# Patient Record
Sex: FEMALE | Race: WHITE | NOT HISPANIC OR LATINO | Employment: OTHER | URBAN - METROPOLITAN AREA
[De-identification: names, ages, dates, MRNs, and addresses within clinical notes are randomized per-mention and may not be internally consistent; named-entity substitution may affect disease eponyms.]

---

## 2017-01-19 RX ORDER — SIMVASTATIN 10 MG/1
TABLET, FILM COATED ORAL
Qty: 90 TABLET | Refills: 1 | Status: SHIPPED | OUTPATIENT
Start: 2017-01-19 | End: 2017-07-10

## 2017-01-19 RX ORDER — LISINOPRIL 10 MG/1
TABLET ORAL
Qty: 90 TABLET | Refills: 1 | Status: SHIPPED | OUTPATIENT
Start: 2017-01-19 | End: 2017-08-02 | Stop reason: SDUPTHER

## 2017-01-27 ENCOUNTER — OFFICE VISIT (OUTPATIENT)
Dept: OPTOMETRY | Facility: CLINIC | Age: 82
End: 2017-01-27
Payer: MEDICARE

## 2017-01-27 DIAGNOSIS — H25.13 NUCLEAR SCLEROSIS, BILATERAL: Primary | ICD-10-CM

## 2017-01-27 DIAGNOSIS — H52.13 MYOPIA WITH ASTIGMATISM AND PRESBYOPIA, BILATERAL: ICD-10-CM

## 2017-01-27 DIAGNOSIS — Z46.0 CONTACT LENS/GLASSES FITTING: ICD-10-CM

## 2017-01-27 DIAGNOSIS — H35.3120 NONEXUDATIVE AGE-RELATED MACULAR DEGENERATION OF LEFT EYE: ICD-10-CM

## 2017-01-27 DIAGNOSIS — H43.813 POSTERIOR VITREOUS DETACHMENT, BILATERAL: ICD-10-CM

## 2017-01-27 DIAGNOSIS — Z46.0 CONTACT LENS/GLASSES FITTING: Primary | ICD-10-CM

## 2017-01-27 DIAGNOSIS — H52.4 MYOPIA WITH ASTIGMATISM AND PRESBYOPIA, BILATERAL: ICD-10-CM

## 2017-01-27 DIAGNOSIS — H52.203 MYOPIA WITH ASTIGMATISM AND PRESBYOPIA, BILATERAL: ICD-10-CM

## 2017-01-27 DIAGNOSIS — Z13.5 GLAUCOMA SCREENING: ICD-10-CM

## 2017-01-27 PROCEDURE — 92015 DETERMINE REFRACTIVE STATE: CPT | Mod: S$GLB,,, | Performed by: OPTOMETRIST

## 2017-01-27 PROCEDURE — 99499 UNLISTED E&M SERVICE: CPT | Mod: S$PBB,,, | Performed by: OPTOMETRIST

## 2017-01-27 PROCEDURE — 99999 PR PBB SHADOW E&M-EST. PATIENT-LVL II: CPT | Mod: PBBFAC,,, | Performed by: OPTOMETRIST

## 2017-01-27 PROCEDURE — 92014 COMPRE OPH EXAM EST PT 1/>: CPT | Mod: S$GLB,,, | Performed by: OPTOMETRIST

## 2017-01-27 PROCEDURE — 92310 CONTACT LENS FITTING OU: CPT | Mod: ,,, | Performed by: OPTOMETRIST

## 2017-01-27 PROCEDURE — 99499 UNLISTED E&M SERVICE: CPT | Mod: S$GLB,,, | Performed by: OPTOMETRIST

## 2017-01-27 NOTE — PROGRESS NOTES
HPI     Annual Exam    Additional comments: DLE 7-16 (timoteo)    pt needs updated specs & clrx            Blurred Vision    Additional comments: at distance only -- near [facility]       Last edited by Urmila Barger on 1/27/2017  1:50 PM. (History)            Assessment /Plan     For exam results, see Encounter Report.    Nuclear sclerosis, bilateral    Nonexudative age-related macular degeneration of left eye    Posterior vitreous detachment, bilateral    Glaucoma screening    Myopia with astigmatism and presbyopia, bilateral    Contact lens/glasses fitting      1. Vis sig OU ready for consult  Pt defers consult at this time  2. Dry changes OS>>OD, appears stable from previous  Continue areds, amsler, sun protection  3. RD precautions given  4. Not suspect  5. Updated specs rx  6. Updated / gave copy clrx    Long discussion AGAIN with patient that current vision not safe for driving.  Changing glasses and contact lens Rx will not improve vision to adequate levels for safe driving  Strongly advised to d/c driving until consult for CE  Pt refuses to schedule stating she has other health concerns that are more pressing.    Advised to call and we will schedule consult per her schedule, otherwise RTC prn

## 2017-01-27 NOTE — MR AVS SNAPSHOT
[facility]  [address] [facility] [address]  [facility] [address]  Phone: [phone]  Fax: [phone]                  Laly Baker   2017 1:45 PM   Office Visit    Description:  Female : 1935   Provider:  SHIRA Murillo, OD   Department:  [facility]           Reason for Visit     Annual Exam     Blurred Vision           Diagnoses this Visit        Comments    Nuclear sclerosis, bilateral    -  Primary     Nonexudative age-related macular degeneration of left eye         Posterior vitreous detachment, bilateral         Glaucoma screening         Myopia with astigmatism and presbyopia, bilateral         Contact lens/glasses fitting                To Do List           Future Appointments        Provider Department Dept Phone    2017 4:00 PM SHIRA Murillo, [facility] [phone]    2017 1:00 PM HAYDE Meléndez MD [facility] [phone]      Goals (5 Years of Data)     None      [facility] On [facility]     [facility] - /7 Assistance  Registered nurses in the [facility] provide clinical advisement, health education, appointment booking, and other advisory services.  Call for this free service at [phone].             Medications           Message regarding Medications     Verify the changes and/or additions to your medication regime listed below are the same as discussed with your clinician today.  If any of these changes or additions are incorrect, please notify your healthcare provider.             Verify that the below list of medications is an accurate representation of the medications you are currently taking.  If none reported, the list may be blank. If incorrect, please contact your healthcare provider. Carry this list with you in case of emergency.           Current Medications     aspirin 81 mg Tab Every day    fish oil-dha-epa (FISH OIL) 1,200-144-216 mg Cap Every day    lisinopril 10 MG tablet  TAKE ONE TABLET BY MOUTH EVERY DAY    multivitamin (THERAGRAN) per tablet Every day    simvastatin (ZOCOR) 10 MG tablet TAKE ONE TABLET BY MOUTH EVERY EVENING    VIT A/VIT C/VIT E/ZINC/COPPER (PRESERVISION AREDS ORAL) Take by mouth.           Clinical Reference Information           Allergies as of 1/27/2017     No Known Allergies      Immunizations Administered on Date of Encounter - 1/27/2017     None      Instructions        Cataract Surgery FAQs  Frequently Asked Questions    My doctor told me I have a cataract     What do I do next?   Relax. Cataracts are a normal part of aging, and cataract surgery is among the safest and most common procedures performed today.  Most patients who have had cataract surgery report significant improvement in their quality of life because of their improved vision, with a speedy recovery and minimal discomfort or inconvenience.    Your optometrist will recommend a cataract surgeon who will examine your eyes, evaluate the need for surgery, and discuss the details with you and your family.     What exactly is a cataract?   A cataract is simply a darkening or clouding of the eyes internal lens, which blurs your vision.  It is not a film which grows over the eye, but rather a degenerative process which causes the eyes normally clear lens to become cloudy or hazy.     Because this degenerative process occurs slowly over many years, we generally wait until the cataract begins to significantly impact your vision, before recommend surgery.                     How is cataract surgery performed?  Cataract surgery involves removal of the dark or cloudy lens from the eye, and implantation of a new, clear lens implant or IOL.  Cataract surgery is a lens replacement surgery.          Modern cataract surgery is performed as a same-day surgery and typically takes about 15 minutes to complete.  It is performed while you are awake, but you will be medicated so that you are relaxed and  comfortable. Of course, the eye is anesthetized so that you dont feel any discomfort, and many people only vaguely remember the actual procedure, recalling only lights and the sensation of moisture on the eye.     Although is takes about a week to fully heal, most people are able to see better and resume their normal activities the very next day!  You will need to use eye drops for about one month after your surgery.     Will I need glasses after cataract surgery?   The purpose of cataract surgery is to improve the overall quality of your vision, but it does not necessarily eliminate the need for glasses. Although some people dont need to wear glasses after standard cataract surgery, most people will still need to wear glasses for certain tasks.  If you are interested in minimizing or even eliminating the need for glasses, you should ask your surgeon about Refractive Cataract Surgery.    What is Refractive Cataract Surgery?   Refractive Cataract Surgery refers to the use of additional techniques performed either at the time of your cataract surgery or soon afterwards, designed to minimize and often eliminate your need for glasses.  Technologies such as LASIK, Astigmatism Correcting Incisions, Multifocal, Accommodating, or Toric lens implants can all be used, depending on the particular needs of each patient. Only your surgeon can determine if you are a candidate and which techniques are appropriate for your goals and your eye.                         LASIK                Multifocal IOL         Will my insurance cover these additional procedures?   Although your insurance will fully cover your cataract surgery, any other additional procedures -designed solely to eliminate the need for glasses- are considered elective (not medically necessary), and therefore not covered by your insurance.  Rest assured that your vision will be better after cataract surgery, in either case.  You simply need to decide whether  "minimizing your dependence on glasses is worth the additional investment in your eyes.  (Costs typically range between $1,000 to $2,000 per eye, depending on your needs).    View a 1hr video discussing cataract surgery with live patient questions and answers on the [facility] Web Site (Keywords: [facility] Cataract):    [email or web address]_[email or web address]/  DRY EYES:  Use Over The Counter artificial tears as needed for dry eye symptoms.  Some common brands include:  Systane, Optive, and Refresh.  These drops can be used as frequently as desired, but may be most helpful use during long periods of concentrated work.  For example, reading / working at the computer.  Avoid drops that "get redness out", as these contain medication that may further irritate the eyes.    ALLERGY EYES / SYMPTOMS:    Over the counter medications include--Zaditor and Alaway  Use as directed 1-2 drops daily for symptoms of itching / watering eyes.  These drops will not help for dry eye or exposure symptoms.    FLASHES / FLOATERS / POSTERIOR VITREOUS DETACHMENT    Call the clinic if you have any further changes in symptoms.  Including:  Increased numbers of floaters or flashing lights, dimness or darkness that moves through or stays constant in your vision, or any pain in the eye (s).                   "

## 2017-01-27 NOTE — PATIENT INSTRUCTIONS
Cataract Surgery FAQs  Frequently Asked Questions    My doctor told me I have a cataract     What do I do next?   Relax. Cataracts are a normal part of aging, and cataract surgery is among the safest and most common procedures performed today.  Most patients who have had cataract surgery report significant improvement in their quality of life because of their improved vision, with a speedy recovery and minimal discomfort or inconvenience.    Your optometrist will recommend a cataract surgeon who will examine your eyes, evaluate the need for surgery, and discuss the details with you and your family.     What exactly is a cataract?   A cataract is simply a darkening or clouding of the eyes internal lens, which blurs your vision.  It is not a film which grows over the eye, but rather a degenerative process which causes the eyes normally clear lens to become cloudy or hazy.     Because this degenerative process occurs slowly over many years, we generally wait until the cataract begins to significantly impact your vision, before recommend surgery.                     How is cataract surgery performed?  Cataract surgery involves removal of the dark or cloudy lens from the eye, and implantation of a new, clear lens implant or IOL.  Cataract surgery is a lens replacement surgery.          Modern cataract surgery is performed as a same-day surgery and typically takes about 15 minutes to complete.  It is performed while you are awake, but you will be medicated so that you are relaxed and comfortable. Of course, the eye is anesthetized so that you dont feel any discomfort, and many people only vaguely remember the actual procedure, recalling only lights and the sensation of moisture on the eye.     Although is takes about a week to fully heal, most people are able to see better and resume their normal activities the very next day!  You will need to use eye drops for about one month after your surgery.     Will I  need glasses after cataract surgery?   The purpose of cataract surgery is to improve the overall quality of your vision, but it does not necessarily eliminate the need for glasses. Although some people dont need to wear glasses after standard cataract surgery, most people will still need to wear glasses for certain tasks.  If you are interested in minimizing or even eliminating the need for glasses, you should ask your surgeon about Refractive Cataract Surgery.    What is Refractive Cataract Surgery?   Refractive Cataract Surgery refers to the use of additional techniques performed either at the time of your cataract surgery or soon afterwards, designed to minimize and often eliminate your need for glasses.  Technologies such as LASIK, Astigmatism Correcting Incisions, Multifocal, Accommodating, or Toric lens implants can all be used, depending on the particular needs of each patient. Only your surgeon can determine if you are a candidate and which techniques are appropriate for your goals and your eye.                         LASIK                Multifocal IOL         Will my insurance cover these additional procedures?   Although your insurance will fully cover your cataract surgery, any other additional procedures -designed solely to eliminate the need for glasses- are considered elective (not medically necessary), and therefore not covered by your insurance.  Rest assured that your vision will be better after cataract surgery, in either case.  You simply need to decide whether minimizing your dependence on glasses is worth the additional investment in your eyes.  (Costs typically range between $1,000 to $2,000 per eye, depending on your needs).    View a 1hr video discussing cataract surgery with live patient questions and answers on the [employer] Web Site (Keywords: [facility] Cataract):    [email or web address]/[email or web address] license of [facility]_cataracts/  DRY EYES:  Use Over The Counter artificial tears as needed  "for dry eye symptoms.  Some common brands include:  Systane, Optive, and Refresh.  These drops can be used as frequently as desired, but may be most helpful use during long periods of concentrated work.  For example, reading / working at the computer.  Avoid drops that "get redness out", as these contain medication that may further irritate the eyes.    ALLERGY EYES / SYMPTOMS:    Over the counter medications include--Zaditor and Alaway  Use as directed 1-2 drops daily for symptoms of itching / watering eyes.  These drops will not help for dry eye or exposure symptoms.    FLASHES / FLOATERS / POSTERIOR VITREOUS DETACHMENT    Call the clinic if you have any further changes in symptoms.  Including:  Increased numbers of floaters or flashing lights, dimness or darkness that moves through or stays constant in your vision, or any pain in the eye (s).              "

## 2017-02-06 ENCOUNTER — TELEPHONE (OUTPATIENT)
Dept: FAMILY MEDICINE | Facility: CLINIC | Age: 82
End: 2017-02-06

## 2017-02-06 DIAGNOSIS — Z12.39 SCREENING FOR BREAST CANCER: Primary | ICD-10-CM

## 2017-02-06 DIAGNOSIS — Z78.0 POSTMENOPAUSAL: ICD-10-CM

## 2017-02-06 NOTE — TELEPHONE ENCOUNTER
----- Message from Aurea Lara sent at 2/6/2017  1:58 PM CST -----  Bone davonte and mammo order patient would like doctor to put in.

## 2017-02-06 NOTE — TELEPHONE ENCOUNTER
See message below. Please advise. Thank you.  Both pended. Was not sure if that was the correct bone density.

## 2017-03-01 ENCOUNTER — HOSPITAL ENCOUNTER (OUTPATIENT)
Dept: RADIOLOGY | Facility: HOSPITAL | Age: 82
Discharge: HOME | End: 2017-03-01
Attending: FAMILY MEDICINE
Payer: MEDICARE

## 2017-03-01 DIAGNOSIS — Z12.39 SCREENING FOR BREAST CANCER: ICD-10-CM

## 2017-03-01 DIAGNOSIS — Z78.0 POSTMENOPAUSAL: ICD-10-CM

## 2017-03-01 DIAGNOSIS — Z12.31 VISIT FOR SCREENING MAMMOGRAM: ICD-10-CM

## 2017-03-01 PROCEDURE — 77080 DXA BONE DENSITY AXIAL: CPT | Mod: 26,,, | Performed by: RADIOLOGY

## 2017-03-01 PROCEDURE — 77067 SCR MAMMO BI INCL CAD: CPT | Mod: 26,,, | Performed by: RADIOLOGY

## 2017-03-01 PROCEDURE — 77080 DXA BONE DENSITY AXIAL: CPT | Mod: TC,PO

## 2017-03-01 PROCEDURE — 77063 BREAST TOMOSYNTHESIS BI: CPT | Mod: 26,,, | Performed by: RADIOLOGY

## 2017-03-01 PROCEDURE — 77067 SCR MAMMO BI INCL CAD: CPT | Mod: TC

## 2017-03-03 ENCOUNTER — TELEPHONE (OUTPATIENT)
Dept: FAMILY MEDICINE | Facility: CLINIC | Age: 82
End: 2017-03-03

## 2017-03-03 NOTE — TELEPHONE ENCOUNTER
Spoke to patient and stated to her that the bone density was normal for her age. Pt verbalized understanding.

## 2017-03-03 NOTE — TELEPHONE ENCOUNTER
----- Message from Noemi Garcia sent at 3/2/2017  4:06 PM CST -----  Contact: ctuu- [phone]  Patient returning a phone call. Thanks!

## 2017-03-06 ENCOUNTER — TELEPHONE (OUTPATIENT)
Dept: FAMILY MEDICINE | Facility: CLINIC | Age: 82
End: 2017-03-06

## 2017-03-06 NOTE — TELEPHONE ENCOUNTER
----- Message from Jaquelin Conroy sent at 3/6/2017  9:18 AM CST -----  Contact: self                 attn:  Carol  Patient [phone] is returning call Nurse Carol from this past Friday 03 03 17/please call

## 2017-05-08 ENCOUNTER — TELEPHONE (OUTPATIENT)
Dept: FAMILY MEDICINE | Facility: CLINIC | Age: 82
End: 2017-05-08

## 2017-05-08 DIAGNOSIS — I10 ESSENTIAL HYPERTENSION: Primary | Chronic | ICD-10-CM

## 2017-05-08 DIAGNOSIS — E78.5 HYPERLIPIDEMIA, UNSPECIFIED HYPERLIPIDEMIA TYPE: Chronic | ICD-10-CM

## 2017-05-08 NOTE — TELEPHONE ENCOUNTER
----- Message from Ami Soliman sent at 5/8/2017  9:41 AM CDT -----  Patient is schedule on 05/16/17 for her f/u appointment contact patient to advise if labs are required prior to appointment at [phone].    Thank you

## 2017-05-09 NOTE — TELEPHONE ENCOUNTER
----- Message from Ami Soliman sent at 5/9/2017 12:43 PM CDT -----  Patient is returning nurses call, contact patient at [phone].    Thank you

## 2017-05-09 NOTE — TELEPHONE ENCOUNTER
Spoke to pt and rescheduled her appt. Pt is wanting to know if she needs to get labs done? Please advise. Pt would like to go on Thursday with her .

## 2017-05-09 NOTE — TELEPHONE ENCOUNTER
----- Message from Nasrin Raman sent at 5/9/2017 10:12 AM CDT -----  Contact: Self  Pt was scheduled for the 18th with her . Her husbands appt was rescheduled, but when they went to reschedule her appt, they scheduled it for the same day. She is suppose to come in on the 23rd with her . Also, her  is doing lab work on the 17th, and the wife is as well,but there are not any orders in the computer. Please call pt to discuss appts.

## 2017-05-18 NOTE — TELEPHONE ENCOUNTER
Spoke to pt and notified her labs entered. Pt states she will come in on Saturday to have them done.

## 2017-05-19 ENCOUNTER — LAB VISIT (OUTPATIENT)
Dept: LAB | Facility: HOSPITAL | Age: 82
End: 2017-05-19
Attending: FAMILY MEDICINE
Payer: MEDICARE

## 2017-05-19 DIAGNOSIS — I10 ESSENTIAL HYPERTENSION: Chronic | ICD-10-CM

## 2017-05-19 DIAGNOSIS — E78.5 HYPERLIPIDEMIA, UNSPECIFIED HYPERLIPIDEMIA TYPE: Chronic | ICD-10-CM

## 2017-05-19 LAB
ALBUMIN SERPL BCP-MCNC: 3.8 G/DL
ALP SERPL-CCNC: 91 U/L
ALT SERPL W/O P-5'-P-CCNC: 28 U/L
ANION GAP SERPL CALC-SCNC: 7 MMOL/L
AST SERPL-CCNC: 34 U/L
BASOPHILS # BLD AUTO: 0.03 K/UL
BASOPHILS NFR BLD: 0.5 %
BILIRUB SERPL-MCNC: 0.6 MG/DL
BUN SERPL-MCNC: 22 MG/DL
CALCIUM SERPL-MCNC: 9.8 MG/DL
CHLORIDE SERPL-SCNC: 107 MMOL/L
CHOLEST/HDLC SERPL: 2.2 {RATIO}
CO2 SERPL-SCNC: 29 MMOL/L
CREAT SERPL-MCNC: 0.9 MG/DL
DIFFERENTIAL METHOD: NORMAL
EOSINOPHIL # BLD AUTO: 0.2 K/UL
EOSINOPHIL NFR BLD: 3.2 %
ERYTHROCYTE [DISTWIDTH] IN BLOOD BY AUTOMATED COUNT: 13.8 %
EST. GFR  (AFRICAN AMERICAN): >60 ML/MIN/1.73 M^2
EST. GFR  (NON AFRICAN AMERICAN): >60 ML/MIN/1.73 M^2
GLUCOSE SERPL-MCNC: 102 MG/DL
HCT VFR BLD AUTO: 40.7 %
HDL/CHOLESTEROL RATIO: 44.5 %
HDLC SERPL-MCNC: 200 MG/DL
HDLC SERPL-MCNC: 89 MG/DL
HGB BLD-MCNC: 13.1 G/DL
LDLC SERPL CALC-MCNC: 98.4 MG/DL
LYMPHOCYTES # BLD AUTO: 1.1 K/UL
LYMPHOCYTES NFR BLD: 18.8 %
MCH RBC QN AUTO: 30.3 PG
MCHC RBC AUTO-ENTMCNC: 32.2 %
MCV RBC AUTO: 94 FL
MONOCYTES # BLD AUTO: 0.8 K/UL
MONOCYTES NFR BLD: 13.5 %
NEUTROPHILS # BLD AUTO: 3.9 K/UL
NEUTROPHILS NFR BLD: 64 %
NONHDLC SERPL-MCNC: 111 MG/DL
PLATELET # BLD AUTO: 199 K/UL
PMV BLD AUTO: 10.1 FL
POTASSIUM SERPL-SCNC: 4.6 MMOL/L
PROT SERPL-MCNC: 7 G/DL
RBC # BLD AUTO: 4.33 M/UL
SODIUM SERPL-SCNC: 143 MMOL/L
T4 FREE SERPL-MCNC: 0.8 NG/DL
TRIGL SERPL-MCNC: 63 MG/DL
TSH SERPL DL<=0.005 MIU/L-ACNC: 4.2 UIU/ML
WBC # BLD AUTO: 6.01 K/UL

## 2017-05-19 PROCEDURE — 85025 COMPLETE CBC W/AUTO DIFF WBC: CPT

## 2017-05-19 PROCEDURE — 80061 LIPID PANEL: CPT

## 2017-05-19 PROCEDURE — 36415 COLL VENOUS BLD VENIPUNCTURE: CPT | Mod: PO

## 2017-05-19 PROCEDURE — 84443 ASSAY THYROID STIM HORMONE: CPT

## 2017-05-19 PROCEDURE — 84439 ASSAY OF FREE THYROXINE: CPT

## 2017-05-19 PROCEDURE — 80053 COMPREHEN METABOLIC PANEL: CPT

## 2017-05-23 ENCOUNTER — OFFICE VISIT (OUTPATIENT)
Dept: FAMILY MEDICINE | Facility: CLINIC | Age: 82
End: 2017-05-23
Payer: MEDICARE

## 2017-05-23 VITALS
HEIGHT: 64 IN | SYSTOLIC BLOOD PRESSURE: 138 MMHG | DIASTOLIC BLOOD PRESSURE: 72 MMHG | HEART RATE: 76 BPM | WEIGHT: 103.19 LBS | RESPIRATION RATE: 20 BRPM | BODY MASS INDEX: 17.62 KG/M2

## 2017-05-23 DIAGNOSIS — I10 ESSENTIAL HYPERTENSION: Chronic | ICD-10-CM

## 2017-05-23 DIAGNOSIS — E78.5 HYPERLIPIDEMIA, UNSPECIFIED HYPERLIPIDEMIA TYPE: Primary | Chronic | ICD-10-CM

## 2017-05-23 DIAGNOSIS — E46 PROTEIN CALORIE MALNUTRITION: ICD-10-CM

## 2017-05-23 PROCEDURE — 1125F AMNT PAIN NOTED PAIN PRSNT: CPT | Mod: S$GLB,,, | Performed by: FAMILY MEDICINE

## 2017-05-23 PROCEDURE — 1160F RVW MEDS BY RX/DR IN RCRD: CPT | Mod: S$GLB,,, | Performed by: FAMILY MEDICINE

## 2017-05-23 PROCEDURE — 1159F MED LIST DOCD IN RCRD: CPT | Mod: S$GLB,,, | Performed by: FAMILY MEDICINE

## 2017-05-23 PROCEDURE — 3078F DIAST BP <80 MM HG: CPT | Mod: S$GLB,,, | Performed by: FAMILY MEDICINE

## 2017-05-23 PROCEDURE — 3075F SYST BP GE 130 - 139MM HG: CPT | Mod: S$GLB,,, | Performed by: FAMILY MEDICINE

## 2017-05-23 PROCEDURE — 1157F ADVNC CARE PLAN IN RCRD: CPT | Mod: S$GLB,,, | Performed by: FAMILY MEDICINE

## 2017-05-23 PROCEDURE — 99499 UNLISTED E&M SERVICE: CPT | Mod: S$PBB,,, | Performed by: FAMILY MEDICINE

## 2017-05-23 PROCEDURE — 99214 OFFICE O/P EST MOD 30 MIN: CPT | Mod: S$GLB,,, | Performed by: FAMILY MEDICINE

## 2017-05-23 PROCEDURE — 99999 PR PBB SHADOW E&M-EST. PATIENT-LVL III: CPT | Mod: PBBFAC,,, | Performed by: FAMILY MEDICINE

## 2017-05-23 NOTE — PROGRESS NOTES
Subjective:       Patient ID: Laly Baker is a 82 y.o. female.    Chief Complaint: Hypertension (Patient here for 6 month follow up. )    Here for f/u chronic medical issues.        Hypertension   This is a chronic problem. The current episode started more than 1 year ago. The problem is controlled. Pertinent negatives include no chest pain, headaches, palpitations or shortness of breath. Past treatments include ACE inhibitors.     Review of Systems   Constitutional: Negative for activity change, chills, fever and unexpected weight change.   HENT: Negative for hearing loss, rhinorrhea and trouble swallowing.    Eyes: Positive for visual disturbance. Negative for discharge.   Respiratory: Negative for cough, chest tightness, shortness of breath and wheezing.    Cardiovascular: Negative for chest pain, palpitations and leg swelling.   Gastrointestinal: Negative for blood in stool, constipation, diarrhea and vomiting.   Endocrine: Negative for cold intolerance, heat intolerance, polydipsia and polyuria.   Genitourinary: Negative for difficulty urinating, dysuria, hematuria and menstrual problem.   Musculoskeletal: Positive for arthralgias. Negative for joint swelling.   Skin: Negative for rash.   Neurological: Negative for weakness and headaches.   Psychiatric/Behavioral: Negative for confusion and dysphoric mood.       Objective:      Physical Exam   Constitutional: She appears well-developed and well-nourished.   HENT:   Head: Normocephalic and atraumatic.   Cardiovascular: Normal rate, regular rhythm and normal heart sounds.    Pulmonary/Chest: Effort normal and breath sounds normal.   Abdominal: Soft. Bowel sounds are normal.   Psychiatric: She has a normal mood and affect.   Nursing note and vitals reviewed.      Assessment:       1. Hyperlipidemia, unspecified hyperlipidemia type    2. Essential hypertension    3. Protein calorie malnutrition        Plan:       Hyperlipidemia, unspecified hyperlipidemia  type    Essential hypertension    Protein calorie malnutrition      Thinks had tetanus booster a few years ago and to call if so.   Will monitor chronic medical issues and continue current plan of care.  Add calcium and vit d.    Return in about 6 months (around 11/23/2017), or if symptoms worsen or fail to improve.

## 2017-05-30 PROBLEM — A41.9 SEVERE SEPSIS: Status: ACTIVE | Noted: 2017-05-30

## 2017-05-30 PROBLEM — R10.11 ABDOMINAL PAIN, BILATERAL UPPER QUADRANT: Status: ACTIVE | Noted: 2017-05-30

## 2017-05-30 PROBLEM — R10.12 ABDOMINAL PAIN, BILATERAL UPPER QUADRANT: Status: ACTIVE | Noted: 2017-05-30

## 2017-05-30 PROBLEM — K85.10 ACUTE BILIARY PANCREATITIS: Status: ACTIVE | Noted: 2017-05-30

## 2017-05-30 PROBLEM — E86.0 DEHYDRATION, MODERATE: Status: ACTIVE | Noted: 2017-05-30

## 2017-05-30 PROBLEM — K85.90 ACUTE PANCREATITIS: Status: ACTIVE | Noted: 2017-05-30

## 2017-05-30 PROBLEM — R65.20 SEVERE SEPSIS: Status: ACTIVE | Noted: 2017-05-30

## 2017-05-31 PROBLEM — N17.0 ACUTE RENAL FAILURE WITH TUBULAR NECROSIS: Status: ACTIVE | Noted: 2017-05-31

## 2017-06-09 ENCOUNTER — TELEPHONE (OUTPATIENT)
Dept: FAMILY MEDICINE | Facility: CLINIC | Age: 82
End: 2017-06-09

## 2017-06-09 ENCOUNTER — PATIENT MESSAGE (OUTPATIENT)
Dept: FAMILY MEDICINE | Facility: CLINIC | Age: 82
End: 2017-06-09

## 2017-06-09 NOTE — TELEPHONE ENCOUNTER
"LOV 5/23/2017    [facility] contacted us VIA Fax that " Patient was in [facility] from 5/30 through 6/7. She was admitted to [facility] on 6/8, who received a call from the patient's  stating that the patient continues to have diarrhea and is requesting a prescription to help stop the diarrhea"    Please advise.  "

## 2017-06-12 ENCOUNTER — TELEPHONE (OUTPATIENT)
Dept: FAMILY MEDICINE | Facility: CLINIC | Age: 82
End: 2017-06-12

## 2017-06-12 NOTE — TELEPHONE ENCOUNTER
----- Message from Nerissa Garibay sent at 6/9/2017  3:35 PM CDT -----  Contact: Aaron with [facility]   Aaron with [facility] would like to speak to a nurse   The patient just got out of the hospital and is having diarrhea     Please call her at  to advise what the patient can take      Thanks

## 2017-06-13 ENCOUNTER — PATIENT MESSAGE (OUTPATIENT)
Dept: FAMILY MEDICINE | Facility: CLINIC | Age: 82
End: 2017-06-13

## 2017-06-13 NOTE — TELEPHONE ENCOUNTER
Note from pharmacy regarding refill for KCL and Furosemide. Spoke with  via telephone. I informed him that Dr. Meléndez would like for her to have labs drawn prior to refilling either medication. He states she no longer has diarrhea, Have gave her an anti-diarrheal (over the counter). He states home health will be drawing blood work on the patient tomorrow morning but he does not know who the ordering physician is. He states they have enough medication for today and tomorrow. She has an appt to see Dr. Meléndez on 06/15/2017. I have instructed him to please keep the appt. on 06/15/2017. Please try to contact nurse to see who is ordering physician, what labs are being ordered, and to be sure that Dr. Meléndez will be able to get results. Need to be sure she is having a BMP ordered. Thanks

## 2017-06-13 NOTE — TELEPHONE ENCOUNTER
"As previously addressed she needs evaluation as this could be c.diff and "stopping" the diarrhea can be dangerous as toxins could build up  Unfortunately no easy script can be sent  Needs visit  "

## 2017-06-14 ENCOUNTER — TELEPHONE (OUTPATIENT)
Dept: FAMILY MEDICINE | Facility: CLINIC | Age: 82
End: 2017-06-14

## 2017-06-14 NOTE — TELEPHONE ENCOUNTER
----- Message from Lester Dumont sent at 6/14/2017 11:04 AM CDT -----  Contact: [place] - Aaron  States that she is returning the call and to please call her back at [phone].  Thank you

## 2017-06-14 NOTE — TELEPHONE ENCOUNTER
----- Message from Ann Marie Frank sent at 6/14/2017 10:02 AM CDT -----  Contact:  rey   Stating the hospital visit for 06 15 2017 was cancelled wrongly by automated system   Wants it rescheduled   Call back

## 2017-06-14 NOTE — TELEPHONE ENCOUNTER
Attempted to contact pt nurse. No answer. LM  Previous message has been sent to Dr. Meléndez to see what to do as appt has already been taken.

## 2017-06-14 NOTE — TELEPHONE ENCOUNTER
Spoke to pt and notified her that unfortunately there was nothing that could be done in regards to the canceled appt. Advised pt that Dr. Meléndez suggest she needs to see a NP if she needed to be seen sooner. Pt was still upset but agreed to appt with NP. Scheduled pt first appt available with NP Andrew. Friday at 8am.

## 2017-06-14 NOTE — TELEPHONE ENCOUNTER
See message below. Please advise. Thank you.  They cancelled the appt accidentally and the appt has been taken. You have no more openings this week. Please advise as the pt  was very upset stating that the pt really needs to see you.

## 2017-06-14 NOTE — TELEPHONE ENCOUNTER
Spoke to pt earlier in previous message regarding this appointment and pt  sent this. appt was canceled by an automated response. Pt has appt on Friday with NP. Please advise as how to handle this message. Thanks.

## 2017-06-14 NOTE — TELEPHONE ENCOUNTER
----- Message from Cookie Walker sent at 6/14/2017 10:21 AM CDT -----  Contact: Aaron london/ [facility]  Aaron london/ [facility] calling in regards to the patient's appt. The patient accidentally cancelled appt for 6/15/17 during the reminder call. She desperately needs this appt. Aaron would like to speak with a Nurse to schedule the appt for tomorrow. Please advise.  Call back Aaron .  Thanks!

## 2017-06-14 NOTE — TELEPHONE ENCOUNTER
Not going to argue via mychart; last time I will address this; if she has abd pain and diarrhea go to ER;  I can't be any more clear  No treatment for either from me

## 2017-06-14 NOTE — TELEPHONE ENCOUNTER
----- Message from Zara Hernandez sent at 6/14/2017  8:38 AM CDT -----  Contact: Sarah Boogie  Home health nurse called regarding rescheduling her hospital f/u for tomorrow. Cancelled the appt by accident when received a phone call about the reminder. Wanted to reschedule for tomorrow same time. Please contact [phone] (home)

## 2017-06-14 NOTE — TELEPHONE ENCOUNTER
This patient is hospital FU - she needs to see her PCP for FU or TCC clinic - this is complex case and should be seeing TCC clinic   Please rescheudle her appt or make it with her PCP =- as she had booked on 6-15-17 already

## 2017-06-14 NOTE — TELEPHONE ENCOUNTER
Spoke to falguni and notified her that the appt has been taken and we are waiting on a response from dr bojorquez.. Notified her that we will notify pt when he responds.

## 2017-06-15 ENCOUNTER — PATIENT MESSAGE (OUTPATIENT)
Dept: FAMILY MEDICINE | Facility: CLINIC | Age: 82
End: 2017-06-15

## 2017-06-15 RX ORDER — POTASSIUM CHLORIDE 750 MG/1
10 TABLET, EXTENDED RELEASE ORAL DAILY
Qty: 7 TABLET | Refills: 0 | Status: SHIPPED | OUTPATIENT
Start: 2017-06-15 | End: 2017-07-10

## 2017-06-15 RX ORDER — FUROSEMIDE 20 MG/1
20 TABLET ORAL DAILY
Qty: 7 TABLET | Refills: 0 | Status: SHIPPED | OUTPATIENT
Start: 2017-06-15 | End: 2017-07-10

## 2017-06-15 NOTE — TELEPHONE ENCOUNTER
Spoke to pt and  and advised your recommendations. States pt is just going to wait until appt tomorrow.

## 2017-06-15 NOTE — TELEPHONE ENCOUNTER
Spoke to Mary with transitional care team at [facility], patient is set for 11:00am appointment 6/16/2017. Spoke to patient who confirmed that they will be attending that appointment.

## 2017-06-15 NOTE — TELEPHONE ENCOUNTER
Supposedly has an appointment tomorrow at [facility]; it is odd they are putting all this effort into these messages instead of going to the ER as advised to begin with as she could have life threatening issue going on    DO NOT COPY/PASTE this message.  Call the patient and advise what I have said please

## 2017-06-15 NOTE — TELEPHONE ENCOUNTER
----- Message from Shavonne Gudino sent at 6/14/2017  2:55 PM CDT -----  Regarding: Re: Medication refill  Patient's  in the office requesting a refill on his wife's medication be called in to our pharmacy here at [facility] so that he can pick it up tomorrow as she is completely out. I have scheduled an appointment for the patient on Friday, 6/16, with Elina Andrew. The appointment she had with Dr Meléndez for tomorrow, 6/15, was somehow cancelled and the patient's  was rather upset.

## 2017-06-15 NOTE — TELEPHONE ENCOUNTER
See message below. Please advise. Thank you.  Did advise pt of your last message and this was sent back. How would you like us to address? Again pt has appt with NP on Friday.

## 2017-06-15 NOTE — TELEPHONE ENCOUNTER
"Spoke with patient today about using [facility] for follow up given that patient is "sick, and not feeling well", but needs to be seen for hospital follow up.   Patient asked for refills of K+ and Lasix as she is out of those medications and needs refills ASAP.  "

## 2017-06-16 RX ORDER — FUROSEMIDE 20 MG/1
TABLET ORAL
Qty: 90 TABLET | Refills: 0 | OUTPATIENT
Start: 2017-06-16

## 2017-06-16 RX ORDER — POTASSIUM CHLORIDE 750 MG/1
TABLET, EXTENDED RELEASE ORAL
Qty: 90 TABLET | Refills: 0 | OUTPATIENT
Start: 2017-06-16

## 2017-06-20 ENCOUNTER — TELEPHONE (OUTPATIENT)
Dept: FAMILY MEDICINE | Facility: CLINIC | Age: 82
End: 2017-06-20

## 2017-06-20 NOTE — TELEPHONE ENCOUNTER
Received Fax from [facility]. Patient declines assistance with [facility]. Patient states that she is able to perform ADL's.

## 2017-06-26 ENCOUNTER — HOSPITAL ENCOUNTER (OUTPATIENT)
Dept: RADIOLOGY | Facility: HOSPITAL | Age: 82
Discharge: HOME | End: 2017-06-26
Attending: INTERNAL MEDICINE
Payer: MEDICARE

## 2017-06-26 DIAGNOSIS — K85.91 ACUTE NECROSIS OF PANCREAS: ICD-10-CM

## 2017-06-26 PROCEDURE — 74178 CT ABD&PLV WO CNTR FLWD CNTR: CPT | Mod: 26,,, | Performed by: RADIOLOGY

## 2017-06-26 PROCEDURE — 25500020 PHARM REV CODE 255: Mod: PO | Performed by: INTERNAL MEDICINE

## 2017-06-26 PROCEDURE — 74178 CT ABD&PLV WO CNTR FLWD CNTR: CPT | Mod: TC,PO

## 2017-06-26 RX ADMIN — IOHEXOL 50 ML: 350 INJECTION, SOLUTION INTRAVENOUS at 01:06

## 2017-06-26 RX ADMIN — IOHEXOL 30 ML: 350 INJECTION, SOLUTION INTRAVENOUS at 01:06

## 2017-06-28 ENCOUNTER — INITIAL CONSULT (OUTPATIENT)
Dept: OPHTHALMOLOGY | Facility: CLINIC | Age: 82
End: 2017-06-28
Payer: MEDICARE

## 2017-06-28 DIAGNOSIS — H25.12 NUCLEAR SCLEROTIC CATARACT OF LEFT EYE: ICD-10-CM

## 2017-06-28 DIAGNOSIS — H25.11 NUCLEAR SCLEROTIC CATARACT OF RIGHT EYE: Primary | ICD-10-CM

## 2017-06-28 PROCEDURE — 99999 PR PBB SHADOW E&M-EST. PATIENT-LVL II: CPT | Mod: PBBFAC,,, | Performed by: OPHTHALMOLOGY

## 2017-06-28 PROCEDURE — 99499 UNLISTED E&M SERVICE: CPT | Mod: S$PBB,,, | Performed by: OPHTHALMOLOGY

## 2017-06-28 PROCEDURE — 92014 COMPRE OPH EXAM EST PT 1/>: CPT | Mod: S$GLB,,, | Performed by: OPHTHALMOLOGY

## 2017-06-28 PROCEDURE — 92136 OPHTHALMIC BIOMETRY: CPT | Mod: RT,S$GLB,, | Performed by: OPHTHALMOLOGY

## 2017-06-28 RX ORDER — PREDNISOLONE ACETATE 10 MG/ML
1 SUSPENSION/ DROPS OPHTHALMIC 3 TIMES DAILY
Qty: 5 ML | Refills: 1 | Status: SHIPPED | OUTPATIENT
Start: 2017-06-28 | End: 2017-07-10

## 2017-06-28 RX ORDER — OFLOXACIN 3 MG/ML
1 SOLUTION/ DROPS OPHTHALMIC 3 TIMES DAILY
Qty: 5 ML | Refills: 0 | Status: SHIPPED | OUTPATIENT
Start: 2017-06-28 | End: 2017-09-26 | Stop reason: SDUPTHER

## 2017-06-28 NOTE — LETTER
June 28, 2017      SHIRA Murillo, OD  [address] [facility] [address] [place] [address]           [facility]  [address] [facility] [address] [place] [address]  Phone: [phone]  Fax: [phone]          Patient: Laly Baker   MR Number: [id number]   YOB: 1935   Date of Visit: 6/28/2017       Dear Dr. SHIRA Murillo:    Thank you for referring Laly Baker to me for evaluation. Attached you will find relevant portions of my assessment and plan of care.    If you have questions, please do not hesitate to call me. I look forward to following Laly Baker along with you.    Sincerely,    Isabela Rosales MD    Enclosure  CC:  No Recipients    If you would like to receive this communication electronically, please contact [email or web address] or [phone] to request more information on [employer] Link access.    For providers and/or their staff who would like to refer a patient to [facility], please contact us through our one-stop-shop provider referral line, [facility], at [phone].    If you feel you have received this communication in error or would no longer like to receive these types of communications, please e-mail [email or web address]

## 2017-06-28 NOTE — PROGRESS NOTES
HPI     Cihdi ref    1. NS OU  2.PVD OU    Cat erema--Pt complains of blurred-no longer wearing contact lens OD. Pt no   longer driving due to vision. Pt was in hospital for 8 days for   pancreatitis.      Last edited by Deidre Roberts on 6/28/2017  9:43 AM. (History)            Assessment /Plan     For exam results, see Encounter Report.    Nuclear sclerotic cataract of right eye  -     IOL Master - OD - Right Eye    Nuclear sclerotic cataract of left eye    Other orders  -     prednisoLONE acetate (PRED FORTE) 1 % DrpS; Place 1 drop into the right eye 3 (three) times daily.  Dispense: 5 mL; Refill: 1  -     ofloxacin (OCUFLOX) 0.3 % ophthalmic solution; Place 1 drop into the right eye 3 (three) times daily.  Dispense: 5 mL; Refill: 0      Visually significant nuclear sclerotic cataract   - Interfering with activities of daily living.  Pt desires cataract surgery for [facility] rehabilitation.   - R/B/A discussed and pt agrees to proceed with surgery.   - IOL options discussed according to patient's goals and concomitant ocular pathology; and pt content with monofocal lens.    - Target: plano.    pcboo 16.0 OD    (pcboo 18.0 OS)    * pt has natural monoVA but wants distance OU*

## 2017-07-10 ENCOUNTER — OFFICE VISIT (OUTPATIENT)
Dept: FAMILY MEDICINE | Facility: CLINIC | Age: 82
End: 2017-07-10
Payer: MEDICARE

## 2017-07-10 ENCOUNTER — TELEPHONE (OUTPATIENT)
Dept: OPHTHALMOLOGY | Facility: CLINIC | Age: 82
End: 2017-07-10

## 2017-07-10 ENCOUNTER — LAB VISIT (OUTPATIENT)
Dept: LAB | Facility: HOSPITAL | Age: 82
End: 2017-07-10
Attending: NURSE PRACTITIONER
Payer: MEDICARE

## 2017-07-10 VITALS
RESPIRATION RATE: 18 BRPM | SYSTOLIC BLOOD PRESSURE: 106 MMHG | HEIGHT: 64 IN | WEIGHT: 93.06 LBS | BODY MASS INDEX: 15.89 KG/M2 | OXYGEN SATURATION: 98 % | HEART RATE: 67 BPM | TEMPERATURE: 98 F | DIASTOLIC BLOOD PRESSURE: 60 MMHG

## 2017-07-10 DIAGNOSIS — Z01.818 PRE-OPERATIVE EXAMINATION: Primary | ICD-10-CM

## 2017-07-10 DIAGNOSIS — Z01.818 PRE-OPERATIVE EXAMINATION: ICD-10-CM

## 2017-07-10 DIAGNOSIS — H25.11 CATARACT, NUCLEAR SCLEROTIC, RIGHT EYE: ICD-10-CM

## 2017-07-10 LAB
ALBUMIN SERPL BCP-MCNC: 3.9 G/DL
ALP SERPL-CCNC: 87 U/L
ALT SERPL W/O P-5'-P-CCNC: 14 U/L
ANION GAP SERPL CALC-SCNC: 8 MMOL/L
AST SERPL-CCNC: 24 U/L
BASOPHILS # BLD AUTO: 0.05 K/UL
BASOPHILS NFR BLD: 0.5 %
BILIRUB SERPL-MCNC: 0.4 MG/DL
BUN SERPL-MCNC: 21 MG/DL
CALCIUM SERPL-MCNC: 10.2 MG/DL
CHLORIDE SERPL-SCNC: 102 MMOL/L
CO2 SERPL-SCNC: 28 MMOL/L
CREAT SERPL-MCNC: 1.2 MG/DL
DIFFERENTIAL METHOD: ABNORMAL
EOSINOPHIL # BLD AUTO: 0.2 K/UL
EOSINOPHIL NFR BLD: 2.2 %
ERYTHROCYTE [DISTWIDTH] IN BLOOD BY AUTOMATED COUNT: 14.3 %
EST. GFR  (AFRICAN AMERICAN): 49.3 ML/MIN/1.73 M^2
EST. GFR  (NON AFRICAN AMERICAN): 42.8 ML/MIN/1.73 M^2
GLUCOSE SERPL-MCNC: 100 MG/DL
HCT VFR BLD AUTO: 36.5 %
HGB BLD-MCNC: 11.5 G/DL
LYMPHOCYTES # BLD AUTO: 2 K/UL
LYMPHOCYTES NFR BLD: 22.2 %
MCH RBC QN AUTO: 28.9 PG
MCHC RBC AUTO-ENTMCNC: 31.5 %
MCV RBC AUTO: 92 FL
MONOCYTES # BLD AUTO: 0.8 K/UL
MONOCYTES NFR BLD: 9 %
NEUTROPHILS # BLD AUTO: 6 K/UL
NEUTROPHILS NFR BLD: 65.9 %
PLATELET # BLD AUTO: 386 K/UL
PMV BLD AUTO: 10.2 FL
POTASSIUM SERPL-SCNC: 4.6 MMOL/L
PROT SERPL-MCNC: 7.9 G/DL
RBC # BLD AUTO: 3.98 M/UL
SODIUM SERPL-SCNC: 138 MMOL/L
WBC # BLD AUTO: 9.15 K/UL

## 2017-07-10 PROCEDURE — 1126F AMNT PAIN NOTED NONE PRSNT: CPT | Mod: S$GLB,,, | Performed by: NURSE PRACTITIONER

## 2017-07-10 PROCEDURE — 85025 COMPLETE CBC W/AUTO DIFF WBC: CPT

## 2017-07-10 PROCEDURE — 36415 COLL VENOUS BLD VENIPUNCTURE: CPT | Mod: PO

## 2017-07-10 PROCEDURE — 99214 OFFICE O/P EST MOD 30 MIN: CPT | Mod: S$GLB,,, | Performed by: NURSE PRACTITIONER

## 2017-07-10 PROCEDURE — 80053 COMPREHEN METABOLIC PANEL: CPT

## 2017-07-10 PROCEDURE — 1157F ADVNC CARE PLAN IN RCRD: CPT | Mod: S$GLB,,, | Performed by: NURSE PRACTITIONER

## 2017-07-10 PROCEDURE — 99999 PR PBB SHADOW E&M-EST. PATIENT-LVL IV: CPT | Mod: PBBFAC,,, | Performed by: NURSE PRACTITIONER

## 2017-07-10 PROCEDURE — 1159F MED LIST DOCD IN RCRD: CPT | Mod: S$GLB,,, | Performed by: NURSE PRACTITIONER

## 2017-07-10 RX ORDER — PREDNISOLONE ACETATE 10 MG/ML
1 SUSPENSION/ DROPS OPHTHALMIC 3 TIMES DAILY
COMMUNITY
End: 2017-09-27 | Stop reason: SDUPTHER

## 2017-07-10 RX ORDER — OFLOXACIN 3 MG/ML
1 SOLUTION/ DROPS OPHTHALMIC 3 TIMES DAILY
COMMUNITY
End: 2018-05-17

## 2017-07-10 NOTE — PROGRESS NOTES
Subjective:       Patient ID: Laly Baker is a 82 y.o. female.    Chief Complaint: Other (Patient here for clearance for cataract surgery. )  She was last seen in primary care by Dr. Meléndez on 05/23/2017. This is her first time seeing me in the clinic.  HPI   She is here today for right eye cataract surgery clearance. Her surgery is scheduled for early August with Dr. Rosales  Vitals:    07/10/17 1017   BP: 106/60   Pulse: 67   Resp: 18   Temp: 98.1 °F (36.7 °C)     Past Medical History:   Diagnosis Date    Cataract     OU    Hyperlipidemia     Hypertension      Lab Results   Component Value Date    WBC 9.15 07/10/2017    HGB 11.5 (L) 07/10/2017    HCT 36.5 (L) 07/10/2017    MCV 92 07/10/2017     (H) 07/10/2017     CMP  Sodium   Date Value Ref Range Status   07/10/2017 138 136 - 145 mmol/L Final     Potassium   Date Value Ref Range Status   07/10/2017 4.6 3.5 - 5.1 mmol/L Final     Chloride   Date Value Ref Range Status   07/10/2017 102 95 - 110 mmol/L Final     CO2   Date Value Ref Range Status   07/10/2017 28 23 - 29 mmol/L Final     Glucose   Date Value Ref Range Status   07/10/2017 100 70 - 110 mg/dL Final     BUN, Bld   Date Value Ref Range Status   07/10/2017 21 8 - 23 mg/dL Final     Creatinine   Date Value Ref Range Status   07/10/2017 1.2 0.5 - 1.4 mg/dL Final     Calcium   Date Value Ref Range Status   07/10/2017 10.2 8.7 - 10.5 mg/dL Final     Total Protein   Date Value Ref Range Status   07/10/2017 7.9 6.0 - 8.4 g/dL Final     Albumin   Date Value Ref Range Status   07/10/2017 3.9 3.5 - 5.2 g/dL Final     Total Bilirubin   Date Value Ref Range Status   07/10/2017 0.4 0.1 - 1.0 mg/dL Final     Comment:     For infants and newborns, interpretation of results should be based  on gestational age, weight and in agreement with clinical  observations.  Premature Infant recommended reference ranges:  Up to 24 hours.............<8.0 mg/dL  Up to 48 hours............<12.0 mg/dL  3-5  days..................<15.0 mg/dL  6-29 days.................<15.0 mg/dL       Alkaline Phosphatase   Date Value Ref Range Status   07/10/2017 87 55 - 135 U/L Final     AST   Date Value Ref Range Status   07/10/2017 24 10 - 40 U/L Final     ALT   Date Value Ref Range Status   07/10/2017 14 10 - 44 U/L Final     Anion Gap   Date Value Ref Range Status   07/10/2017 8 8 - 16 mmol/L Final     eGFR if    Date Value Ref Range Status   07/10/2017 49.3 (A) >60 mL/min/1.73 m^2 Final     eGFR if non    Date Value Ref Range Status   07/10/2017 42.8 (A) >60 mL/min/1.73 m^2 Final     Comment:     Calculation used to obtain the estimated glomerular filtration  rate (eGFR) is the CKD-EPI equation. Since race is unknown   in our information system, the eGFR values for   -American and Non--American patients are given   for each creatinine result.       No results found for: LABA1C, HGBA1C  EK2017  Cardiac Clearance Needed: NO   Anticoagulants: YES fish oil    Review of Systems   Constitutional: Positive for unexpected weight change.     She states she has had more than 10 pound weight loss since with being in the hospital recently.  Hospitalized on 2017 for diagnosis of pancreatitis. I have reviewed her last lab and will repeat her CBC and BMP related to elevated WBC and abnormal potassium and glucose levels which need follow up.  Objective:      Physical Exam   Constitutional: She is oriented to person, place, and time. Vital signs are normal. She appears well-developed and well-nourished.   HENT:   Head: Normocephalic and atraumatic.   Right Ear: Hearing, tympanic membrane, external ear and ear canal normal.   Left Ear: Hearing, tympanic membrane, external ear and ear canal normal.   Nose: Nose normal.   Mouth/Throat: Uvula is midline, oropharynx is clear and moist and mucous membranes are normal.   Eyes: Conjunctivae, EOM and lids are normal. Pupils are equal, round, and  reactive to light. Right eye exhibits no chemosis, no discharge, no exudate and no hordeolum. No foreign body present in the right eye. Left eye exhibits no chemosis, no discharge, no exudate and no hordeolum. No foreign body present in the left eye. No scleral icterus.   Neck: Normal range of motion. Neck supple.   Cardiovascular: Normal rate, regular rhythm and normal heart sounds.    Pulmonary/Chest: Effort normal and breath sounds normal.   Abdominal: Soft. She exhibits no ascites. There is no splenomegaly or hepatomegaly. There is no tenderness.   Lymphadenopathy:        Head (right side): No submental, no submandibular, no tonsillar, no preauricular, no posterior auricular and no occipital adenopathy present.        Head (left side): No submental, no submandibular, no tonsillar, no preauricular, no posterior auricular and no occipital adenopathy present.     She has no cervical adenopathy.   Neurological: She is alert and oriented to person, place, and time.   Skin: Skin is warm, dry and intact.   Psychiatric: She has a normal mood and affect. Her speech is normal and behavior is normal. Judgment and thought content normal. Cognition and memory are normal.   Nursing note and vitals reviewed.      Assessment & Plan:       Pre-operative examination  -     CBC auto differential; Future; Expected date: 07/10/2017  -     Comprehensive metabolic panel; Future; Expected date: 07/10/2017    Cataract, nuclear sclerotic, right eye    I have updated her medical and social history   She is cleared for cataract surgery.      Return if symptoms worsen or fail to improve.

## 2017-07-11 ENCOUNTER — TELEPHONE (OUTPATIENT)
Dept: FAMILY MEDICINE | Facility: CLINIC | Age: 82
End: 2017-07-11

## 2017-07-11 NOTE — TELEPHONE ENCOUNTER
----- Message from Ami Mac sent at 7/11/2017  7:37 AM CDT -----  Patient requesting to speak with nurse concerning lab test results/please call back at [phone] to advise.

## 2017-07-11 NOTE — TELEPHONE ENCOUNTER
I called earlier this morning and was only able to get her . Called again at the number in message and unable to reach via telephone. Please notify her again and tell all labs were acceptable and I have sent her clearance for eye surgery.

## 2017-07-12 NOTE — TELEPHONE ENCOUNTER
----- Message from Noemi Garcia sent at 7/12/2017  2:39 PM CDT -----  Contact: Self-  [phone] will be available at 4:30pm   Patient is returning a phone call. Thanks!

## 2017-07-19 ENCOUNTER — PATIENT MESSAGE (OUTPATIENT)
Dept: OPHTHALMOLOGY | Facility: CLINIC | Age: 82
End: 2017-07-19

## 2017-07-21 ENCOUNTER — TELEPHONE (OUTPATIENT)
Dept: OPHTHALMOLOGY | Facility: CLINIC | Age: 82
End: 2017-07-21

## 2017-07-21 DIAGNOSIS — H25.11 NUCLEAR SCLEROTIC CATARACT OF RIGHT EYE: Primary | ICD-10-CM

## 2017-07-28 ENCOUNTER — LAB VISIT (OUTPATIENT)
Dept: LAB | Facility: HOSPITAL | Age: 82
End: 2017-07-28
Attending: INTERNAL MEDICINE
Payer: MEDICARE

## 2017-07-28 DIAGNOSIS — K86.3 CYST AND PSEUDOCYST OF PANCREAS: Primary | ICD-10-CM

## 2017-07-28 DIAGNOSIS — K86.2 CYST AND PSEUDOCYST OF PANCREAS: Primary | ICD-10-CM

## 2017-07-28 LAB — CANCER AG19-9 SERPL-ACNC: 32 U/ML

## 2017-07-28 PROCEDURE — 36415 COLL VENOUS BLD VENIPUNCTURE: CPT | Mod: PO

## 2017-07-28 PROCEDURE — 86301 IMMUNOASSAY TUMOR CA 19-9: CPT

## 2017-08-02 ENCOUNTER — HOSPITAL ENCOUNTER (OUTPATIENT)
Dept: RADIOLOGY | Facility: HOSPITAL | Age: 82
Discharge: HOME | End: 2017-08-02
Attending: INTERNAL MEDICINE
Payer: MEDICARE

## 2017-08-02 DIAGNOSIS — K86.3 CYST AND PSEUDOCYST OF PANCREAS: ICD-10-CM

## 2017-08-02 DIAGNOSIS — K86.2 CYST AND PSEUDOCYST OF PANCREAS: ICD-10-CM

## 2017-08-02 PROCEDURE — 74178 CT ABD&PLV WO CNTR FLWD CNTR: CPT | Mod: TC,PO

## 2017-08-02 PROCEDURE — 74178 CT ABD&PLV WO CNTR FLWD CNTR: CPT | Mod: 26,,, | Performed by: RADIOLOGY

## 2017-08-02 PROCEDURE — 25500020 PHARM REV CODE 255: Mod: PO | Performed by: INTERNAL MEDICINE

## 2017-08-02 RX ADMIN — IOHEXOL 75 ML: 350 INJECTION, SOLUTION INTRAVENOUS at 12:08

## 2017-08-02 RX ADMIN — IOHEXOL 30 ML: 350 INJECTION, SOLUTION INTRAVENOUS at 12:08

## 2017-08-04 RX ORDER — LISINOPRIL 10 MG/1
TABLET ORAL
Qty: 90 TABLET | Refills: 1 | Status: SHIPPED | OUTPATIENT
Start: 2017-08-04 | End: 2017-11-01 | Stop reason: SDUPTHER

## 2017-08-07 ENCOUNTER — ANESTHESIA EVENT (OUTPATIENT)
Dept: SURGERY | Facility: HOSPITAL | Age: 82
End: 2017-08-07
Payer: MEDICARE

## 2017-08-08 ENCOUNTER — ANESTHESIA (OUTPATIENT)
Dept: SURGERY | Facility: HOSPITAL | Age: 82
End: 2017-08-08
Payer: MEDICARE

## 2017-08-08 ENCOUNTER — HOSPITAL ENCOUNTER (OUTPATIENT)
Facility: HOSPITAL | Age: 82
Discharge: HOME | End: 2017-08-08
Attending: OPHTHALMOLOGY | Admitting: OPHTHALMOLOGY
Payer: MEDICARE

## 2017-08-08 ENCOUNTER — SURGERY (OUTPATIENT)
Age: 82
End: 2017-08-08

## 2017-08-08 VITALS
RESPIRATION RATE: 16 BRPM | HEIGHT: 64 IN | SYSTOLIC BLOOD PRESSURE: 102 MMHG | TEMPERATURE: 98 F | BODY MASS INDEX: 16.39 KG/M2 | OXYGEN SATURATION: 100 % | WEIGHT: 96 LBS | DIASTOLIC BLOOD PRESSURE: 52 MMHG | HEART RATE: 55 BPM

## 2017-08-08 DIAGNOSIS — H25.11 CATARACT, NUCLEAR SCLEROTIC, RIGHT EYE: Primary | ICD-10-CM

## 2017-08-08 DIAGNOSIS — H25.10 SENILE NUCLEAR SCLEROSIS: ICD-10-CM

## 2017-08-08 PROCEDURE — 71000033 HC RECOVERY, INTIAL HOUR: Mod: PO | Performed by: OPHTHALMOLOGY

## 2017-08-08 PROCEDURE — 37000009 HC ANESTHESIA EA ADD 15 MINS: Mod: PO | Performed by: OPHTHALMOLOGY

## 2017-08-08 PROCEDURE — 25000003 PHARM REV CODE 250: Mod: PO | Performed by: ANESTHESIOLOGY

## 2017-08-08 PROCEDURE — 36000706: Mod: PO | Performed by: OPHTHALMOLOGY

## 2017-08-08 PROCEDURE — 37000008 HC ANESTHESIA 1ST 15 MINUTES: Mod: PO | Performed by: OPHTHALMOLOGY

## 2017-08-08 PROCEDURE — 63600175 PHARM REV CODE 636 W HCPCS: Mod: PO | Performed by: OPHTHALMOLOGY

## 2017-08-08 PROCEDURE — 25000003 PHARM REV CODE 250: Mod: PO | Performed by: OPHTHALMOLOGY

## 2017-08-08 PROCEDURE — 66984 XCAPSL CTRC RMVL W/O ECP: CPT | Mod: RT,,, | Performed by: OPHTHALMOLOGY

## 2017-08-08 PROCEDURE — D9220A PRA ANESTHESIA: Mod: CRNA,,, | Performed by: NURSE ANESTHETIST, CERTIFIED REGISTERED

## 2017-08-08 PROCEDURE — 63600175 PHARM REV CODE 636 W HCPCS: Mod: PO | Performed by: NURSE ANESTHETIST, CERTIFIED REGISTERED

## 2017-08-08 PROCEDURE — C9447 INJ, PHENYLEPHRINE KETOROLAC: HCPCS | Mod: PO | Performed by: OPHTHALMOLOGY

## 2017-08-08 PROCEDURE — D9220A PRA ANESTHESIA: Mod: ANES,,, | Performed by: ANESTHESIOLOGY

## 2017-08-08 PROCEDURE — V2632 POST CHMBR INTRAOCULAR LENS: HCPCS | Mod: PO | Performed by: OPHTHALMOLOGY

## 2017-08-08 PROCEDURE — 36000707: Mod: PO | Performed by: OPHTHALMOLOGY

## 2017-08-08 DEVICE — LENS IOL ITEC PRELOAD 16.0D: Type: IMPLANTABLE DEVICE | Site: EYE | Status: FUNCTIONAL

## 2017-08-08 RX ORDER — PREDNISOLONE ACETATE 10 MG/ML
SUSPENSION/ DROPS OPHTHALMIC
Status: DISCONTINUED | OUTPATIENT
Start: 2017-08-08 | End: 2017-08-08 | Stop reason: HOSPADM

## 2017-08-08 RX ORDER — ACETAMINOPHEN 325 MG/1
650 TABLET ORAL EVERY 4 HOURS PRN
Status: CANCELLED | OUTPATIENT
Start: 2017-08-08

## 2017-08-08 RX ORDER — OFLOXACIN 3 MG/ML
1 SOLUTION/ DROPS OPHTHALMIC
Status: COMPLETED | OUTPATIENT
Start: 2017-08-08 | End: 2017-08-08

## 2017-08-08 RX ORDER — MIDAZOLAM HYDROCHLORIDE 1 MG/ML
INJECTION, SOLUTION INTRAMUSCULAR; INTRAVENOUS
Status: DISCONTINUED | OUTPATIENT
Start: 2017-08-08 | End: 2017-08-08

## 2017-08-08 RX ORDER — ONDANSETRON 2 MG/ML
INJECTION INTRAMUSCULAR; INTRAVENOUS
Status: DISCONTINUED | OUTPATIENT
Start: 2017-08-08 | End: 2017-08-08

## 2017-08-08 RX ORDER — PHENYLEPHRINE HYDROCHLORIDE 25 MG/ML
1 SOLUTION/ DROPS OPHTHALMIC
Status: COMPLETED | OUTPATIENT
Start: 2017-08-08 | End: 2017-08-08

## 2017-08-08 RX ORDER — LIDOCAINE HYDROCHLORIDE 10 MG/ML
1 INJECTION, SOLUTION EPIDURAL; INFILTRATION; INTRACAUDAL; PERINEURAL ONCE
Status: DISCONTINUED | OUTPATIENT
Start: 2017-08-08 | End: 2017-08-08 | Stop reason: HOSPADM

## 2017-08-08 RX ORDER — LIDOCAINE HYDROCHLORIDE 10 MG/ML
INJECTION, SOLUTION EPIDURAL; INFILTRATION; INTRACAUDAL; PERINEURAL
Status: DISCONTINUED | OUTPATIENT
Start: 2017-08-08 | End: 2017-08-08 | Stop reason: HOSPADM

## 2017-08-08 RX ORDER — KETOROLAC TROMETHAMINE 5 MG/ML
1 SOLUTION OPHTHALMIC ONCE
Status: COMPLETED | OUTPATIENT
Start: 2017-08-08 | End: 2017-08-08

## 2017-08-08 RX ORDER — PROPARACAINE HYDROCHLORIDE 5 MG/ML
1 SOLUTION/ DROPS OPHTHALMIC
Status: DISCONTINUED | OUTPATIENT
Start: 2017-08-08 | End: 2017-08-08 | Stop reason: HOSPADM

## 2017-08-08 RX ORDER — LIDOCAINE HYDROCHLORIDE 40 MG/ML
INJECTION, SOLUTION RETROBULBAR
Status: DISCONTINUED | OUTPATIENT
Start: 2017-08-08 | End: 2017-08-08 | Stop reason: HOSPADM

## 2017-08-08 RX ORDER — TROPICAMIDE 10 MG/ML
1 SOLUTION/ DROPS OPHTHALMIC
Status: COMPLETED | OUTPATIENT
Start: 2017-08-08 | End: 2017-08-08

## 2017-08-08 RX ORDER — LIDOCAINE HYDROCHLORIDE 40 MG/ML
1 INJECTION, SOLUTION RETROBULBAR
Status: COMPLETED | OUTPATIENT
Start: 2017-08-08 | End: 2017-08-08

## 2017-08-08 RX ORDER — SODIUM CHLORIDE, SODIUM LACTATE, POTASSIUM CHLORIDE, CALCIUM CHLORIDE 600; 310; 30; 20 MG/100ML; MG/100ML; MG/100ML; MG/100ML
INJECTION, SOLUTION INTRAVENOUS CONTINUOUS
Status: DISCONTINUED | OUTPATIENT
Start: 2017-08-08 | End: 2017-08-08 | Stop reason: HOSPADM

## 2017-08-08 RX ORDER — MOXIFLOXACIN 5 MG/ML
SOLUTION/ DROPS OPHTHALMIC
Status: DISCONTINUED | OUTPATIENT
Start: 2017-08-08 | End: 2017-08-08 | Stop reason: HOSPADM

## 2017-08-08 RX ORDER — MOXIFLOXACIN 5 MG/ML
1 SOLUTION/ DROPS OPHTHALMIC
Status: DISCONTINUED | OUTPATIENT
Start: 2017-08-08 | End: 2017-08-08

## 2017-08-08 RX ADMIN — SODIUM CHLORIDE, SODIUM LACTATE, POTASSIUM CHLORIDE, AND CALCIUM CHLORIDE: .6; .31; .03; .02 INJECTION, SOLUTION INTRAVENOUS at 07:08

## 2017-08-08 RX ADMIN — MIDAZOLAM HYDROCHLORIDE 1 MG: 1 INJECTION, SOLUTION INTRAMUSCULAR; INTRAVENOUS at 08:08

## 2017-08-08 RX ADMIN — LIDOCAINE HYDROCHLORIDE 4 MG: 40 SOLUTION RETROBULBAR; TOPICAL at 07:08

## 2017-08-08 RX ADMIN — MOXIFLOXACIN HYDROCHLORIDE 1 DROP: 5 SOLUTION/ DROPS OPHTHALMIC at 09:08

## 2017-08-08 RX ADMIN — LIDOCAINE HYDROCHLORIDE 1 ML: 10 INJECTION, SOLUTION EPIDURAL; INFILTRATION; INTRACAUDAL; PERINEURAL at 09:08

## 2017-08-08 RX ADMIN — Medication 0.5 ML: at 09:08

## 2017-08-08 RX ADMIN — OFLOXACIN 1 DROP: 3 SOLUTION OPHTHALMIC at 07:08

## 2017-08-08 RX ADMIN — TROPICAMIDE 1 DROP: 10 SOLUTION/ DROPS OPHTHALMIC at 07:08

## 2017-08-08 RX ADMIN — PROPARACAINE HYDROCHLORIDE 1 DROP: 5 SOLUTION/ DROPS OPHTHALMIC at 07:08

## 2017-08-08 RX ADMIN — PHENYLEPHRINE AND KETOROLAC 1 EACH: 10.16; 2.88 INJECTION, SOLUTION, CONCENTRATE INTRAOCULAR at 09:08

## 2017-08-08 RX ADMIN — LIDOCAINE HYDROCHLORIDE 3 DROP: 40 SOLUTION RETROBULBAR; TOPICAL at 09:08

## 2017-08-08 RX ADMIN — PREDNISOLONE ACETATE 1 DROP: 10 SUSPENSION OPHTHALMIC at 09:08

## 2017-08-08 RX ADMIN — KETOROLAC TROMETHAMINE 1 DROP: 5 SOLUTION OPHTHALMIC at 07:08

## 2017-08-08 RX ADMIN — SODIUM HYALURONATE 0.8 MG: 10 INJECTION INTRAOCULAR at 09:08

## 2017-08-08 RX ADMIN — PHENYLEPHRINE HYDROCHLORIDE 1 DROP: 25 SOLUTION/ DROPS OPHTHALMIC at 07:08

## 2017-08-08 RX ADMIN — BALANCED SALT SOLUTION 500 ML: 6.4; .75; .48; .3; 3.9; 1.7 SOLUTION OPHTHALMIC at 09:08

## 2017-08-08 RX ADMIN — ONDANSETRON 4 MG: 2 INJECTION, SOLUTION INTRAMUSCULAR; INTRAVENOUS at 08:08

## 2017-08-08 NOTE — OP NOTE
DATE OF PROCEDURE: 08/08/2017    SURGEON: BRETT HOOPER MD    PREOPERATIVE DIAGNOSIS:  Senile nuclear sclerotic cataract right eye.     POSTOPERATIVE DIAGNOSIS: Senile nuclear sclerotic cataract right eye.     PROCEDURE PERFORMED:  Phacoemulsification with placement of intraocular lens, right eye.    IMPLANT:  PCBOO 16.0    ANESTHESIA:  Topical and MAC    COMPLICATIONS: none    ESTIMATED BLOOD LOSS: <1cc    SPECIMENS: none    INDICATIONS FOR PROCEDURE:  This patient presented to the clinic with decreased vision in the right eye and was found to have a cataract.  The risks, benefits, and alternatives were discussed and the patient agreed to proceed with phacoemulsification and implantation of a lens in the right eye.     PROCEDURE IN DETAIL:  The patient was met in the preop holding area.  Consent was confirmed to be signed.  The operative site was marked.  The patient was brought into the operating room by the anesthesia team and placed under monitored anesthesia care.  The right eye was prepped and draped in a sterile ophthalmic fashion.  A Brianda speculum was placed into the right eye.   A paracentesis site was made and 1% preservative-free lidocaine was injected into the anterior chamber.  Viscoelastic  material was injected into the anterior chamber.  A keratome blade was used to make a clear corneal incision.  A cystotome was used to initiate the continuous curvilinear capsulorrhexis which was completed with Utrata forceps.  BSS on a lancaster cannula was used to perform hydrodissection.  The phacoemulsification tip was introduced into the eye and the nucleus was removed in a standard divide-and-conquer fashion.  Remaining cortical material was removed from the eye using irrigation-aspiration.  The capsular bag was filled with viscoelastic material and the intraocular lens was injected and positioned into place. Remaining viscoelastic material was removed from the eye using irrigation and aspiration.  The  corneal wounds were hydrated.  The eye was filled to physiologic pressure. The wounds were found to be watertight. Drops of Vigamox and prednisilone were placed into the eye.  The eye was washed, dried, and shielded.  The patient tolerated the procedure well and knows to follow up with me tomorrow morning, sooner if needed.

## 2017-08-08 NOTE — PLAN OF CARE
VSS, all questions answered. Denies recent fever or illness. Right eye dilating well. Pt states ready for procedure.

## 2017-08-08 NOTE — DISCHARGE INSTRUCTIONS
Discharge Instructions: After Your Surgery  Youve just had surgery. During surgery you were given medicine called anesthesia to keep you relaxed and free of pain. After surgery you may have some pain or nausea. This is common. Here are some tips for feeling better and getting well after surgery.     Stay on schedule with your medication.   Going home  Your doctor or nurse will show you how to take care of yourself when you go home. He or she will also answer your questions. Have an adult family member or friend drive you home. For the first 24 hours after your surgery:  · Do not drive or use heavy equipment.  · Do not make important decisions or sign legal papers.  · Do not drink alcohol.  · Have someone stay with you, if needed. He or she can watch for problems and help keep you safe.  Be sure to go to all follow-up visits with your doctor. And rest after your surgery for as long as your doctor tells you to.  Coping with pain  If you have pain after surgery, pain medicine will help you feel better. Take it as told, before pain becomes severe. Also, ask your doctor or pharmacist about other ways to control pain. This might be with heat, ice, or relaxation. And follow any other instructions your surgeon or nurse gives you.  Tips for taking pain medicine  To get the best relief possible, remember these points:  · Pain medicines can upset your stomach. Taking them with a little food may help.  · Most pain relievers taken by mouth need at least 20 to 30 minutes to start to work.  · Taking medicine on a schedule can help you remember to take it. Try to time your medicine so that you can take it before starting an activity. This might be before you get dressed, go for a walk, or sit down for dinner.  · Constipation is a common side effect of pain medicines. Call your doctor before taking any medicines such as laxatives or stool softeners to help ease constipation. Also ask if you should skip any foods. Drinking lots  of fluids and eating foods such as fruits and vegetables that are high in fiber can also help. Remember, do not take laxatives unless your surgeon has prescribed them.  · Drinking alcohol and taking pain medicine can cause dizziness and slow your breathing. It can even be deadly. Do not drink alcohol while taking pain medicine.  · Pain medicine can make you react more slowly to things. Do not drive or run machinery while taking pain medicine.  Your health care provider may tell you to take acetaminophen to help ease your pain. Ask him or her how much you are supposed to take each day. Acetaminophen or other pain relievers may interact with your prescription medicines or other over-the-counter (OTC) drugs. Some prescription medicines have acetaminophen and other ingredients. Using both prescription and OTC acetaminophen for pain can cause you to overdose. Read the labels on your OTC medicines with care. This will help you to clearly know the list of ingredients, how much to take, and any warnings. It may also help you not take too much acetaminophen. If you have questions or do not understand the information, ask your pharmacist or health care provider to explain it to you before you take the OTC medicine.  Managing nausea  Some people have an upset stomach after surgery. This is often because of anesthesia, pain, or pain medicine, or the stress of surgery. These tips will help you handle nausea and eat healthy foods as you get better. If you were on a special food plan before surgery, ask your doctor if you should follow it while you get better. These tips may help:  · Do not push yourself to eat. Your body will tell you when to eat and how much.  · Start off with clear liquids and soup. They are easier to digest.  · Next try semi-solid foods, such as mashed potatoes, applesauce, and gelatin, as you feel ready.  · Slowly move to solid foods. Dont eat fatty, rich, or spicy foods at first.  · Do not force yourself to  have 3 large meals a day. Instead eat smaller amounts more often.  · Take pain medicines with a small amount of solid food, such as crackers or toast, to avoid nausea.     Call your surgeon if  · You still have pain an hour after taking medicine. The medicine may not be strong enough.  · You feel too sleepy, dizzy, or groggy. The medicine may be too strong.  · You have side effects like nausea, vomiting, or skin changes, such as rash, itching, or hives.       If you have obstructive sleep apnea  You were given anesthesia medicine during surgery to keep you comfortable and free of pain. After surgery, you may have more apnea spells because of this medicine and other medicines you were given. The spells may last longer than usual.   At home:  · Keep using the continuous positive airway pressure (CPAP) device when you sleep. Unless your health care provider tells you not to, use it when you sleep, day or night. CPAP is a common device used to treat obstructive sleep apnea.  · Talk with your provider before taking any pain medicine, muscle relaxants, or sedatives. Your provider will tell you about the possible dangers of taking these medicines.  Date Last Reviewed: 10/16/2014  © 1318-0394 [employer]. [address] [place] [address], [place] [address]. All rights reserved. This information is not intended as a substitute for professional medical care. Always follow your healthcare professional's instructions.

## 2017-08-08 NOTE — PLAN OF CARE
PT TO PACU S/P CATARACT REMOVAL RT EYE.  DISCHARGE INSTRUCTIONS INCLUDING EYE SHEET AND ANES POST OP

## 2017-08-08 NOTE — ANESTHESIA PREPROCEDURE EVALUATION
08/08/2017  Laly Baker is a 82 y.o., female.    Anesthesia Evaluation    I have reviewed the Patient Summary Reports.        Review of Systems  Anesthesia Hx:  No problems with previous Anesthesia    Cardiovascular:   Hypertension, well controlled        Physical Exam  General:  Well nourished                 Anesthesia Plan  Type of Anesthesia, risks & benefits discussed:  Anesthesia Type:  MAC  Patient's Preference:   Intra-op Monitoring Plan:   Intra-op Monitoring Plan Comments:   Post Op Pain Control Plan:   Post Op Pain Control Plan Comments:   Induction:   IV  Beta Blocker:  Patient is not currently on a Beta-Blocker (No further documentation required).       Informed Consent: Patient understands risks and agrees with Anesthesia plan.  Questions answered. Anesthesia consent signed with patient.  ASA Score: 2     Day of Surgery Review of History & Physical:    H&P update referred to the surgeon.         Ready For Surgery From Anesthesia Perspective.

## 2017-08-08 NOTE — ANESTHESIA POSTPROCEDURE EVALUATION
"Anesthesia Post Evaluation    Patient: Laly Baker    Procedure(s) Performed: Procedure(s) (LRB):  PHACOEMULSIFICATION-ASPIRATION-CATARACT (Right)  INSERTION-INTRAOCULAR LENS (IOL) (Right)    Final Anesthesia Type: general  Patient location during evaluation: PACU  Patient participation: Yes- Able to Participate  Level of consciousness: awake and alert  Post-procedure vital signs: reviewed and stable  Pain management: adequate  Airway patency: patent  PONV status at discharge: No PONV  Anesthetic complications: no      Cardiovascular status: blood pressure returned to baseline and hemodynamically stable  Respiratory status: unassisted  Hydration status: euvolemic  Follow-up not needed.        Visit Vitals  /67   Pulse 70   Temp 36.7 °C (98.1 °F) (Skin)   Resp 16   Ht 5' 4" (1.626 m)   Wt 43.5 kg (96 lb)   SpO2 100%   Breastfeeding? No   BMI 16.48 kg/m²       Pain/Puma Score: Pain Assessment Performed: Yes (8/8/2017  9:11 AM)  Presence of Pain: denies (8/8/2017  9:11 AM)  Puma Score: 9 (8/8/2017  9:11 AM)      "

## 2017-08-09 ENCOUNTER — OFFICE VISIT (OUTPATIENT)
Dept: OPHTHALMOLOGY | Facility: CLINIC | Age: 82
End: 2017-08-09
Payer: MEDICARE

## 2017-08-09 DIAGNOSIS — Z96.1 STATUS POST CATARACT EXTRACTION AND INSERTION OF INTRAOCULAR LENS, RIGHT: Primary | ICD-10-CM

## 2017-08-09 DIAGNOSIS — Z98.41 STATUS POST CATARACT EXTRACTION AND INSERTION OF INTRAOCULAR LENS, RIGHT: Primary | ICD-10-CM

## 2017-08-09 DIAGNOSIS — H25.12 NUCLEAR SCLEROTIC CATARACT OF LEFT EYE: ICD-10-CM

## 2017-08-09 PROCEDURE — 99999 PR PBB SHADOW E&M-EST. PATIENT-LVL II: CPT | Mod: PBBFAC,,, | Performed by: OPHTHALMOLOGY

## 2017-08-09 PROCEDURE — 99024 POSTOP FOLLOW-UP VISIT: CPT | Mod: S$GLB,,, | Performed by: OPHTHALMOLOGY

## 2017-08-09 RX ORDER — KETOROLAC TROMETHAMINE 5 MG/ML
SOLUTION OPHTHALMIC
COMMUNITY
Start: 2017-07-10 | End: 2018-05-17

## 2017-08-09 NOTE — PROGRESS NOTES
HPI     Post-op Evaluation    Additional comments: 1 d po phaco iol OD d 8/8/2017//  taking drops as   directed//           Comments   [facility] referral    phaco iol OD  8/8/2017       Last edited by Isabela Rosales MD on 8/9/2017  1:52 PM. (History)            Assessment /Plan     For exam results, see Encounter Report.    Status post cataract extraction and insertion of intraocular lens, right    Nuclear sclerotic cataract of left eye      Slit Lamp Exam  L/L - normal  C/s - quiet  Cornea - clear  A/C - 1+ cell  Lens - PCIOL    POD #1 s/p phaco/IOL  - doing well  - continue the following drops:    vigamox or ocuflox TID x 1 wk then stop  Pred forte or durezol or dexamethasone TID x  4 wks  Ketorolac TID until runs out    Appropriate precautions and post op medications reviewed.  Patient instructed to call or come in if symptoms of redness, decreased vision, or pain are experienced.    -f/up 1-2wks, sooner PRN.       Cat OS - sign consent next, date in OCT,  recovering from broken hip

## 2017-08-23 ENCOUNTER — OFFICE VISIT (OUTPATIENT)
Dept: OPHTHALMOLOGY | Facility: CLINIC | Age: 82
End: 2017-08-23
Payer: MEDICARE

## 2017-08-23 DIAGNOSIS — Z98.41 STATUS POST CATARACT EXTRACTION AND INSERTION OF INTRAOCULAR LENS, RIGHT: Primary | ICD-10-CM

## 2017-08-23 DIAGNOSIS — H25.12 NUCLEAR SCLEROTIC CATARACT OF LEFT EYE: ICD-10-CM

## 2017-08-23 DIAGNOSIS — Z96.1 STATUS POST CATARACT EXTRACTION AND INSERTION OF INTRAOCULAR LENS, RIGHT: Primary | ICD-10-CM

## 2017-08-23 PROCEDURE — 99499 UNLISTED E&M SERVICE: CPT | Mod: S$PBB,,, | Performed by: OPHTHALMOLOGY

## 2017-08-23 PROCEDURE — 99024 POSTOP FOLLOW-UP VISIT: CPT | Mod: S$GLB,,, | Performed by: OPHTHALMOLOGY

## 2017-08-23 PROCEDURE — 92136 OPHTHALMIC BIOMETRY: CPT | Mod: 26,LT,S$GLB, | Performed by: OPHTHALMOLOGY

## 2017-08-23 PROCEDURE — 99999 PR PBB SHADOW E&M-EST. PATIENT-LVL II: CPT | Mod: PBBFAC,,, | Performed by: OPHTHALMOLOGY

## 2017-08-28 NOTE — PROGRESS NOTES
HPI     [facility] referral     1. phaco iol OD  8/8/2017     Pt denies pain, using gtts as directed   gtts-ofloxacin TID, PF TID    Last edited by Deidre Roberts on 8/23/2017  2:49 PM. (History)            Assessment /Plan     For exam results, see Encounter Report.    Status post cataract extraction and insertion of intraocular lens, right    Nuclear sclerotic cataract of left eye  -     IOL Master - OU - Both Eyes      PO week #1 s/p phaco/IOL -    - doing well, no issues  - okay to d/c abx gtt  - continue PF/ketoroloc TID for total of 1 month    - f/up 3-4 wks for MRx, DFE    Visually significant nuclear sclerotic cataract   - Interfering with activities of daily living.  Pt desires cataract surgery for [facility] rehabilitation.   - R/B/A discussed and pt agrees to proceed with surgery.   - IOL options discussed according to patient's goals and concomitant ocular pathology; and pt content with monofocal lens.    - Target: plano.      (pcboo 18.0 OS)    * pt has natural monoVA but wants distance OU*

## 2017-09-14 ENCOUNTER — TELEPHONE (OUTPATIENT)
Dept: OPHTHALMOLOGY | Facility: CLINIC | Age: 82
End: 2017-09-14

## 2017-09-14 DIAGNOSIS — H25.12 NUCLEAR SCLEROTIC CATARACT OF LEFT EYE: Primary | ICD-10-CM

## 2017-09-26 RX ORDER — OFLOXACIN 3 MG/ML
SOLUTION/ DROPS OPHTHALMIC
Qty: 5 ML | Refills: 0 | Status: SHIPPED | OUTPATIENT
Start: 2017-09-26 | End: 2018-05-17

## 2017-09-27 RX ORDER — PREDNISOLONE ACETATE 10 MG/ML
1 SUSPENSION/ DROPS OPHTHALMIC 3 TIMES DAILY
Qty: 5 ML | Refills: 1 | Status: SHIPPED | OUTPATIENT
Start: 2017-09-27 | End: 2018-05-17

## 2017-10-03 ENCOUNTER — OFFICE VISIT (OUTPATIENT)
Dept: FAMILY MEDICINE | Facility: CLINIC | Age: 82
End: 2017-10-03
Payer: MEDICARE

## 2017-10-03 VITALS
DIASTOLIC BLOOD PRESSURE: 50 MMHG | RESPIRATION RATE: 16 BRPM | SYSTOLIC BLOOD PRESSURE: 108 MMHG | HEART RATE: 68 BPM | OXYGEN SATURATION: 99 % | WEIGHT: 92.81 LBS | HEIGHT: 64 IN | BODY MASS INDEX: 15.85 KG/M2

## 2017-10-03 DIAGNOSIS — R63.4 WEIGHT LOSS: ICD-10-CM

## 2017-10-03 DIAGNOSIS — I10 ESSENTIAL HYPERTENSION: Chronic | ICD-10-CM

## 2017-10-03 DIAGNOSIS — N18.30 CKD (CHRONIC KIDNEY DISEASE), STAGE III: ICD-10-CM

## 2017-10-03 DIAGNOSIS — Z01.818 PRE-OP EXAM: Primary | ICD-10-CM

## 2017-10-03 PROBLEM — R10.12 ABDOMINAL PAIN, BILATERAL UPPER QUADRANT: Status: RESOLVED | Noted: 2017-05-30 | Resolved: 2017-10-03

## 2017-10-03 PROBLEM — R10.11 ABDOMINAL PAIN, BILATERAL UPPER QUADRANT: Status: RESOLVED | Noted: 2017-05-30 | Resolved: 2017-10-03

## 2017-10-03 PROBLEM — K85.90 ACUTE PANCREATITIS WITHOUT INFECTION OR NECROSIS: Status: RESOLVED | Noted: 2017-05-30 | Resolved: 2017-10-03

## 2017-10-03 PROBLEM — A41.9 SEVERE SEPSIS: Status: RESOLVED | Noted: 2017-05-30 | Resolved: 2017-10-03

## 2017-10-03 PROBLEM — H25.11 CATARACT, NUCLEAR SCLEROTIC, RIGHT EYE: Status: RESOLVED | Noted: 2017-07-10 | Resolved: 2017-10-03

## 2017-10-03 PROBLEM — R65.20 SEVERE SEPSIS: Status: RESOLVED | Noted: 2017-05-30 | Resolved: 2017-10-03

## 2017-10-03 PROBLEM — N17.0 ACUTE RENAL FAILURE WITH TUBULAR NECROSIS: Status: RESOLVED | Noted: 2017-05-31 | Resolved: 2017-10-03

## 2017-10-03 PROBLEM — E86.0 DEHYDRATION, MODERATE: Status: RESOLVED | Noted: 2017-05-30 | Resolved: 2017-10-03

## 2017-10-03 PROCEDURE — 99213 OFFICE O/P EST LOW 20 MIN: CPT | Mod: S$GLB,,, | Performed by: EMERGENCY MEDICINE

## 2017-10-03 PROCEDURE — 90662 IIV NO PRSV INCREASED AG IM: CPT | Mod: S$GLB,,, | Performed by: EMERGENCY MEDICINE

## 2017-10-03 PROCEDURE — 99499 UNLISTED E&M SERVICE: CPT | Mod: S$PBB,,, | Performed by: EMERGENCY MEDICINE

## 2017-10-03 PROCEDURE — G0008 ADMIN INFLUENZA VIRUS VAC: HCPCS | Mod: S$GLB,,, | Performed by: EMERGENCY MEDICINE

## 2017-10-03 PROCEDURE — 99999 PR PBB SHADOW E&M-EST. PATIENT-LVL III: CPT | Mod: PBBFAC,,, | Performed by: EMERGENCY MEDICINE

## 2017-10-03 RX ORDER — SIMVASTATIN 10 MG/1
10 TABLET, FILM COATED ORAL NIGHTLY
COMMUNITY
End: 2017-10-06

## 2017-10-03 NOTE — Clinical Note
I have done a short preop note for her impending cataract surgery.  She is low risk. Yayo Lindsey MD

## 2017-10-03 NOTE — PROGRESS NOTES
Subjective:   THIS NOTE IS DONE WITH VOICE RECOGNITION        Patient ID: Laly Elder is a 82 y.o. female.    Chief Complaint: Establish Care      HPI     Pancreatitis in May 2017.    Hospital Course:   Hospital medicine asked to evaluate patient for acute pancreatitis with severe sepsis/SIRS. Patient started on supportive care and conservative management with nothing by mouth status and fluids, IV pain medication as needed. Lipase has trended down significantly from 69,000-6000->100s. Patient complaining of left sided rib pain, although improved. Patient's ambulation has improved and she is tolerating her diet better.  +loose stools but only one BM per day (better). C diff negative. Off antibiotics and fluids. Potassium repleted. Patient stable for discharge home. She did qualify for home oxygen, suspect oxygen requirement from atelectasis plus some fluid overload. Discharged home with home health, PT/OT, RN visit, home O2. Labs to be drawn and sent to PCP and Dr. Campbell. Started on Lasix orally plus potassium repletion and nebs.     CKD III    Results for LALY ELDER (MRN [id number]) as of 10/3/2017 11:19   Ref. Range 6/5/2017 17:58 6/6/2017 04:32 6/6/2017 14:48 6/7/2017 07:58 7/10/2017 11:50   Creatinine Latest Ref Range: 0.5 - 1.4 mg/dL  1.34   1.2   eGFR if non African American Latest Ref Range: >60 mL/min/1.73 m^2  37 (A)   42.8 (A)     Results for LALY ELDER (MRN [id number]) as of 10/3/2017 11:19   Ref. Range 6/5/2017 17:58 6/6/2017 04:32 6/6/2017 14:48 6/7/2017 07:58 7/10/2017 11:50   Creatinine Latest Ref Range: 0.5 - 1.4 mg/dL  1.34   1.2   eGFR if non African American Latest Ref Range: >60 mL/min/1.73 m^2  37 (A)   42.8 (A)     Has stopped simvastatin.  Will recheck lipids with next blood work.    She is scheduled for cataract surgery on October 10 for left cataract emulsification.  We will use this checkup for her preop clearance.      She is experiencing some neuropathy symptoms manifested  primarily by tingling and prickly sensation in her feet.  This is not progressing.  She chooses to ignore it, and does not want to have any intervention.    Immunization History   Administered Date(s) Administered    Influenza 12/01/2006, 10/11/2010    Influenza - High Dose 11/22/2011, 10/25/2013, 10/17/2014, 11/19/2015, 10/26/2016, 10/03/2017    Pneumococcal Conjugate - 13 Valent 11/15/2016    Pneumococcal Polysaccharide - 23 Valent 11/07/2014    Zoster 04/10/2008         Current Outpatient Prescriptions   Medication Sig Dispense Refill    aspirin 81 mg Tab Take 1 tablet by mouth once daily at 6am. Every day      fish oil-dha-epa (FISH OIL) 1,200-144-216 mg Cap Take 1,000 mg by mouth once daily at 6am. Every day      ketorolac 0.5% (ACULAR) 0.5 % Drop       lisinopril 10 MG tablet TAKE ONE TABLET BY MOUTH EVERY DAY 90 tablet 1    MULTIVIT-IRON-MIN-FOLIC ACID 3,500-18-0.4 UNIT-MG-MG ORAL CHEW Take 1 tablet by mouth once daily at 6am.      ofloxacin (OCUFLOX) 0.3 % ophthalmic solution INSTILL ONE DROP INTO THE RIGHT EYE THREE TIMES A DAY 5 mL 0    prednisoLONE acetate (PRED FORTE) 1 % DrpS Place 1 drop into the right eye 3 (three) times daily. 5 mL 1    simvastatin (ZOCOR) 10 MG tablet Take 10 mg by mouth every evening.      ofloxacin (OCUFLOX) 0.3 % ophthalmic solution Place 1 drop into the right eye 3 (three) times daily.       No current facility-administered medications for this visit.          Review of Systems   Constitutional: Positive for unexpected weight change. Negative for activity change, appetite change, chills, diaphoresis, fatigue and fever.   HENT: Negative for congestion, ear pain, hearing loss, rhinorrhea, trouble swallowing and voice change.    Eyes: Positive for visual disturbance. Negative for pain.   Respiratory: Negative for cough, chest tightness and shortness of breath.    Cardiovascular: Negative for chest pain, palpitations and leg swelling.   Gastrointestinal: Negative for  abdominal distention, abdominal pain and blood in stool.   Endocrine: Negative for polyphagia and polyuria.   Genitourinary: Negative for difficulty urinating, flank pain, frequency and urgency.   Musculoskeletal: Negative for arthralgias, back pain, joint swelling, myalgias, neck pain and neck stiffness.   Skin: Negative for pallor and rash.   Neurological: Negative for dizziness, tremors, syncope, weakness and headaches.   Hematological: Negative for adenopathy.   Psychiatric/Behavioral: Negative for dysphoric mood and sleep disturbance. The patient is not nervous/anxious.        Objective:      Physical Exam   Constitutional: She is oriented to person, place, and time. She appears well-developed and well-nourished. No distress.   HENT:   Head: Normocephalic and atraumatic.   Nose: Nose normal.   Mouth/Throat: Oropharynx is clear and moist.   Eyes: Conjunctivae and EOM are normal. Pupils are equal, round, and reactive to light. No scleral icterus.   Cataract left eye.  Detailed exam per ophthalmology.   Neck: Normal range of motion. Neck supple. No JVD present. No thyromegaly present.   Cardiovascular: Normal rate, regular rhythm, normal heart sounds and intact distal pulses.    No murmur heard.  Pulmonary/Chest: Effort normal and breath sounds normal. She has no wheezes. She has no rales.   Abdominal: Soft. Bowel sounds are normal. She exhibits no distension. There is no tenderness.   No residual abdominal tenderness based on her history of pancreatitis.  There are no masses.     Musculoskeletal: Normal range of motion. She exhibits no edema or tenderness.   Lymphadenopathy:     She has no cervical adenopathy.   Neurological: She is alert and oriented to person, place, and time. She has normal reflexes. No cranial nerve deficit. Coordination normal.   Skin: Skin is warm and dry. No rash noted. No erythema.   Psychiatric: She has a normal mood and affect. Her behavior is normal.   Vitals reviewed.      Assessment:        1. Pre-op exam    2. CKD (chronic kidney disease), stage III    3. Essential hypertension    4. Weight loss        Plan:       1. Pre-op exam  Cleared for cataract surgery  A copy of this note to Dr. Rosales    2. CKD (chronic kidney disease), stage III  Stable  Update lab work  Avoid nonsteroidal anti-inflammatory medications  No new medicines added today.    - CBC auto differential; Future  - Renal function panel; Future  - Lipid panel; Future    3. Essential hypertension  Controlled.  Continue current medications  Continue to avoid excessive sodium  Continue home monitoring  Target blood pressure is 139/89 or less    4. Weight loss  13 pound weight loss since June  All labs from the hospital reviewed  Additional calories recommended in the form of formal supplementation  She did have a mildly elevated TSH, we'll monitor this with her next blood work.    - TSH; Future

## 2017-10-04 ENCOUNTER — LAB VISIT (OUTPATIENT)
Dept: LAB | Facility: HOSPITAL | Age: 82
End: 2017-10-04
Attending: EMERGENCY MEDICINE
Payer: MEDICARE

## 2017-10-04 DIAGNOSIS — R63.4 WEIGHT LOSS: ICD-10-CM

## 2017-10-04 DIAGNOSIS — N18.30 CKD (CHRONIC KIDNEY DISEASE), STAGE III: ICD-10-CM

## 2017-10-04 LAB
ALBUMIN SERPL BCP-MCNC: 3.7 G/DL
ANION GAP SERPL CALC-SCNC: 9 MMOL/L
BASOPHILS # BLD AUTO: 0.02 K/UL
BASOPHILS NFR BLD: 0.3 %
BUN SERPL-MCNC: 36 MG/DL
CALCIUM SERPL-MCNC: 9.9 MG/DL
CHLORIDE SERPL-SCNC: 108 MMOL/L
CHOLEST SERPL-MCNC: 205 MG/DL
CHOLEST/HDLC SERPL: 3.3 {RATIO}
CO2 SERPL-SCNC: 27 MMOL/L
CREAT SERPL-MCNC: 1.1 MG/DL
DIFFERENTIAL METHOD: ABNORMAL
EOSINOPHIL # BLD AUTO: 0.2 K/UL
EOSINOPHIL NFR BLD: 3.9 %
ERYTHROCYTE [DISTWIDTH] IN BLOOD BY AUTOMATED COUNT: 14.9 %
EST. GFR  (AFRICAN AMERICAN): 54.8 ML/MIN/1.73 M^2
EST. GFR  (NON AFRICAN AMERICAN): 47.5 ML/MIN/1.73 M^2
GLUCOSE SERPL-MCNC: 98 MG/DL
HCT VFR BLD AUTO: 38 %
HDLC SERPL-MCNC: 62 MG/DL
HDLC SERPL: 30.2 %
HGB BLD-MCNC: 12.2 G/DL
LDLC SERPL CALC-MCNC: 123.8 MG/DL
LYMPHOCYTES # BLD AUTO: 1.4 K/UL
LYMPHOCYTES NFR BLD: 22.9 %
MCH RBC QN AUTO: 28.7 PG
MCHC RBC AUTO-ENTMCNC: 32.1 G/DL
MCV RBC AUTO: 89 FL
MONOCYTES # BLD AUTO: 0.7 K/UL
MONOCYTES NFR BLD: 11.9 %
NEUTROPHILS # BLD AUTO: 3.6 K/UL
NEUTROPHILS NFR BLD: 60.8 %
NONHDLC SERPL-MCNC: 143 MG/DL
PHOSPHATE SERPL-MCNC: 3.7 MG/DL
PLATELET # BLD AUTO: 231 K/UL
PMV BLD AUTO: 9.7 FL
POTASSIUM SERPL-SCNC: 4.9 MMOL/L
RBC # BLD AUTO: 4.25 M/UL
SODIUM SERPL-SCNC: 144 MMOL/L
TRIGL SERPL-MCNC: 96 MG/DL
TSH SERPL DL<=0.005 MIU/L-ACNC: 3.79 UIU/ML
WBC # BLD AUTO: 5.9 K/UL

## 2017-10-04 PROCEDURE — 80061 LIPID PANEL: CPT

## 2017-10-04 PROCEDURE — 36415 COLL VENOUS BLD VENIPUNCTURE: CPT | Mod: PO

## 2017-10-04 PROCEDURE — 85025 COMPLETE CBC W/AUTO DIFF WBC: CPT

## 2017-10-04 PROCEDURE — 80069 RENAL FUNCTION PANEL: CPT

## 2017-10-04 PROCEDURE — 84443 ASSAY THYROID STIM HORMONE: CPT

## 2017-10-04 NOTE — PATIENT INSTRUCTIONS
Laly,    Thank you for coming in today.    Your weight is problematic.  I would encourage you to supplement your regular diet with supplement like Boost or Ensure.  I am sure there is a product that would work at the location where you shop.    I have cleared you for your cataract surgery and sent a note to Dr. Rosales.    Your immunizations are perfect.    I would like to see how you weight is doing in six weeks.  Can you please weigh yourself weekly, record the values and send them to me in six weeks.  Please weigh yourself in a consistent fashion (same scale, dressed the same, and with empty bladder/bowel).    ODALYS

## 2017-10-05 ENCOUNTER — PATIENT MESSAGE (OUTPATIENT)
Dept: FAMILY MEDICINE | Facility: CLINIC | Age: 82
End: 2017-10-05

## 2017-10-09 ENCOUNTER — ANESTHESIA EVENT (OUTPATIENT)
Dept: SURGERY | Facility: HOSPITAL | Age: 82
End: 2017-10-09
Payer: MEDICARE

## 2017-10-09 RX ORDER — MOXIFLOXACIN 5 MG/ML
1 SOLUTION/ DROPS OPHTHALMIC
Status: CANCELLED | OUTPATIENT
Start: 2017-10-09 | End: 2017-10-09

## 2017-10-09 NOTE — H&P
History    Chief complaint:  Painless progressive vision loss    Present Ilness/Diagnosis: Nuclear sclerotic Cataract    Past Medical History:  has a past medical history of Anticoagulant long-term use; Arthritis; Cataract; Hyperlipidemia; Hypertension; and Pancreatitis (05/2017).    Family History/Social History: refer to chart    Allergies: Review of patient's allergies indicates:  No Known Allergies    Current Medications: No current facility-administered medications for this encounter.     Current Outpatient Prescriptions:     aspirin 81 mg Tab, Take 1 tablet by mouth once daily at 6am. Every day, Disp: , Rfl:     fish oil-dha-epa (FISH OIL) 1,200-144-216 mg Cap, Take 1,000 mg by mouth once daily at 6am. Every day, Disp: , Rfl:     lisinopril 10 MG tablet, TAKE ONE TABLET BY MOUTH EVERY DAY, Disp: 90 tablet, Rfl: 1    MULTIVIT-IRON-MIN-FOLIC ACID 3,500-18-0.4 UNIT-MG-MG ORAL CHEW, Take 1 tablet by mouth once daily at 6am., Disp: , Rfl:     ketorolac 0.5% (ACULAR) 0.5 % Drop, , Disp: , Rfl:     ofloxacin (OCUFLOX) 0.3 % ophthalmic solution, Place 1 drop into the right eye 3 (three) times daily., Disp: , Rfl:     ofloxacin (OCUFLOX) 0.3 % ophthalmic solution, INSTILL ONE DROP INTO THE RIGHT EYE THREE TIMES A DAY, Disp: 5 mL, Rfl: 0    prednisoLONE acetate (PRED FORTE) 1 % DrpS, Place 1 drop into the right eye 3 (three) times daily., Disp: 5 mL, Rfl: 1    Physical Exam    BP: Vital signs stable  General: No apparent distress  HEENT: nuclear sclerotic cataract  Lungs: adequate respirations  Heart: + pulses  Abdomen: soft  Rectal/pelvic: deferred    Impression: Visually significant Cataract    Plan: Phacoemulsification with implantation of Intraocular lens

## 2017-10-10 ENCOUNTER — ANESTHESIA (OUTPATIENT)
Dept: SURGERY | Facility: HOSPITAL | Age: 82
End: 2017-10-10
Payer: MEDICARE

## 2017-10-10 ENCOUNTER — SURGERY (OUTPATIENT)
Age: 82
End: 2017-10-10

## 2017-10-10 ENCOUNTER — HOSPITAL ENCOUNTER (OUTPATIENT)
Facility: HOSPITAL | Age: 82
Discharge: HOME | End: 2017-10-10
Attending: OPHTHALMOLOGY | Admitting: OPHTHALMOLOGY
Payer: MEDICARE

## 2017-10-10 VITALS
OXYGEN SATURATION: 100 % | HEART RATE: 53 BPM | SYSTOLIC BLOOD PRESSURE: 134 MMHG | HEIGHT: 65 IN | RESPIRATION RATE: 16 BRPM | WEIGHT: 95 LBS | TEMPERATURE: 97 F | BODY MASS INDEX: 15.83 KG/M2 | DIASTOLIC BLOOD PRESSURE: 60 MMHG

## 2017-10-10 DIAGNOSIS — H25.10 SENILE NUCLEAR SCLEROSIS: ICD-10-CM

## 2017-10-10 DIAGNOSIS — H25.12 NUCLEAR SENILE CATARACT OF LEFT EYE: Primary | ICD-10-CM

## 2017-10-10 PROCEDURE — 36000707: Mod: PO | Performed by: OPHTHALMOLOGY

## 2017-10-10 PROCEDURE — D9220A PRA ANESTHESIA: Mod: ANES,,, | Performed by: ANESTHESIOLOGY

## 2017-10-10 PROCEDURE — 25000003 PHARM REV CODE 250: Mod: PO | Performed by: ANESTHESIOLOGY

## 2017-10-10 PROCEDURE — 36000706: Mod: PO | Performed by: OPHTHALMOLOGY

## 2017-10-10 PROCEDURE — V2632 POST CHMBR INTRAOCULAR LENS: HCPCS | Mod: PO | Performed by: OPHTHALMOLOGY

## 2017-10-10 PROCEDURE — C9447 INJ, PHENYLEPHRINE KETOROLAC: HCPCS | Mod: PO | Performed by: OPHTHALMOLOGY

## 2017-10-10 PROCEDURE — 63600175 PHARM REV CODE 636 W HCPCS: Mod: PO | Performed by: OPHTHALMOLOGY

## 2017-10-10 PROCEDURE — 25000003 PHARM REV CODE 250: Mod: PO | Performed by: OPHTHALMOLOGY

## 2017-10-10 PROCEDURE — 63600175 PHARM REV CODE 636 W HCPCS: Mod: PO | Performed by: NURSE ANESTHETIST, CERTIFIED REGISTERED

## 2017-10-10 PROCEDURE — 71000033 HC RECOVERY, INTIAL HOUR: Mod: PO | Performed by: OPHTHALMOLOGY

## 2017-10-10 PROCEDURE — 66984 XCAPSL CTRC RMVL W/O ECP: CPT | Mod: 79,LT,, | Performed by: OPHTHALMOLOGY

## 2017-10-10 PROCEDURE — 37000008 HC ANESTHESIA 1ST 15 MINUTES: Mod: PO | Performed by: OPHTHALMOLOGY

## 2017-10-10 PROCEDURE — 37000009 HC ANESTHESIA EA ADD 15 MINS: Mod: PO | Performed by: OPHTHALMOLOGY

## 2017-10-10 PROCEDURE — D9220A PRA ANESTHESIA: Mod: CRNA,,, | Performed by: NURSE ANESTHETIST, CERTIFIED REGISTERED

## 2017-10-10 DEVICE — LENS IOL ITEC PRELOAD 18.0D: Type: IMPLANTABLE DEVICE | Site: EYE | Status: FUNCTIONAL

## 2017-10-10 RX ORDER — SODIUM CHLORIDE 0.9 % (FLUSH) 0.9 %
3 SYRINGE (ML) INJECTION
Status: DISCONTINUED | OUTPATIENT
Start: 2017-10-10 | End: 2017-10-10 | Stop reason: HOSPADM

## 2017-10-10 RX ORDER — LIDOCAINE HYDROCHLORIDE 40 MG/ML
1 INJECTION, SOLUTION RETROBULBAR
Status: COMPLETED | OUTPATIENT
Start: 2017-10-10 | End: 2017-10-10

## 2017-10-10 RX ORDER — PROPARACAINE HYDROCHLORIDE 5 MG/ML
1 SOLUTION/ DROPS OPHTHALMIC
Status: DISCONTINUED | OUTPATIENT
Start: 2017-10-10 | End: 2017-10-10 | Stop reason: HOSPADM

## 2017-10-10 RX ORDER — LIDOCAINE HYDROCHLORIDE 10 MG/ML
INJECTION, SOLUTION EPIDURAL; INFILTRATION; INTRACAUDAL; PERINEURAL
Status: DISCONTINUED | OUTPATIENT
Start: 2017-10-10 | End: 2017-10-10 | Stop reason: HOSPADM

## 2017-10-10 RX ORDER — OFLOXACIN 3 MG/ML
1 SOLUTION/ DROPS OPHTHALMIC
Status: COMPLETED | OUTPATIENT
Start: 2017-10-10 | End: 2017-10-10

## 2017-10-10 RX ORDER — MIDAZOLAM HYDROCHLORIDE 1 MG/ML
INJECTION, SOLUTION INTRAMUSCULAR; INTRAVENOUS
Status: DISCONTINUED | OUTPATIENT
Start: 2017-10-10 | End: 2017-10-10

## 2017-10-10 RX ORDER — KETOROLAC TROMETHAMINE 5 MG/ML
1 SOLUTION OPHTHALMIC ONCE
Status: COMPLETED | OUTPATIENT
Start: 2017-10-10 | End: 2017-10-10

## 2017-10-10 RX ORDER — LIDOCAINE HYDROCHLORIDE 10 MG/ML
1 INJECTION, SOLUTION EPIDURAL; INFILTRATION; INTRACAUDAL; PERINEURAL ONCE
Status: DISCONTINUED | OUTPATIENT
Start: 2017-10-10 | End: 2017-10-10 | Stop reason: HOSPADM

## 2017-10-10 RX ORDER — PREDNISOLONE ACETATE 10 MG/ML
SUSPENSION/ DROPS OPHTHALMIC
Status: DISCONTINUED | OUTPATIENT
Start: 2017-10-10 | End: 2017-10-10 | Stop reason: HOSPADM

## 2017-10-10 RX ORDER — MOXIFLOXACIN 5 MG/ML
SOLUTION/ DROPS OPHTHALMIC
Status: DISCONTINUED | OUTPATIENT
Start: 2017-10-10 | End: 2017-10-10 | Stop reason: HOSPADM

## 2017-10-10 RX ORDER — PHENYLEPHRINE HYDROCHLORIDE 25 MG/ML
1 SOLUTION/ DROPS OPHTHALMIC
Status: DISCONTINUED | OUTPATIENT
Start: 2017-10-10 | End: 2017-10-10 | Stop reason: HOSPADM

## 2017-10-10 RX ORDER — TROPICAMIDE 10 MG/ML
1 SOLUTION/ DROPS OPHTHALMIC
Status: DISCONTINUED | OUTPATIENT
Start: 2017-10-10 | End: 2017-10-10 | Stop reason: HOSPADM

## 2017-10-10 RX ORDER — SODIUM CHLORIDE, SODIUM LACTATE, POTASSIUM CHLORIDE, CALCIUM CHLORIDE 600; 310; 30; 20 MG/100ML; MG/100ML; MG/100ML; MG/100ML
INJECTION, SOLUTION INTRAVENOUS CONTINUOUS
Status: DISCONTINUED | OUTPATIENT
Start: 2017-10-10 | End: 2017-10-10 | Stop reason: HOSPADM

## 2017-10-10 RX ORDER — MEPERIDINE HYDROCHLORIDE 50 MG/ML
12.5 INJECTION INTRAMUSCULAR; INTRAVENOUS; SUBCUTANEOUS ONCE AS NEEDED
Status: DISCONTINUED | OUTPATIENT
Start: 2017-10-10 | End: 2017-10-10 | Stop reason: HOSPADM

## 2017-10-10 RX ADMIN — MIDAZOLAM HYDROCHLORIDE 1 MG: 1 INJECTION, SOLUTION INTRAMUSCULAR; INTRAVENOUS at 10:10

## 2017-10-10 RX ADMIN — TROPICAMIDE 1 DROP: 10 SOLUTION/ DROPS OPHTHALMIC at 09:10

## 2017-10-10 RX ADMIN — PHENYLEPHRINE AND KETOROLAC 1 EACH: 10.16; 2.88 INJECTION, SOLUTION, CONCENTRATE INTRAOCULAR at 10:10

## 2017-10-10 RX ADMIN — PHENYLEPHRINE HYDROCHLORIDE 1 DROP: 25 SOLUTION/ DROPS OPHTHALMIC at 09:10

## 2017-10-10 RX ADMIN — MOXIFLOXACIN HYDROCHLORIDE 1 DROP: 5 SOLUTION/ DROPS OPHTHALMIC at 10:10

## 2017-10-10 RX ADMIN — SODIUM HYALURONATE 0.8 MG: 10 INJECTION INTRAOCULAR at 10:10

## 2017-10-10 RX ADMIN — SODIUM CHLORIDE, SODIUM LACTATE, POTASSIUM CHLORIDE, AND CALCIUM CHLORIDE: .6; .31; .03; .02 INJECTION, SOLUTION INTRAVENOUS at 09:10

## 2017-10-10 RX ADMIN — BALANCED SALT SOLUTION 15 ML: 6.4; .75; .48; .3; 3.9; 1.7 SOLUTION OPHTHALMIC at 10:10

## 2017-10-10 RX ADMIN — LIDOCAINE HYDROCHLORIDE: 40 INJECTION, SOLUTION RETROBULBAR; TOPICAL at 09:10

## 2017-10-10 RX ADMIN — OFLOXACIN 1 DROP: 3 SOLUTION OPHTHALMIC at 09:10

## 2017-10-10 RX ADMIN — LIDOCAINE HYDROCHLORIDE 1 ML: 10 INJECTION, SOLUTION EPIDURAL; INFILTRATION; INTRACAUDAL; PERINEURAL at 10:10

## 2017-10-10 RX ADMIN — PROPARACAINE HYDROCHLORIDE 1 DROP: 5 SOLUTION/ DROPS OPHTHALMIC at 09:10

## 2017-10-10 RX ADMIN — PROPARACAINE HYDROCHLORIDE 1 DROP: 5 SOLUTION/ DROPS OPHTHALMIC at 10:10

## 2017-10-10 RX ADMIN — KETOROLAC TROMETHAMINE 1 DROP: 5 SOLUTION OPHTHALMIC at 09:10

## 2017-10-10 RX ADMIN — Medication 0.5 ML: at 10:10

## 2017-10-10 RX ADMIN — LIDOCAINE HYDROCHLORIDE: 40 INJECTION, SOLUTION RETROBULBAR; TOPICAL at 10:10

## 2017-10-10 RX ADMIN — PREDNISOLONE ACETATE 1 DROP: 10 SUSPENSION OPHTHALMIC at 10:10

## 2017-10-10 NOTE — TRANSFER OF CARE
"Anesthesia Transfer of Care Note    Patient: Laly Baker    Procedure(s) Performed: Procedure(s) (LRB):  PHACOEMULSIFICATION-ASPIRATION-CATARACT (Left)  INSERTION-INTRAOCULAR LENS (IOL) (Left)    Patient location: PACU    Anesthesia Type: MAC    Transport from OR: Transported from OR on room air with adequate spontaneous ventilation    Post pain: adequate analgesia    Post assessment: no apparent anesthetic complications    Level of consciousness: awake, alert and oriented    Nausea/Vomiting: no nausea/vomiting    Complications: none    Transfer of care protocol was followed      Last vitals:   Visit Vitals  /61 (BP Location: Right arm, Patient Position: Sitting)   Pulse (!) 48   Temp 36.6 °C (97.9 °F) (Skin)   Resp 18   Ht 5' 5" (1.651 m)   Wt 43.1 kg (95 lb)   SpO2 100%   Breastfeeding? No   BMI 15.81 kg/m²     "

## 2017-10-10 NOTE — DISCHARGE INSTRUCTIONS
Pre printed instructions given post op     Recovery After Procedural Sedation (Adult)  You have been given medicine by vein to make you sleep during your surgery. This may have included both a pain medicine and sleeping medicine. Most of the effects have worn off. But you may still have some drowsiness for the next 6 to 8 hours.  Home care  Follow these guidelines when you get home:  · For the next 8 hours, you should be watched by a responsible adult. This person should make sure your condition is not getting worse.  · Don't drink any alcohol for the next 24 hours.  · Don't drive, operate dangerous machinery, or make important business or personal decisions during the next 24 hours.  Note: Your healthcare provider may tell you not to take any medicine by mouth for pain or sleep in the next 4 hours. These medicines may react with the medicines you were given in the hospital. This could cause a much stronger response than usual.  Follow-up care  Follow up with your healthcare provider if you are not alert and back to your usual level of activity within 12 hours.  When to seek medical advice  Call your healthcare provider right away if any of these occur:  · Drowsiness gets worse  · Weakness or dizziness gets worse  · Repeated vomiting  · You can't be awakened   Date Last Reviewed: 10/18/2016  © 1728-9547 [employer]. [address] [place] [address], [place] [address]. All rights reserved. This information is not intended as a substitute for professional medical care. Always follow your healthcare professional's instructions.

## 2017-10-10 NOTE — PLAN OF CARE
Vss, phoebe po fluids, denies pain, ambulates easily. IV removed, catheter intact. Discharge instructions provided and states understanding. States ready to go home.  Discharged from facility with family.

## 2017-10-10 NOTE — OP NOTE
DATE OF PROCEDURE: 10/10/2017    SURGEON: BRETT HOOPER MD    PREOPERATIVE DIAGNOSIS:  Senile nuclear sclerotic cataract left eye.     POSTOPERATIVE DIAGNOSIS: Senile nuclear sclerotic cataract left eye.     PROCEDURE PERFORMED:  Phacoemulsification with placement of intraocular lens, left eye.    IMPLANT:  PCBOO 18.0    ANESTHESIA:  Topical and MAC    COMPLICATIONS: none    ESTIMATED BLOOD LOSS: <1cc    SPECIMENS: none    INDICATIONS FOR PROCEDURE:  This patient presented to the clinic with decreased vision in the left eye and was found to have a cataract.  The risks, benefits, and alternatives were discussed and the patient agreed to proceed with phacoemulsification and implantation of a lens in the left eye.     PROCEDURE IN DETAIL:  The patient was met in the preop holding area.  Consent was confirmed to be signed.  The operative site was marked.  The patient was brought into the operating room by the anesthesia team and placed under monitored anesthesia care.  The left eye was prepped and draped in a sterile ophthalmic fashion.  A Brianda speculum was placed into the left eye.   A paracentesis site was made and 1% preservative-free lidocaine was injected into the anterior chamber.  Viscoelastic  material was injected into the anterior chamber.  A keratome blade was used to make a clear corneal incision.  A cystotome was used to initiate the continuous curvilinear capsulorrhexis which was completed with Utrata forceps.  BSS on a lancaster cannula was used to perform hydrodissection.  The phacoemulsification tip was introduced into the eye and the nucleus was removed in a standard divide-and-conquer fashion.  Remaining cortical material was removed from the eye using irrigation-aspiration.  The capsular bag was filled with viscoelastic material and the intraocular lens was injected and positioned into place. Remaining viscoelastic material was removed from the eye using irrigation and aspiration.  The corneal  wounds were hydrated.  The eye was filled to physiologic pressure. The wounds were found to be watertight. Drops of Vigamox and prednisilone were placed into the eye.  The eye was washed, dried, and shielded.  The patient tolerated the procedure well and knows to follow up with me tomorrow morning, sooner if needed.

## 2017-10-11 ENCOUNTER — OFFICE VISIT (OUTPATIENT)
Dept: OPHTHALMOLOGY | Facility: CLINIC | Age: 82
End: 2017-10-11
Payer: MEDICARE

## 2017-10-11 DIAGNOSIS — Z96.1 STATUS POST CATARACT EXTRACTION AND INSERTION OF INTRAOCULAR LENS, LEFT: ICD-10-CM

## 2017-10-11 DIAGNOSIS — Z96.1 STATUS POST CATARACT EXTRACTION AND INSERTION OF INTRAOCULAR LENS, RIGHT: Primary | ICD-10-CM

## 2017-10-11 DIAGNOSIS — Z98.41 STATUS POST CATARACT EXTRACTION AND INSERTION OF INTRAOCULAR LENS, RIGHT: Primary | ICD-10-CM

## 2017-10-11 DIAGNOSIS — Z98.42 STATUS POST CATARACT EXTRACTION AND INSERTION OF INTRAOCULAR LENS, LEFT: ICD-10-CM

## 2017-10-11 PROCEDURE — 99999 PR PBB SHADOW E&M-EST. PATIENT-LVL II: CPT | Mod: PBBFAC,,, | Performed by: OPHTHALMOLOGY

## 2017-10-11 PROCEDURE — 99024 POSTOP FOLLOW-UP VISIT: CPT | Mod: S$GLB,,, | Performed by: OPHTHALMOLOGY

## 2017-10-11 NOTE — ANESTHESIA PREPROCEDURE EVALUATION
10/10/2017  Laly Baker is a 82 y.o., female.    Anesthesia Evaluation    I have reviewed the Patient Summary Reports.    I have reviewed the Nursing Notes.   I have reviewed the Medications.     Review of Systems      Physical Exam  General:  Well nourished    Airway/Jaw/Neck:  Airway Findings: Mouth Opening: Normal Tongue: Normal  General Airway Assessment: Adult, Good  Mallampati: II  Improves to II with phonation.  TM Distance: 4-6 cm      Dental:  Dental Findings: In tact   Chest/Lungs:  Chest/Lungs Findings: Clear to auscultation, Normal Respiratory Rate     Heart/Vascular:  Heart Findings: Rate: Normal  Rhythm: Regular Rhythm  Sounds: Normal  Heart murmur: negative       Mental Status:  Mental Status Findings:  Cooperative, Alert and Oriented         Anesthesia Plan  Type of Anesthesia, risks & benefits discussed:  Anesthesia Type:  MAC  Patient's Preference:   Intra-op Monitoring Plan: standard ASA monitors  Intra-op Monitoring Plan Comments:   Post Op Pain Control Plan:   Post Op Pain Control Plan Comments:   Induction:   IV  Beta Blocker:  Patient is not currently on a Beta-Blocker (No further documentation required).       Informed Consent: Patient understands risks and agrees with Anesthesia plan.  Questions answered. Anesthesia consent signed with patient.  ASA Score: 3     Day of Surgery Review of History & Physical: I have interviewed and examined the patient. I have reviewed the patient's H&P dated:  There are no significant changes.  H&P update referred to the surgeon.         Ready For Surgery From Anesthesia Perspective.

## 2017-10-11 NOTE — ANESTHESIA POSTPROCEDURE EVALUATION
"Anesthesia Post Evaluation    Patient: Laly Baker    Procedure(s) Performed: Procedure(s) (LRB):  PHACOEMULSIFICATION-ASPIRATION-CATARACT (Left)  INSERTION-INTRAOCULAR LENS (IOL) (Left)    Final Anesthesia Type: general  Patient location during evaluation: PACU  Patient participation: Yes- Able to Participate  Level of consciousness: awake and alert and oriented  Post-procedure vital signs: reviewed and stable  Pain management: adequate  Airway patency: patent  PONV status at discharge: No PONV  Anesthetic complications: no      Cardiovascular status: blood pressure returned to baseline  Respiratory status: unassisted, spontaneous ventilation and room air  Hydration status: euvolemic  Follow-up not needed.        Visit Vitals  /60   Pulse (!) 53   Temp 36.3 °C (97.3 °F) (Skin)   Resp 16   Ht 5' 5" (1.651 m)   Wt 43.1 kg (95 lb)   SpO2 100%   Breastfeeding? No   BMI 15.81 kg/m²       Pain/Puma Score: Pain Assessment Performed: Yes (10/10/2017 10:49 AM)  Presence of Pain: denies (10/10/2017 10:49 AM)  Puma Score: 10 (10/10/2017 10:49 AM)      "

## 2017-10-13 NOTE — PROGRESS NOTES
HPI     Post-op Evaluation    Additional comments: 1 day s/p phaco iol OS 10/10/17           Comments   timoteo referral     1 day s/p phaco iol OS 10/10/17  Pseudophakic OD  8/8/2017     Pt states no pain or irritation. Seeing better.    Gtts: Prednisolone, Ketorolac, Ofloxacin TID OS       Last edited by Isabela Rosales MD on 10/13/2017 10:24 AM. (History)            Assessment /Plan     For exam results, see Encounter Report.    Status post cataract extraction and insertion of intraocular lens, right    Status post cataract extraction and insertion of intraocular lens, left      Slit Lamp Exam  L/L - normal  C/s - quiet  Cornea - clear  A/C - 1+ cell  Lens - PCIOL    POD #1 s/p phaco/IOL  - doing well  - continue the following drops:    vigamox or ocuflox TID x 1 wk then stop  Pred forte or durezol or dexamethasone TID x  4 wks  Ketorolac TID until runs out    Appropriate precautions and post op medications reviewed.  Patient instructed to call or come in if symptoms of redness, decreased vision, or pain are experienced.    -f/up  4wks, sooner PRN.  mrx if needed, dfe with dr. mahmood.

## 2017-11-02 RX ORDER — LISINOPRIL 10 MG/1
TABLET ORAL
Qty: 90 TABLET | Refills: 3 | Status: SHIPPED | OUTPATIENT
Start: 2017-11-02 | End: 2018-05-17 | Stop reason: SDUPTHER

## 2017-11-02 NOTE — PROGRESS NOTES
Refill Authorization Note     is requesting a refill authorization.    Brief assessment and rational for refill: APPROVE: prr  Name of medication: LISINOPRIL 10MG TABS  How patient will take medication: t1t po daily   Amount/Quantity of medication ordered: 90d   Medication reconciliation completed: No        Refills Authorized: Yes  If authorized number of refills: 3        Medication Therapy Plan: Recently seen; kidney labs and BP controlled.  Talib 12 mo   Comments:   Lab Results   Component Value Date    CREATININE 1.1 10/04/2017    BUN 36 (H) 10/04/2017     10/04/2017    K 4.9 10/04/2017     10/04/2017    CO2 27 10/04/2017      BP Readings from Last 3 Encounters:   10/10/17 134/60   10/03/17 (!) 108/50   08/08/17 (!) 102/52

## 2017-11-28 ENCOUNTER — OFFICE VISIT (OUTPATIENT)
Dept: OPTOMETRY | Facility: CLINIC | Age: 82
End: 2017-11-28
Payer: MEDICARE

## 2017-11-28 DIAGNOSIS — Z96.1 S/P CATARACT EXTRACTION AND INSERTION OF INTRAOCULAR LENS, LEFT: Primary | ICD-10-CM

## 2017-11-28 DIAGNOSIS — Z13.5 GLAUCOMA SCREENING: ICD-10-CM

## 2017-11-28 DIAGNOSIS — Z98.42 S/P CATARACT EXTRACTION AND INSERTION OF INTRAOCULAR LENS, LEFT: Primary | ICD-10-CM

## 2017-11-28 DIAGNOSIS — H43.813 POSTERIOR VITREOUS DETACHMENT, BILATERAL: ICD-10-CM

## 2017-11-28 DIAGNOSIS — H35.3120 NONEXUDATIVE AGE-RELATED MACULAR DEGENERATION OF LEFT EYE: ICD-10-CM

## 2017-11-28 DIAGNOSIS — Z96.1 BILATERAL PSEUDOPHAKIA: ICD-10-CM

## 2017-11-28 PROCEDURE — 99999 PR PBB SHADOW E&M-EST. PATIENT-LVL II: CPT | Mod: PBBFAC,,, | Performed by: OPTOMETRIST

## 2017-11-28 PROCEDURE — 99024 POSTOP FOLLOW-UP VISIT: CPT | Mod: S$GLB,,, | Performed by: OPTOMETRIST

## 2017-11-28 NOTE — PROGRESS NOTES
HPI     Post-op Evaluation    Additional comments: 1 month s/p phaco IOL OS -- finished all gtts // OU   done now -- pt states happy w/ results           Blurred Vision    Additional comments: at near only -- distance VA good           Comments   Agree above  Notes VA stable / improved following CE  OD 8/8/17  OS 10/10/17  Finished w/ gtts no new complaint         Last edited by SHIRA Murillo, OD on 11/28/2017  1:37 PM. (History)        ROS     Positive for: Eyes    Negative for: Constitutional, Gastrointestinal, Neurological, Skin,   Genitourinary, Musculoskeletal, HENT, Endocrine, Cardiovascular,   Respiratory, Psychiatric, Allergic/Imm, Heme/Lymph    Last edited by SHIRA Murillo, OD on 11/28/2017  1:37 PM. (History)        Assessment /Plan     For exam results, see Encounter Report.    S/P cataract extraction and insertion of intraocular lens, left    Nonexudative age-related macular degeneration of left eye    Posterior vitreous detachment, bilateral    Glaucoma screening    Bilateral pseudophakia      1. Finished PO period OS, sx 10/10/17  Good result, no issues   2. Stable OS>>OD, continue areds / amsler  3. RD precautions   4. Not suspect  5. Stable---ok continue with otc readers for near    Discussed and educated patient on current findings /plan.  RTC 1 year, prn if any changes / issues

## 2017-11-28 NOTE — PATIENT INSTRUCTIONS
Using the Amsler Grid  If you are at risk for vision loss, you may be told to check your eyesight regularly using the Amsler grid. Below is the grid and instructions for using it.         The Amsler grid helps you track changes in your vision.    How to Use the Amsler Grid  1. Use the grid in a well-lighted area.  2. Wear glasses or contact lenses if you usually wear them.  3. Hold the grid at your normal reading distance (about 16 inches).  4. Cover your left eye.  5. With your right eye, look at the dot in the center of the grid.  6. While looking at the dot, notice if any of the lines look wavy, if any lines disappear, or if the boxes change shape.  7. Write down on a piece of paper any vision changes from the last time you used the grid.  8. Now repeat the exercise, this time covering your right eye.  9. Call your doctor right away if you notice any vision changes.  How Often Should I Check My Vision?  Use the Amsler grid as often as your eye doctor suggests. Keep the grid where youll remember to use it. Call your eye doctor right away if you notice any changes with your eyesight. This includes if your vision improves.  © 6900-0553 [facility], [address] [place] [address], [place] [address]. All rights reserved. This information is not intended as a substitute for professional medical care. Always follow your healthcare professional's instructions.FLASHES / FLOATERS / POSTERIOR VITREOUS DETACHMENT    Call the clinic if you have any further changes in symptoms.  Including:  Increased numbers of floaters or flashing lights, dimness or darkness that moves through or stays constant in your vision, or any pain in the eye (s).            DRY EYES:  Use Over The Counter artificial tears as needed for dry eye symptoms.  Some common brands include:  Systane, Optive, and Refresh.  These drops can be used as frequently as desired, but may be most helpful use during long periods of concentrated work.  For example, reading /  "working at the computer.  Avoid drops that "get redness out", as these contain medication that may further irritate the eyes.    ALLERGY EYES / SYMPTOMS:    Over the counter medications include--Zaditor and Alaway  Use as directed 1-2 drops daily for symptoms of itching / watering eyes.  These drops will not help for dry eye or exposure symptoms.      "

## 2017-12-08 ENCOUNTER — HOSPITAL ENCOUNTER (OUTPATIENT)
Dept: RADIOLOGY | Facility: HOSPITAL | Age: 82
Discharge: HOME | End: 2017-12-08
Attending: INTERNAL MEDICINE
Payer: MEDICARE

## 2017-12-08 DIAGNOSIS — K85.90 PANCREATITIS: Primary | ICD-10-CM

## 2017-12-08 DIAGNOSIS — K86.3 PSEUDOCYST OF PANCREAS: ICD-10-CM

## 2017-12-08 PROCEDURE — 74178 CT ABD&PLV WO CNTR FLWD CNTR: CPT | Mod: 26,,, | Performed by: RADIOLOGY

## 2017-12-08 PROCEDURE — 74178 CT ABD&PLV WO CNTR FLWD CNTR: CPT | Mod: TC,PO

## 2017-12-08 PROCEDURE — 25500020 PHARM REV CODE 255: Mod: PO | Performed by: INTERNAL MEDICINE

## 2017-12-08 RX ADMIN — IOHEXOL 75 ML: 350 INJECTION, SOLUTION INTRAVENOUS at 12:12

## 2018-02-15 ENCOUNTER — LAB VISIT (OUTPATIENT)
Dept: LAB | Facility: HOSPITAL | Age: 83
End: 2018-02-15
Attending: INTERNAL MEDICINE
Payer: MEDICARE

## 2018-02-15 DIAGNOSIS — K86.2 CYST AND PSEUDOCYST OF PANCREAS: Primary | ICD-10-CM

## 2018-02-15 DIAGNOSIS — K86.3 CYST AND PSEUDOCYST OF PANCREAS: Primary | ICD-10-CM

## 2018-02-15 PROCEDURE — 82656 EL-1 FECAL QUAL/SEMIQ: CPT

## 2018-02-22 LAB
ELASTASE 1, FECAL: 483.2 UG E/G STOOL
ELASTASE INTERPRETATION: NORMAL

## 2018-03-06 ENCOUNTER — HOSPITAL ENCOUNTER (OUTPATIENT)
Dept: RADIOLOGY | Facility: HOSPITAL | Age: 83
Discharge: HOME | End: 2018-03-06
Attending: EMERGENCY MEDICINE
Payer: MEDICARE

## 2018-03-06 DIAGNOSIS — Z12.39 ENCOUNTER FOR SPECIAL SCREENING EXAMINATION FOR NEOPLASM OF BREAST: ICD-10-CM

## 2018-03-06 PROCEDURE — 77067 SCR MAMMO BI INCL CAD: CPT | Mod: 26,,, | Performed by: RADIOLOGY

## 2018-03-06 PROCEDURE — 77067 SCR MAMMO BI INCL CAD: CPT | Mod: TC,PO

## 2018-03-06 PROCEDURE — 77063 BREAST TOMOSYNTHESIS BI: CPT | Mod: 26,,, | Performed by: RADIOLOGY

## 2018-03-19 ENCOUNTER — TELEPHONE (OUTPATIENT)
Dept: FAMILY MEDICINE | Facility: CLINIC | Age: 83
End: 2018-03-19

## 2018-03-19 NOTE — TELEPHONE ENCOUNTER
Would you like this patient booked for April or May or wait till first available in July? Please advise.

## 2018-03-19 NOTE — TELEPHONE ENCOUNTER
----- Message from Geni Gold sent at 3/19/2018 11:46 AM CDT -----  Contact: self  Pt requesting 6 mon f/u appt for April or May, I was not able to find anything until July.  Please call [phone].

## 2018-05-17 ENCOUNTER — OFFICE VISIT (OUTPATIENT)
Dept: FAMILY MEDICINE | Facility: CLINIC | Age: 83
End: 2018-05-17
Payer: MEDICARE

## 2018-05-17 VITALS
WEIGHT: 101 LBS | SYSTOLIC BLOOD PRESSURE: 102 MMHG | HEIGHT: 65 IN | BODY MASS INDEX: 16.83 KG/M2 | DIASTOLIC BLOOD PRESSURE: 52 MMHG | HEART RATE: 75 BPM | OXYGEN SATURATION: 95 % | RESPIRATION RATE: 17 BRPM

## 2018-05-17 DIAGNOSIS — E78.00 PURE HYPERCHOLESTEROLEMIA: Chronic | ICD-10-CM

## 2018-05-17 DIAGNOSIS — I10 ESSENTIAL HYPERTENSION: Primary | Chronic | ICD-10-CM

## 2018-05-17 DIAGNOSIS — K86.3 PANCREATIC PSEUDOCYST: ICD-10-CM

## 2018-05-17 PROCEDURE — 3078F DIAST BP <80 MM HG: CPT | Mod: CPTII,S$GLB,, | Performed by: EMERGENCY MEDICINE

## 2018-05-17 PROCEDURE — 3074F SYST BP LT 130 MM HG: CPT | Mod: CPTII,S$GLB,, | Performed by: EMERGENCY MEDICINE

## 2018-05-17 PROCEDURE — 99214 OFFICE O/P EST MOD 30 MIN: CPT | Mod: S$GLB,,, | Performed by: EMERGENCY MEDICINE

## 2018-05-17 PROCEDURE — 99999 PR PBB SHADOW E&M-EST. PATIENT-LVL IV: CPT | Mod: PBBFAC,,, | Performed by: EMERGENCY MEDICINE

## 2018-05-17 RX ORDER — LISINOPRIL 5 MG/1
5 TABLET ORAL DAILY
Qty: 90 TABLET | Refills: 1 | Status: SHIPPED | OUTPATIENT
Start: 2018-05-17 | End: 2018-11-12 | Stop reason: SDUPTHER

## 2018-05-17 RX ORDER — MULTIVIT WITH MINERALS/HERBS
1 TABLET ORAL DAILY
COMMUNITY
End: 2018-11-29

## 2018-05-17 NOTE — PROGRESS NOTES
Subjective:   THIS NOTE IS DONE WITH VOICE RECOGNITION        Patient ID: Laly Baker is a 83 y.o. female.    Chief Complaint: Hyperlipidemia      HPI   She is in today to follow-up on her medical issues including hyperlipidemia, pseudocyst, and a bout of pancreatitis.    Her pancreatitis was many months ago.  Has resolved.  Her lipase levels have returned to normal.  Follow-up of her pancreatic pseudocyst has revealed resolution.  She is having no symptoms.    She did lose a significant amount of weight during her pancreatic episode.  She has gained 6 lb.  She is using a combination of boost in trying the eat more.  She would like to be about 6 lb heavier.    She does have some spider veins and tiny varicosities.  She is not experiencing pain. She does have the way they look.  Options discussed.  She will continue to watch these.  No intervention anticipated.    BP Readings from Last 3 Encounters:   05/17/18 (!) 102/52   10/10/17 134/60   10/03/17 (!) 108/50         Immunization History   Administered Date(s) Administered    Influenza 12/01/2006, 10/11/2010    Influenza - High Dose 11/22/2011, 10/25/2013, 10/17/2014, 11/19/2015, 10/26/2016, 10/03/2017    Pneumococcal Conjugate - 13 Valent 11/15/2016    Pneumococcal Polysaccharide - 23 Valent 11/07/2014    Zoster 04/10/2008     No change in social history, surgical history, medical history, or family history.    Current Outpatient Prescriptions   Medication Sig Dispense Refill    aspirin 81 mg Tab Take 1 tablet by mouth once daily at 6am. Every day      b complex vitamins tablet Take 1 tablet by mouth once daily.      fish oil-dha-epa (FISH OIL) 1,200-144-216 mg Cap Take 1,000 mg by mouth once daily at 6am. Every day      lisinopril 10 MG tablet TAKE ONE TABLET BY MOUTH EVERY DAY 90 tablet 3    MULTIVIT-IRON-MIN-FOLIC ACID 3,500-18-0.4 UNIT-MG-MG ORAL CHEW Take 1 tablet by mouth once daily at 6am.       No current facility-administered medications for  this visit.          Review of Systems   Constitutional: Negative for activity change, appetite change, chills, diaphoresis, fatigue, fever and unexpected weight change.   HENT: Negative for congestion, ear pain, hearing loss, rhinorrhea, trouble swallowing and voice change.    Eyes: Negative for pain and visual disturbance.   Respiratory: Negative for cough, chest tightness and shortness of breath.    Cardiovascular: Negative for chest pain, palpitations and leg swelling.        Other then the veins in her legs, no issues.   Gastrointestinal: Negative for abdominal distention, abdominal pain and blood in stool.   Genitourinary: Negative for difficulty urinating, flank pain, frequency and urgency.   Musculoskeletal: Negative for arthralgias, back pain, joint swelling, myalgias, neck pain and neck stiffness.   Skin: Negative for pallor and rash.   Neurological: Negative for dizziness, tremors, syncope, weakness and headaches.   Hematological: Negative for adenopathy.   Psychiatric/Behavioral: Negative for dysphoric mood and sleep disturbance. The patient is not nervous/anxious.        Objective:      Physical Exam    Assessment:       1. Essential hypertension    2. Pure hypercholesterolemia    3. Pancreatic pseudocyst        Plan:       1. Essential hypertension  Controlled.  Secondary to low blood pressure will decrease her lisinopril to 5 mg a day.  Continue to monitor blood pressure  Continue current medications  Continue to avoid excessive sodium  Continue home monitoring  Target blood pressure is 139/89 or less    - lisinopril (PRINIVIL,ZESTRIL) 5 MG tablet; Take 1 tablet (5 mg total) by mouth once daily.  Dispense: 90 tablet; Refill: 1  - Comprehensive metabolic panel; Future    2. Pure hypercholesterolemia  Stable  Routine follow-up recommended with lipid profile    - Lipid panel; Future    3. Pancreatic pseudocyst  Resolved  One final check a lipase to insure return to stability.  - Lipase; Future

## 2018-05-20 NOTE — PATIENT INSTRUCTIONS
Laly,    I anticipate that your pancreatic enzymes will remain normal.  I am pleased she has been able to gain some weight.  Hopefully regain the last 6 lb.    Routine blood work to check your cholesterol levels.    If reducing your lisinopril to 5 mg causes significant increase in your blood pressure, please let me know.    ODALYS

## 2018-05-21 ENCOUNTER — LAB VISIT (OUTPATIENT)
Dept: LAB | Facility: HOSPITAL | Age: 83
End: 2018-05-21
Attending: EMERGENCY MEDICINE
Payer: MEDICARE

## 2018-05-21 DIAGNOSIS — K86.3 PANCREATIC PSEUDOCYST: ICD-10-CM

## 2018-05-21 DIAGNOSIS — E78.00 PURE HYPERCHOLESTEROLEMIA: Chronic | ICD-10-CM

## 2018-05-21 DIAGNOSIS — I10 ESSENTIAL HYPERTENSION: Chronic | ICD-10-CM

## 2018-05-21 LAB
ALBUMIN SERPL BCP-MCNC: 4 G/DL
ALP SERPL-CCNC: 71 U/L
ALT SERPL W/O P-5'-P-CCNC: 18 U/L
ANION GAP SERPL CALC-SCNC: 8 MMOL/L
AST SERPL-CCNC: 25 U/L
BILIRUB SERPL-MCNC: 0.5 MG/DL
BUN SERPL-MCNC: 41 MG/DL
CALCIUM SERPL-MCNC: 10.1 MG/DL
CHLORIDE SERPL-SCNC: 105 MMOL/L
CHOLEST SERPL-MCNC: 245 MG/DL
CHOLEST/HDLC SERPL: 3.5 {RATIO}
CO2 SERPL-SCNC: 27 MMOL/L
CREAT SERPL-MCNC: 1.3 MG/DL
EST. GFR  (AFRICAN AMERICAN): 44.5 ML/MIN/1.73 M^2
EST. GFR  (NON AFRICAN AMERICAN): 38.6 ML/MIN/1.73 M^2
GLUCOSE SERPL-MCNC: 88 MG/DL
HDLC SERPL-MCNC: 71 MG/DL
HDLC SERPL: 29 %
LDLC SERPL CALC-MCNC: 153.2 MG/DL
LIPASE SERPL-CCNC: 40 U/L
NONHDLC SERPL-MCNC: 174 MG/DL
POTASSIUM SERPL-SCNC: 4.9 MMOL/L
PROT SERPL-MCNC: 7.1 G/DL
SODIUM SERPL-SCNC: 140 MMOL/L
TRIGL SERPL-MCNC: 104 MG/DL

## 2018-05-21 PROCEDURE — 83690 ASSAY OF LIPASE: CPT

## 2018-05-21 PROCEDURE — 36415 COLL VENOUS BLD VENIPUNCTURE: CPT | Mod: PO

## 2018-05-21 PROCEDURE — 80061 LIPID PANEL: CPT

## 2018-05-21 PROCEDURE — 80053 COMPREHEN METABOLIC PANEL: CPT

## 2018-05-23 ENCOUNTER — PATIENT MESSAGE (OUTPATIENT)
Dept: FAMILY MEDICINE | Facility: CLINIC | Age: 83
End: 2018-05-23

## 2018-05-24 ENCOUNTER — PATIENT MESSAGE (OUTPATIENT)
Dept: OPTOMETRY | Facility: CLINIC | Age: 83
End: 2018-05-24

## 2018-05-24 ENCOUNTER — PATIENT MESSAGE (OUTPATIENT)
Dept: FAMILY MEDICINE | Facility: CLINIC | Age: 83
End: 2018-05-24

## 2018-05-24 DIAGNOSIS — E78.2 MIXED HYPERLIPIDEMIA: Primary | ICD-10-CM

## 2018-05-26 ENCOUNTER — PATIENT MESSAGE (OUTPATIENT)
Dept: FAMILY MEDICINE | Facility: CLINIC | Age: 83
End: 2018-05-26

## 2018-05-29 ENCOUNTER — PATIENT MESSAGE (OUTPATIENT)
Dept: FAMILY MEDICINE | Facility: CLINIC | Age: 83
End: 2018-05-29

## 2018-11-12 DIAGNOSIS — I10 ESSENTIAL HYPERTENSION: Chronic | ICD-10-CM

## 2018-11-13 RX ORDER — LISINOPRIL 5 MG/1
5 TABLET ORAL DAILY
Qty: 90 TABLET | Refills: 1 | Status: SHIPPED | OUTPATIENT
Start: 2018-11-13 | End: 2019-05-13

## 2018-11-19 ENCOUNTER — LAB VISIT (OUTPATIENT)
Dept: LAB | Facility: HOSPITAL | Age: 83
End: 2018-11-19
Attending: EMERGENCY MEDICINE
Payer: MEDICARE

## 2018-11-19 DIAGNOSIS — E78.2 MIXED HYPERLIPIDEMIA: ICD-10-CM

## 2018-11-19 LAB
CHOLEST SERPL-MCNC: 238 MG/DL
CHOLEST/HDLC SERPL: 3.4 {RATIO}
HDLC SERPL-MCNC: 69 MG/DL
HDLC SERPL: 29 %
LDLC SERPL CALC-MCNC: 148.8 MG/DL
NONHDLC SERPL-MCNC: 169 MG/DL
TRIGL SERPL-MCNC: 101 MG/DL

## 2018-11-19 PROCEDURE — 80061 LIPID PANEL: CPT

## 2018-11-19 PROCEDURE — 36415 COLL VENOUS BLD VENIPUNCTURE: CPT | Mod: PO

## 2018-11-26 ENCOUNTER — OFFICE VISIT (OUTPATIENT)
Dept: FAMILY MEDICINE | Facility: CLINIC | Age: 83
End: 2018-11-26
Payer: MEDICARE

## 2018-11-26 VITALS
RESPIRATION RATE: 16 BRPM | OXYGEN SATURATION: 97 % | HEART RATE: 71 BPM | SYSTOLIC BLOOD PRESSURE: 110 MMHG | DIASTOLIC BLOOD PRESSURE: 62 MMHG | BODY MASS INDEX: 17.12 KG/M2 | WEIGHT: 102.75 LBS | HEIGHT: 65 IN

## 2018-11-26 DIAGNOSIS — E78.00 PURE HYPERCHOLESTEROLEMIA: Chronic | ICD-10-CM

## 2018-11-26 DIAGNOSIS — I10 ESSENTIAL HYPERTENSION: Primary | Chronic | ICD-10-CM

## 2018-11-26 DIAGNOSIS — M48.00 SPINAL STENOSIS, UNSPECIFIED SPINAL REGION: Chronic | ICD-10-CM

## 2018-11-26 DIAGNOSIS — Z86.010 HX OF COLONIC POLYPS: Chronic | ICD-10-CM

## 2018-11-26 PROCEDURE — 1101F PT FALLS ASSESS-DOCD LE1/YR: CPT | Mod: CPTII,S$GLB,, | Performed by: EMERGENCY MEDICINE

## 2018-11-26 PROCEDURE — 3074F SYST BP LT 130 MM HG: CPT | Mod: CPTII,S$GLB,, | Performed by: EMERGENCY MEDICINE

## 2018-11-26 PROCEDURE — 99214 OFFICE O/P EST MOD 30 MIN: CPT | Mod: S$GLB,,, | Performed by: EMERGENCY MEDICINE

## 2018-11-26 PROCEDURE — 3078F DIAST BP <80 MM HG: CPT | Mod: CPTII,S$GLB,, | Performed by: EMERGENCY MEDICINE

## 2018-11-26 PROCEDURE — 99499 UNLISTED E&M SERVICE: CPT | Mod: S$GLB,,, | Performed by: EMERGENCY MEDICINE

## 2018-11-26 PROCEDURE — 99999 PR PBB SHADOW E&M-EST. PATIENT-LVL IV: CPT | Mod: PBBFAC,,, | Performed by: EMERGENCY MEDICINE

## 2018-11-26 RX ORDER — SIMVASTATIN 20 MG/1
20 TABLET, FILM COATED ORAL NIGHTLY
Qty: 90 TABLET | Refills: 3 | Status: SHIPPED | OUTPATIENT
Start: 2018-11-26 | End: 2019-11-05

## 2018-11-26 NOTE — PROGRESS NOTES
Subjective:   THIS NOTE IS DONE WITH VOICE RECOGNITION        Patient ID: Laly Baker is a 83 y.o. female.    Chief Complaint: Hyperlipidemia      HPI     She is in today to review her health issues, and update her ongoing medical conditions.    She remains remarkably active, engaged, and with significant amount of activity a daily basis.  A very active 83-year-old woman.    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  She has elected not to let this bother her.  She is participating in all activities.  She does not feel she needs additional intervention, but she does admit to regular awareness of the difficulty with some minor radicular pain and back pain.    Here to follow up on blood pressure.  Taking current medications.    BP Readings from Last 3 Encounters:   11/26/18 110/62   05/17/18 (!) 102/52   10/10/17 134/60     Hyperlipidemia monitoring.  She is currently using 20 milligrams of simvastatin daily.    Lab Results   Component Value Date    CHOL 238 (H) 11/19/2018    CHOL 245 (H) 05/21/2018    CHOL 205 (H) 10/04/2017     Lab Results   Component Value Date    HDL 69 11/19/2018    HDL 71 05/21/2018    HDL 62 10/04/2017     Lab Results   Component Value Date    LDLCALC 148.8 11/19/2018    LDLCALC 153.2 05/21/2018    LDLCALC 123.8 10/04/2017     Lab Results   Component Value Date    TRIG 101 11/19/2018    TRIG 104 05/21/2018    TRIG 96 10/04/2017     Lab Results   Component Value Date    CHOLHDL 29.0 11/19/2018    CHOLHDL 29.0 05/21/2018    CHOLHDL 30.2 10/04/2017     Question about Vit B and lung cancer.  Not an issue for her.  The study that she is looking at was valid for only males with extraordinarily high vitamin B consumption.    Immunization History   Administered Date(s) Administered    Influenza 12/01/2006, 10/11/2010    Influenza - High Dose 11/22/2011, 10/25/2013, 10/17/2014, 11/19/2015, 10/26/2016, 10/03/2017, 10/30/2018    Pneumococcal Conjugate - 13 Valent 11/15/2016    Pneumococcal  Polysaccharide - 23 Valent 11/07/2014    Zoster 04/10/2008         Current Outpatient Medications   Medication Sig Dispense Refill    aspirin 81 mg Tab Take 1 tablet by mouth once daily at 6am. Every day      b complex vitamins tablet Take 1 tablet by mouth once daily.      fish oil-dha-epa (FISH OIL) 1,200-144-216 mg Cap Take 1,000 mg by mouth once daily at 6am. Every day      lisinopril (PRINIVIL,ZESTRIL) 5 MG tablet Take 1 tablet (5 mg total) by mouth once daily. 90 tablet 1    MULTIVIT-IRON-MIN-FOLIC ACID 3,500-18-0.4 UNIT-MG-MG ORAL CHEW Take 1 tablet by mouth once daily at 6am.       No current facility-administered medications for this visit.          Review of Systems   Constitutional: Negative for activity change and unexpected weight change.   HENT: Negative for hearing loss, rhinorrhea and trouble swallowing.    Eyes: Negative for discharge and visual disturbance.   Respiratory: Negative for chest tightness and wheezing.    Cardiovascular: Negative for chest pain and palpitations.   Gastrointestinal: Negative for blood in stool, constipation, diarrhea and vomiting.   Endocrine: Negative for polydipsia and polyuria.   Genitourinary: Negative for difficulty urinating, dysuria, hematuria and menstrual problem.   Musculoskeletal: Negative for arthralgias, joint swelling and neck pain.   Skin: Negative.    Neurological: Negative for weakness and headaches.   Psychiatric/Behavioral: Negative for confusion and dysphoric mood.       Objective:      Physical Exam   Constitutional: She is oriented to person, place, and time. She appears well-developed and well-nourished. No distress.   HENT:   Head: Normocephalic and atraumatic.   Nose: Nose normal.   Mouth/Throat: Oropharynx is clear and moist.   Eyes: Conjunctivae and EOM are normal. Pupils are equal, round, and reactive to light. No scleral icterus.   Neck: Normal range of motion. Neck supple. No JVD present. No thyromegaly present.   Cardiovascular:  Normal rate, regular rhythm, normal heart sounds and intact distal pulses.   No murmur heard.  Pulmonary/Chest: Effort normal and breath sounds normal. She has no wheezes. She has no rales.   Abdominal: Soft. Bowel sounds are normal. She exhibits no distension. There is no tenderness.   Musculoskeletal: Normal range of motion. She exhibits no edema or tenderness.   Even with her spinal stenosis, she has reasonable range of motion of the back.  She does not have specific findings on direct straight leg raising.  No significant deformity of the spine.    Hips and knees with reasonable range of motion.   Lymphadenopathy:     She has no cervical adenopathy.   Neurological: She is alert and oriented to person, place, and time. She has normal reflexes. She displays normal reflexes. No cranial nerve deficit. Coordination normal.   Skin: Skin is warm and dry. Capillary refill takes less than 2 seconds. No rash noted. No erythema.   Psychiatric: She has a normal mood and affect. Her behavior is normal. Judgment and thought content normal.   Vitals reviewed.      Assessment:       1. Essential hypertension    2. Pure hypercholesterolemia    3. Hx of colonic polyps    4. Spinal stenosis, unspecified spinal region        Plan:       1. Essential hypertension  Controlled.  Continue current medications  Continue to avoid excessive sodium  Continue home monitoring  Target blood pressure is 139/89 or less      2. Pure hypercholesterolemia  Lipids controlled  Continue current medications  Annual lipid profile and comprehensive metabolic panel  If complications developed such as myalgia or arthralgia, contact me.    - simvastatin (ZOCOR) 20 MG tablet; Take 1 tablet (20 mg total) by mouth every evening.  Dispense: 90 tablet; Refill: 3    3. Hx of colonic polyps  Colonoscopy in 2015 was unremarkable for polyps  We did discuss fecal immune testing, which would be referral for her.  Given her previous findings and her age, I do believe  this is a reasonable screening next year to year after.    4. Spinal stenosis, unspecified spinal region  Stable  Satisfied with current management  No limitations on activity.

## 2018-11-29 ENCOUNTER — OFFICE VISIT (OUTPATIENT)
Dept: OPTOMETRY | Facility: CLINIC | Age: 83
End: 2018-11-29
Payer: MEDICARE

## 2018-11-29 DIAGNOSIS — Z96.1 BILATERAL PSEUDOPHAKIA: ICD-10-CM

## 2018-11-29 DIAGNOSIS — H52.4 MYOPIA WITH ASTIGMATISM AND PRESBYOPIA, BILATERAL: ICD-10-CM

## 2018-11-29 DIAGNOSIS — Z13.5 GLAUCOMA SCREENING: ICD-10-CM

## 2018-11-29 DIAGNOSIS — H43.813 POSTERIOR VITREOUS DETACHMENT, BILATERAL: Primary | ICD-10-CM

## 2018-11-29 DIAGNOSIS — H52.13 MYOPIA WITH ASTIGMATISM AND PRESBYOPIA, BILATERAL: ICD-10-CM

## 2018-11-29 DIAGNOSIS — H35.3131 EARLY DRY STAGE NONEXUDATIVE AGE-RELATED MACULAR DEGENERATION OF BOTH EYES: ICD-10-CM

## 2018-11-29 DIAGNOSIS — H52.203 MYOPIA WITH ASTIGMATISM AND PRESBYOPIA, BILATERAL: ICD-10-CM

## 2018-11-29 PROCEDURE — 92014 COMPRE OPH EXAM EST PT 1/>: CPT | Mod: S$GLB,,, | Performed by: OPTOMETRIST

## 2018-11-29 PROCEDURE — 99999 PR PBB SHADOW E&M-EST. PATIENT-LVL III: CPT | Mod: PBBFAC,,, | Performed by: OPTOMETRIST

## 2018-11-29 PROCEDURE — 92015 DETERMINE REFRACTIVE STATE: CPT | Mod: S$GLB,,, | Performed by: OPTOMETRIST

## 2018-11-29 NOTE — PROGRESS NOTES
HPI     Annual Exam      Additional comments: DLE 11-17 (timoteo)   ocular health exam               Blurred Vision      Additional comments: at near only -- distance VA good              Comments     Agree above  Notes VA reduced at near OU (without specs) and distance OS  No other new issues            Last edited by SHIRA Murillo, OD on 11/29/2018 11:58 AM. (History)        ROS     Positive for: Eyes    Negative for: Constitutional, Gastrointestinal, Neurological, Skin,   Genitourinary, Musculoskeletal, HENT, Endocrine, Cardiovascular,   Respiratory, Psychiatric, Allergic/Imm, Heme/Lymph    Last edited by SHIRA Murillo, OD on 11/29/2018 10:22 AM. (History)        Assessment /Plan     For exam results, see Encounter Report.    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 RD precautions given  2. Very mild changes, consider oct mac next exam  areds or similar mv  3. Not supsect  4. Stable OU  5. Gave copy of specs  Consider for distance driving, but not needed for full time wear    Discussed and educated patient on current findings /plan.  RTC 1 year, prn if any changes / issues

## 2018-11-29 NOTE — PATIENT INSTRUCTIONS
"DRY EYES:  Use Over The Counter artificial tears as needed for dry eye symptoms.  Some common brands include:  Systane, Optive, and Refresh.  These drops can be used as frequently as desired, but may be most helpful use during long periods of concentrated work.  For example, reading / working at the computer.  Avoid drops that "get redness out", as these contain medication that may further irritate the eyes.    ALLERGY EYES / SYMPTOMS:    Over the counter medications include--Zaditor and Alaway  Use as directed 1-2 drops daily for symptoms of itching / watering eyes.  These drops will not help for dry eye or exposure symptoms.    FLASHES / FLOATERS / POSTERIOR VITREOUS DETACHMENT    Call the clinic if you have any further changes in symptoms.  Including:  Increased numbers of floaters or flashing lights, dimness or darkness that moves through or stays constant in your vision, or any pain in the eye (s).            Using the Amsler Grid  If you are at risk for vision loss, you may be told to check your eyesight regularly using the Amsler grid. Below is the grid and instructions for using it.         The Amsler grid helps you track changes in your vision.    How to Use the Amsler Grid  1. Use the grid in a well-lighted area.  2. Wear glasses or contact lenses if you usually wear them.  3. Hold the grid at your normal reading distance (about 16 inches).  4. Cover your left eye.  5. With your right eye, look at the dot in the center of the grid.  6. While looking at the dot, notice if any of the lines look wavy, if any lines disappear, or if the boxes change shape.  7. Write down on a piece of paper any vision changes from the last time you used the grid.  8. Now repeat the exercise, this time covering your right eye.  9. Call your doctor right away if you notice any vision changes.  How Often Should I Check My Vision?  Use the Amsler grid as often as your eye doctor suggests. Keep the grid where youll remember to use " it. Call your eye doctor right away if you notice any changes with your eyesight. This includes if your vision improves.  © 4035-4758 [employer], [address] [place] [address], [place] [address]. All rights reserved. This information is not intended as a substitute for professional medical care. Always follow your healthcare professional's instructions.

## 2018-12-01 NOTE — PATIENT INSTRUCTIONS
Laly,    You're doing extremely well.  I would not make any new recommendations.  I would encourage you to keep current activities and interventions in place.    [place] comes time to test for colon cancer screening, I would recommend using the newer technology of fecal immune testing given that your last colonoscopy revealed no polyps.    Yayo Lindsey MD

## 2018-12-02 PROBLEM — N18.30 CKD (CHRONIC KIDNEY DISEASE), STAGE III: Chronic | Status: ACTIVE | Noted: 2018-12-02

## 2018-12-24 ENCOUNTER — TELEPHONE (OUTPATIENT)
Dept: FAMILY MEDICINE | Facility: CLINIC | Age: 83
End: 2018-12-24

## 2018-12-24 NOTE — TELEPHONE ENCOUNTER
----- Message from Zenobia Ye sent at 12/24/2018  8:36 AM CST -----    [facility]  Calling to  Verify   Pt  Still  Having  Dr donis  For pcp , because  Pt  Has  Seen  maryellen  Several times // call [phone]

## 2019-03-04 ENCOUNTER — TELEPHONE (OUTPATIENT)
Dept: FAMILY MEDICINE | Facility: CLINIC | Age: 84
End: 2019-03-04

## 2019-03-04 DIAGNOSIS — Z13.820 OSTEOPOROSIS SCREENING: ICD-10-CM

## 2019-03-04 DIAGNOSIS — Z12.31 SCREENING MAMMOGRAM, ENCOUNTER FOR: Primary | ICD-10-CM

## 2019-03-04 NOTE — TELEPHONE ENCOUNTER
----- Message from Tea Martinez sent at 3/4/2019  1:22 PM CST -----  Could you please put an order in for Ms. Baker to get a mammogram and dexascan? She got a letter saying it was time for her to get this done but we have no orders. Please call her when orders are in so she can make appointment. Thank you.

## 2019-03-07 ENCOUNTER — TELEPHONE (OUTPATIENT)
Dept: FAMILY MEDICINE | Facility: CLINIC | Age: 84
End: 2019-03-07

## 2019-03-07 NOTE — TELEPHONE ENCOUNTER
I left a message for pt that BMD per Dr. Lindsey can wait another year since the last two have been normal.   I have the order in for mammogram left message to call back to set up.

## 2019-03-07 NOTE — TELEPHONE ENCOUNTER
----- Message from Constanza Huddleston sent at 3/7/2019 11:59 AM CST -----  Contact: Ildefonso Clemenssumit  Pt's spouse Mr Ildefonso came to desk, states sent message on My [employer] in regards to approval for mammo by Dr Lindsey. He wants someone to respond on the portal.

## 2019-03-15 ENCOUNTER — HOSPITAL ENCOUNTER (OUTPATIENT)
Dept: RADIOLOGY | Facility: HOSPITAL | Age: 84
Discharge: HOME | End: 2019-03-15
Attending: EMERGENCY MEDICINE
Payer: MEDICARE

## 2019-03-15 DIAGNOSIS — Z12.31 SCREENING MAMMOGRAM, ENCOUNTER FOR: ICD-10-CM

## 2019-03-15 PROCEDURE — 77063 BREAST TOMOSYNTHESIS BI: CPT | Mod: 26,,, | Performed by: RADIOLOGY

## 2019-03-15 PROCEDURE — 77067 SCR MAMMO BI INCL CAD: CPT | Mod: 26,,, | Performed by: RADIOLOGY

## 2019-03-15 PROCEDURE — 77067 SCR MAMMO BI INCL CAD: CPT | Mod: TC,PO

## 2019-03-15 PROCEDURE — 77067 MAMMO DIGITAL SCREENING BILAT WITH TOMOSYNTHESIS_CAD: ICD-10-PCS | Mod: 26,,, | Performed by: RADIOLOGY

## 2019-03-15 PROCEDURE — 77063 MAMMO DIGITAL SCREENING BILAT WITH TOMOSYNTHESIS_CAD: ICD-10-PCS | Mod: 26,,, | Performed by: RADIOLOGY

## 2019-05-13 DIAGNOSIS — I10 ESSENTIAL HYPERTENSION: Chronic | ICD-10-CM

## 2019-05-13 RX ORDER — LISINOPRIL 5 MG/1
5 TABLET ORAL DAILY
Qty: 90 TABLET | Refills: 1 | Status: SHIPPED | OUTPATIENT
Start: 2019-05-13 | End: 2019-11-05 | Stop reason: SDUPTHER

## 2019-05-13 RX ORDER — LISINOPRIL 5 MG/1
5 TABLET ORAL DAILY
Qty: 90 TABLET | Refills: 1 | Status: CANCELLED | OUTPATIENT
Start: 2019-05-13

## 2019-07-05 ENCOUNTER — TELEPHONE (OUTPATIENT)
Dept: FAMILY MEDICINE | Facility: CLINIC | Age: 84
End: 2019-07-05

## 2019-07-05 NOTE — TELEPHONE ENCOUNTER
----- Message from Yesi Snyder sent at 7/5/2019  2:14 PM CDT -----  Can you guys call this patient to souleymane her and her , they would like to go in together, they are looking at the middle of august I offered them the end of augustm they want to know from you guys if there would be anything sooner, [phone]

## 2019-08-23 ENCOUNTER — OFFICE VISIT (OUTPATIENT)
Dept: FAMILY MEDICINE | Facility: CLINIC | Age: 84
End: 2019-08-23
Payer: MEDICARE

## 2019-08-23 VITALS
SYSTOLIC BLOOD PRESSURE: 110 MMHG | HEART RATE: 84 BPM | WEIGHT: 101 LBS | OXYGEN SATURATION: 94 % | RESPIRATION RATE: 16 BRPM | BODY MASS INDEX: 16.83 KG/M2 | DIASTOLIC BLOOD PRESSURE: 60 MMHG | HEIGHT: 65 IN

## 2019-08-23 DIAGNOSIS — E78.2 MIXED HYPERLIPIDEMIA: Chronic | ICD-10-CM

## 2019-08-23 DIAGNOSIS — I10 ESSENTIAL HYPERTENSION: Chronic | ICD-10-CM

## 2019-08-23 DIAGNOSIS — M48.00 SPINAL STENOSIS, UNSPECIFIED SPINAL REGION: Chronic | ICD-10-CM

## 2019-08-23 DIAGNOSIS — N18.30 CKD (CHRONIC KIDNEY DISEASE), STAGE III: Primary | Chronic | ICD-10-CM

## 2019-08-23 DIAGNOSIS — Z86.010 HX OF COLONIC POLYPS: Chronic | ICD-10-CM

## 2019-08-23 PROCEDURE — 99999 PR PBB SHADOW E&M-EST. PATIENT-LVL IV: CPT | Mod: PBBFAC,,, | Performed by: EMERGENCY MEDICINE

## 2019-08-23 PROCEDURE — 99499 UNLISTED E&M SERVICE: CPT | Mod: S$GLB,,, | Performed by: EMERGENCY MEDICINE

## 2019-08-23 PROCEDURE — 1101F PT FALLS ASSESS-DOCD LE1/YR: CPT | Mod: CPTII,S$GLB,, | Performed by: EMERGENCY MEDICINE

## 2019-08-23 PROCEDURE — 3078F PR MOST RECENT DIASTOLIC BLOOD PRESSURE < 80 MM HG: ICD-10-PCS | Mod: CPTII,S$GLB,, | Performed by: EMERGENCY MEDICINE

## 2019-08-23 PROCEDURE — 1101F PR PT FALLS ASSESS DOC 0-1 FALLS W/OUT INJ PAST YR: ICD-10-PCS | Mod: CPTII,S$GLB,, | Performed by: EMERGENCY MEDICINE

## 2019-08-23 PROCEDURE — 3074F PR MOST RECENT SYSTOLIC BLOOD PRESSURE < 130 MM HG: ICD-10-PCS | Mod: CPTII,S$GLB,, | Performed by: EMERGENCY MEDICINE

## 2019-08-23 PROCEDURE — 99499 RISK ADDL DX/OHS AUDIT: ICD-10-PCS | Mod: S$GLB,,, | Performed by: EMERGENCY MEDICINE

## 2019-08-23 PROCEDURE — 99214 PR OFFICE/OUTPT VISIT, EST, LEVL IV, 30-39 MIN: ICD-10-PCS | Mod: S$GLB,,, | Performed by: EMERGENCY MEDICINE

## 2019-08-23 PROCEDURE — 99214 OFFICE O/P EST MOD 30 MIN: CPT | Mod: S$GLB,,, | Performed by: EMERGENCY MEDICINE

## 2019-08-23 PROCEDURE — 3074F SYST BP LT 130 MM HG: CPT | Mod: CPTII,S$GLB,, | Performed by: EMERGENCY MEDICINE

## 2019-08-23 PROCEDURE — 3078F DIAST BP <80 MM HG: CPT | Mod: CPTII,S$GLB,, | Performed by: EMERGENCY MEDICINE

## 2019-08-23 PROCEDURE — 99999 PR PBB SHADOW E&M-EST. PATIENT-LVL IV: ICD-10-PCS | Mod: PBBFAC,,, | Performed by: EMERGENCY MEDICINE

## 2019-08-23 NOTE — PATIENT INSTRUCTIONS
Laly,    We do need to monitor your chronic kidney disease every six months.    Keep up the good work.    I do recommend both you and your  to get flu shots and your shingles vaccine this fall.    Yayo Lindsey MD

## 2019-08-23 NOTE — PROGRESS NOTES
Subjective:   THIS NOTE IS DONE WITH VOICE RECOGNITION        Patient ID: Laly Elder is a 84 y.o. female.    Chief Complaint: Hyperlipidemia and Chronic Kidney Disease      HPI     She is in today to follow-up on her blood pressure, her chronic kidney disease, and her hyperlipidemia.    Here to follow up on blood pressure.  Taking current medications.  She is not experiencing cardiovascular symptomatology.  She continues to exercise regularly, without limitation.    BP Readings from Last 3 Encounters:   08/23/19 110/60   11/26/18 110/62   05/17/18 (!) 102/52     Hyperlipidemia monitoring.  Simvastatin 20mg.  She is due for follow-up lipids, which will be arranged for later this fall.    Lab Results   Component Value Date    CHOL 238 (H) 11/19/2018    CHOL 245 (H) 05/21/2018    CHOL 205 (H) 10/04/2017     Lab Results   Component Value Date    HDL 69 11/19/2018    HDL 71 05/21/2018    HDL 62 10/04/2017     Lab Results   Component Value Date    LDLCALC 148.8 11/19/2018    LDLCALC 153.2 05/21/2018    LDLCALC 123.8 10/04/2017     Lab Results   Component Value Date    TRIG 101 11/19/2018    TRIG 104 05/21/2018    TRIG 96 10/04/2017     Lab Results   Component Value Date    CHOLHDL 29.0 11/19/2018    CHOLHDL 29.0 05/21/2018    CHOLHDL 30.2 10/04/2017     She does have chronic kidney disease stage 3.  She is due for routine testing in the next month or 2.  She is not experiencing problems.  She is not using nonsteroidal anti-inflammatory medications.  Her blood pressure is controlled.  No other recognized nephrotoxins.  She is limiting her salt.  Most importantly, she continues to feel quite well in all regards.    Results for LALY ELDER (MRN [id number]) as of 8/23/2019 09:30   Ref. Range 7/28/2017 14:33 10/4/2017 08:02 12/8/2017 09:40 5/21/2018 08:36 11/19/2018 08:17   Creatinine Latest Ref Range: 0.5 - 1.4 mg/dL  1.1 1.0 1.3    eGFR if non African American Latest Ref Range: >60 mL/min/1.73 m^2  47.5 (A) 53 (A)  38.6 (A)      She did have an adenomatous polyp on her previous colonoscopy.  Her last colonoscopy which was 5 years ago was unremarkable.  When asked how long she thought she was going to live, she is hoping she will be like her other family members who live in the mid 90s.  With that in mind.  I have recommended that she move forward with routine diagnostic colonoscopy secondary to history of adenomatous polyps.    Immunization History   Administered Date(s) Administered    Influenza 12/01/2006, 10/11/2010    Influenza - High Dose - PF (65 years and older) 11/22/2011, 10/25/2013, 10/17/2014, 11/19/2015, 10/26/2016, 10/03/2017, 10/30/2018    Pneumococcal Conjugate - 13 Valent 11/15/2016    Pneumococcal Polysaccharide - 23 Valent 11/07/2014    Zoster 04/10/2008     Recombinant shingles vaccine recommended as well as influenza vaccine.    Current Outpatient Medications   Medication Sig Dispense Refill    aspirin 81 mg Tab Take 1 tablet by mouth once daily at 6am. Every day      fish oil-dha-epa (FISH OIL) 1,200-144-216 mg Cap Take 1,000 mg by mouth once daily at 6am. Every day      lisinopril (PRINIVIL,ZESTRIL) 5 MG tablet Take 1 tablet (5 mg total) by mouth once daily. 90 tablet 1    mv-mn/folic acid/calcium/vit K (WOMEN'S 50 PLUS MULTIVITAMIN ORAL) Take by mouth once daily.      protein supplement Liqd Take by mouth.      simvastatin (ZOCOR) 20 MG tablet Take 1 tablet (20 mg total) by mouth every evening. 90 tablet 3     No current facility-administered medications for this visit.          Review of Systems   Constitutional: Negative for activity change, appetite change, chills, diaphoresis, fatigue, fever and unexpected weight change.   HENT: Negative for congestion, ear pain, hearing loss, rhinorrhea, trouble swallowing and voice change.    Eyes: Negative for pain and visual disturbance.   Respiratory: Negative for cough, chest tightness and shortness of breath.    Cardiovascular: Negative for chest  pain, palpitations and leg swelling.   Gastrointestinal: Negative for abdominal distention, abdominal pain and blood in stool.   Genitourinary: Negative for difficulty urinating, flank pain, frequency and urgency.   Musculoskeletal: Positive for back pain. Negative for arthralgias, joint swelling, myalgias, neck pain and neck stiffness.   Skin: Negative for pallor and rash.   Neurological: Negative for dizziness, tremors, syncope, weakness and headaches.   Hematological: Negative for adenopathy.   Psychiatric/Behavioral: Negative for dysphoric mood and sleep disturbance. The patient is not nervous/anxious.        Objective:      Physical Exam   Constitutional: She is oriented to person, place, and time. She appears well-developed and well-nourished. No distress.   HENT:   Head: Normocephalic and atraumatic.   Nose: Nose normal.   Mouth/Throat: Oropharynx is clear and moist.   Eyes: Pupils are equal, round, and reactive to light. Conjunctivae and EOM are normal. No scleral icterus.   Neck: Normal range of motion. Neck supple. No JVD present. No thyromegaly present.   Cardiovascular: Normal rate, regular rhythm, normal heart sounds and intact distal pulses.   No murmur heard.  Pulmonary/Chest: Effort normal and breath sounds normal. She has no wheezes. She has no rales.   Abdominal: Soft. Bowel sounds are normal. She exhibits no distension. There is no tenderness.   Musculoskeletal: Normal range of motion. She exhibits no edema or tenderness.   Lymphadenopathy:     She has no cervical adenopathy.   Neurological: She is alert and oriented to person, place, and time. She has normal reflexes. No cranial nerve deficit. Coordination normal.   Skin: Skin is warm and dry. Capillary refill takes less than 2 seconds. No rash noted. No erythema.   Psychiatric: She has a normal mood and affect. Her behavior is normal.   Vitals reviewed.      Assessment:       1. CKD (chronic kidney disease), stage III    2. Hx of colonic polyps     3. Mixed hyperlipidemia    4. Essential hypertension    5. Spinal stenosis, unspecified spinal region        Plan:       1. CKD (chronic kidney disease), stage III  Thought to be stable  Update labs at her convenience with her annual labs that are due in late October.  Will add PTH and vitamin-D levels.    - Vitamin D; Future  - PTH, intact; Future  - Comprehensive metabolic panel; Future    2. Hx of colonic polyps  History of adenomatous colon polyp, 10 years ago.  Have recommended 1 more colonoscopy, assuming there are no issues.    - Case request GI: COLONOSCOPY    3. Mixed hyperlipidemia  Update labs  No change in medications  Remain physically fit  - Lipid panel; Future  - Comprehensive metabolic panel; Future    4. Essential hypertension  Controlled.  Continue current medications  Continue to avoid excessive sodium  Continue home monitoring  Target blood pressure is 139/89 or less    5. Spinal stenosis, unspecified spinal region  Stable  Remains very active.  No new symptoms  No change in current management

## 2019-10-14 ENCOUNTER — LAB VISIT (OUTPATIENT)
Dept: LAB | Facility: HOSPITAL | Age: 84
End: 2019-10-14
Attending: EMERGENCY MEDICINE
Payer: MEDICARE

## 2019-10-14 DIAGNOSIS — N18.30 CKD (CHRONIC KIDNEY DISEASE), STAGE III: Chronic | ICD-10-CM

## 2019-10-14 DIAGNOSIS — E78.2 MIXED HYPERLIPIDEMIA: Chronic | ICD-10-CM

## 2019-10-14 LAB
25(OH)D3+25(OH)D2 SERPL-MCNC: 42 NG/ML (ref 30–96)
ALBUMIN SERPL BCP-MCNC: 3.7 G/DL (ref 3.5–5.2)
ALP SERPL-CCNC: 72 U/L (ref 55–135)
ALT SERPL W/O P-5'-P-CCNC: 21 U/L (ref 10–44)
ANION GAP SERPL CALC-SCNC: 7 MMOL/L (ref 8–16)
AST SERPL-CCNC: 30 U/L (ref 10–40)
BILIRUB SERPL-MCNC: 0.5 MG/DL (ref 0.1–1)
BUN SERPL-MCNC: 20 MG/DL (ref 8–23)
CALCIUM SERPL-MCNC: 9.3 MG/DL (ref 8.7–10.5)
CHLORIDE SERPL-SCNC: 100 MMOL/L (ref 95–110)
CHOLEST SERPL-MCNC: 133 MG/DL (ref 120–199)
CHOLEST/HDLC SERPL: 2.3 {RATIO} (ref 2–5)
CO2 SERPL-SCNC: 27 MMOL/L (ref 23–29)
CREAT SERPL-MCNC: 0.9 MG/DL (ref 0.5–1.4)
EST. GFR  (AFRICAN AMERICAN): >60 ML/MIN/1.73 M^2
EST. GFR  (NON AFRICAN AMERICAN): 59.7 ML/MIN/1.73 M^2
GLUCOSE SERPL-MCNC: 100 MG/DL (ref 70–110)
HDLC SERPL-MCNC: 59 MG/DL (ref 40–75)
HDLC SERPL: 44.4 % (ref 20–50)
LDLC SERPL CALC-MCNC: 58 MG/DL (ref 63–159)
NONHDLC SERPL-MCNC: 74 MG/DL
POTASSIUM SERPL-SCNC: 4.6 MMOL/L (ref 3.5–5.1)
PROT SERPL-MCNC: 6.3 G/DL (ref 6–8.4)
PTH-INTACT SERPL-MCNC: 42 PG/ML (ref 9–77)
SODIUM SERPL-SCNC: 134 MMOL/L (ref 136–145)
TRIGL SERPL-MCNC: 80 MG/DL (ref 30–150)

## 2019-10-14 PROCEDURE — 80061 LIPID PANEL: CPT

## 2019-10-14 PROCEDURE — 83970 ASSAY OF PARATHORMONE: CPT

## 2019-10-14 PROCEDURE — 80053 COMPREHEN METABOLIC PANEL: CPT

## 2019-10-14 PROCEDURE — 36415 COLL VENOUS BLD VENIPUNCTURE: CPT | Mod: PO

## 2019-10-14 PROCEDURE — 82306 VITAMIN D 25 HYDROXY: CPT

## 2019-11-05 DIAGNOSIS — E78.00 PURE HYPERCHOLESTEROLEMIA: Chronic | ICD-10-CM

## 2019-11-05 DIAGNOSIS — I10 ESSENTIAL HYPERTENSION: Chronic | ICD-10-CM

## 2019-11-05 RX ORDER — LISINOPRIL 5 MG/1
5 TABLET ORAL DAILY
Qty: 90 TABLET | Refills: 1 | Status: CANCELLED | OUTPATIENT
Start: 2019-11-05

## 2019-11-05 RX ORDER — LISINOPRIL 5 MG/1
5 TABLET ORAL DAILY
Qty: 90 TABLET | Refills: 1 | Status: SHIPPED | OUTPATIENT
Start: 2019-11-05 | End: 2020-01-01 | Stop reason: SDUPTHER

## 2019-11-06 RX ORDER — SIMVASTATIN 20 MG/1
20 TABLET, FILM COATED ORAL NIGHTLY
Qty: 90 TABLET | Refills: 3 | Status: ON HOLD | OUTPATIENT
Start: 2019-11-06 | End: 2020-01-01 | Stop reason: HOSPADM

## 2019-12-04 ENCOUNTER — OFFICE VISIT (OUTPATIENT)
Dept: OPTOMETRY | Facility: CLINIC | Age: 84
End: 2019-12-04
Payer: MEDICARE

## 2019-12-04 ENCOUNTER — TELEPHONE (OUTPATIENT)
Dept: OPTOMETRY | Facility: CLINIC | Age: 84
End: 2019-12-04

## 2019-12-04 DIAGNOSIS — Z13.5 GLAUCOMA SCREENING: ICD-10-CM

## 2019-12-04 DIAGNOSIS — Z96.1 BILATERAL PSEUDOPHAKIA: ICD-10-CM

## 2019-12-04 DIAGNOSIS — H35.3111 EARLY DRY STAGE NONEXUDATIVE AGE-RELATED MACULAR DEGENERATION OF RIGHT EYE: ICD-10-CM

## 2019-12-04 DIAGNOSIS — H43.813 POSTERIOR VITREOUS DETACHMENT, BILATERAL: Primary | ICD-10-CM

## 2019-12-04 DIAGNOSIS — H35.3220 EXUDATIVE AGE-RELATED MACULAR DEGENERATION OF LEFT EYE, UNSPECIFIED STAGE: ICD-10-CM

## 2019-12-04 PROCEDURE — 99499 RISK ADDL DX/OHS AUDIT: ICD-10-PCS | Mod: S$GLB,,, | Performed by: OPTOMETRIST

## 2019-12-04 PROCEDURE — 92014 PR EYE EXAM, EST PATIENT,COMPREHESV: ICD-10-PCS | Mod: S$GLB,,, | Performed by: OPTOMETRIST

## 2019-12-04 PROCEDURE — 99499 UNLISTED E&M SERVICE: CPT | Mod: S$GLB,,, | Performed by: OPTOMETRIST

## 2019-12-04 PROCEDURE — 92134 CPTRZ OPH DX IMG PST SGM RTA: CPT | Mod: S$GLB,,, | Performed by: OPTOMETRIST

## 2019-12-04 PROCEDURE — 99999 PR PBB SHADOW E&M-EST. PATIENT-LVL III: ICD-10-PCS | Mod: PBBFAC,,, | Performed by: OPTOMETRIST

## 2019-12-04 PROCEDURE — 92134 POSTERIOR SEGMENT OCT RETINA (OCULAR COHERENCE TOMOGRAPHY)-BOTH EYES: ICD-10-PCS | Mod: S$GLB,,, | Performed by: OPTOMETRIST

## 2019-12-04 PROCEDURE — 99999 PR PBB SHADOW E&M-EST. PATIENT-LVL III: CPT | Mod: PBBFAC,,, | Performed by: OPTOMETRIST

## 2019-12-04 PROCEDURE — 92014 COMPRE OPH EXAM EST PT 1/>: CPT | Mod: S$GLB,,, | Performed by: OPTOMETRIST

## 2019-12-04 NOTE — PATIENT INSTRUCTIONS
"DRY EYES:  Use Over The Counter artificial tears as needed for dry eye symptoms.  Some common brands include:  Systane, Optive, and Refresh.  These drops can be used as frequently as desired, but may be most helpful use during long periods of concentrated work.  For example, reading / working at the computer.  Avoid drops that "get redness out", as these contain medication that may further irritate the eyes.    ALLERGY EYES / SYMPTOMS:    Over the counter medications include--Zaditor and Alaway  Use as directed 1-2 drops daily for symptoms of itching / watering eyes.  These drops will not help for dry eye or exposure symptoms.    FLASHES / FLOATERS / POSTERIOR VITREOUS DETACHMENT    Call the clinic if you have any further changes in symptoms.  Including:  Increased numbers of floaters or flashing lights, dimness or darkness that moves through or stays constant in your vision, or any pain in the eye (s).            Using the Amsler Grid  If you are at risk for vision loss, you may be told to check your eyesight regularly using the Amsler grid. Below is the grid and instructions for using it.         The Amsler grid helps you track changes in your vision.    How to Use the Amsler Grid  1. Use the grid in a well-lighted area.  2. Wear glasses or contact lenses if you usually wear them.  3. Hold the grid at your normal reading distance (about 16 inches).  4. Cover your left eye.  5. With your right eye, look at the dot in the center of the grid.  6. While looking at the dot, notice if any of the lines look wavy, if any lines disappear, or if the boxes change shape.  7. Write down on a piece of paper any vision changes from the last time you used the grid.  8. Now repeat the exercise, this time covering your right eye.  9. Call your doctor right away if you notice any vision changes.  How Often Should I Check My Vision?  Use the Amsler grid as often as your eye doctor suggests. Keep the grid where youll remember to use " it. Call your eye doctor right away if you notice any changes with your eyesight. This includes if your vision improves.  © 0022-7353 [employer], [address] [place] [address], [place] [address]. All rights reserved. This information is not intended as a substitute for professional medical care. Always follow your healthcare professional's instructions.

## 2019-12-04 NOTE — TELEPHONE ENCOUNTER
----- Message from SHIRA Murillo, OD sent at 12/4/2019 10:02 AM CST -----  Call patient----OCT indicates possible fluid in retina OS, advise consult visit with Dr Hart. Not urgent, but within 4-5 weeks  Thanks

## 2019-12-04 NOTE — Clinical Note
Call patient----OCT indicates possible fluid in retina OS, advise consult visit with Dr Hart. Not urgent, but within 4-5 weeksThanks

## 2019-12-04 NOTE — PROGRESS NOTES
HPI     Routine eye exam-dle-11/29/18    Pt denies any blurred vision. Wears otc reading glasses. No eye pain. No   flashes, some floaters.     Last edited by Deidre Senior on 12/4/2019  9:14 AM. (History)        ROS     Positive for: Eyes    Negative for: Constitutional, Gastrointestinal, Neurological, Skin,   Genitourinary, Musculoskeletal, HENT, Endocrine, Cardiovascular,   Respiratory, Psychiatric, Allergic/Imm, Heme/Lymph    Last edited by SHIRA Murillo, OD on 12/4/2019  9:21 AM. (History)        Assessment /Plan     For exam results, see Encounter Report.    Posterior vitreous detachment, bilateral    Exudative age-related macular degeneration of left eye, unspecified stage  -     Posterior Segment OCT Retina-Both eyes  -     Ambulatory Referral to Ophthalmology    Early dry stage nonexudative age-related macular degeneration of right eye    Glaucoma screening    Bilateral pseudophakia      1. RD precautions given  2. Suspect ped w/ nvm OS  Advise retina consult  3. Early dry changes     OCT today  Baseline   OD few small drusen / essentially normal  OS PED and suspect NVM    4. Not suspect  5. Stable OU    RTC for retina eval, then prn w/ me pending

## 2019-12-04 NOTE — TELEPHONE ENCOUNTER
----- Message from Flor Hadley sent at 12/4/2019  1:29 PM CST -----  Contact: Ildefonso thorne  Type:  Patient Returning Call    Who Called:  Ildefonso  Who Left Message for Patient:  Deidre  Does the patient know what this is regarding?:  n/a  Best Call Back Number:  [phone]  Additional Information:  As per Deidre, pls send msg

## 2019-12-30 ENCOUNTER — OFFICE VISIT (OUTPATIENT)
Dept: OPHTHALMOLOGY | Facility: CLINIC | Age: 84
End: 2019-12-30
Payer: MEDICARE

## 2019-12-30 DIAGNOSIS — H43.813 POSTERIOR VITREOUS DETACHMENT, BILATERAL: ICD-10-CM

## 2019-12-30 DIAGNOSIS — H35.3132 NONEXUDATIVE AGE-RELATED MACULAR DEGENERATION, BILATERAL, INTERMEDIATE DRY STAGE: Primary | ICD-10-CM

## 2019-12-30 PROCEDURE — 99999 PR PBB SHADOW E&M-EST. PATIENT-LVL III: CPT | Mod: PBBFAC,,, | Performed by: OPHTHALMOLOGY

## 2019-12-30 PROCEDURE — 92134 CPTRZ OPH DX IMG PST SGM RTA: CPT | Mod: S$GLB,,, | Performed by: OPHTHALMOLOGY

## 2019-12-30 PROCEDURE — 92134 POSTERIOR SEGMENT OCT RETINA (OCULAR COHERENCE TOMOGRAPHY)-BOTH EYES: ICD-10-PCS | Mod: S$GLB,,, | Performed by: OPHTHALMOLOGY

## 2019-12-30 PROCEDURE — 92014 COMPRE OPH EXAM EST PT 1/>: CPT | Mod: S$GLB,,, | Performed by: OPHTHALMOLOGY

## 2019-12-30 PROCEDURE — 92225 PR SPECIAL EYE EXAM, INITIAL: CPT | Mod: RT,S$GLB,, | Performed by: OPHTHALMOLOGY

## 2019-12-30 PROCEDURE — 92225 PR SPECIAL EYE EXAM, INITIAL: ICD-10-PCS | Mod: LT,S$GLB,, | Performed by: OPHTHALMOLOGY

## 2019-12-30 PROCEDURE — 92014 PR EYE EXAM, EST PATIENT,COMPREHESV: ICD-10-PCS | Mod: S$GLB,,, | Performed by: OPHTHALMOLOGY

## 2019-12-30 PROCEDURE — 99999 PR PBB SHADOW E&M-EST. PATIENT-LVL III: ICD-10-PCS | Mod: PBBFAC,,, | Performed by: OPHTHALMOLOGY

## 2019-12-30 NOTE — PROGRESS NOTES
HPI     REFERRED BY DR CHANG (12/4/19)- FLUID OS    PVD OU  ARMD OU  PCIOL OU    NO EYE DROPS    -no noticeable vision changes  -no pains  -no flashes or floaters    Last edited by Joana Peralta on 12/30/2019  1:57 PM. (History)        OCT - OD few drusen   OS - larger drusenoid PED      A/P    1. Dry AMD OU  Larger PED OS    AREDS/AG    2. PVD OU    3. PCIOL OU      1 year OCT

## 2019-12-30 NOTE — LETTER
December 30, 2019      SHIRA Murillo, OD  [address] [facility] [address] [place] [address]           [facility]  [address] [place] [address]  Phone: [phone]  Fax: [phone]          Patient: Laly Baker   MR Number: [id number]   YOB: 1935   Date of Visit: 12/30/2019       Dear Dr. SHIRA Murillo:    Thank you for referring Laly Baker to me for evaluation. Attached you will find relevant portions of my assessment and plan of care.    If you have questions, please do not hesitate to call me. I look forward to following Laly Baker along with you.    Sincerely,    NICK Hart MD    Enclosure  CC:  No Recipients    If you would like to receive this communication electronically, please contact [email or web address] or [phone] to request more information on Epic[employer] Link access.    For providers and/or their staff who would like to refer a patient to [facility], please contact us through our one-stop-shop provider referral line, [facility], at [phone].    If you feel you have received this communication in error or would no longer like to receive these types of communications, please e-mail [email or web address]

## 2020-01-01 ENCOUNTER — TELEPHONE (OUTPATIENT)
Dept: GASTROENTEROLOGY | Facility: CLINIC | Age: 85
End: 2020-01-01

## 2020-01-01 ENCOUNTER — TELEPHONE (OUTPATIENT)
Dept: HEMATOLOGY/ONCOLOGY | Facility: CLINIC | Age: 85
End: 2020-01-01

## 2020-01-01 ENCOUNTER — INFUSION (OUTPATIENT)
Dept: INFUSION THERAPY | Facility: HOSPITAL | Age: 85
End: 2020-01-01
Attending: INTERNAL MEDICINE
Payer: MEDICARE

## 2020-01-01 ENCOUNTER — HOSPITAL ENCOUNTER (OUTPATIENT)
Facility: HOSPITAL | Age: 85
Discharge: HOME | End: 2020-02-24
Attending: INTERNAL MEDICINE | Admitting: INTERNAL MEDICINE
Payer: MEDICARE

## 2020-01-01 ENCOUNTER — DOCUMENT SCAN (OUTPATIENT)
Dept: HOME HEALTH SERVICES | Facility: HOSPITAL | Age: 85
End: 2020-01-01
Payer: MEDICARE

## 2020-01-01 ENCOUNTER — TELEPHONE (OUTPATIENT)
Dept: FAMILY MEDICINE | Facility: CLINIC | Age: 85
End: 2020-01-01

## 2020-01-01 ENCOUNTER — OFFICE VISIT (OUTPATIENT)
Dept: HEMATOLOGY/ONCOLOGY | Facility: CLINIC | Age: 85
End: 2020-01-01
Payer: MEDICARE

## 2020-01-01 ENCOUNTER — DOCUMENTATION ONLY (OUTPATIENT)
Dept: INFUSION THERAPY | Facility: HOSPITAL | Age: 85
End: 2020-01-01

## 2020-01-01 ENCOUNTER — HOSPITAL ENCOUNTER (OUTPATIENT)
Dept: RADIOLOGY | Facility: HOSPITAL | Age: 85
Discharge: HOME | End: 2020-06-03
Attending: INTERNAL MEDICINE
Payer: MEDICARE

## 2020-01-01 ENCOUNTER — TELEPHONE (OUTPATIENT)
Dept: ORTHOPEDICS | Facility: CLINIC | Age: 85
End: 2020-01-01

## 2020-01-01 ENCOUNTER — ANESTHESIA (OUTPATIENT)
Dept: ENDOSCOPY | Facility: HOSPITAL | Age: 85
DRG: 444 | End: 2020-01-01
Payer: MEDICARE

## 2020-01-01 ENCOUNTER — TELEPHONE (OUTPATIENT)
Dept: INFECTIOUS DISEASES | Facility: CLINIC | Age: 85
End: 2020-01-01

## 2020-01-01 ENCOUNTER — CLINICAL SUPPORT (OUTPATIENT)
Dept: HEMATOLOGY/ONCOLOGY | Facility: CLINIC | Age: 85
End: 2020-01-01
Payer: MEDICARE

## 2020-01-01 ENCOUNTER — NURSE TRIAGE (OUTPATIENT)
Dept: ADMINISTRATIVE | Facility: CLINIC | Age: 85
End: 2020-01-01

## 2020-01-01 ENCOUNTER — PATIENT OUTREACH (OUTPATIENT)
Dept: ADMINISTRATIVE | Facility: OTHER | Age: 85
End: 2020-01-01

## 2020-01-01 ENCOUNTER — ANESTHESIA EVENT (OUTPATIENT)
Dept: ENDOSCOPY | Facility: HOSPITAL | Age: 85
End: 2020-01-01
Payer: MEDICARE

## 2020-01-01 ENCOUNTER — HOSPITAL ENCOUNTER (OUTPATIENT)
Dept: RADIOLOGY | Facility: HOSPITAL | Age: 85
Discharge: HOME | End: 2020-09-08
Attending: INTERNAL MEDICINE
Payer: MEDICARE

## 2020-01-01 ENCOUNTER — ANESTHESIA (OUTPATIENT)
Dept: ENDOSCOPY | Facility: HOSPITAL | Age: 85
End: 2020-01-01
Payer: MEDICARE

## 2020-01-01 ENCOUNTER — OFFICE VISIT (OUTPATIENT)
Dept: GASTROENTEROLOGY | Facility: CLINIC | Age: 85
End: 2020-01-01
Payer: MEDICARE

## 2020-01-01 ENCOUNTER — HOSPITAL ENCOUNTER (OUTPATIENT)
Dept: RADIOLOGY | Facility: HOSPITAL | Age: 85
Discharge: HOME | End: 2020-10-02
Attending: INTERNAL MEDICINE
Payer: MEDICARE

## 2020-01-01 ENCOUNTER — LAB VISIT (OUTPATIENT)
Dept: LAB | Facility: HOSPITAL | Age: 85
End: 2020-01-01
Attending: INTERNAL MEDICINE
Payer: MEDICARE

## 2020-01-01 ENCOUNTER — OFFICE VISIT (OUTPATIENT)
Dept: ORTHOPEDICS | Facility: CLINIC | Age: 85
End: 2020-01-01
Payer: MEDICARE

## 2020-01-01 ENCOUNTER — PATIENT MESSAGE (OUTPATIENT)
Dept: HEMATOLOGY/ONCOLOGY | Facility: CLINIC | Age: 85
End: 2020-01-01

## 2020-01-01 ENCOUNTER — TELEPHONE (OUTPATIENT)
Dept: URGENT CARE | Facility: CLINIC | Age: 85
End: 2020-01-01

## 2020-01-01 ENCOUNTER — OFFICE VISIT (OUTPATIENT)
Dept: INFECTIOUS DISEASES | Facility: CLINIC | Age: 85
End: 2020-01-01
Payer: MEDICARE

## 2020-01-01 ENCOUNTER — PATIENT MESSAGE (OUTPATIENT)
Dept: ADMINISTRATIVE | Facility: HOSPITAL | Age: 85
End: 2020-01-01

## 2020-01-01 ENCOUNTER — EXTERNAL HOME HEALTH (OUTPATIENT)
Dept: HOME HEALTH SERVICES | Facility: HOSPITAL | Age: 85
End: 2020-01-01
Payer: MEDICARE

## 2020-01-01 ENCOUNTER — OFFICE VISIT (OUTPATIENT)
Dept: URGENT CARE | Facility: CLINIC | Age: 85
End: 2020-01-01
Payer: MEDICARE

## 2020-01-01 ENCOUNTER — ANESTHESIA EVENT (OUTPATIENT)
Dept: ENDOSCOPY | Facility: HOSPITAL | Age: 85
DRG: 444 | End: 2020-01-01
Payer: MEDICARE

## 2020-01-01 ENCOUNTER — PATIENT MESSAGE (OUTPATIENT)
Dept: PHARMACY | Facility: CLINIC | Age: 85
End: 2020-01-01

## 2020-01-01 ENCOUNTER — IMMUNIZATION (OUTPATIENT)
Dept: PHARMACY | Facility: CLINIC | Age: 85
End: 2020-01-01
Payer: MEDICARE

## 2020-01-01 ENCOUNTER — HOSPITAL ENCOUNTER (INPATIENT)
Facility: HOSPITAL | Age: 85
LOS: 8 days | Discharge: HOME HEALTH | DRG: 444 | End: 2020-07-08
Attending: HOSPITALIST | Admitting: HOSPITALIST
Payer: MEDICARE

## 2020-01-01 ENCOUNTER — LAB VISIT (OUTPATIENT)
Dept: URGENT CARE | Facility: CLINIC | Age: 85
End: 2020-01-01
Payer: MEDICARE

## 2020-01-01 ENCOUNTER — LAB VISIT (OUTPATIENT)
Dept: LAB | Facility: HOSPITAL | Age: 85
End: 2020-01-01
Attending: NURSE PRACTITIONER
Payer: MEDICARE

## 2020-01-01 VITALS
RESPIRATION RATE: 18 BRPM | BODY MASS INDEX: 14.42 KG/M2 | SYSTOLIC BLOOD PRESSURE: 135 MMHG | WEIGHT: 84.44 LBS | OXYGEN SATURATION: 99 % | HEIGHT: 64 IN | HEART RATE: 98 BPM | TEMPERATURE: 99 F | DIASTOLIC BLOOD PRESSURE: 71 MMHG

## 2020-01-01 VITALS
TEMPERATURE: 98 F | WEIGHT: 80.44 LBS | RESPIRATION RATE: 18 BRPM | HEIGHT: 64 IN | OXYGEN SATURATION: 99 % | BODY MASS INDEX: 13.73 KG/M2 | HEART RATE: 78 BPM | DIASTOLIC BLOOD PRESSURE: 66 MMHG | SYSTOLIC BLOOD PRESSURE: 140 MMHG

## 2020-01-01 VITALS
TEMPERATURE: 97 F | DIASTOLIC BLOOD PRESSURE: 78 MMHG | HEIGHT: 64 IN | HEART RATE: 76 BPM | SYSTOLIC BLOOD PRESSURE: 136 MMHG | BODY MASS INDEX: 13.58 KG/M2 | RESPIRATION RATE: 20 BRPM | HEART RATE: 76 BPM | WEIGHT: 79.56 LBS | SYSTOLIC BLOOD PRESSURE: 153 MMHG | DIASTOLIC BLOOD PRESSURE: 69 MMHG | OXYGEN SATURATION: 99 %

## 2020-01-01 VITALS
WEIGHT: 78 LBS | SYSTOLIC BLOOD PRESSURE: 127 MMHG | BODY MASS INDEX: 13.32 KG/M2 | HEART RATE: 82 BPM | DIASTOLIC BLOOD PRESSURE: 64 MMHG | HEIGHT: 64 IN

## 2020-01-01 VITALS
RESPIRATION RATE: 17 BRPM | SYSTOLIC BLOOD PRESSURE: 115 MMHG | BODY MASS INDEX: 14.12 KG/M2 | TEMPERATURE: 99 F | OXYGEN SATURATION: 98 % | HEART RATE: 100 BPM | HEIGHT: 64 IN | WEIGHT: 82.69 LBS | DIASTOLIC BLOOD PRESSURE: 63 MMHG

## 2020-01-01 VITALS
HEIGHT: 64 IN | HEART RATE: 88 BPM | TEMPERATURE: 98 F | RESPIRATION RATE: 18 BRPM | DIASTOLIC BLOOD PRESSURE: 64 MMHG | OXYGEN SATURATION: 92 % | WEIGHT: 87.31 LBS | BODY MASS INDEX: 14.9 KG/M2 | SYSTOLIC BLOOD PRESSURE: 140 MMHG

## 2020-01-01 VITALS
HEIGHT: 64 IN | BODY MASS INDEX: 13.33 KG/M2 | WEIGHT: 78.06 LBS | RESPIRATION RATE: 20 BRPM | SYSTOLIC BLOOD PRESSURE: 133 MMHG | HEART RATE: 81 BPM | DIASTOLIC BLOOD PRESSURE: 72 MMHG | TEMPERATURE: 98 F

## 2020-01-01 VITALS
DIASTOLIC BLOOD PRESSURE: 76 MMHG | DIASTOLIC BLOOD PRESSURE: 82 MMHG | TEMPERATURE: 97 F | SYSTOLIC BLOOD PRESSURE: 143 MMHG | BODY MASS INDEX: 13.97 KG/M2 | OXYGEN SATURATION: 99 % | RESPIRATION RATE: 20 BRPM | HEIGHT: 64 IN | WEIGHT: 81.81 LBS | WEIGHT: 81.81 LBS | SYSTOLIC BLOOD PRESSURE: 130 MMHG | BODY MASS INDEX: 13.97 KG/M2 | HEIGHT: 64 IN | HEART RATE: 101 BPM | DIASTOLIC BLOOD PRESSURE: 57 MMHG | BODY MASS INDEX: 13.89 KG/M2 | HEART RATE: 66 BPM | RESPIRATION RATE: 18 BRPM | SYSTOLIC BLOOD PRESSURE: 136 MMHG | TEMPERATURE: 98 F | OXYGEN SATURATION: 98 % | HEART RATE: 60 BPM | RESPIRATION RATE: 16 BRPM | HEIGHT: 64 IN | WEIGHT: 81.38 LBS

## 2020-01-01 VITALS
TEMPERATURE: 98 F | WEIGHT: 79.56 LBS | WEIGHT: 78.94 LBS | DIASTOLIC BLOOD PRESSURE: 68 MMHG | SYSTOLIC BLOOD PRESSURE: 121 MMHG | OXYGEN SATURATION: 100 % | HEART RATE: 82 BPM | RESPIRATION RATE: 20 BRPM | HEIGHT: 64 IN | TEMPERATURE: 97 F | DIASTOLIC BLOOD PRESSURE: 63 MMHG | HEART RATE: 81 BPM | SYSTOLIC BLOOD PRESSURE: 132 MMHG | RESPIRATION RATE: 20 BRPM | BODY MASS INDEX: 13.66 KG/M2 | OXYGEN SATURATION: 99 % | BODY MASS INDEX: 13.48 KG/M2

## 2020-01-01 VITALS
HEIGHT: 64 IN | TEMPERATURE: 98 F | BODY MASS INDEX: 16.19 KG/M2 | WEIGHT: 94.81 LBS | RESPIRATION RATE: 18 BRPM | DIASTOLIC BLOOD PRESSURE: 55 MMHG | HEART RATE: 80 BPM | SYSTOLIC BLOOD PRESSURE: 93 MMHG | OXYGEN SATURATION: 99 %

## 2020-01-01 VITALS
RESPIRATION RATE: 20 BRPM | OXYGEN SATURATION: 98 % | SYSTOLIC BLOOD PRESSURE: 139 MMHG | HEART RATE: 86 BPM | DIASTOLIC BLOOD PRESSURE: 65 MMHG | BODY MASS INDEX: 13.97 KG/M2 | TEMPERATURE: 97 F | HEIGHT: 64 IN | WEIGHT: 81.81 LBS

## 2020-01-01 VITALS
RESPIRATION RATE: 20 BRPM | HEART RATE: 102 BPM | WEIGHT: 75.63 LBS | TEMPERATURE: 98 F | OXYGEN SATURATION: 100 % | SYSTOLIC BLOOD PRESSURE: 96 MMHG | BODY MASS INDEX: 12.98 KG/M2 | DIASTOLIC BLOOD PRESSURE: 61 MMHG

## 2020-01-01 VITALS
BODY MASS INDEX: 13.66 KG/M2 | DIASTOLIC BLOOD PRESSURE: 69 MMHG | SYSTOLIC BLOOD PRESSURE: 141 MMHG | WEIGHT: 80 LBS | HEIGHT: 64 IN | TEMPERATURE: 99 F | HEART RATE: 70 BPM | RESPIRATION RATE: 16 BRPM

## 2020-01-01 VITALS
HEIGHT: 64 IN | DIASTOLIC BLOOD PRESSURE: 49 MMHG | BODY MASS INDEX: 13.97 KG/M2 | WEIGHT: 81.81 LBS | TEMPERATURE: 98 F | HEART RATE: 95 BPM | OXYGEN SATURATION: 99 % | SYSTOLIC BLOOD PRESSURE: 136 MMHG

## 2020-01-01 VITALS
WEIGHT: 78.06 LBS | HEART RATE: 86 BPM | RESPIRATION RATE: 16 BRPM | BODY MASS INDEX: 15.33 KG/M2 | DIASTOLIC BLOOD PRESSURE: 55 MMHG | HEART RATE: 81 BPM | SYSTOLIC BLOOD PRESSURE: 126 MMHG | WEIGHT: 75.63 LBS | OXYGEN SATURATION: 100 % | DIASTOLIC BLOOD PRESSURE: 67 MMHG | TEMPERATURE: 97 F | SYSTOLIC BLOOD PRESSURE: 138 MMHG | BODY MASS INDEX: 12.91 KG/M2 | HEIGHT: 64 IN | HEIGHT: 60 IN | TEMPERATURE: 98 F | RESPIRATION RATE: 20 BRPM

## 2020-01-01 VITALS
BODY MASS INDEX: 16.47 KG/M2 | SYSTOLIC BLOOD PRESSURE: 121 MMHG | HEART RATE: 78 BPM | TEMPERATURE: 99 F | DIASTOLIC BLOOD PRESSURE: 59 MMHG | WEIGHT: 89.5 LBS | OXYGEN SATURATION: 96 % | HEIGHT: 62 IN | RESPIRATION RATE: 20 BRPM

## 2020-01-01 VITALS
HEART RATE: 73 BPM | HEIGHT: 64 IN | DIASTOLIC BLOOD PRESSURE: 68 MMHG | SYSTOLIC BLOOD PRESSURE: 143 MMHG | WEIGHT: 78.06 LBS | BODY MASS INDEX: 13.33 KG/M2

## 2020-01-01 VITALS
RESPIRATION RATE: 20 BRPM | SYSTOLIC BLOOD PRESSURE: 137 MMHG | TEMPERATURE: 99 F | OXYGEN SATURATION: 98 % | BODY MASS INDEX: 15.33 KG/M2 | BODY MASS INDEX: 13.66 KG/M2 | HEIGHT: 60 IN | WEIGHT: 80 LBS | DIASTOLIC BLOOD PRESSURE: 62 MMHG | TEMPERATURE: 97 F | HEART RATE: 79 BPM | RESPIRATION RATE: 20 BRPM | HEART RATE: 75 BPM | DIASTOLIC BLOOD PRESSURE: 59 MMHG | WEIGHT: 78.06 LBS | SYSTOLIC BLOOD PRESSURE: 109 MMHG | OXYGEN SATURATION: 100 % | HEIGHT: 64 IN

## 2020-01-01 VITALS
DIASTOLIC BLOOD PRESSURE: 63 MMHG | HEIGHT: 65 IN | WEIGHT: 96.56 LBS | HEART RATE: 67 BPM | BODY MASS INDEX: 16.09 KG/M2 | SYSTOLIC BLOOD PRESSURE: 106 MMHG

## 2020-01-01 VITALS
DIASTOLIC BLOOD PRESSURE: 59 MMHG | OXYGEN SATURATION: 99 % | HEART RATE: 76 BPM | SYSTOLIC BLOOD PRESSURE: 139 MMHG | RESPIRATION RATE: 14 BRPM | TEMPERATURE: 98 F | WEIGHT: 100 LBS | HEIGHT: 64 IN | BODY MASS INDEX: 17.07 KG/M2

## 2020-01-01 VITALS — BODY MASS INDEX: 15.36 KG/M2 | RESPIRATION RATE: 17 BRPM | WEIGHT: 89.94 LBS | HEIGHT: 64 IN

## 2020-01-01 VITALS
BODY MASS INDEX: 13.89 KG/M2 | OXYGEN SATURATION: 96 % | TEMPERATURE: 99 F | RESPIRATION RATE: 20 BRPM | HEART RATE: 111 BPM | WEIGHT: 81.38 LBS | DIASTOLIC BLOOD PRESSURE: 56 MMHG | HEIGHT: 64 IN | SYSTOLIC BLOOD PRESSURE: 142 MMHG

## 2020-01-01 VITALS — BODY MASS INDEX: 15.09 KG/M2 | WEIGHT: 88.38 LBS | HEIGHT: 64 IN

## 2020-01-01 VITALS
RESPIRATION RATE: 11 BRPM | DIASTOLIC BLOOD PRESSURE: 58 MMHG | SYSTOLIC BLOOD PRESSURE: 147 MMHG | HEART RATE: 79 BPM | HEIGHT: 62 IN | TEMPERATURE: 98 F | WEIGHT: 83.75 LBS | OXYGEN SATURATION: 99 % | BODY MASS INDEX: 15.41 KG/M2

## 2020-01-01 VITALS
OXYGEN SATURATION: 98 % | BODY MASS INDEX: 14.38 KG/M2 | DIASTOLIC BLOOD PRESSURE: 66 MMHG | WEIGHT: 83.75 LBS | HEART RATE: 91 BPM | SYSTOLIC BLOOD PRESSURE: 144 MMHG | TEMPERATURE: 98 F

## 2020-01-01 VITALS — TEMPERATURE: 99 F

## 2020-01-01 DIAGNOSIS — R00.0 TACHYCARDIA: ICD-10-CM

## 2020-01-01 DIAGNOSIS — R63.0 POOR APPETITE: ICD-10-CM

## 2020-01-01 DIAGNOSIS — C77.2 PANCREATIC CANCER METASTASIZED TO INTRA-ABDOMINAL LYMPH NODE: ICD-10-CM

## 2020-01-01 DIAGNOSIS — M86.9 OSTEOMYELITIS OF FINGER: Primary | ICD-10-CM

## 2020-01-01 DIAGNOSIS — C25.9 PANCREATIC ADENOCARCINOMA: ICD-10-CM

## 2020-01-01 DIAGNOSIS — Z09 HOSPITAL DISCHARGE FOLLOW-UP: Primary | ICD-10-CM

## 2020-01-01 DIAGNOSIS — C25.9 PANCREATIC CANCER METASTASIZED TO INTRA-ABDOMINAL LYMPH NODE: ICD-10-CM

## 2020-01-01 DIAGNOSIS — C78.7 METASTASIS TO LIVER: ICD-10-CM

## 2020-01-01 DIAGNOSIS — I10 ESSENTIAL HYPERTENSION: Chronic | ICD-10-CM

## 2020-01-01 DIAGNOSIS — C77.2 PANCREATIC CANCER METASTASIZED TO INTRA-ABDOMINAL LYMPH NODE: Primary | ICD-10-CM

## 2020-01-01 DIAGNOSIS — C25.9 PANCREATIC ADENOCARCINOMA: Primary | ICD-10-CM

## 2020-01-01 DIAGNOSIS — R63.6 UNDERWEIGHT: ICD-10-CM

## 2020-01-01 DIAGNOSIS — D63.0 ANEMIA IN NEOPLASTIC DISEASE: ICD-10-CM

## 2020-01-01 DIAGNOSIS — K86.9 DIABETES MELLITUS ASSOCIATED WITH PANCREATIC DISEASE: ICD-10-CM

## 2020-01-01 DIAGNOSIS — D63.8 ANEMIA OF CHRONIC DISEASE: ICD-10-CM

## 2020-01-01 DIAGNOSIS — R10.9 ABDOMINAL PAIN, UNSPECIFIED ABDOMINAL LOCATION: ICD-10-CM

## 2020-01-01 DIAGNOSIS — E16.2 HYPOGLYCEMIA: ICD-10-CM

## 2020-01-01 DIAGNOSIS — R63.4 UNINTENTIONAL WEIGHT LOSS: ICD-10-CM

## 2020-01-01 DIAGNOSIS — E11.69 DIABETES MELLITUS ASSOCIATED WITH PANCREATIC DISEASE: ICD-10-CM

## 2020-01-01 DIAGNOSIS — T45.1X5A ANEMIA ASSOCIATED WITH CHEMOTHERAPY: Primary | ICD-10-CM

## 2020-01-01 DIAGNOSIS — M65.341 TRIGGER FINGER, RIGHT RING FINGER: Primary | ICD-10-CM

## 2020-01-01 DIAGNOSIS — C80.1 BILIARY OBSTRUCTION DUE TO CANCER: ICD-10-CM

## 2020-01-01 DIAGNOSIS — C25.9 PANCREATIC CANCER METASTASIZED TO INTRA-ABDOMINAL LYMPH NODE: Primary | ICD-10-CM

## 2020-01-01 DIAGNOSIS — C80.1 MALIGNANT BILIARY OBSTRUCTION: ICD-10-CM

## 2020-01-01 DIAGNOSIS — Z87.19 HISTORY OF MELENA: ICD-10-CM

## 2020-01-01 DIAGNOSIS — E78.00 PURE HYPERCHOLESTEROLEMIA: Chronic | ICD-10-CM

## 2020-01-01 DIAGNOSIS — D64.9 ANEMIA, UNSPECIFIED TYPE: ICD-10-CM

## 2020-01-01 DIAGNOSIS — R60.9 EDEMA, UNSPECIFIED TYPE: ICD-10-CM

## 2020-01-01 DIAGNOSIS — M79.89 LEG SWELLING: ICD-10-CM

## 2020-01-01 DIAGNOSIS — C25.9 MALIGNANT NEOPLASM OF PANCREAS, UNSPECIFIED LOCATION OF MALIGNANCY: Primary | ICD-10-CM

## 2020-01-01 DIAGNOSIS — D64.81 ANEMIA ASSOCIATED WITH CHEMOTHERAPY: ICD-10-CM

## 2020-01-01 DIAGNOSIS — D50.9 IRON DEFICIENCY ANEMIA, UNSPECIFIED IRON DEFICIENCY ANEMIA TYPE: ICD-10-CM

## 2020-01-01 DIAGNOSIS — E87.1 HYPONATREMIA: ICD-10-CM

## 2020-01-01 DIAGNOSIS — R63.4 WEIGHT LOSS, ABNORMAL: ICD-10-CM

## 2020-01-01 DIAGNOSIS — L02.512 ABSCESS OF LEFT RING FINGER: Primary | ICD-10-CM

## 2020-01-01 DIAGNOSIS — U07.1 COVID-19: ICD-10-CM

## 2020-01-01 DIAGNOSIS — T45.1X5A ANEMIA ASSOCIATED WITH CHEMOTHERAPY: ICD-10-CM

## 2020-01-01 DIAGNOSIS — Z86.010 HX OF COLONIC POLYPS: ICD-10-CM

## 2020-01-01 DIAGNOSIS — R10.11 RIGHT UPPER QUADRANT ABDOMINAL PAIN: Primary | ICD-10-CM

## 2020-01-01 DIAGNOSIS — M79.89 LEG SWELLING: Primary | ICD-10-CM

## 2020-01-01 DIAGNOSIS — K26.9 DUODENAL ULCER: ICD-10-CM

## 2020-01-01 DIAGNOSIS — K83.1 BILIARY OBSTRUCTION DUE TO CANCER: ICD-10-CM

## 2020-01-01 DIAGNOSIS — R19.4 CHANGE IN BOWEL HABITS: Primary | ICD-10-CM

## 2020-01-01 DIAGNOSIS — R45.89 ANXIETY ABOUT HEALTH: ICD-10-CM

## 2020-01-01 DIAGNOSIS — Z86.010 HISTORY OF COLON POLYPS: ICD-10-CM

## 2020-01-01 DIAGNOSIS — R63.4 ABNORMAL WEIGHT LOSS: ICD-10-CM

## 2020-01-01 DIAGNOSIS — D69.59 CHEMOTHERAPY-INDUCED THROMBOCYTOPENIA: ICD-10-CM

## 2020-01-01 DIAGNOSIS — G89.3 CANCER ASSOCIATED PAIN: ICD-10-CM

## 2020-01-01 DIAGNOSIS — R07.9 CHEST PAIN: ICD-10-CM

## 2020-01-01 DIAGNOSIS — K83.1 MALIGNANT BILIARY OBSTRUCTION: ICD-10-CM

## 2020-01-01 DIAGNOSIS — R30.0 DYSURIA: ICD-10-CM

## 2020-01-01 DIAGNOSIS — K83.1 BILIARY OBSTRUCTION: Primary | ICD-10-CM

## 2020-01-01 DIAGNOSIS — M65.9 FLEXOR TENOSYNOVITIS OF FINGER: Primary | ICD-10-CM

## 2020-01-01 DIAGNOSIS — T45.1X5A CHEMOTHERAPY-INDUCED THROMBOCYTOPENIA: ICD-10-CM

## 2020-01-01 DIAGNOSIS — D64.81 ANEMIA ASSOCIATED WITH CHEMOTHERAPY: Primary | ICD-10-CM

## 2020-01-01 DIAGNOSIS — Z87.19 HISTORY OF PANCREATITIS: ICD-10-CM

## 2020-01-01 DIAGNOSIS — N18.30 CKD (CHRONIC KIDNEY DISEASE), STAGE III: Chronic | ICD-10-CM

## 2020-01-01 DIAGNOSIS — K92.2 GASTROINTESTINAL HEMORRHAGE, UNSPECIFIED GASTROINTESTINAL HEMORRHAGE TYPE: ICD-10-CM

## 2020-01-01 DIAGNOSIS — R19.4 FREQUENT BOWEL MOVEMENTS: Primary | ICD-10-CM

## 2020-01-01 DIAGNOSIS — Z01.818 PRE-OPERATIVE CLEARANCE: ICD-10-CM

## 2020-01-01 DIAGNOSIS — K86.3 PANCREATIC PSEUDOCYST: ICD-10-CM

## 2020-01-01 DIAGNOSIS — K92.2 LOWER GI BLEED: ICD-10-CM

## 2020-01-01 DIAGNOSIS — R79.89 ELEVATED LFTS: ICD-10-CM

## 2020-01-01 DIAGNOSIS — K59.00 CONSTIPATION, UNSPECIFIED CONSTIPATION TYPE: ICD-10-CM

## 2020-01-01 DIAGNOSIS — L03.90 CELLULITIS, UNSPECIFIED CELLULITIS SITE: Primary | ICD-10-CM

## 2020-01-01 LAB
ABO + RH BLD: NORMAL
ALBUMIN SERPL BCP-MCNC: 2.1 G/DL (ref 3.5–5.2)
ALBUMIN SERPL BCP-MCNC: 2.2 G/DL (ref 3.5–5.2)
ALBUMIN SERPL BCP-MCNC: 2.4 G/DL (ref 3.5–5.2)
ALBUMIN SERPL BCP-MCNC: 2.4 G/DL (ref 3.5–5.2)
ALBUMIN SERPL BCP-MCNC: 2.6 G/DL (ref 3.5–5.2)
ALBUMIN SERPL BCP-MCNC: 2.7 G/DL (ref 3.5–5.2)
ALBUMIN SERPL BCP-MCNC: 3.1 G/DL (ref 3.5–5.2)
ALBUMIN SERPL BCP-MCNC: 3.5 G/DL (ref 3.5–5.2)
ALP SERPL-CCNC: 105 U/L (ref 38–145)
ALP SERPL-CCNC: 109 U/L (ref 38–145)
ALP SERPL-CCNC: 208 U/L (ref 55–135)
ALP SERPL-CCNC: 322 U/L (ref 55–135)
ALP SERPL-CCNC: 322 U/L (ref 55–135)
ALP SERPL-CCNC: 390 U/L (ref 55–135)
ALP SERPL-CCNC: 401 U/L (ref 55–135)
ALP SERPL-CCNC: 444 U/L (ref 55–135)
ALT SERPL W/O P-5'-P-CCNC: 153 U/L (ref 10–44)
ALT SERPL W/O P-5'-P-CCNC: 160 U/L (ref 10–44)
ALT SERPL W/O P-5'-P-CCNC: 213 U/L (ref 10–44)
ALT SERPL W/O P-5'-P-CCNC: 327 U/L (ref 10–44)
ALT SERPL W/O P-5'-P-CCNC: 34 U/L (ref 0–35)
ALT SERPL W/O P-5'-P-CCNC: 469 U/L (ref 10–44)
ALT SERPL W/O P-5'-P-CCNC: 59 U/L (ref 0–35)
ALT SERPL W/O P-5'-P-CCNC: 68 U/L (ref 10–44)
ANION GAP SERPL CALC-SCNC: 5 MMOL/L (ref 8–16)
ANION GAP SERPL CALC-SCNC: 6 MMOL/L (ref 8–16)
ANION GAP SERPL CALC-SCNC: 8 MMOL/L (ref 8–16)
ANION GAP SERPL CALC-SCNC: 8 MMOL/L (ref 8–16)
ANION GAP SERPL CALC-SCNC: 9 MMOL/L (ref 8–16)
ANISOCYTOSIS BLD QL SMEAR: ABNORMAL
AST SERPL-CCNC: 102 U/L (ref 10–40)
AST SERPL-CCNC: 203 U/L (ref 10–40)
AST SERPL-CCNC: 29 U/L (ref 10–40)
AST SERPL-CCNC: 31 U/L (ref 10–40)
AST SERPL-CCNC: 32 U/L (ref 10–40)
AST SERPL-CCNC: 44 U/L (ref 14–36)
AST SERPL-CCNC: 53 U/L (ref 10–40)
AST SERPL-CCNC: 53 U/L (ref 14–36)
BACTERIA #/AREA URNS HPF: NEGATIVE /HPF
BACTERIA #/AREA URNS HPF: NEGATIVE /HPF
BACTERIA UR CULT: NO GROWTH
BASO STIPL BLD QL SMEAR: ABNORMAL
BASOPHILS # BLD AUTO: 0 K/UL (ref 0–0.2)
BASOPHILS # BLD AUTO: 0.01 K/UL (ref 0–0.2)
BASOPHILS # BLD AUTO: 0.01 K/UL (ref 0–0.2)
BASOPHILS # BLD AUTO: 0.02 K/UL (ref 0–0.2)
BASOPHILS # BLD AUTO: 0.03 K/UL (ref 0–0.2)
BASOPHILS # BLD AUTO: 0.04 K/UL (ref 0–0.2)
BASOPHILS # BLD AUTO: 0.06 K/UL (ref 0–0.2)
BASOPHILS # BLD AUTO: 0.06 K/UL (ref 0–0.2)
BASOPHILS NFR BLD: 0 % (ref 0–1.9)
BASOPHILS NFR BLD: 0.1 % (ref 0–1.9)
BASOPHILS NFR BLD: 0.1 % (ref 0–1.9)
BASOPHILS NFR BLD: 0.2 % (ref 0–1.9)
BASOPHILS NFR BLD: 0.4 % (ref 0–1.9)
BASOPHILS NFR BLD: 0.4 % (ref 0–1.9)
BASOPHILS NFR BLD: 0.5 % (ref 0–1.9)
BASOPHILS NFR BLD: 0.5 % (ref 0–1.9)
BASOPHILS NFR BLD: 0.6 % (ref 0–1.9)
BASOPHILS NFR BLD: 0.7 % (ref 0–1.9)
BASOPHILS NFR BLD: 0.7 % (ref 0–1.9)
BILIRUB SERPL-MCNC: 0.4 MG/DL (ref 0.2–1.3)
BILIRUB SERPL-MCNC: 0.6 MG/DL (ref 0.2–1.3)
BILIRUB SERPL-MCNC: 1 MG/DL (ref 0.1–1)
BILIRUB SERPL-MCNC: 1.2 MG/DL (ref 0.1–1)
BILIRUB SERPL-MCNC: 1.6 MG/DL (ref 0.1–1)
BILIRUB SERPL-MCNC: 1.8 MG/DL (ref 0.1–1)
BILIRUB SERPL-MCNC: 3.2 MG/DL (ref 0.1–1)
BILIRUB SERPL-MCNC: 4 MG/DL (ref 0.1–1)
BILIRUB UR QL STRIP: NEGATIVE
BLD GP AB SCN CELLS X3 SERPL QL: NORMAL
BLD PROD TYP BPU: NORMAL
BLD PROD TYP BPU: NORMAL
BLOOD UNIT EXPIRATION DATE: NORMAL
BLOOD UNIT EXPIRATION DATE: NORMAL
BLOOD UNIT TYPE CODE: 5100
BLOOD UNIT TYPE CODE: 5100
BLOOD UNIT TYPE: NORMAL
BLOOD UNIT TYPE: NORMAL
BUN SERPL-MCNC: 14 MG/DL (ref 8–23)
BUN SERPL-MCNC: 14 MG/DL (ref 8–23)
BUN SERPL-MCNC: 15 MG/DL (ref 8–23)
BUN SERPL-MCNC: 15 MG/DL (ref 8–23)
BUN SERPL-MCNC: 24 MG/DL (ref 7–18)
BUN SERPL-MCNC: 7 MG/DL (ref 8–23)
BUN SERPL-MCNC: 8 MG/DL (ref 8–23)
BURR CELLS BLD QL SMEAR: ABNORMAL
CALCIUM SERPL-MCNC: 7.9 MG/DL (ref 8.7–10.5)
CALCIUM SERPL-MCNC: 8.1 MG/DL (ref 8.7–10.5)
CALCIUM SERPL-MCNC: 8.3 MG/DL (ref 8.7–10.5)
CALCIUM SERPL-MCNC: 8.3 MG/DL (ref 8.7–10.5)
CALCIUM SERPL-MCNC: 8.4 MG/DL (ref 8.7–10.5)
CALCIUM SERPL-MCNC: 8.8 MG/DL (ref 8.7–10.5)
CALCIUM SERPL-MCNC: 8.9 MG/DL (ref 8.4–10.2)
CALCIUM SERPL-MCNC: 9.3 MG/DL (ref 8.4–10.2)
CANCER AG19-9 SERPL-ACNC: 6263 U/ML (ref 2–40)
CANCER AG19-9 SERPL-ACNC: ABNORMAL U/ML (ref 2–40)
CHLORIDE SERPL-SCNC: 100 MMOL/L (ref 95–110)
CHLORIDE SERPL-SCNC: 101 MMOL/L (ref 95–110)
CHLORIDE SERPL-SCNC: 104 MMOL/L (ref 95–110)
CHLORIDE SERPL-SCNC: 94 MMOL/L (ref 95–110)
CHLORIDE SERPL-SCNC: 96 MMOL/L (ref 95–110)
CHLORIDE SERPL-SCNC: 97 MMOL/L (ref 95–110)
CHLORIDE SERPL-SCNC: 97 MMOL/L (ref 95–110)
CHLORIDE SERPL-SCNC: 99 MMOL/L (ref 95–110)
CLARITY UR: CLEAR
CO2 SERPL-SCNC: 22 MMOL/L (ref 23–29)
CO2 SERPL-SCNC: 23 MMOL/L (ref 23–29)
CO2 SERPL-SCNC: 23 MMOL/L (ref 23–29)
CO2 SERPL-SCNC: 24 MMOL/L (ref 23–29)
CO2 SERPL-SCNC: 25 MMOL/L (ref 22–31)
CO2 SERPL-SCNC: 27 MMOL/L (ref 23–29)
CO2 SERPL-SCNC: 28 MMOL/L (ref 22–31)
CO2 SERPL-SCNC: 29 MMOL/L (ref 23–29)
CODING SYSTEM: NORMAL
CODING SYSTEM: NORMAL
COLOR UR: YELLOW
CREAT SERPL-MCNC: 0.64 MG/DL (ref 0.5–1.4)
CREAT SERPL-MCNC: 0.7 MG/DL (ref 0.5–1.4)
CREAT SERPL-MCNC: 0.75 MG/DL (ref 0.5–1.4)
CREAT SERPL-MCNC: 0.8 MG/DL (ref 0.5–1.4)
DIFFERENTIAL METHOD: ABNORMAL
DISPENSE STATUS: NORMAL
DISPENSE STATUS: NORMAL
EOSINOPHIL # BLD AUTO: 0 K/UL (ref 0–0.5)
EOSINOPHIL # BLD AUTO: 0.1 K/UL (ref 0–0.5)
EOSINOPHIL # BLD AUTO: 0.2 K/UL (ref 0–0.5)
EOSINOPHIL NFR BLD: 0 % (ref 0–8)
EOSINOPHIL NFR BLD: 0.1 % (ref 0–8)
EOSINOPHIL NFR BLD: 0.3 % (ref 0–8)
EOSINOPHIL NFR BLD: 0.4 % (ref 0–8)
EOSINOPHIL NFR BLD: 0.7 % (ref 0–8)
EOSINOPHIL NFR BLD: 1.1 % (ref 0–8)
EOSINOPHIL NFR BLD: 1.4 % (ref 0–8)
EOSINOPHIL NFR BLD: 1.7 % (ref 0–8)
EOSINOPHIL NFR BLD: 1.8 % (ref 0–8)
EOSINOPHIL NFR BLD: 1.9 % (ref 0–8)
EOSINOPHIL NFR BLD: 1.9 % (ref 0–8)
EOSINOPHIL NFR BLD: 2.2 % (ref 0–8)
EOSINOPHIL NFR BLD: 3 % (ref 0–8)
EOSINOPHIL NFR BLD: 4.2 % (ref 0–8)
ERYTHROCYTE [DISTWIDTH] IN BLOOD BY AUTOMATED COUNT: 13.6 % (ref 11.5–14.5)
ERYTHROCYTE [DISTWIDTH] IN BLOOD BY AUTOMATED COUNT: 13.8 % (ref 11.5–14.5)
ERYTHROCYTE [DISTWIDTH] IN BLOOD BY AUTOMATED COUNT: 14 % (ref 11.5–14.5)
ERYTHROCYTE [DISTWIDTH] IN BLOOD BY AUTOMATED COUNT: 14.1 % (ref 11.5–14.5)
ERYTHROCYTE [DISTWIDTH] IN BLOOD BY AUTOMATED COUNT: 14.4 % (ref 11.5–14.5)
ERYTHROCYTE [DISTWIDTH] IN BLOOD BY AUTOMATED COUNT: 14.5 % (ref 11.5–14.5)
ERYTHROCYTE [DISTWIDTH] IN BLOOD BY AUTOMATED COUNT: 14.6 % (ref 11.5–14.5)
ERYTHROCYTE [DISTWIDTH] IN BLOOD BY AUTOMATED COUNT: 14.7 % (ref 11.5–14.5)
ERYTHROCYTE [DISTWIDTH] IN BLOOD BY AUTOMATED COUNT: 15 % (ref 11.5–14.5)
ERYTHROCYTE [DISTWIDTH] IN BLOOD BY AUTOMATED COUNT: 15.1 % (ref 11.5–14.5)
ERYTHROCYTE [DISTWIDTH] IN BLOOD BY AUTOMATED COUNT: 15.2 % (ref 11.5–14.5)
ERYTHROCYTE [DISTWIDTH] IN BLOOD BY AUTOMATED COUNT: 16.1 % (ref 11.5–14.5)
ERYTHROCYTE [DISTWIDTH] IN BLOOD BY AUTOMATED COUNT: 22.7 % (ref 11.5–14.5)
EST. GFR  (AFRICAN AMERICAN): >60 ML/MIN/1.73 M^2
EST. GFR  (NON AFRICAN AMERICAN): >60 ML/MIN/1.73 M^2
FERRITIN SERPL-MCNC: 154 NG/ML (ref 20–300)
FINAL PATHOLOGIC DIAGNOSIS: NORMAL
FINAL PATHOLOGIC DIAGNOSIS: NORMAL
GLUCOSE SERPL-MCNC: 109 MG/DL (ref 70–110)
GLUCOSE SERPL-MCNC: 112 MG/DL (ref 70–110)
GLUCOSE SERPL-MCNC: 149 MG/DL (ref 70–110)
GLUCOSE SERPL-MCNC: 157 MG/DL (ref 70–110)
GLUCOSE SERPL-MCNC: 224 MG/DL (ref 70–110)
GLUCOSE SERPL-MCNC: 229 MG/DL (ref 70–110)
GLUCOSE SERPL-MCNC: 231 MG/DL (ref 70–110)
GLUCOSE SERPL-MCNC: 232 MG/DL (ref 70–110)
GLUCOSE UR QL STRIP: NEGATIVE
GROSS: NORMAL
GROSS: NORMAL
H PYLORI IGG SERPL QL IA: NEGATIVE
HCT VFR BLD AUTO: 20 % (ref 37–48.5)
HCT VFR BLD AUTO: 21.5 % (ref 37–48.5)
HCT VFR BLD AUTO: 24.3 % (ref 37–48.5)
HCT VFR BLD AUTO: 24.8 % (ref 37–48.5)
HCT VFR BLD AUTO: 26.5 % (ref 37–48.5)
HCT VFR BLD AUTO: 28.1 % (ref 37–48.5)
HCT VFR BLD AUTO: 28.3 % (ref 37–48.5)
HCT VFR BLD AUTO: 28.6 % (ref 37–48.5)
HCT VFR BLD AUTO: 29 % (ref 37–48.5)
HCT VFR BLD AUTO: 29 % (ref 37–48.5)
HCT VFR BLD AUTO: 29.2 % (ref 37–48.5)
HCT VFR BLD AUTO: 29.3 % (ref 37–48.5)
HCT VFR BLD AUTO: 29.4 % (ref 37–48.5)
HCT VFR BLD AUTO: 29.6 % (ref 37–48.5)
HCT VFR BLD AUTO: 29.9 % (ref 37–48.5)
HCT VFR BLD AUTO: 31.5 % (ref 37–48.5)
HCT VFR BLD AUTO: 32.8 % (ref 37–48.5)
HGB BLD-MCNC: 10.6 G/DL (ref 12–16)
HGB BLD-MCNC: 10.9 G/DL (ref 12–16)
HGB BLD-MCNC: 6.5 G/DL (ref 12–16)
HGB BLD-MCNC: 7.2 G/DL (ref 12–16)
HGB BLD-MCNC: 7.9 G/DL (ref 12–16)
HGB BLD-MCNC: 8.1 G/DL (ref 12–16)
HGB BLD-MCNC: 8.7 G/DL (ref 12–16)
HGB BLD-MCNC: 9.1 G/DL (ref 12–16)
HGB BLD-MCNC: 9.1 G/DL (ref 12–16)
HGB BLD-MCNC: 9.2 G/DL (ref 12–16)
HGB BLD-MCNC: 9.3 G/DL (ref 12–16)
HGB BLD-MCNC: 9.5 G/DL (ref 12–16)
HGB BLD-MCNC: 9.6 G/DL (ref 12–16)
HGB BLD-MCNC: 9.8 G/DL (ref 12–16)
HGB BLD-MCNC: 9.9 G/DL (ref 12–16)
HGB UR QL STRIP: NEGATIVE
HYALINE CASTS #/AREA URNS LPF: 1 /LPF (ref 0–1)
HYALINE CASTS #/AREA URNS LPF: 1 /LPF (ref 0–1)
IMM GRANULOCYTES # BLD AUTO: 0.03 K/UL (ref 0–0.04)
IMM GRANULOCYTES # BLD AUTO: 0.04 K/UL (ref 0–0.04)
IMM GRANULOCYTES # BLD AUTO: 0.05 K/UL (ref 0–0.04)
IMM GRANULOCYTES # BLD AUTO: 0.05 K/UL (ref 0–0.04)
IMM GRANULOCYTES # BLD AUTO: 0.07 K/UL (ref 0–0.04)
IMM GRANULOCYTES # BLD AUTO: 0.07 K/UL (ref 0–0.04)
IMM GRANULOCYTES # BLD AUTO: 0.08 K/UL (ref 0–0.04)
IMM GRANULOCYTES # BLD AUTO: 0.11 K/UL (ref 0–0.04)
IMM GRANULOCYTES # BLD AUTO: 0.15 K/UL (ref 0–0.04)
IMM GRANULOCYTES # BLD AUTO: 0.15 K/UL (ref 0–0.04)
IMM GRANULOCYTES # BLD AUTO: 0.2 K/UL (ref 0–0.04)
IMM GRANULOCYTES # BLD AUTO: 0.25 K/UL (ref 0–0.04)
IMM GRANULOCYTES # BLD AUTO: 0.31 K/UL (ref 0–0.04)
IMM GRANULOCYTES # BLD AUTO: 0.35 K/UL (ref 0–0.04)
IMM GRANULOCYTES # BLD AUTO: 0.39 K/UL (ref 0–0.04)
IMM GRANULOCYTES # BLD AUTO: 0.4 K/UL (ref 0–0.04)
IMM GRANULOCYTES # BLD AUTO: 0.46 K/UL (ref 0–0.04)
IMM GRANULOCYTES NFR BLD AUTO: 0.4 % (ref 0–0.5)
IMM GRANULOCYTES NFR BLD AUTO: 0.5 % (ref 0–0.5)
IMM GRANULOCYTES NFR BLD AUTO: 0.6 % (ref 0–0.5)
IMM GRANULOCYTES NFR BLD AUTO: 0.6 % (ref 0–0.5)
IMM GRANULOCYTES NFR BLD AUTO: 0.8 % (ref 0–0.5)
IMM GRANULOCYTES NFR BLD AUTO: 0.8 % (ref 0–0.5)
IMM GRANULOCYTES NFR BLD AUTO: 1 % (ref 0–0.5)
IMM GRANULOCYTES NFR BLD AUTO: 1.1 % (ref 0–0.5)
IMM GRANULOCYTES NFR BLD AUTO: 1.8 % (ref 0–0.5)
IMM GRANULOCYTES NFR BLD AUTO: 2.1 % (ref 0–0.5)
IMM GRANULOCYTES NFR BLD AUTO: 3 % (ref 0–0.5)
IMM GRANULOCYTES NFR BLD AUTO: 3.3 % (ref 0–0.5)
IMM GRANULOCYTES NFR BLD AUTO: 4 % (ref 0–0.5)
IMM GRANULOCYTES NFR BLD AUTO: 4.4 % (ref 0–0.5)
IMM GRANULOCYTES NFR BLD AUTO: 4.9 % (ref 0–0.5)
IMM GRANULOCYTES NFR BLD AUTO: 5 % (ref 0–0.5)
IMM GRANULOCYTES NFR BLD AUTO: 5 % (ref 0–0.5)
INR PPP: 1.2 (ref 0.8–1.2)
IRON SERPL-MCNC: 25 UG/DL (ref 30–160)
KETONES UR QL STRIP: NEGATIVE
LEUKOCYTE ESTERASE UR QL STRIP: ABNORMAL
LYMPHOCYTES # BLD AUTO: 0.2 K/UL (ref 1–4.8)
LYMPHOCYTES # BLD AUTO: 0.3 K/UL (ref 1–4.8)
LYMPHOCYTES # BLD AUTO: 0.4 K/UL (ref 1–4.8)
LYMPHOCYTES # BLD AUTO: 0.5 K/UL (ref 1–4.8)
LYMPHOCYTES # BLD AUTO: 0.6 K/UL (ref 1–4.8)
LYMPHOCYTES # BLD AUTO: 0.6 K/UL (ref 1–4.8)
LYMPHOCYTES # BLD AUTO: 0.7 K/UL (ref 1–4.8)
LYMPHOCYTES # BLD AUTO: 1 K/UL (ref 1–4.8)
LYMPHOCYTES NFR BLD: 1.8 % (ref 18–48)
LYMPHOCYTES NFR BLD: 11.1 % (ref 18–48)
LYMPHOCYTES NFR BLD: 11.4 % (ref 18–48)
LYMPHOCYTES NFR BLD: 3.6 % (ref 18–48)
LYMPHOCYTES NFR BLD: 3.9 % (ref 18–48)
LYMPHOCYTES NFR BLD: 4.3 % (ref 18–48)
LYMPHOCYTES NFR BLD: 4.4 % (ref 18–48)
LYMPHOCYTES NFR BLD: 4.7 % (ref 18–48)
LYMPHOCYTES NFR BLD: 5 % (ref 18–48)
LYMPHOCYTES NFR BLD: 5.3 % (ref 18–48)
LYMPHOCYTES NFR BLD: 6.3 % (ref 18–48)
LYMPHOCYTES NFR BLD: 7 % (ref 18–48)
LYMPHOCYTES NFR BLD: 7.3 % (ref 18–48)
LYMPHOCYTES NFR BLD: 7.4 % (ref 18–48)
LYMPHOCYTES NFR BLD: 7.8 % (ref 18–48)
LYMPHOCYTES NFR BLD: 7.8 % (ref 18–48)
LYMPHOCYTES NFR BLD: 8 % (ref 18–48)
MAGNESIUM SERPL-MCNC: 1.9 MG/DL (ref 1.6–2.6)
MCH RBC QN AUTO: 28.9 PG (ref 27–31)
MCH RBC QN AUTO: 29.1 PG (ref 27–31)
MCH RBC QN AUTO: 29.2 PG (ref 27–31)
MCH RBC QN AUTO: 29.3 PG (ref 27–31)
MCH RBC QN AUTO: 29.5 PG (ref 27–31)
MCH RBC QN AUTO: 29.6 PG (ref 27–31)
MCH RBC QN AUTO: 29.7 PG (ref 27–31)
MCH RBC QN AUTO: 29.7 PG (ref 27–31)
MCH RBC QN AUTO: 29.8 PG (ref 27–31)
MCH RBC QN AUTO: 29.8 PG (ref 27–31)
MCH RBC QN AUTO: 29.9 PG (ref 27–31)
MCH RBC QN AUTO: 29.9 PG (ref 27–31)
MCH RBC QN AUTO: 30.1 PG (ref 27–31)
MCH RBC QN AUTO: 30.2 PG (ref 27–31)
MCH RBC QN AUTO: 30.9 PG (ref 27–31)
MCHC RBC AUTO-ENTMCNC: 30.7 G/DL (ref 32–36)
MCHC RBC AUTO-ENTMCNC: 31.7 G/DL (ref 32–36)
MCHC RBC AUTO-ENTMCNC: 31.8 G/DL (ref 32–36)
MCHC RBC AUTO-ENTMCNC: 32.5 G/DL (ref 32–36)
MCHC RBC AUTO-ENTMCNC: 32.5 G/DL (ref 32–36)
MCHC RBC AUTO-ENTMCNC: 32.7 G/DL (ref 32–36)
MCHC RBC AUTO-ENTMCNC: 32.7 G/DL (ref 32–36)
MCHC RBC AUTO-ENTMCNC: 32.8 G/DL (ref 32–36)
MCHC RBC AUTO-ENTMCNC: 33.2 G/DL (ref 32–36)
MCHC RBC AUTO-ENTMCNC: 33.3 G/DL (ref 32–36)
MCHC RBC AUTO-ENTMCNC: 33.5 G/DL (ref 32–36)
MCHC RBC AUTO-ENTMCNC: 33.7 G/DL (ref 32–36)
MCHC RBC AUTO-ENTMCNC: 33.8 G/DL (ref 32–36)
MCHC RBC AUTO-ENTMCNC: 33.9 G/DL (ref 32–36)
MCHC RBC AUTO-ENTMCNC: 33.9 G/DL (ref 32–36)
MCV RBC AUTO: 88 FL (ref 82–98)
MCV RBC AUTO: 89 FL (ref 82–98)
MCV RBC AUTO: 90 FL (ref 82–98)
MCV RBC AUTO: 91 FL (ref 82–98)
MCV RBC AUTO: 93 FL (ref 82–98)
MCV RBC AUTO: 93 FL (ref 82–98)
MCV RBC AUTO: 94 FL (ref 82–98)
MCV RBC AUTO: 98 FL (ref 82–98)
MICROSCOPIC COMMENT: NORMAL
MONOCYTES # BLD AUTO: 0.3 K/UL (ref 0.3–1)
MONOCYTES # BLD AUTO: 0.5 K/UL (ref 0.3–1)
MONOCYTES # BLD AUTO: 0.5 K/UL (ref 0.3–1)
MONOCYTES # BLD AUTO: 0.6 K/UL (ref 0.3–1)
MONOCYTES # BLD AUTO: 0.7 K/UL (ref 0.3–1)
MONOCYTES # BLD AUTO: 0.8 K/UL (ref 0.3–1)
MONOCYTES # BLD AUTO: 0.9 K/UL (ref 0.3–1)
MONOCYTES # BLD AUTO: 0.9 K/UL (ref 0.3–1)
MONOCYTES # BLD AUTO: 1 K/UL (ref 0.3–1)
MONOCYTES # BLD AUTO: 1 K/UL (ref 0.3–1)
MONOCYTES # BLD AUTO: 1.1 K/UL (ref 0.3–1)
MONOCYTES NFR BLD: 10 % (ref 4–15)
MONOCYTES NFR BLD: 11.1 % (ref 4–15)
MONOCYTES NFR BLD: 11.1 % (ref 4–15)
MONOCYTES NFR BLD: 11.4 % (ref 4–15)
MONOCYTES NFR BLD: 11.5 % (ref 4–15)
MONOCYTES NFR BLD: 13.5 % (ref 4–15)
MONOCYTES NFR BLD: 4 % (ref 4–15)
MONOCYTES NFR BLD: 6 % (ref 4–15)
MONOCYTES NFR BLD: 6.8 % (ref 4–15)
MONOCYTES NFR BLD: 7.2 % (ref 4–15)
MONOCYTES NFR BLD: 7.8 % (ref 4–15)
MONOCYTES NFR BLD: 8 % (ref 4–15)
MONOCYTES NFR BLD: 8.6 % (ref 4–15)
MONOCYTES NFR BLD: 8.9 % (ref 4–15)
MONOCYTES NFR BLD: 9.3 % (ref 4–15)
MONOCYTES NFR BLD: 9.6 % (ref 4–15)
MONOCYTES NFR BLD: 9.9 % (ref 4–15)
NEUTROPHILS # BLD AUTO: 11 K/UL (ref 1.8–7.7)
NEUTROPHILS # BLD AUTO: 3.4 K/UL (ref 1.8–7.7)
NEUTROPHILS # BLD AUTO: 4.3 K/UL (ref 1.8–7.7)
NEUTROPHILS # BLD AUTO: 5.2 K/UL (ref 1.8–7.7)
NEUTROPHILS # BLD AUTO: 5.6 K/UL (ref 1.8–7.7)
NEUTROPHILS # BLD AUTO: 6.1 K/UL (ref 1.8–7.7)
NEUTROPHILS # BLD AUTO: 6.4 K/UL (ref 1.8–7.7)
NEUTROPHILS # BLD AUTO: 6.5 K/UL (ref 1.8–7.7)
NEUTROPHILS # BLD AUTO: 6.7 K/UL (ref 1.8–7.7)
NEUTROPHILS # BLD AUTO: 6.8 K/UL (ref 1.8–7.7)
NEUTROPHILS # BLD AUTO: 6.8 K/UL (ref 1.8–7.7)
NEUTROPHILS # BLD AUTO: 7.2 K/UL (ref 1.8–7.7)
NEUTROPHILS # BLD AUTO: 7.2 K/UL (ref 1.8–7.7)
NEUTROPHILS # BLD AUTO: 7.3 K/UL (ref 1.8–7.7)
NEUTROPHILS # BLD AUTO: 7.3 K/UL (ref 1.8–7.7)
NEUTROPHILS # BLD AUTO: 7.5 K/UL (ref 1.8–7.7)
NEUTROPHILS # BLD AUTO: 9.8 K/UL (ref 1.8–7.7)
NEUTROPHILS NFR BLD: 66.6 % (ref 38–73)
NEUTROPHILS NFR BLD: 71.7 % (ref 38–73)
NEUTROPHILS NFR BLD: 73.3 % (ref 38–73)
NEUTROPHILS NFR BLD: 73.4 % (ref 38–73)
NEUTROPHILS NFR BLD: 75.3 % (ref 38–73)
NEUTROPHILS NFR BLD: 77.5 % (ref 38–73)
NEUTROPHILS NFR BLD: 78.2 % (ref 38–73)
NEUTROPHILS NFR BLD: 79.1 % (ref 38–73)
NEUTROPHILS NFR BLD: 82.4 % (ref 38–73)
NEUTROPHILS NFR BLD: 83.9 % (ref 38–73)
NEUTROPHILS NFR BLD: 84 % (ref 38–73)
NEUTROPHILS NFR BLD: 84.8 % (ref 38–73)
NEUTROPHILS NFR BLD: 86.9 % (ref 38–73)
NEUTROPHILS NFR BLD: 87 % (ref 38–73)
NEUTROPHILS NFR BLD: 87.3 % (ref 38–73)
NEUTROPHILS NFR BLD: 89.6 % (ref 38–73)
NEUTROPHILS NFR BLD: 90.7 % (ref 38–73)
NITRITE UR QL STRIP: NEGATIVE
NRBC BLD-RTO: 0 /100 WBC
NUM UNITS TRANS PACKED RBC: NORMAL
NUM UNITS TRANS PACKED RBC: NORMAL
OVALOCYTES BLD QL SMEAR: ABNORMAL
PH UR STRIP: 6.5 [PH] (ref 5–8)
PHOSPHATE SERPL-MCNC: 2.3 MG/DL (ref 2.7–4.5)
PLATELET # BLD AUTO: 131 K/UL (ref 150–350)
PLATELET # BLD AUTO: 141 K/UL (ref 150–350)
PLATELET # BLD AUTO: 162 K/UL (ref 150–350)
PLATELET # BLD AUTO: 163 K/UL (ref 150–350)
PLATELET # BLD AUTO: 165 K/UL (ref 150–350)
PLATELET # BLD AUTO: 165 K/UL (ref 150–350)
PLATELET # BLD AUTO: 179 K/UL (ref 150–350)
PLATELET # BLD AUTO: 186 K/UL (ref 150–350)
PLATELET # BLD AUTO: 186 K/UL (ref 150–350)
PLATELET # BLD AUTO: 187 K/UL (ref 150–350)
PLATELET # BLD AUTO: 209 K/UL (ref 150–350)
PLATELET # BLD AUTO: 227 K/UL (ref 150–350)
PLATELET # BLD AUTO: 232 K/UL (ref 150–350)
PLATELET # BLD AUTO: 233 K/UL (ref 150–350)
PLATELET # BLD AUTO: 233 K/UL (ref 150–350)
PLATELET # BLD AUTO: 236 K/UL (ref 150–350)
PLATELET # BLD AUTO: 236 K/UL (ref 150–350)
PLATELET BLD QL SMEAR: ABNORMAL
PMV BLD AUTO: 10 FL (ref 9.2–12.9)
PMV BLD AUTO: 10.2 FL (ref 9.2–12.9)
PMV BLD AUTO: 10.3 FL (ref 9.2–12.9)
PMV BLD AUTO: 10.4 FL (ref 9.2–12.9)
PMV BLD AUTO: 10.5 FL (ref 9.2–12.9)
PMV BLD AUTO: 10.8 FL (ref 9.2–12.9)
PMV BLD AUTO: 11 FL (ref 9.2–12.9)
PMV BLD AUTO: 9.5 FL (ref 9.2–12.9)
PMV BLD AUTO: 9.7 FL (ref 9.2–12.9)
PMV BLD AUTO: 9.7 FL (ref 9.2–12.9)
PMV BLD AUTO: 9.8 FL (ref 9.2–12.9)
PMV BLD AUTO: 9.8 FL (ref 9.2–12.9)
PMV BLD AUTO: 9.9 FL (ref 9.2–12.9)
POIKILOCYTOSIS BLD QL SMEAR: SLIGHT
POTASSIUM SERPL-SCNC: 3.5 MMOL/L (ref 3.5–5.1)
POTASSIUM SERPL-SCNC: 3.6 MMOL/L (ref 3.5–5.1)
POTASSIUM SERPL-SCNC: 3.7 MMOL/L (ref 3.5–5.1)
POTASSIUM SERPL-SCNC: 4.1 MMOL/L (ref 3.5–5.1)
POTASSIUM SERPL-SCNC: 4.2 MMOL/L (ref 3.5–5.1)
POTASSIUM SERPL-SCNC: 4.5 MMOL/L (ref 3.5–5.1)
PREALB SERPL-MCNC: 5 MG/DL (ref 20–43)
PROT SERPL-MCNC: 4.5 G/DL (ref 6–8.4)
PROT SERPL-MCNC: 4.8 G/DL (ref 6–8.4)
PROT SERPL-MCNC: 5 G/DL (ref 6–8.4)
PROT SERPL-MCNC: 5.2 G/DL (ref 6–8.4)
PROT SERPL-MCNC: 5.5 G/DL (ref 6–8.4)
PROT SERPL-MCNC: 5.8 G/DL (ref 6–8.4)
PROT SERPL-MCNC: 6 G/DL (ref 6–8.4)
PROT SERPL-MCNC: 6.1 G/DL (ref 6–8.4)
PROT UR QL STRIP: NEGATIVE
PROTHROMBIN TIME: 11.8 SEC (ref 9–12.5)
RBC # BLD AUTO: 2.25 M/UL (ref 4–5.4)
RBC # BLD AUTO: 2.43 M/UL (ref 4–5.4)
RBC # BLD AUTO: 2.71 M/UL (ref 4–5.4)
RBC # BLD AUTO: 2.78 M/UL (ref 4–5.4)
RBC # BLD AUTO: 2.82 M/UL (ref 4–5.4)
RBC # BLD AUTO: 3.02 M/UL (ref 4–5.4)
RBC # BLD AUTO: 3.08 M/UL (ref 4–5.4)
RBC # BLD AUTO: 3.09 M/UL (ref 4–5.4)
RBC # BLD AUTO: 3.15 M/UL (ref 4–5.4)
RBC # BLD AUTO: 3.19 M/UL (ref 4–5.4)
RBC # BLD AUTO: 3.23 M/UL (ref 4–5.4)
RBC # BLD AUTO: 3.28 M/UL (ref 4–5.4)
RBC # BLD AUTO: 3.3 M/UL (ref 4–5.4)
RBC # BLD AUTO: 3.59 M/UL (ref 4–5.4)
RBC # BLD AUTO: 3.72 M/UL (ref 4–5.4)
RBC #/AREA URNS HPF: 1 /HPF (ref 0–4)
RBC #/AREA URNS HPF: 1 /HPF (ref 0–4)
SARS-COV-2 RDRP RESP QL NAA+PROBE: NEGATIVE
SARS-COV-2 RNA RESP QL NAA+PROBE: NEGATIVE
SARS-COV-2 RNA RESP QL NAA+PROBE: NOT DETECTED
SARS-COV-2 RNA RESP QL NAA+PROBE: NOT DETECTED
SATURATED IRON: 13 % (ref 20–50)
SODIUM SERPL-SCNC: 127 MMOL/L (ref 136–145)
SODIUM SERPL-SCNC: 129 MMOL/L (ref 136–145)
SODIUM SERPL-SCNC: 129 MMOL/L (ref 136–145)
SODIUM SERPL-SCNC: 130 MMOL/L (ref 136–145)
SODIUM SERPL-SCNC: 132 MMOL/L (ref 136–145)
SODIUM SERPL-SCNC: 133 MMOL/L (ref 136–145)
SODIUM SERPL-SCNC: 135 MMOL/L (ref 136–145)
SODIUM SERPL-SCNC: 137 MMOL/L (ref 136–145)
SP GR UR STRIP: 1.02 (ref 1–1.03)
SQUAMOUS #/AREA URNS HPF: 1 /HPF
SQUAMOUS #/AREA URNS HPF: 1 /HPF
TOTAL IRON BINDING CAPACITY: 194 UG/DL (ref 250–450)
TRANSFERRIN SERPL-MCNC: 131 MG/DL (ref 200–375)
URN SPEC COLLECT METH UR: ABNORMAL
UROBILINOGEN UR STRIP-ACNC: 0.2 EU/DL
UUN UR-MCNC: 15 MG/DL (ref 7–18)
VIT B12 SERPL-MCNC: 1976 PG/ML (ref 210–950)
WBC # BLD AUTO: 10.95 K/UL (ref 3.9–12.7)
WBC # BLD AUTO: 13.3 K/UL (ref 3.9–12.7)
WBC # BLD AUTO: 5.03 K/UL (ref 3.9–12.7)
WBC # BLD AUTO: 5.99 K/UL (ref 3.9–12.7)
WBC # BLD AUTO: 6.59 K/UL (ref 3.9–12.7)
WBC # BLD AUTO: 7.03 K/UL (ref 3.9–12.7)
WBC # BLD AUTO: 7.07 K/UL (ref 3.9–12.7)
WBC # BLD AUTO: 8.09 K/UL (ref 3.9–12.7)
WBC # BLD AUTO: 8.1 K/UL (ref 3.9–12.7)
WBC # BLD AUTO: 8.16 K/UL (ref 3.9–12.7)
WBC # BLD AUTO: 8.25 K/UL (ref 3.9–12.7)
WBC # BLD AUTO: 8.29 K/UL (ref 3.9–12.7)
WBC # BLD AUTO: 8.3 K/UL (ref 3.9–12.7)
WBC # BLD AUTO: 8.85 K/UL (ref 3.9–12.7)
WBC # BLD AUTO: 8.88 K/UL (ref 3.9–12.7)
WBC # BLD AUTO: 9.25 K/UL (ref 3.9–12.7)
WBC # BLD AUTO: 9.29 K/UL (ref 3.9–12.7)
WBC #/AREA URNS HPF: 2 /HPF (ref 0–5)
WBC #/AREA URNS HPF: 2 /HPF (ref 0–5)

## 2020-01-01 PROCEDURE — 1101F PT FALLS ASSESS-DOCD LE1/YR: CPT | Mod: CPTII,S$GLB,, | Performed by: INTERNAL MEDICINE

## 2020-01-01 PROCEDURE — 3052F PR MOST RECENT HEMOGLOBIN A1C LEVEL 8.0 - < 9.0%: ICD-10-PCS | Mod: CPTII,S$GLB,, | Performed by: INTERNAL MEDICINE

## 2020-01-01 PROCEDURE — 99211 OFF/OP EST MAY X REQ PHY/QHP: CPT | Mod: S$GLB,,, | Performed by: PHYSICIAN ASSISTANT

## 2020-01-01 PROCEDURE — 43262 ENDO CHOLANGIOPANCREATOGRAPH: CPT | Mod: ,,, | Performed by: INTERNAL MEDICINE

## 2020-01-01 PROCEDURE — 3288F PR FALLS RISK ASSESSMENT DOCUMENTED: ICD-10-PCS | Mod: CPTII,S$GLB,, | Performed by: NURSE PRACTITIONER

## 2020-01-01 PROCEDURE — 99223 1ST HOSP IP/OBS HIGH 75: CPT | Mod: ,,, | Performed by: FAMILY MEDICINE

## 2020-01-01 PROCEDURE — 74329 X-RAY FOR PANCREAS ENDOSCOPY: CPT | Performed by: INTERNAL MEDICINE

## 2020-01-01 PROCEDURE — 85025 COMPLETE CBC W/AUTO DIFF WBC: CPT | Mod: PN

## 2020-01-01 PROCEDURE — 12000002 HC ACUTE/MED SURGE SEMI-PRIVATE ROOM

## 2020-01-01 PROCEDURE — 63600175 PHARM REV CODE 636 W HCPCS: Performed by: FAMILY MEDICINE

## 2020-01-01 PROCEDURE — 1159F PR MEDICATION LIST DOCUMENTED IN MEDICAL RECORD: ICD-10-PCS | Mod: S$GLB,,, | Performed by: NURSE PRACTITIONER

## 2020-01-01 PROCEDURE — 97802 MEDICAL NUTRITION INDIV IN: CPT

## 2020-01-01 PROCEDURE — 99214 PR OFFICE/OUTPT VISIT, EST, LEVL IV, 30-39 MIN: ICD-10-PCS | Mod: S$GLB,,, | Performed by: INTERNAL MEDICINE

## 2020-01-01 PROCEDURE — 99024 PR POST-OP FOLLOW-UP VISIT: ICD-10-PCS | Mod: S$GLB,,, | Performed by: ORTHOPAEDIC SURGERY

## 2020-01-01 PROCEDURE — 99233 PR SUBSEQUENT HOSPITAL CARE,LEVL III: ICD-10-PCS | Mod: ,,, | Performed by: HOSPITALIST

## 2020-01-01 PROCEDURE — 63600175 PHARM REV CODE 636 W HCPCS: Performed by: HOSPITALIST

## 2020-01-01 PROCEDURE — P9016 RBC LEUKOCYTES REDUCED: HCPCS

## 2020-01-01 PROCEDURE — 1101F PR PT FALLS ASSESS DOC 0-1 FALLS W/OUT INJ PAST YR: ICD-10-PCS | Mod: CPTII,S$GLB,, | Performed by: INTERNAL MEDICINE

## 2020-01-01 PROCEDURE — 3074F PR MOST RECENT SYSTOLIC BLOOD PRESSURE < 130 MM HG: ICD-10-PCS | Mod: CPTII,S$GLB,, | Performed by: NURSE PRACTITIONER

## 2020-01-01 PROCEDURE — 94761 N-INVAS EAR/PLS OXIMETRY MLT: CPT

## 2020-01-01 PROCEDURE — 99499 RISK ADDL DX/OHS AUDIT: ICD-10-PCS | Mod: S$GLB,,, | Performed by: INTERNAL MEDICINE

## 2020-01-01 PROCEDURE — 74177 CT CHEST ABDOMEN PELVIS WITH CONTRAST (XPD): ICD-10-PCS | Mod: 26,,, | Performed by: RADIOLOGY

## 2020-01-01 PROCEDURE — 84100 ASSAY OF PHOSPHORUS: CPT

## 2020-01-01 PROCEDURE — 25000003 PHARM REV CODE 250: Mod: PN | Performed by: INTERNAL MEDICINE

## 2020-01-01 PROCEDURE — 99203 OFFICE O/P NEW LOW 30 MIN: CPT | Mod: 25,S$GLB,, | Performed by: ORTHOPAEDIC SURGERY

## 2020-01-01 PROCEDURE — 1157F ADVNC CARE PLAN IN RCRD: CPT | Mod: S$GLB,,, | Performed by: HOSPITALIST

## 2020-01-01 PROCEDURE — 96413 CHEMO IV INFUSION 1 HR: CPT | Mod: PN

## 2020-01-01 PROCEDURE — 63600175 PHARM REV CODE 636 W HCPCS: Mod: PN | Performed by: INTERNAL MEDICINE

## 2020-01-01 PROCEDURE — 63600175 PHARM REV CODE 636 W HCPCS: Performed by: NURSE ANESTHETIST, CERTIFIED REGISTERED

## 2020-01-01 PROCEDURE — 1101F PT FALLS ASSESS-DOCD LE1/YR: CPT | Mod: CPTII,S$GLB,, | Performed by: ORTHOPAEDIC SURGERY

## 2020-01-01 PROCEDURE — 1101F PT FALLS ASSESS-DOCD LE1/YR: CPT | Mod: CPTII,S$GLB,, | Performed by: NURSE PRACTITIONER

## 2020-01-01 PROCEDURE — 1159F PR MEDICATION LIST DOCUMENTED IN MEDICAL RECORD: ICD-10-PCS | Mod: S$GLB,,, | Performed by: INTERNAL MEDICINE

## 2020-01-01 PROCEDURE — 96367 TX/PROPH/DG ADDL SEQ IV INF: CPT | Mod: PN

## 2020-01-01 PROCEDURE — G0180 MD CERTIFICATION HHA PATIENT: HCPCS | Mod: ,,, | Performed by: EMERGENCY MEDICINE

## 2020-01-01 PROCEDURE — 1101F PR PT FALLS ASSESS DOC 0-1 FALLS W/OUT INJ PAST YR: ICD-10-PCS | Mod: CPTII,S$GLB,, | Performed by: ORTHOPAEDIC SURGERY

## 2020-01-01 PROCEDURE — 1159F MED LIST DOCD IN RCRD: CPT | Mod: S$GLB,,, | Performed by: INTERNAL MEDICINE

## 2020-01-01 PROCEDURE — 1126F AMNT PAIN NOTED NONE PRSNT: CPT | Mod: S$GLB,,, | Performed by: NURSE PRACTITIONER

## 2020-01-01 PROCEDURE — 36415 COLL VENOUS BLD VENIPUNCTURE: CPT

## 2020-01-01 PROCEDURE — 3288F PR FALLS RISK ASSESSMENT DOCUMENTED: ICD-10-PCS | Mod: CPTII,S$GLB,, | Performed by: ORTHOPAEDIC SURGERY

## 2020-01-01 PROCEDURE — 3078F DIAST BP <80 MM HG: CPT | Mod: CPTII,S$GLB,, | Performed by: INTERNAL MEDICINE

## 2020-01-01 PROCEDURE — 96375 TX/PRO/DX INJ NEW DRUG ADDON: CPT | Mod: PN

## 2020-01-01 PROCEDURE — 71260 CT THORAX DX C+: CPT | Mod: 26,,, | Performed by: RADIOLOGY

## 2020-01-01 PROCEDURE — 20600001 HC STEP DOWN PRIVATE ROOM

## 2020-01-01 PROCEDURE — 3288F PR FALLS RISK ASSESSMENT DOCUMENTED: ICD-10-PCS | Mod: CPTII,S$GLB,, | Performed by: INTERNAL MEDICINE

## 2020-01-01 PROCEDURE — 25000003 PHARM REV CODE 250: Performed by: HOSPITALIST

## 2020-01-01 PROCEDURE — 1126F AMNT PAIN NOTED NONE PRSNT: CPT | Mod: S$GLB,,, | Performed by: INTERNAL MEDICINE

## 2020-01-01 PROCEDURE — 99223 1ST HOSP IP/OBS HIGH 75: CPT | Mod: 25,,, | Performed by: INTERNAL MEDICINE

## 2020-01-01 PROCEDURE — 99214 OFFICE O/P EST MOD 30 MIN: CPT | Mod: S$GLB,,, | Performed by: INTERNAL MEDICINE

## 2020-01-01 PROCEDURE — 3052F HG A1C>EQUAL 8.0%<EQUAL 9.0%: CPT | Mod: CPTII,S$GLB,, | Performed by: INTERNAL MEDICINE

## 2020-01-01 PROCEDURE — 36430 TRANSFUSION BLD/BLD COMPNT: CPT

## 2020-01-01 PROCEDURE — 1157F PR ADVANCE CARE PLAN OR EQUIV PRESENT IN MEDICAL RECORD: ICD-10-PCS | Mod: S$GLB,,, | Performed by: ORTHOPAEDIC SURGERY

## 2020-01-01 PROCEDURE — 1126F AMNT PAIN NOTED NONE PRSNT: CPT | Mod: S$GLB,,, | Performed by: HOSPITALIST

## 2020-01-01 PROCEDURE — 86920 COMPATIBILITY TEST SPIN: CPT

## 2020-01-01 PROCEDURE — 3078F PR MOST RECENT DIASTOLIC BLOOD PRESSURE < 80 MM HG: ICD-10-PCS | Mod: CPTII,S$GLB,, | Performed by: NURSE PRACTITIONER

## 2020-01-01 PROCEDURE — 99239 HOSP IP/OBS DSCHRG MGMT >30: CPT | Mod: ,,, | Performed by: HOSPITALIST

## 2020-01-01 PROCEDURE — 25000003 PHARM REV CODE 250: Performed by: STUDENT IN AN ORGANIZED HEALTH CARE EDUCATION/TRAINING PROGRAM

## 2020-01-01 PROCEDURE — 99214 PR OFFICE/OUTPT VISIT, EST, LEVL IV, 30-39 MIN: ICD-10-PCS | Mod: S$GLB,,, | Performed by: NURSE PRACTITIONER

## 2020-01-01 PROCEDURE — 1125F PR PAIN SEVERITY QUANTIFIED, PAIN PRESENT: ICD-10-PCS | Mod: S$GLB,,, | Performed by: INTERNAL MEDICINE

## 2020-01-01 PROCEDURE — 1125F PR PAIN SEVERITY QUANTIFIED, PAIN PRESENT: ICD-10-PCS | Mod: S$GLB,,, | Performed by: NURSE PRACTITIONER

## 2020-01-01 PROCEDURE — 1126F PR PAIN SEVERITY QUANTIFIED, NO PAIN PRESENT: ICD-10-PCS | Mod: S$GLB,,, | Performed by: INTERNAL MEDICINE

## 2020-01-01 PROCEDURE — U0003 INFECTIOUS AGENT DETECTION BY NUCLEIC ACID (DNA OR RNA); SEVERE ACUTE RESPIRATORY SYNDROME CORONAVIRUS 2 (SARS-COV-2) (CORONAVIRUS DISEASE [COVID-19]), AMPLIFIED PROBE TECHNIQUE, MAKING USE OF HIGH THROUGHPUT TECHNOLOGIES AS DESCRIBED BY CMS-2020-01-R: HCPCS

## 2020-01-01 PROCEDURE — 3078F PR MOST RECENT DIASTOLIC BLOOD PRESSURE < 80 MM HG: ICD-10-PCS | Mod: CPTII,S$GLB,, | Performed by: INTERNAL MEDICINE

## 2020-01-01 PROCEDURE — 25000003 PHARM REV CODE 250: Performed by: NURSE ANESTHETIST, CERTIFIED REGISTERED

## 2020-01-01 PROCEDURE — 3075F PR MOST RECENT SYSTOLIC BLOOD PRESS GE 130-139MM HG: ICD-10-PCS | Mod: CPTII,S$GLB,, | Performed by: INTERNAL MEDICINE

## 2020-01-01 PROCEDURE — 1125F AMNT PAIN NOTED PAIN PRSNT: CPT | Mod: S$GLB,,, | Performed by: INTERNAL MEDICINE

## 2020-01-01 PROCEDURE — 3078F DIAST BP <80 MM HG: CPT | Mod: CPTII,S$GLB,, | Performed by: NURSE PRACTITIONER

## 2020-01-01 PROCEDURE — 99215 PR OFFICE/OUTPT VISIT, EST, LEVL V, 40-54 MIN: ICD-10-PCS | Mod: S$GLB,,, | Performed by: INTERNAL MEDICINE

## 2020-01-01 PROCEDURE — 3077F SYST BP >= 140 MM HG: CPT | Mod: CPTII,S$GLB,, | Performed by: INTERNAL MEDICINE

## 2020-01-01 PROCEDURE — 3288F FALL RISK ASSESSMENT DOCD: CPT | Mod: CPTII,S$GLB,, | Performed by: NURSE PRACTITIONER

## 2020-01-01 PROCEDURE — 99223 1ST HOSP IP/OBS HIGH 75: CPT | Mod: GC,,, | Performed by: INTERNAL MEDICINE

## 2020-01-01 PROCEDURE — 25500020 PHARM REV CODE 255: Performed by: HOSPITALIST

## 2020-01-01 PROCEDURE — 81001 URINALYSIS AUTO W/SCOPE: CPT

## 2020-01-01 PROCEDURE — 45380 PR COLONOSCOPY,BIOPSY: ICD-10-PCS | Mod: ,,, | Performed by: INTERNAL MEDICINE

## 2020-01-01 PROCEDURE — 85025 COMPLETE CBC W/AUTO DIFF WBC: CPT

## 2020-01-01 PROCEDURE — 74177 CT ABD & PELVIS W/CONTRAST: CPT | Mod: TC,PO

## 2020-01-01 PROCEDURE — 71046 X-RAY EXAM CHEST 2 VIEWS: CPT | Mod: TC,FY,PO

## 2020-01-01 PROCEDURE — 99999 PR PBB SHADOW E&M-EST. PATIENT-LVL IV: ICD-10-PCS | Mod: PBBFAC,,, | Performed by: INTERNAL MEDICINE

## 2020-01-01 PROCEDURE — 88305 TISSUE EXAM BY PATHOLOGIST: ICD-10-PCS | Mod: 26,,, | Performed by: PATHOLOGY

## 2020-01-01 PROCEDURE — D9220A PRA ANESTHESIA: ICD-10-PCS | Mod: CRNA,,, | Performed by: NURSE ANESTHETIST, CERTIFIED REGISTERED

## 2020-01-01 PROCEDURE — 99214 OFFICE O/P EST MOD 30 MIN: CPT | Mod: S$GLB,,, | Performed by: NURSE PRACTITIONER

## 2020-01-01 PROCEDURE — A4216 STERILE WATER/SALINE, 10 ML: HCPCS | Mod: PN | Performed by: INTERNAL MEDICINE

## 2020-01-01 PROCEDURE — 99999 PR PBB SHADOW E&M-EST. PATIENT-LVL III: ICD-10-PCS | Mod: PBBFAC,,, | Performed by: INTERNAL MEDICINE

## 2020-01-01 PROCEDURE — 37000009 HC ANESTHESIA EA ADD 15 MINS: Performed by: INTERNAL MEDICINE

## 2020-01-01 PROCEDURE — 3288F FALL RISK ASSESSMENT DOCD: CPT | Mod: CPTII,S$GLB,, | Performed by: INTERNAL MEDICINE

## 2020-01-01 PROCEDURE — 99214 PR OFFICE/OUTPT VISIT, EST, LEVL IV, 30-39 MIN: ICD-10-PCS | Mod: S$GLB,,, | Performed by: HOSPITALIST

## 2020-01-01 PROCEDURE — D9220A PRA ANESTHESIA: ICD-10-PCS | Mod: ANES,,, | Performed by: ANESTHESIOLOGY

## 2020-01-01 PROCEDURE — 99999 PR PBB SHADOW E&M-EST. PATIENT-LVL IV: CPT | Mod: PBBFAC,,, | Performed by: ORTHOPAEDIC SURGERY

## 2020-01-01 PROCEDURE — 25000003 PHARM REV CODE 250: Mod: PN | Performed by: NURSE PRACTITIONER

## 2020-01-01 PROCEDURE — 25000003 PHARM REV CODE 250: Performed by: FAMILY MEDICINE

## 2020-01-01 PROCEDURE — C9113 INJ PANTOPRAZOLE SODIUM, VIA: HCPCS | Performed by: HOSPITALIST

## 2020-01-01 PROCEDURE — 99204 PR OFFICE/OUTPT VISIT, NEW, LEVL IV, 45-59 MIN: ICD-10-PCS | Mod: S$GLB,,, | Performed by: NURSE PRACTITIONER

## 2020-01-01 PROCEDURE — 37000008 HC ANESTHESIA 1ST 15 MINUTES: Mod: PO | Performed by: INTERNAL MEDICINE

## 2020-01-01 PROCEDURE — 99024 POSTOP FOLLOW-UP VISIT: CPT | Mod: S$GLB,,, | Performed by: ORTHOPAEDIC SURGERY

## 2020-01-01 PROCEDURE — 99233 SBSQ HOSP IP/OBS HIGH 50: CPT | Mod: ,,, | Performed by: HOSPITALIST

## 2020-01-01 PROCEDURE — 3074F SYST BP LT 130 MM HG: CPT | Mod: CPTII,S$GLB,, | Performed by: INTERNAL MEDICINE

## 2020-01-01 PROCEDURE — 87086 URINE CULTURE/COLONY COUNT: CPT | Mod: PN

## 2020-01-01 PROCEDURE — 3288F FALL RISK ASSESSMENT DOCD: CPT | Mod: CPTII,S$GLB,, | Performed by: ORTHOPAEDIC SURGERY

## 2020-01-01 PROCEDURE — 1157F ADVNC CARE PLAN IN RCRD: CPT | Mod: S$GLB,,, | Performed by: ORTHOPAEDIC SURGERY

## 2020-01-01 PROCEDURE — 99499 UNLISTED E&M SERVICE: CPT | Mod: S$GLB,,, | Performed by: INTERNAL MEDICINE

## 2020-01-01 PROCEDURE — 3075F SYST BP GE 130 - 139MM HG: CPT | Mod: CPTII,S$GLB,, | Performed by: HOSPITALIST

## 2020-01-01 PROCEDURE — 99999 PR PBB SHADOW E&M-EST. PATIENT-LVL V: CPT | Mod: PBBFAC,,, | Performed by: NURSE PRACTITIONER

## 2020-01-01 PROCEDURE — 45378 DIAGNOSTIC COLONOSCOPY: CPT | Mod: ,,, | Performed by: INTERNAL MEDICINE

## 2020-01-01 PROCEDURE — 20550 NJX 1 TENDON SHEATH/LIGAMENT: CPT | Mod: F8,S$GLB,, | Performed by: ORTHOPAEDIC SURGERY

## 2020-01-01 PROCEDURE — 82607 VITAMIN B-12: CPT

## 2020-01-01 PROCEDURE — 99999 PR PBB SHADOW E&M-EST. PATIENT-LVL IV: ICD-10-PCS | Mod: PBBFAC,,, | Performed by: ORTHOPAEDIC SURGERY

## 2020-01-01 PROCEDURE — 1100F PTFALLS ASSESS-DOCD GE2>/YR: CPT | Mod: CPTII,S$GLB,, | Performed by: INTERNAL MEDICINE

## 2020-01-01 PROCEDURE — 74329 PR  X-RAY FOR PANCREAS ENDOSCOPY: ICD-10-PCS | Mod: 26,,, | Performed by: INTERNAL MEDICINE

## 2020-01-01 PROCEDURE — A4217 STERILE WATER/SALINE, 500 ML: HCPCS | Performed by: HOSPITALIST

## 2020-01-01 PROCEDURE — 3074F PR MOST RECENT SYSTOLIC BLOOD PRESSURE < 130 MM HG: ICD-10-PCS | Mod: CPTII,S$GLB,, | Performed by: INTERNAL MEDICINE

## 2020-01-01 PROCEDURE — D9220A PRA ANESTHESIA: Mod: CRNA,,, | Performed by: NURSE ANESTHETIST, CERTIFIED REGISTERED

## 2020-01-01 PROCEDURE — 1157F ADVNC CARE PLAN IN RCRD: CPT | Mod: S$GLB,,, | Performed by: INTERNAL MEDICINE

## 2020-01-01 PROCEDURE — 99999 PR PBB SHADOW E&M-EST. PATIENT-LVL IV: CPT | Mod: PBBFAC,,, | Performed by: INTERNAL MEDICINE

## 2020-01-01 PROCEDURE — 43239 EGD BIOPSY SINGLE/MULTIPLE: CPT | Mod: 51,,, | Performed by: INTERNAL MEDICINE

## 2020-01-01 PROCEDURE — 88305 TISSUE EXAM BY PATHOLOGIST: CPT | Mod: 26,,, | Performed by: PATHOLOGY

## 2020-01-01 PROCEDURE — 99999 PR PBB SHADOW E&M-EST. PATIENT-LVL IV: ICD-10-PCS | Mod: PBBFAC,,, | Performed by: NURSE PRACTITIONER

## 2020-01-01 PROCEDURE — 3078F DIAST BP <80 MM HG: CPT | Mod: CPTII,S$GLB,, | Performed by: HOSPITALIST

## 2020-01-01 PROCEDURE — 99999 PR PBB SHADOW E&M-EST. PATIENT-LVL IV: CPT | Mod: PBBFAC,,, | Performed by: NURSE PRACTITIONER

## 2020-01-01 PROCEDURE — D9220A PRA ANESTHESIA: Mod: ANES,,, | Performed by: ANESTHESIOLOGY

## 2020-01-01 PROCEDURE — 86301 IMMUNOASSAY TUMOR CA 19-9: CPT

## 2020-01-01 PROCEDURE — 99999 PR PBB SHADOW E&M-EST. PATIENT-LVL II: CPT | Mod: PBBFAC,,, | Performed by: ORTHOPAEDIC SURGERY

## 2020-01-01 PROCEDURE — 99215 OFFICE O/P EST HI 40 MIN: CPT | Mod: S$GLB,,, | Performed by: INTERNAL MEDICINE

## 2020-01-01 PROCEDURE — 99999 PR PBB SHADOW E&M-EST. PATIENT-LVL III: ICD-10-PCS | Mod: PBBFAC,,, | Performed by: NURSE PRACTITIONER

## 2020-01-01 PROCEDURE — C1769 GUIDE WIRE: HCPCS | Performed by: INTERNAL MEDICINE

## 2020-01-01 PROCEDURE — 81001 URINALYSIS AUTO W/SCOPE: CPT | Mod: PN

## 2020-01-01 PROCEDURE — 71046 X-RAY EXAM CHEST 2 VIEWS: CPT | Mod: 26,,, | Performed by: RADIOLOGY

## 2020-01-01 PROCEDURE — 37000008 HC ANESTHESIA 1ST 15 MINUTES: Performed by: INTERNAL MEDICINE

## 2020-01-01 PROCEDURE — 99213 OFFICE O/P EST LOW 20 MIN: CPT | Mod: S$GLB,,, | Performed by: INTERNAL MEDICINE

## 2020-01-01 PROCEDURE — 63600175 PHARM REV CODE 636 W HCPCS: Mod: PO | Performed by: NURSE ANESTHETIST, CERTIFIED REGISTERED

## 2020-01-01 PROCEDURE — 96417 CHEMO IV INFUS EACH ADDL SEQ: CPT | Mod: PN

## 2020-01-01 PROCEDURE — U0002 COVID-19 LAB TEST NON-CDC: HCPCS

## 2020-01-01 PROCEDURE — 63600175 PHARM REV CODE 636 W HCPCS: Performed by: ANESTHESIOLOGY

## 2020-01-01 PROCEDURE — 83540 ASSAY OF IRON: CPT

## 2020-01-01 PROCEDURE — 37000009 HC ANESTHESIA EA ADD 15 MINS

## 2020-01-01 PROCEDURE — 99214 OFFICE O/P EST MOD 30 MIN: CPT | Mod: S$GLB,,, | Performed by: HOSPITALIST

## 2020-01-01 PROCEDURE — 45378 PR COLONOSCOPY,DIAGNOSTIC: ICD-10-PCS | Mod: ,,, | Performed by: INTERNAL MEDICINE

## 2020-01-01 PROCEDURE — 99999 PR PBB SHADOW E&M-EST. PATIENT-LVL V: CPT | Mod: PBBFAC,,, | Performed by: INTERNAL MEDICINE

## 2020-01-01 PROCEDURE — 1159F MED LIST DOCD IN RCRD: CPT | Mod: S$GLB,,, | Performed by: ORTHOPAEDIC SURGERY

## 2020-01-01 PROCEDURE — 25500020 PHARM REV CODE 255: Mod: PO | Performed by: INTERNAL MEDICINE

## 2020-01-01 PROCEDURE — 99999 PR PBB SHADOW E&M-EST. PATIENT-LVL III: ICD-10-PCS | Mod: PBBFAC,,, | Performed by: HOSPITALIST

## 2020-01-01 PROCEDURE — 99900035 HC TECH TIME PER 15 MIN (STAT)

## 2020-01-01 PROCEDURE — 3075F SYST BP GE 130 - 139MM HG: CPT | Mod: CPTII,S$GLB,, | Performed by: INTERNAL MEDICINE

## 2020-01-01 PROCEDURE — G0180 PR HOME HEALTH MD CERTIFICATION: ICD-10-PCS | Mod: ,,, | Performed by: EMERGENCY MEDICINE

## 2020-01-01 PROCEDURE — 84134 ASSAY OF PREALBUMIN: CPT

## 2020-01-01 PROCEDURE — 85025 COMPLETE CBC W/AUTO DIFF WBC: CPT | Mod: 91

## 2020-01-01 PROCEDURE — 85027 COMPLETE CBC AUTOMATED: CPT

## 2020-01-01 PROCEDURE — 85610 PROTHROMBIN TIME: CPT | Mod: 91

## 2020-01-01 PROCEDURE — 1101F PR PT FALLS ASSESS DOC 0-1 FALLS W/OUT INJ PAST YR: ICD-10-PCS | Mod: CPTII,S$GLB,, | Performed by: NURSE PRACTITIONER

## 2020-01-01 PROCEDURE — 1159F MED LIST DOCD IN RCRD: CPT | Mod: S$GLB,,, | Performed by: NURSE PRACTITIONER

## 2020-01-01 PROCEDURE — 43262 ENDO CHOLANGIOPANCREATOGRAPH: CPT | Performed by: INTERNAL MEDICINE

## 2020-01-01 PROCEDURE — 80053 COMPREHEN METABOLIC PANEL: CPT

## 2020-01-01 PROCEDURE — 3288F FALL RISK ASSESSMENT DOCD: CPT | Mod: CPTII,S$GLB,, | Performed by: HOSPITALIST

## 2020-01-01 PROCEDURE — 96361 HYDRATE IV INFUSION ADD-ON: CPT | Mod: PN

## 2020-01-01 PROCEDURE — 71260 CT CHEST ABDOMEN PELVIS WITH CONTRAST (XPD): ICD-10-PCS | Mod: 26,,, | Performed by: RADIOLOGY

## 2020-01-01 PROCEDURE — A4216 STERILE WATER/SALINE, 10 ML: HCPCS | Mod: PN | Performed by: NURSE PRACTITIONER

## 2020-01-01 PROCEDURE — 99999 PR PBB SHADOW E&M-EST. PATIENT-LVL V: ICD-10-PCS | Mod: PBBFAC,,, | Performed by: NURSE PRACTITIONER

## 2020-01-01 PROCEDURE — 1126F AMNT PAIN NOTED NONE PRSNT: CPT | Mod: S$GLB,,, | Performed by: ORTHOPAEDIC SURGERY

## 2020-01-01 PROCEDURE — 25000003 PHARM REV CODE 250: Performed by: PHYSICIAN ASSISTANT

## 2020-01-01 PROCEDURE — 1126F PR PAIN SEVERITY QUANTIFIED, NO PAIN PRESENT: ICD-10-PCS | Mod: S$GLB,,, | Performed by: HOSPITALIST

## 2020-01-01 PROCEDURE — 88305 TISSUE EXAM BY PATHOLOGIST: CPT | Performed by: PATHOLOGY

## 2020-01-01 PROCEDURE — 63600175 PHARM REV CODE 636 W HCPCS: Mod: PO | Performed by: INTERNAL MEDICINE

## 2020-01-01 PROCEDURE — 1100F PR PT FALLS ASSESS DOC 2+ FALLS/FALL W/INJURY/YR: ICD-10-PCS | Mod: CPTII,S$GLB,, | Performed by: INTERNAL MEDICINE

## 2020-01-01 PROCEDURE — 99999 PR PBB SHADOW E&M-EST. PATIENT-LVL III: CPT | Mod: PBBFAC,,, | Performed by: INTERNAL MEDICINE

## 2020-01-01 PROCEDURE — 71000033 HC RECOVERY, INTIAL HOUR

## 2020-01-01 PROCEDURE — 99239 PR HOSPITAL DISCHARGE DAY,>30 MIN: ICD-10-PCS | Mod: ,,, | Performed by: HOSPITALIST

## 2020-01-01 PROCEDURE — 1126F PR PAIN SEVERITY QUANTIFIED, NO PAIN PRESENT: ICD-10-PCS | Mod: S$GLB,,, | Performed by: NURSE PRACTITIONER

## 2020-01-01 PROCEDURE — 99223 PR INITIAL HOSPITAL CARE,LEVL III: ICD-10-PCS | Mod: GC,,, | Performed by: INTERNAL MEDICINE

## 2020-01-01 PROCEDURE — 99223 1ST HOSP IP/OBS HIGH 75: CPT | Mod: ,,, | Performed by: INTERNAL MEDICINE

## 2020-01-01 PROCEDURE — 1101F PT FALLS ASSESS-DOCD LE1/YR: CPT | Mod: CPTII,S$GLB,, | Performed by: HOSPITALIST

## 2020-01-01 PROCEDURE — 36415 COLL VENOUS BLD VENIPUNCTURE: CPT | Mod: PN

## 2020-01-01 PROCEDURE — 1126F PR PAIN SEVERITY QUANTIFIED, NO PAIN PRESENT: ICD-10-PCS | Mod: S$GLB,,, | Performed by: ORTHOPAEDIC SURGERY

## 2020-01-01 PROCEDURE — 99204 OFFICE O/P NEW MOD 45 MIN: CPT | Mod: S$GLB,,, | Performed by: NURSE PRACTITIONER

## 2020-01-01 PROCEDURE — 1159F PR MEDICATION LIST DOCUMENTED IN MEDICAL RECORD: ICD-10-PCS | Mod: S$GLB,,, | Performed by: HOSPITALIST

## 2020-01-01 PROCEDURE — 3075F PR MOST RECENT SYSTOLIC BLOOD PRESS GE 130-139MM HG: ICD-10-PCS | Mod: CPTII,S$GLB,, | Performed by: HOSPITALIST

## 2020-01-01 PROCEDURE — 86850 RBC ANTIBODY SCREEN: CPT

## 2020-01-01 PROCEDURE — 1157F PR ADVANCE CARE PLAN OR EQUIV PRESENT IN MEDICAL RECORD: ICD-10-PCS | Mod: S$GLB,,, | Performed by: HOSPITALIST

## 2020-01-01 PROCEDURE — 43239 PR EGD, FLEX, W/BIOPSY, SGL/MULTI: ICD-10-PCS | Mod: 51,,, | Performed by: INTERNAL MEDICINE

## 2020-01-01 PROCEDURE — 85007 BL SMEAR W/DIFF WBC COUNT: CPT

## 2020-01-01 PROCEDURE — 36415 COLL VENOUS BLD VENIPUNCTURE: CPT | Mod: PO

## 2020-01-01 PROCEDURE — 3077F SYST BP >= 140 MM HG: CPT | Mod: CPTII,S$GLB,, | Performed by: NURSE PRACTITIONER

## 2020-01-01 PROCEDURE — 37000008 HC ANESTHESIA 1ST 15 MINUTES

## 2020-01-01 PROCEDURE — 99211 PR OFFICE/OUTPT VISIT, EST, LEVL I: ICD-10-PCS | Mod: S$GLB,,, | Performed by: PHYSICIAN ASSISTANT

## 2020-01-01 PROCEDURE — 45380 COLONOSCOPY AND BIOPSY: CPT | Mod: ,,, | Performed by: INTERNAL MEDICINE

## 2020-01-01 PROCEDURE — 74177 CT ABD & PELVIS W/CONTRAST: CPT | Mod: 26,,, | Performed by: RADIOLOGY

## 2020-01-01 PROCEDURE — G0179 MD RECERTIFICATION HHA PT: HCPCS | Mod: ,,, | Performed by: EMERGENCY MEDICINE

## 2020-01-01 PROCEDURE — 83735 ASSAY OF MAGNESIUM: CPT

## 2020-01-01 PROCEDURE — 74329 X-RAY FOR PANCREAS ENDOSCOPY: CPT | Mod: 26,,, | Performed by: INTERNAL MEDICINE

## 2020-01-01 PROCEDURE — G0179 PR HOME HEALTH MD RECERTIFICATION: ICD-10-PCS | Mod: ,,, | Performed by: EMERGENCY MEDICINE

## 2020-01-01 PROCEDURE — 27201012 HC FORCEPS, HOT/COLD, DISP: Mod: PO | Performed by: INTERNAL MEDICINE

## 2020-01-01 PROCEDURE — 3077F PR MOST RECENT SYSTOLIC BLOOD PRESSURE >= 140 MM HG: ICD-10-PCS | Mod: CPTII,S$GLB,, | Performed by: INTERNAL MEDICINE

## 2020-01-01 PROCEDURE — 87086 URINE CULTURE/COLONY COUNT: CPT

## 2020-01-01 PROCEDURE — 63600175 PHARM REV CODE 636 W HCPCS: Mod: PN | Performed by: NURSE PRACTITIONER

## 2020-01-01 PROCEDURE — 99223 PR INITIAL HOSPITAL CARE,LEVL III: ICD-10-PCS | Mod: ,,, | Performed by: FAMILY MEDICINE

## 2020-01-01 PROCEDURE — 20550 TENDON SHEATH: ICD-10-PCS | Mod: F8,S$GLB,, | Performed by: ORTHOPAEDIC SURGERY

## 2020-01-01 PROCEDURE — 3074F SYST BP LT 130 MM HG: CPT | Mod: CPTII,S$GLB,, | Performed by: NURSE PRACTITIONER

## 2020-01-01 PROCEDURE — 1159F MED LIST DOCD IN RCRD: CPT | Mod: S$GLB,,, | Performed by: HOSPITALIST

## 2020-01-01 PROCEDURE — 86677 HELICOBACTER PYLORI ANTIBODY: CPT

## 2020-01-01 PROCEDURE — 99499 RISK ADDL DX/OHS AUDIT: ICD-10-PCS | Mod: S$GLB,,, | Performed by: NURSE PRACTITIONER

## 2020-01-01 PROCEDURE — 27202074 HC NEEDLE KNIFE, BILIARY: Performed by: INTERNAL MEDICINE

## 2020-01-01 PROCEDURE — 80053 COMPREHEN METABOLIC PANEL: CPT | Mod: PN

## 2020-01-01 PROCEDURE — A9698 NON-RAD CONTRAST MATERIALNOC: HCPCS | Mod: PO | Performed by: INTERNAL MEDICINE

## 2020-01-01 PROCEDURE — 27202304 HC CANNULA, ERCP: Performed by: INTERNAL MEDICINE

## 2020-01-01 PROCEDURE — 99999 PR PBB SHADOW E&M-EST. PATIENT-LVL III: CPT | Mod: PBBFAC,,, | Performed by: NURSE PRACTITIONER

## 2020-01-01 PROCEDURE — 45378 DIAGNOSTIC COLONOSCOPY: CPT | Performed by: INTERNAL MEDICINE

## 2020-01-01 PROCEDURE — 82728 ASSAY OF FERRITIN: CPT

## 2020-01-01 PROCEDURE — 27201012 HC FORCEPS, HOT/COLD, DISP: Performed by: INTERNAL MEDICINE

## 2020-01-01 PROCEDURE — 99999 PR PBB SHADOW E&M-EST. PATIENT-LVL V: ICD-10-PCS | Mod: PBBFAC,,, | Performed by: INTERNAL MEDICINE

## 2020-01-01 PROCEDURE — 71046 XR CHEST PA AND LATERAL: ICD-10-PCS | Mod: 26,,, | Performed by: RADIOLOGY

## 2020-01-01 PROCEDURE — 1100F PTFALLS ASSESS-DOCD GE2>/YR: CPT | Mod: CPTII,S$GLB,, | Performed by: NURSE PRACTITIONER

## 2020-01-01 PROCEDURE — 74177 CT ABDOMEN PELVIS WITH CONTRAST: ICD-10-PCS | Mod: 26,,, | Performed by: RADIOLOGY

## 2020-01-01 PROCEDURE — 43262 PR ERCP,SPHINCTEROTOMY: ICD-10-PCS | Mod: 59,,, | Performed by: INTERNAL MEDICINE

## 2020-01-01 PROCEDURE — 99223 PR INITIAL HOSPITAL CARE,LEVL III: ICD-10-PCS | Mod: ,,, | Performed by: INTERNAL MEDICINE

## 2020-01-01 PROCEDURE — 3077F PR MOST RECENT SYSTOLIC BLOOD PRESSURE >= 140 MM HG: ICD-10-PCS | Mod: CPTII,S$GLB,, | Performed by: NURSE PRACTITIONER

## 2020-01-01 PROCEDURE — 99203 PR OFFICE/OUTPT VISIT, NEW, LEVL III, 30-44 MIN: ICD-10-PCS | Mod: 25,S$GLB,, | Performed by: ORTHOPAEDIC SURGERY

## 2020-01-01 PROCEDURE — 71000044 HC DOSC ROUTINE RECOVERY FIRST HOUR

## 2020-01-01 PROCEDURE — 45380 COLONOSCOPY AND BIOPSY: CPT | Mod: PO | Performed by: INTERNAL MEDICINE

## 2020-01-01 PROCEDURE — 1101F PR PT FALLS ASSESS DOC 0-1 FALLS W/OUT INJ PAST YR: ICD-10-PCS | Mod: CPTII,S$GLB,, | Performed by: HOSPITALIST

## 2020-01-01 PROCEDURE — B4185 PARENTERAL SOL 10 GM LIPIDS: HCPCS | Performed by: HOSPITALIST

## 2020-01-01 PROCEDURE — 99499 UNLISTED E&M SERVICE: CPT | Mod: S$GLB,,, | Performed by: NURSE PRACTITIONER

## 2020-01-01 PROCEDURE — 43239 EGD BIOPSY SINGLE/MULTIPLE: CPT | Performed by: INTERNAL MEDICINE

## 2020-01-01 PROCEDURE — 27201674 HC SPHINCTERTOME: Performed by: INTERNAL MEDICINE

## 2020-01-01 PROCEDURE — 3288F PR FALLS RISK ASSESSMENT DOCUMENTED: ICD-10-PCS | Mod: CPTII,S$GLB,, | Performed by: HOSPITALIST

## 2020-01-01 PROCEDURE — 1159F PR MEDICATION LIST DOCUMENTED IN MEDICAL RECORD: ICD-10-PCS | Mod: S$GLB,,, | Performed by: ORTHOPAEDIC SURGERY

## 2020-01-01 PROCEDURE — 1157F PR ADVANCE CARE PLAN OR EQUIV PRESENT IN MEDICAL RECORD: ICD-10-PCS | Mod: S$GLB,,, | Performed by: INTERNAL MEDICINE

## 2020-01-01 PROCEDURE — 37000009 HC ANESTHESIA EA ADD 15 MINS: Mod: PO | Performed by: INTERNAL MEDICINE

## 2020-01-01 PROCEDURE — 99223 PR INITIAL HOSPITAL CARE,LEVL III: ICD-10-PCS | Mod: 25,,, | Performed by: INTERNAL MEDICINE

## 2020-01-01 PROCEDURE — 1100F PR PT FALLS ASSESS DOC 2+ FALLS/FALL W/INJURY/YR: ICD-10-PCS | Mod: CPTII,S$GLB,, | Performed by: NURSE PRACTITIONER

## 2020-01-01 PROCEDURE — 3078F PR MOST RECENT DIASTOLIC BLOOD PRESSURE < 80 MM HG: ICD-10-PCS | Mod: CPTII,S$GLB,, | Performed by: HOSPITALIST

## 2020-01-01 PROCEDURE — 99999 PR PBB SHADOW E&M-EST. PATIENT-LVL II: ICD-10-PCS | Mod: PBBFAC,,, | Performed by: ORTHOPAEDIC SURGERY

## 2020-01-01 PROCEDURE — 99999 PR PBB SHADOW E&M-EST. PATIENT-LVL III: CPT | Mod: PBBFAC,,, | Performed by: HOSPITALIST

## 2020-01-01 PROCEDURE — 99213 PR OFFICE/OUTPT VISIT, EST, LEVL III, 20-29 MIN: ICD-10-PCS | Mod: S$GLB,,, | Performed by: INTERNAL MEDICINE

## 2020-01-01 PROCEDURE — 1125F AMNT PAIN NOTED PAIN PRSNT: CPT | Mod: S$GLB,,, | Performed by: NURSE PRACTITIONER

## 2020-01-01 RX ORDER — OXYCODONE HYDROCHLORIDE 10 MG/1
10 TABLET ORAL EVERY 6 HOURS PRN
Status: DISCONTINUED | OUTPATIENT
Start: 2020-01-01 | End: 2020-01-01

## 2020-01-01 RX ORDER — HYDROMORPHONE HYDROCHLORIDE 1 MG/ML
1 INJECTION, SOLUTION INTRAMUSCULAR; INTRAVENOUS; SUBCUTANEOUS EVERY 4 HOURS PRN
Status: DISCONTINUED | OUTPATIENT
Start: 2020-01-01 | End: 2020-01-01 | Stop reason: HOSPADM

## 2020-01-01 RX ORDER — MIDAZOLAM HYDROCHLORIDE 1 MG/ML
INJECTION INTRAMUSCULAR; INTRAVENOUS
Status: DISCONTINUED | OUTPATIENT
Start: 2020-01-01 | End: 2020-01-01

## 2020-01-01 RX ORDER — SODIUM CHLORIDE, SODIUM LACTATE, POTASSIUM CHLORIDE, CALCIUM CHLORIDE 600; 310; 30; 20 MG/100ML; MG/100ML; MG/100ML; MG/100ML
INJECTION, SOLUTION INTRAVENOUS CONTINUOUS
Status: DISCONTINUED | OUTPATIENT
Start: 2020-01-01 | End: 2020-01-01 | Stop reason: HOSPADM

## 2020-01-01 RX ORDER — PROPOFOL 10 MG/ML
VIAL (ML) INTRAVENOUS
Status: DISCONTINUED | OUTPATIENT
Start: 2020-01-01 | End: 2020-01-01

## 2020-01-01 RX ORDER — SODIUM CHLORIDE 0.9 % (FLUSH) 0.9 %
10 SYRINGE (ML) INJECTION
Status: CANCELLED | OUTPATIENT
Start: 2020-01-01

## 2020-01-01 RX ORDER — SODIUM CHLORIDE 9 MG/ML
500 INJECTION, SOLUTION INTRAVENOUS
Status: COMPLETED | OUTPATIENT
Start: 2020-01-01 | End: 2020-01-01

## 2020-01-01 RX ORDER — ONDANSETRON 2 MG/ML
8 INJECTION INTRAMUSCULAR; INTRAVENOUS
Status: COMPLETED | OUTPATIENT
Start: 2020-01-01 | End: 2020-01-01

## 2020-01-01 RX ORDER — SODIUM CHLORIDE 0.9 % (FLUSH) 0.9 %
10 SYRINGE (ML) INJECTION
Status: DISCONTINUED | OUTPATIENT
Start: 2020-01-01 | End: 2020-01-01 | Stop reason: HOSPADM

## 2020-01-01 RX ORDER — HEPARIN 100 UNIT/ML
500 SYRINGE INTRAVENOUS
Status: CANCELLED | OUTPATIENT
Start: 2020-01-01

## 2020-01-01 RX ORDER — LORAZEPAM 2 MG/ML
0.25 INJECTION INTRAMUSCULAR ONCE AS NEEDED
Status: DISCONTINUED | OUTPATIENT
Start: 2020-01-01 | End: 2020-01-01 | Stop reason: HOSPADM

## 2020-01-01 RX ORDER — SYRING-NEEDL,DISP,INSUL,0.3 ML 29 G X1/2"
296 SYRINGE, EMPTY DISPOSABLE MISCELLANEOUS ONCE AS NEEDED
Status: DISCONTINUED | OUTPATIENT
Start: 2020-01-01 | End: 2020-01-01

## 2020-01-01 RX ORDER — SODIUM CHLORIDE 9 MG/ML
INJECTION, SOLUTION INTRAVENOUS
Status: COMPLETED | OUTPATIENT
Start: 2020-01-01 | End: 2020-01-01

## 2020-01-01 RX ORDER — DEXAMETHASONE SODIUM PHOSPHATE 4 MG/ML
4 INJECTION, SOLUTION INTRA-ARTICULAR; INTRALESIONAL; INTRAMUSCULAR; INTRAVENOUS; SOFT TISSUE
Status: COMPLETED | OUTPATIENT
Start: 2020-01-01 | End: 2020-01-01

## 2020-01-01 RX ORDER — ONDANSETRON 2 MG/ML
8 INJECTION INTRAMUSCULAR; INTRAVENOUS
Status: CANCELLED | OUTPATIENT
Start: 2020-01-01

## 2020-01-01 RX ORDER — SODIUM CHLORIDE 9 MG/ML
INJECTION, SOLUTION INTRAVENOUS CONTINUOUS
Status: CANCELLED
Start: 2020-01-01

## 2020-01-01 RX ORDER — SIMVASTATIN 20 MG/1
20 TABLET, FILM COATED ORAL NIGHTLY
Qty: 90 TABLET | Refills: 3 | OUTPATIENT
Start: 2020-01-01 | End: 2021-07-30

## 2020-01-01 RX ORDER — LISINOPRIL 5 MG/1
10 TABLET ORAL DAILY
Qty: 90 TABLET | Refills: 0 | Status: CANCELLED | OUTPATIENT
Start: 2020-01-01

## 2020-01-01 RX ORDER — ONDANSETRON 8 MG/1
8 TABLET, ORALLY DISINTEGRATING ORAL EVERY 8 HOURS PRN
Qty: 40 TABLET | Refills: 3 | Status: SHIPPED | OUTPATIENT
Start: 2020-01-01 | End: 2020-01-01 | Stop reason: CLARIF

## 2020-01-01 RX ORDER — KETAMINE HCL IN 0.9 % NACL 50 MG/5 ML
SYRINGE (ML) INTRAVENOUS
Status: DISCONTINUED | OUTPATIENT
Start: 2020-01-01 | End: 2020-01-01

## 2020-01-01 RX ORDER — ONDANSETRON 2 MG/ML
4 INJECTION INTRAMUSCULAR; INTRAVENOUS DAILY PRN
Status: DISCONTINUED | OUTPATIENT
Start: 2020-01-01 | End: 2020-01-01 | Stop reason: HOSPADM

## 2020-01-01 RX ORDER — MORPHINE SULFATE 2 MG/ML
4 INJECTION, SOLUTION INTRAMUSCULAR; INTRAVENOUS EVERY 4 HOURS PRN
Status: DISCONTINUED | OUTPATIENT
Start: 2020-01-01 | End: 2020-01-01

## 2020-01-01 RX ORDER — HYDROMORPHONE HYDROCHLORIDE 1 MG/ML
0.2 INJECTION, SOLUTION INTRAMUSCULAR; INTRAVENOUS; SUBCUTANEOUS EVERY 5 MIN PRN
Status: DISCONTINUED | OUTPATIENT
Start: 2020-01-01 | End: 2020-01-01 | Stop reason: HOSPADM

## 2020-01-01 RX ORDER — AMOXICILLIN AND CLAVULANATE POTASSIUM 875; 125 MG/1; MG/1
1 TABLET, FILM COATED ORAL 2 TIMES DAILY
Qty: 7 TABLET | Refills: 0 | Status: SHIPPED | OUTPATIENT
Start: 2020-01-01 | End: 2020-01-01

## 2020-01-01 RX ORDER — HYDROMORPHONE HYDROCHLORIDE 1 MG/ML
0.5 INJECTION, SOLUTION INTRAMUSCULAR; INTRAVENOUS; SUBCUTANEOUS ONCE
Status: COMPLETED | OUTPATIENT
Start: 2020-01-01 | End: 2020-01-01

## 2020-01-01 RX ORDER — LISINOPRIL 2.5 MG/1
5 TABLET ORAL NIGHTLY
Status: DISCONTINUED | OUTPATIENT
Start: 2020-01-01 | End: 2020-01-01

## 2020-01-01 RX ORDER — SODIUM CHLORIDE 9 MG/ML
INJECTION, SOLUTION INTRAVENOUS CONTINUOUS
Status: DISCONTINUED | OUTPATIENT
Start: 2020-01-01 | End: 2020-01-01

## 2020-01-01 RX ORDER — HEPARIN 100 UNIT/ML
500 SYRINGE INTRAVENOUS
Status: DISCONTINUED | OUTPATIENT
Start: 2020-01-01 | End: 2020-01-01 | Stop reason: HOSPADM

## 2020-01-01 RX ORDER — SODIUM CHLORIDE 9 MG/ML
INJECTION, SOLUTION INTRAVENOUS CONTINUOUS PRN
Status: DISCONTINUED | OUTPATIENT
Start: 2020-01-01 | End: 2020-01-01

## 2020-01-01 RX ORDER — SPIRONOLACTONE 50 MG/1
50 TABLET, FILM COATED ORAL DAILY
Qty: 30 TABLET | Refills: 2 | Status: ON HOLD | OUTPATIENT
Start: 2020-01-01 | End: 2020-01-01 | Stop reason: HOSPADM

## 2020-01-01 RX ORDER — HYDROMORPHONE HYDROCHLORIDE 1 MG/ML
1 INJECTION, SOLUTION INTRAMUSCULAR; INTRAVENOUS; SUBCUTANEOUS EVERY 6 HOURS PRN
Status: DISCONTINUED | OUTPATIENT
Start: 2020-01-01 | End: 2020-01-01

## 2020-01-01 RX ORDER — GLUCAGON 1 MG
1 KIT INJECTION
Status: DISCONTINUED | OUTPATIENT
Start: 2020-01-01 | End: 2020-01-01 | Stop reason: HOSPADM

## 2020-01-01 RX ORDER — DEXAMETHASONE SODIUM PHOSPHATE 4 MG/ML
4 INJECTION, SOLUTION INTRA-ARTICULAR; INTRALESIONAL; INTRAMUSCULAR; INTRAVENOUS; SOFT TISSUE
Status: CANCELLED
Start: 2020-01-01

## 2020-01-01 RX ORDER — SODIUM CHLORIDE 9 MG/ML
INJECTION, SOLUTION INTRAVENOUS CONTINUOUS
Status: CANCELLED | OUTPATIENT
Start: 2020-01-01

## 2020-01-01 RX ORDER — SODIUM CHLORIDE 9 MG/ML
INJECTION, SOLUTION INTRAVENOUS ONCE
Status: CANCELLED
Start: 2020-01-01

## 2020-01-01 RX ORDER — PANTOPRAZOLE SODIUM 40 MG/1
40 TABLET, DELAYED RELEASE ORAL
Qty: 60 TABLET | Refills: 2 | Status: SHIPPED | OUTPATIENT
Start: 2020-01-01 | End: 2020-01-01 | Stop reason: CLARIF

## 2020-01-01 RX ORDER — SODIUM CHLORIDE 0.9 % (FLUSH) 0.9 %
3 SYRINGE (ML) INJECTION
Status: DISCONTINUED | OUTPATIENT
Start: 2020-01-01 | End: 2020-01-01 | Stop reason: HOSPADM

## 2020-01-01 RX ORDER — GLYCOPYRROLATE 0.2 MG/ML
INJECTION INTRAMUSCULAR; INTRAVENOUS
Status: DISCONTINUED | OUTPATIENT
Start: 2020-01-01 | End: 2020-01-01

## 2020-01-01 RX ORDER — LIDOCAINE HYDROCHLORIDE 20 MG/ML
INJECTION INTRAVENOUS
Status: DISCONTINUED | OUTPATIENT
Start: 2020-01-01 | End: 2020-01-01

## 2020-01-01 RX ORDER — PANTOPRAZOLE SODIUM 40 MG/10ML
40 INJECTION, POWDER, LYOPHILIZED, FOR SOLUTION INTRAVENOUS EVERY 12 HOURS
Status: DISCONTINUED | OUTPATIENT
Start: 2020-01-01 | End: 2020-01-01

## 2020-01-01 RX ORDER — LISINOPRIL 5 MG/1
5 TABLET ORAL DAILY
Qty: 90 TABLET | Refills: 1 | Status: CANCELLED | OUTPATIENT
Start: 2020-01-01

## 2020-01-01 RX ORDER — SIMVASTATIN 20 MG/1
20 TABLET, FILM COATED ORAL NIGHTLY
Qty: 90 TABLET | Refills: 3 | Status: CANCELLED | OUTPATIENT
Start: 2020-01-01 | End: 2021-07-30

## 2020-01-01 RX ORDER — PROPOFOL 10 MG/ML
VIAL (ML) INTRAVENOUS CONTINUOUS PRN
Status: DISCONTINUED | OUTPATIENT
Start: 2020-01-01 | End: 2020-01-01

## 2020-01-01 RX ORDER — HYDRALAZINE HYDROCHLORIDE 50 MG/1
50 TABLET, FILM COATED ORAL EVERY 6 HOURS PRN
Status: DISCONTINUED | OUTPATIENT
Start: 2020-01-01 | End: 2020-01-01 | Stop reason: HOSPADM

## 2020-01-01 RX ORDER — FENTANYL CITRATE 50 UG/ML
INJECTION, SOLUTION INTRAMUSCULAR; INTRAVENOUS
Status: DISCONTINUED | OUTPATIENT
Start: 2020-01-01 | End: 2020-01-01

## 2020-01-01 RX ORDER — OXYCODONE HYDROCHLORIDE 5 MG/1
5 TABLET ORAL EVERY 6 HOURS PRN
Status: DISCONTINUED | OUTPATIENT
Start: 2020-01-01 | End: 2020-01-01

## 2020-01-01 RX ORDER — LISINOPRIL 5 MG/1
10 TABLET ORAL DAILY
Qty: 90 TABLET | Refills: 1 | Status: SHIPPED | OUTPATIENT
Start: 2020-01-01 | End: 2020-01-01 | Stop reason: CLARIF

## 2020-01-01 RX ORDER — PANTOPRAZOLE SODIUM 40 MG/1
40 TABLET, DELAYED RELEASE ORAL
Status: DISCONTINUED | OUTPATIENT
Start: 2020-01-01 | End: 2020-01-01 | Stop reason: HOSPADM

## 2020-01-01 RX ORDER — TALC
6 POWDER (GRAM) TOPICAL NIGHTLY PRN
Status: DISCONTINUED | OUTPATIENT
Start: 2020-01-01 | End: 2020-01-01 | Stop reason: HOSPADM

## 2020-01-01 RX ORDER — POLYETHYLENE GLYCOL 3350, SODIUM SULFATE ANHYDROUS, SODIUM BICARBONATE, SODIUM CHLORIDE, POTASSIUM CHLORIDE 236; 22.74; 6.74; 5.86; 2.97 G/4L; G/4L; G/4L; G/4L; G/4L
4000 POWDER, FOR SOLUTION ORAL ONCE
Status: COMPLETED | OUTPATIENT
Start: 2020-01-01 | End: 2020-01-01

## 2020-01-01 RX ORDER — SODIUM CHLORIDE 9 MG/ML
INJECTION, SOLUTION INTRAVENOUS CONTINUOUS
Status: ACTIVE | OUTPATIENT
Start: 2020-01-01 | End: 2020-01-01

## 2020-01-01 RX ORDER — TRIAMCINOLONE ACETONIDE 40 MG/ML
40 INJECTION, SUSPENSION INTRA-ARTICULAR; INTRAMUSCULAR
Status: DISCONTINUED | OUTPATIENT
Start: 2020-01-01 | End: 2020-01-01 | Stop reason: HOSPADM

## 2020-01-01 RX ORDER — SULFAMETHOXAZOLE AND TRIMETHOPRIM 800; 160 MG/1; MG/1
1 TABLET ORAL 2 TIMES DAILY
Qty: 14 TABLET | Refills: 0 | Status: ON HOLD | OUTPATIENT
Start: 2020-01-01 | End: 2020-01-01 | Stop reason: HOSPADM

## 2020-01-01 RX ORDER — HYDROMORPHONE HYDROCHLORIDE 1 MG/ML
2 INJECTION, SOLUTION INTRAMUSCULAR; INTRAVENOUS; SUBCUTANEOUS EVERY 4 HOURS PRN
Status: DISCONTINUED | OUTPATIENT
Start: 2020-01-01 | End: 2020-01-01

## 2020-01-01 RX ORDER — LIDOCAINE HCL/PF 100 MG/5ML
SYRINGE (ML) INTRAVENOUS
Status: DISCONTINUED | OUTPATIENT
Start: 2020-01-01 | End: 2020-01-01

## 2020-01-01 RX ORDER — MIDAZOLAM HYDROCHLORIDE 1 MG/ML
INJECTION, SOLUTION INTRAMUSCULAR; INTRAVENOUS
Status: DISCONTINUED | OUTPATIENT
Start: 2020-01-01 | End: 2020-01-01

## 2020-01-01 RX ORDER — IBUPROFEN 200 MG
24 TABLET ORAL
Status: DISCONTINUED | OUTPATIENT
Start: 2020-01-01 | End: 2020-01-01 | Stop reason: HOSPADM

## 2020-01-01 RX ORDER — SYRING-NEEDL,DISP,INSUL,0.3 ML 29 G X1/2"
148 SYRINGE, EMPTY DISPOSABLE MISCELLANEOUS ONCE AS NEEDED
Status: COMPLETED | OUTPATIENT
Start: 2020-01-01 | End: 2020-01-01

## 2020-01-01 RX ORDER — PROMETHAZINE HYDROCHLORIDE 12.5 MG/1
12.5-25 TABLET ORAL EVERY 6 HOURS PRN
Qty: 40 TABLET | Refills: 3 | Status: SHIPPED | OUTPATIENT
Start: 2020-01-01 | End: 2020-01-01 | Stop reason: CLARIF

## 2020-01-01 RX ORDER — HYDROCORTISONE 25 MG/G
CREAM TOPICAL 2 TIMES DAILY PRN
Qty: 30 G | Refills: 1 | Status: SHIPPED | OUTPATIENT
Start: 2020-01-01 | End: 2020-01-01

## 2020-01-01 RX ORDER — LINEZOLID 600 MG/1
600 TABLET, FILM COATED ORAL EVERY 12 HOURS
Qty: 60 TABLET | Refills: 1 | Status: ON HOLD | OUTPATIENT
Start: 2020-01-01 | End: 2021-01-01 | Stop reason: HOSPADM

## 2020-01-01 RX ORDER — OXYCODONE HYDROCHLORIDE 10 MG/1
30 TABLET ORAL EVERY 6 HOURS PRN
Status: DISCONTINUED | OUTPATIENT
Start: 2020-01-01 | End: 2020-01-01 | Stop reason: HOSPADM

## 2020-01-01 RX ORDER — LIDOCAINE AND PRILOCAINE 25; 25 MG/G; MG/G
CREAM TOPICAL
Qty: 30 G | Refills: 0 | Status: SHIPPED | OUTPATIENT
Start: 2020-01-01 | End: 2020-01-01 | Stop reason: CLARIF

## 2020-01-01 RX ORDER — IBUPROFEN 200 MG
16 TABLET ORAL
Status: DISCONTINUED | OUTPATIENT
Start: 2020-01-01 | End: 2020-01-01 | Stop reason: HOSPADM

## 2020-01-01 RX ORDER — IBUPROFEN 600 MG/1
600 TABLET ORAL EVERY 6 HOURS PRN
Status: DISCONTINUED | OUTPATIENT
Start: 2020-01-01 | End: 2020-01-01

## 2020-01-01 RX ORDER — SIMVASTATIN 20 MG/1
20 TABLET, FILM COATED ORAL NIGHTLY
Status: DISCONTINUED | OUTPATIENT
Start: 2020-01-01 | End: 2020-01-01

## 2020-01-01 RX ORDER — SODIUM CHLORIDE 9 MG/ML
INJECTION, SOLUTION INTRAVENOUS CONTINUOUS
Status: DISCONTINUED | OUTPATIENT
Start: 2020-01-01 | End: 2020-01-01 | Stop reason: HOSPADM

## 2020-01-01 RX ORDER — DEXAMETHASONE 2 MG/1
2 TABLET ORAL 2 TIMES DAILY PRN
Qty: 40 TABLET | Refills: 5 | Status: ON HOLD | OUTPATIENT
Start: 2020-01-01 | End: 2020-01-01 | Stop reason: SDUPTHER

## 2020-01-01 RX ORDER — ONDANSETRON 2 MG/ML
4 INJECTION INTRAMUSCULAR; INTRAVENOUS EVERY 8 HOURS PRN
Status: DISCONTINUED | OUTPATIENT
Start: 2020-01-01 | End: 2020-01-01 | Stop reason: HOSPADM

## 2020-01-01 RX ORDER — LISINOPRIL 5 MG/1
10 TABLET ORAL DAILY
Qty: 90 TABLET | Refills: 0 | Status: SHIPPED | OUTPATIENT
Start: 2020-01-01 | End: 2020-01-01 | Stop reason: SDUPTHER

## 2020-01-01 RX ORDER — HYDROCODONE BITARTRATE AND ACETAMINOPHEN 500; 5 MG/1; MG/1
TABLET ORAL
Status: DISCONTINUED | OUTPATIENT
Start: 2020-01-01 | End: 2020-01-01 | Stop reason: HOSPADM

## 2020-01-01 RX ORDER — HYDROMORPHONE HYDROCHLORIDE 1 MG/ML
1.5 INJECTION, SOLUTION INTRAMUSCULAR; INTRAVENOUS; SUBCUTANEOUS ONCE
Status: COMPLETED | OUTPATIENT
Start: 2020-01-01 | End: 2020-01-01

## 2020-01-01 RX ORDER — CLARITHROMYCIN 500 MG/1
500 TABLET, FILM COATED ORAL 2 TIMES DAILY
Qty: 60 TABLET | Refills: 1 | Status: ON HOLD | OUTPATIENT
Start: 2020-01-01 | End: 2021-01-01 | Stop reason: HOSPADM

## 2020-01-01 RX ORDER — PANTOPRAZOLE SODIUM 40 MG/10ML
80 INJECTION, POWDER, LYOPHILIZED, FOR SOLUTION INTRAVENOUS ONCE
Status: COMPLETED | OUTPATIENT
Start: 2020-01-01 | End: 2020-01-01

## 2020-01-01 RX ORDER — LISINOPRIL 5 MG/1
5 TABLET ORAL DAILY
Qty: 90 TABLET | Refills: 0 | Status: ON HOLD | OUTPATIENT
Start: 2020-01-01 | End: 2020-01-01 | Stop reason: SDUPTHER

## 2020-01-01 RX ORDER — LISINOPRIL 2.5 MG/1
5 TABLET ORAL DAILY
Status: DISCONTINUED | OUTPATIENT
Start: 2020-01-01 | End: 2020-01-01

## 2020-01-01 RX ORDER — SODIUM CHLORIDE 9 MG/ML
INJECTION, SOLUTION INTRAVENOUS ONCE
Status: COMPLETED | OUTPATIENT
Start: 2020-01-01 | End: 2020-01-01

## 2020-01-01 RX ORDER — LISINOPRIL 20 MG/1
20 TABLET ORAL DAILY
Status: DISCONTINUED | OUTPATIENT
Start: 2020-01-01 | End: 2020-01-01 | Stop reason: HOSPADM

## 2020-01-01 RX ORDER — MORPHINE SULFATE 4 MG/ML
2 INJECTION, SOLUTION INTRAMUSCULAR; INTRAVENOUS EVERY 4 HOURS PRN
Status: DISCONTINUED | OUTPATIENT
Start: 2020-01-01 | End: 2020-01-01

## 2020-01-01 RX ORDER — OXYCODONE HYDROCHLORIDE 20 MG/1
20 TABLET ORAL EVERY 6 HOURS PRN
Qty: 25 TABLET | Refills: 0 | Status: SHIPPED | OUTPATIENT
Start: 2020-01-01 | End: 2020-01-01 | Stop reason: CLARIF

## 2020-01-01 RX ORDER — ONDANSETRON 2 MG/ML
INJECTION INTRAMUSCULAR; INTRAVENOUS
Status: DISCONTINUED | OUTPATIENT
Start: 2020-01-01 | End: 2020-01-01

## 2020-01-01 RX ORDER — FUROSEMIDE 40 MG/1
40 TABLET ORAL DAILY PRN
Qty: 30 TABLET | Refills: 1 | Status: ON HOLD | OUTPATIENT
Start: 2020-01-01 | End: 2020-01-01

## 2020-01-01 RX ORDER — OXYCODONE AND ACETAMINOPHEN 5; 325 MG/1; MG/1
1 TABLET ORAL EVERY 8 HOURS PRN
Qty: 30 TABLET | Refills: 0 | Status: SHIPPED | OUTPATIENT
Start: 2020-01-01 | End: 2020-01-01 | Stop reason: CLARIF

## 2020-01-01 RX ORDER — BISACODYL 5 MG
5 TABLET, DELAYED RELEASE (ENTERIC COATED) ORAL DAILY PRN
COMMUNITY
End: 2020-01-01 | Stop reason: CLARIF

## 2020-01-01 RX ADMIN — PIPERACILLIN AND TAZOBACTAM 4.5 G: 4; .5 INJECTION, POWDER, FOR SOLUTION INTRAVENOUS at 09:07

## 2020-01-01 RX ADMIN — OXYCODONE HYDROCHLORIDE 30 MG: 10 TABLET ORAL at 07:07

## 2020-01-01 RX ADMIN — PROPOFOL 20 MG: 10 INJECTION, EMULSION INTRAVENOUS at 05:07

## 2020-01-01 RX ADMIN — DEXAMETHASONE SODIUM PHOSPHATE 4 MG: 4 INJECTION, SOLUTION INTRA-ARTICULAR; INTRALESIONAL; INTRAMUSCULAR; INTRAVENOUS; SOFT TISSUE at 04:08

## 2020-01-01 RX ADMIN — PANTOPRAZOLE SODIUM 40 MG: 40 TABLET, DELAYED RELEASE ORAL at 05:07

## 2020-01-01 RX ADMIN — LISINOPRIL 5 MG: 2.5 TABLET ORAL at 09:07

## 2020-01-01 RX ADMIN — LIDOCAINE HYDROCHLORIDE 50 MG: 20 INJECTION, SOLUTION INTRAVENOUS at 04:07

## 2020-01-01 RX ADMIN — PIPERACILLIN AND TAZOBACTAM 4.5 G: 4; .5 INJECTION, POWDER, FOR SOLUTION INTRAVENOUS at 01:07

## 2020-01-01 RX ADMIN — ONDANSETRON 4 MG: 2 INJECTION INTRAMUSCULAR; INTRAVENOUS at 05:07

## 2020-01-01 RX ADMIN — GEMCITABINE 865 MG: 38 INJECTION, SOLUTION INTRAVENOUS at 04:07

## 2020-01-01 RX ADMIN — Medication 10 ML: at 05:09

## 2020-01-01 RX ADMIN — PROPOFOL 20 MG: 10 INJECTION, EMULSION INTRAVENOUS at 10:02

## 2020-01-01 RX ADMIN — OXYCODONE HYDROCHLORIDE 10 MG: 10 TABLET ORAL at 05:07

## 2020-01-01 RX ADMIN — SODIUM CHLORIDE 500 ML: 9 INJECTION, SOLUTION INTRAVENOUS at 04:08

## 2020-01-01 RX ADMIN — FENTANYL CITRATE 25 MCG: 50 INJECTION, SOLUTION INTRAMUSCULAR; INTRAVENOUS at 04:07

## 2020-01-01 RX ADMIN — PIPERACILLIN AND TAZOBACTAM 4.5 G: 4; .5 INJECTION, POWDER, FOR SOLUTION INTRAVENOUS at 02:07

## 2020-01-01 RX ADMIN — SODIUM CHLORIDE: 0.9 INJECTION, SOLUTION INTRAVENOUS at 05:07

## 2020-01-01 RX ADMIN — PROPOFOL 20 MG: 10 INJECTION, EMULSION INTRAVENOUS at 04:07

## 2020-01-01 RX ADMIN — Medication 10 MG: at 04:07

## 2020-01-01 RX ADMIN — PROPOFOL 125 MCG/KG/MIN: 10 INJECTION, EMULSION INTRAVENOUS at 02:07

## 2020-01-01 RX ADMIN — PROPOFOL 50 MG: 10 INJECTION, EMULSION INTRAVENOUS at 09:02

## 2020-01-01 RX ADMIN — MAGNESIUM SULFATE HEPTAHYDRATE: 500 INJECTION, SOLUTION INTRAMUSCULAR; INTRAVENOUS at 09:07

## 2020-01-01 RX ADMIN — SODIUM CHLORIDE, SODIUM LACTATE, POTASSIUM CHLORIDE, AND CALCIUM CHLORIDE: .6; .31; .03; .02 INJECTION, SOLUTION INTRAVENOUS at 09:02

## 2020-01-01 RX ADMIN — MAGNESIUM SULFATE HEPTAHYDRATE: 500 INJECTION, SOLUTION INTRAMUSCULAR; INTRAVENOUS at 10:07

## 2020-01-01 RX ADMIN — GEMCITABINE 1000 MG: 38 INJECTION, SOLUTION INTRAVENOUS at 04:09

## 2020-01-01 RX ADMIN — PANTOPRAZOLE SODIUM 80 MG: 40 INJECTION, POWDER, LYOPHILIZED, FOR SOLUTION INTRAVENOUS at 09:07

## 2020-01-01 RX ADMIN — PIPERACILLIN AND TAZOBACTAM 4.5 G: 4; .5 INJECTION, POWDER, FOR SOLUTION INTRAVENOUS at 05:07

## 2020-01-01 RX ADMIN — SODIUM CHLORIDE: 9 INJECTION, SOLUTION INTRAVENOUS at 03:08

## 2020-01-01 RX ADMIN — Medication 10 ML: at 05:08

## 2020-01-01 RX ADMIN — OXYCODONE HYDROCHLORIDE 30 MG: 10 TABLET ORAL at 02:07

## 2020-01-01 RX ADMIN — PIPERACILLIN AND TAZOBACTAM 4.5 G: 4; .5 INJECTION, POWDER, FOR SOLUTION INTRAVENOUS at 07:07

## 2020-01-01 RX ADMIN — PANCRELIPASE 4 CAPSULE: 60000; 12000; 38000 CAPSULE, DELAYED RELEASE PELLETS ORAL at 09:07

## 2020-01-01 RX ADMIN — FERUMOXYTOL 510 MG: 510 INJECTION INTRAVENOUS at 04:09

## 2020-01-01 RX ADMIN — HYDRALAZINE HYDROCHLORIDE 50 MG: 50 TABLET, FILM COATED ORAL at 04:07

## 2020-01-01 RX ADMIN — HYDROMORPHONE HYDROCHLORIDE 1 MG: 1 INJECTION, SOLUTION INTRAMUSCULAR; INTRAVENOUS; SUBCUTANEOUS at 07:07

## 2020-01-01 RX ADMIN — PANCRELIPASE 4 CAPSULE: 60000; 12000; 38000 CAPSULE, DELAYED RELEASE PELLETS ORAL at 05:07

## 2020-01-01 RX ADMIN — SODIUM CHLORIDE: 0.9 INJECTION, SOLUTION INTRAVENOUS at 03:07

## 2020-01-01 RX ADMIN — PANCRELIPASE 4 CAPSULE: 60000; 12000; 38000 CAPSULE, DELAYED RELEASE PELLETS ORAL at 06:07

## 2020-01-01 RX ADMIN — SODIUM CHLORIDE: 9 INJECTION, SOLUTION INTRAVENOUS at 04:09

## 2020-01-01 RX ADMIN — SODIUM CHLORIDE: 9 INJECTION, SOLUTION INTRAVENOUS at 05:07

## 2020-01-01 RX ADMIN — PACLITAXEL 100 MG: 100 INJECTION, POWDER, LYOPHILIZED, FOR SUSPENSION INTRAVENOUS at 03:10

## 2020-01-01 RX ADMIN — PANTOPRAZOLE SODIUM 40 MG: 40 INJECTION, POWDER, LYOPHILIZED, FOR SOLUTION INTRAVENOUS at 08:07

## 2020-01-01 RX ADMIN — PROPOFOL 10 MG: 10 INJECTION, EMULSION INTRAVENOUS at 06:07

## 2020-01-01 RX ADMIN — ONDANSETRON 4 MG: 2 INJECTION INTRAMUSCULAR; INTRAVENOUS at 02:07

## 2020-01-01 RX ADMIN — PANCRELIPASE 4 CAPSULE: 60000; 12000; 38000 CAPSULE, DELAYED RELEASE PELLETS ORAL at 12:07

## 2020-01-01 RX ADMIN — DEXAMETHASONE SODIUM PHOSPHATE 4 MG: 4 INJECTION, SOLUTION INTRA-ARTICULAR; INTRALESIONAL; INTRAMUSCULAR; INTRAVENOUS; SOFT TISSUE at 03:08

## 2020-01-01 RX ADMIN — ONDANSETRON 8 MG: 2 INJECTION INTRAMUSCULAR; INTRAVENOUS at 03:10

## 2020-01-01 RX ADMIN — PANCRELIPASE 4 CAPSULE: 60000; 12000; 38000 CAPSULE, DELAYED RELEASE PELLETS ORAL at 07:07

## 2020-01-01 RX ADMIN — PIPERACILLIN AND TAZOBACTAM 4.5 G: 4; .5 INJECTION, POWDER, FOR SOLUTION INTRAVENOUS at 03:07

## 2020-01-01 RX ADMIN — GEMCITABINE 1000 MG: 38 INJECTION, SOLUTION INTRAVENOUS at 05:09

## 2020-01-01 RX ADMIN — PROPOFOL 20 MG: 10 INJECTION, EMULSION INTRAVENOUS at 06:07

## 2020-01-01 RX ADMIN — ONDANSETRON 8 MG: 2 INJECTION INTRAMUSCULAR; INTRAVENOUS at 04:09

## 2020-01-01 RX ADMIN — OXYCODONE HYDROCHLORIDE 30 MG: 10 TABLET ORAL at 09:07

## 2020-01-01 RX ADMIN — OXYCODONE HYDROCHLORIDE 10 MG: 10 TABLET ORAL at 08:07

## 2020-01-01 RX ADMIN — IOHEXOL 1000 ML: 9 SOLUTION ORAL at 09:06

## 2020-01-01 RX ADMIN — MAGNESIUM CITRATE 148 ML: 1.75 LIQUID ORAL at 05:07

## 2020-01-01 RX ADMIN — FENTANYL CITRATE 25 MCG: 50 INJECTION, SOLUTION INTRAMUSCULAR; INTRAVENOUS at 02:07

## 2020-01-01 RX ADMIN — SODIUM CHLORIDE 500 ML: 9 INJECTION, SOLUTION INTRAVENOUS at 04:07

## 2020-01-01 RX ADMIN — HYDROMORPHONE HYDROCHLORIDE 0.2 MG: 1 INJECTION, SOLUTION INTRAMUSCULAR; INTRAVENOUS; SUBCUTANEOUS at 01:07

## 2020-01-01 RX ADMIN — PANTOPRAZOLE SODIUM 40 MG: 40 INJECTION, POWDER, LYOPHILIZED, FOR SOLUTION INTRAVENOUS at 09:07

## 2020-01-01 RX ADMIN — FENTANYL CITRATE 25 MCG: 50 INJECTION, SOLUTION INTRAMUSCULAR; INTRAVENOUS at 05:07

## 2020-01-01 RX ADMIN — POLYETHYLENE GLYCOL 3350, SODIUM SULFATE ANHYDROUS, SODIUM BICARBONATE, SODIUM CHLORIDE, POTASSIUM CHLORIDE 4000 ML: 236; 22.74; 6.74; 5.86; 2.97 POWDER, FOR SOLUTION ORAL at 07:07

## 2020-01-01 RX ADMIN — GEMCITABINE 990 MG: 38 INJECTION, SOLUTION INTRAVENOUS at 04:08

## 2020-01-01 RX ADMIN — GEMCITABINE 1050 MG: 38 INJECTION, SOLUTION INTRAVENOUS at 04:10

## 2020-01-01 RX ADMIN — HYDRALAZINE HYDROCHLORIDE 50 MG: 50 TABLET, FILM COATED ORAL at 09:07

## 2020-01-01 RX ADMIN — IOHEXOL 75 ML: 350 INJECTION, SOLUTION INTRAVENOUS at 09:06

## 2020-01-01 RX ADMIN — PACLITAXEL 100 MG: 100 INJECTION, POWDER, LYOPHILIZED, FOR SUSPENSION INTRAVENOUS at 04:09

## 2020-01-01 RX ADMIN — LIDOCAINE HYDROCHLORIDE 40 MG: 20 INJECTION, SOLUTION INTRAVENOUS at 02:07

## 2020-01-01 RX ADMIN — MORPHINE SULFATE 2 MG: 4 INJECTION, SOLUTION INTRAMUSCULAR; INTRAVENOUS at 01:07

## 2020-01-01 RX ADMIN — I.V. FAT EMULSION 250 ML: 20 EMULSION INTRAVENOUS at 10:07

## 2020-01-01 RX ADMIN — LIDOCAINE HYDROCHLORIDE 60 MG: 20 INJECTION, SOLUTION INTRAVENOUS at 05:07

## 2020-01-01 RX ADMIN — DEXAMETHASONE SODIUM PHOSPHATE 4 MG: 4 INJECTION, SOLUTION INTRA-ARTICULAR; INTRALESIONAL; INTRAMUSCULAR; INTRAVENOUS; SOFT TISSUE at 04:07

## 2020-01-01 RX ADMIN — Medication 10 ML: at 04:09

## 2020-01-01 RX ADMIN — PIPERACILLIN AND TAZOBACTAM 4.5 G: 4; .5 INJECTION, POWDER, FOR SOLUTION INTRAVENOUS at 10:07

## 2020-01-01 RX ADMIN — HYDROMORPHONE HYDROCHLORIDE 2 MG: 1 INJECTION, SOLUTION INTRAMUSCULAR; INTRAVENOUS; SUBCUTANEOUS at 09:07

## 2020-01-01 RX ADMIN — MIDAZOLAM HYDROCHLORIDE 1 MG: 1 INJECTION, SOLUTION INTRAMUSCULAR; INTRAVENOUS at 05:07

## 2020-01-01 RX ADMIN — OXYCODONE HYDROCHLORIDE 10 MG: 10 TABLET ORAL at 11:07

## 2020-01-01 RX ADMIN — DEXAMETHASONE SODIUM PHOSPHATE 4 MG: 4 INJECTION, SOLUTION INTRA-ARTICULAR; INTRALESIONAL; INTRAMUSCULAR; INTRAVENOUS; SOFT TISSUE at 05:07

## 2020-01-01 RX ADMIN — OXYCODONE HYDROCHLORIDE 5 MG: 5 TABLET ORAL at 08:07

## 2020-01-01 RX ADMIN — TRIAMCINOLONE ACETONIDE 40 MG: 40 INJECTION, SUSPENSION INTRA-ARTICULAR; INTRAMUSCULAR at 02:09

## 2020-01-01 RX ADMIN — IOHEXOL 75 ML: 350 INJECTION, SOLUTION INTRAVENOUS at 02:07

## 2020-01-01 RX ADMIN — PROPOFOL 80 MG: 10 INJECTION, EMULSION INTRAVENOUS at 05:07

## 2020-01-01 RX ADMIN — Medication 10 ML: at 05:10

## 2020-01-01 RX ADMIN — MIDAZOLAM HYDROCHLORIDE 1 MG: 1 INJECTION, SOLUTION INTRAMUSCULAR; INTRAVENOUS at 04:07

## 2020-01-01 RX ADMIN — GLYCOPYRROLATE 0.2 MG: 0.2 INJECTION, SOLUTION INTRAMUSCULAR; INTRAVENOUS at 05:07

## 2020-01-01 RX ADMIN — FENTANYL CITRATE 50 MCG: 50 INJECTION, SOLUTION INTRAMUSCULAR; INTRAVENOUS at 03:07

## 2020-01-01 RX ADMIN — DEXAMETHASONE SODIUM PHOSPHATE 4 MG: 4 INJECTION, SOLUTION INTRA-ARTICULAR; INTRALESIONAL; INTRAMUSCULAR; INTRAVENOUS; SOFT TISSUE at 04:09

## 2020-01-01 RX ADMIN — FERUMOXYTOL 510 MG: 510 INJECTION INTRAVENOUS at 03:09

## 2020-01-01 RX ADMIN — MORPHINE SULFATE 2 MG: 4 INJECTION, SOLUTION INTRAMUSCULAR; INTRAVENOUS at 06:07

## 2020-01-01 RX ADMIN — PANTOPRAZOLE SODIUM 40 MG: 40 TABLET, DELAYED RELEASE ORAL at 06:07

## 2020-01-01 RX ADMIN — GEMCITABINE 1000 MG: 38 INJECTION, SOLUTION INTRAVENOUS at 05:07

## 2020-01-01 RX ADMIN — GLYCOPYRROLATE 0.2 MG: 0.2 INJECTION, SOLUTION INTRAMUSCULAR; INTRAVENOUS at 02:07

## 2020-01-01 RX ADMIN — LIDOCAINE HYDROCHLORIDE 100 MG: 20 INJECTION, SOLUTION INTRAVENOUS at 09:02

## 2020-01-01 RX ADMIN — PROPOFOL 30 MG: 10 INJECTION, EMULSION INTRAVENOUS at 04:07

## 2020-01-01 RX ADMIN — SODIUM CHLORIDE: 0.9 INJECTION, SOLUTION INTRAVENOUS at 01:07

## 2020-01-01 RX ADMIN — LIDOCAINE HYDROCHLORIDE 50 MG: 20 INJECTION, SOLUTION INTRAVENOUS at 05:07

## 2020-01-01 RX ADMIN — HYDROMORPHONE HYDROCHLORIDE 1.5 MG: 1 INJECTION, SOLUTION INTRAMUSCULAR; INTRAVENOUS; SUBCUTANEOUS at 02:07

## 2020-01-01 RX ADMIN — HYDROMORPHONE HYDROCHLORIDE 1 MG: 1 INJECTION, SOLUTION INTRAMUSCULAR; INTRAVENOUS; SUBCUTANEOUS at 11:07

## 2020-01-01 RX ADMIN — GEMCITABINE 975 MG: 38 INJECTION, SOLUTION INTRAVENOUS at 04:08

## 2020-01-01 RX ADMIN — Medication 10 ML: at 05:07

## 2020-01-01 RX ADMIN — PROPOFOL 20 MG: 10 INJECTION, EMULSION INTRAVENOUS at 02:07

## 2020-01-01 RX ADMIN — HYDROMORPHONE HYDROCHLORIDE 1 MG: 1 INJECTION, SOLUTION INTRAMUSCULAR; INTRAVENOUS; SUBCUTANEOUS at 12:07

## 2020-01-01 RX ADMIN — IOHEXOL 1000 ML: 9 SOLUTION ORAL at 02:09

## 2020-01-01 RX ADMIN — PROPOFOL 40 MG: 10 INJECTION, EMULSION INTRAVENOUS at 02:07

## 2020-01-01 RX ADMIN — SODIUM CHLORIDE: 9 INJECTION, SOLUTION INTRAVENOUS at 04:08

## 2020-01-01 RX ADMIN — PIPERACILLIN AND TAZOBACTAM 4.5 G: 4; .5 INJECTION, POWDER, FOR SOLUTION INTRAVENOUS at 06:07

## 2020-01-01 RX ADMIN — PIPERACILLIN AND TAZOBACTAM 4.5 G: 4; .5 INJECTION, POWDER, FOR SOLUTION INTRAVENOUS at 12:07

## 2020-01-01 RX ADMIN — SODIUM CHLORIDE: 9 INJECTION, SOLUTION INTRAVENOUS at 03:09

## 2020-01-01 RX ADMIN — HYDROMORPHONE HYDROCHLORIDE 2 MG: 1 INJECTION, SOLUTION INTRAMUSCULAR; INTRAVENOUS; SUBCUTANEOUS at 08:07

## 2020-01-01 RX ADMIN — LISINOPRIL 20 MG: 20 TABLET ORAL at 09:07

## 2020-01-01 RX ADMIN — GEMCITABINE 1000 MG: 38 INJECTION, SOLUTION INTRAVENOUS at 04:08

## 2020-01-01 RX ADMIN — IOHEXOL 75 ML: 350 INJECTION, SOLUTION INTRAVENOUS at 02:09

## 2020-01-01 RX ADMIN — Medication 10 ML: at 03:08

## 2020-01-01 RX ADMIN — PROPOFOL 75 MCG/KG/MIN: 10 INJECTION, EMULSION INTRAVENOUS at 04:07

## 2020-01-01 RX ADMIN — SODIUM CHLORIDE: 900 INJECTION, SOLUTION INTRAVENOUS at 03:10

## 2020-01-01 RX ADMIN — LISINOPRIL 20 MG: 20 TABLET ORAL at 07:07

## 2020-01-01 RX ADMIN — Medication 10 ML: at 03:09

## 2020-01-01 RX ADMIN — ONDANSETRON 4 MG: 2 INJECTION, SOLUTION INTRAMUSCULAR; INTRAVENOUS at 04:07

## 2020-01-01 RX ADMIN — IOHEXOL 30 ML: 300 INJECTION, SOLUTION INTRAVENOUS at 05:07

## 2020-01-01 RX ADMIN — HYDROMORPHONE HYDROCHLORIDE 0.5 MG: 1 INJECTION, SOLUTION INTRAMUSCULAR; INTRAVENOUS; SUBCUTANEOUS at 01:07

## 2020-01-31 NOTE — TELEPHONE ENCOUNTER
Spoke with pt and rescheduled appt per provider request. Pt rescheduled same day and time with Mana Gregory NP. Pt verbalized understanding.

## 2020-01-31 NOTE — TELEPHONE ENCOUNTER
----- Message from Norman Snyder sent at 1/31/2020  2:52 PM CST -----  Contact: pt    Type:  Patient Returning Call    Who Called:  Pt  Ildefonso   Who Left Message for Patient:  Charlee   Does the patient know what this is regarding?:  Yes   Best Call Back Number:    Additional Information:  Returning a call

## 2020-01-31 NOTE — TELEPHONE ENCOUNTER
Attempted to contact pt to schedule colonoscopy and cancel appt with NP on 2/4/20. Unable to leave message.

## 2020-02-04 NOTE — PROGRESS NOTES
Subjective:       Patient ID: Laly Baker is a 84 y.o. female, Body mass index is 16.07 kg/m².    Chief Complaint: GI Problem (increased bowel movements, no diarrhea)      Patient is new to me. Established patient of Dr. Barton.    Here for screening colonoscopy. Last c-scope done 1/12/15; showed diverticulosis and hemorrhoids. Polyps with previous colonoscopies. Reports an increased number of bowel movements per day. This started beginning of Jan 2020 when she started taking ocuvite OTC. Reports she went from having one bowel movement per day to 3-4 times per day. Denies diarrhea, constipation, or abdominal pain. Describes stool as type 3-4 on bristol scale. Denies hematochezia or melena. She stopped taking Ocuvite and bowel movements decreased to one time per day. Denies heartburn, nausea, vomiting, dysphagia, or unexpected weight loss.     Review of Systems   Constitutional: Negative for appetite change, fever and unexpected weight change.   HENT: Negative for trouble swallowing.    Respiratory: Negative for cough and shortness of breath.    Cardiovascular: Negative for chest pain.   Gastrointestinal: Negative for abdominal pain, blood in stool, constipation, diarrhea, nausea and vomiting.   Genitourinary: Negative for difficulty urinating and dysuria.   Musculoskeletal: Negative for gait problem.   Skin: Negative for rash.   Neurological: Negative for speech difficulty.   Psychiatric/Behavioral: Negative for confusion.       Past Medical History:   Diagnosis Date    Anticoagulant long-term use     Arthritis     Colon polyp     Hyperlipidemia     Hypertension     Nonexudative age-related macular degeneration, bilateral, intermediate dry stage 12/30/2019    Pancreatic pseudocyst     Pancreatitis 05/2017      Past Surgical History:   Procedure Laterality Date    CATARACT EXTRACTION W/  INTRAOCULAR LENS IMPLANT Right 08/08/2017    [facility]    CATARACT EXTRACTION W/  INTRAOCULAR LENS IMPLANT Left  10/10/2017    [facility]    CERVICAL CONIZATION   W/ LASER      COLONOSCOPY  01/12/2015    Dr. Barton; diverticulosis; ext int hemorrhoid; repeat in 5 years    COLONOSCOPY W/ POLYPECTOMY      cone      epid ster  2012. 2014    TONSILLECTOMY        Family History   Problem Relation Age of Onset    Diabetes Mother 92    Cataracts Mother     Hypertension Mother     Stroke Father     Cataracts Father     Hypertension Father     Cancer Brother 82        lung cancer    Amblyopia Neg Hx     Blindness Neg Hx     Glaucoma Neg Hx     Macular degeneration Neg Hx     Retinal detachment Neg Hx     Strabismus Neg Hx     Thyroid disease Neg Hx       Wt Readings from Last 10 Encounters:   02/04/20 43.8 kg (96 lb 9 oz)   08/23/19 45.8 kg (100 lb 15.5 oz)   11/26/18 46.6 kg (102 lb 11.8 oz)   05/17/18 45.8 kg (100 lb 15.5 oz)   10/06/17 43.1 kg (95 lb)   10/03/17 42.1 kg (92 lb 13 oz)   08/03/17 43.5 kg (96 lb)   07/10/17 42.2 kg (93 lb 0.6 oz)   06/16/17 48.8 kg (107 lb 9.6 oz)   06/04/17 42.5 kg (93 lb 11.1 oz)     Lab Results   Component Value Date    WBC 5.90 10/04/2017    HGB 12.2 10/04/2017    HCT 38.0 10/04/2017    MCV 89 10/04/2017     10/04/2017     CMP  Sodium   Date Value Ref Range Status   10/14/2019 134 (L) 136 - 145 mmol/L Final     Potassium   Date Value Ref Range Status   10/14/2019 4.6 3.5 - 5.1 mmol/L Final     Chloride   Date Value Ref Range Status   10/14/2019 100 95 - 110 mmol/L Final     CO2   Date Value Ref Range Status   10/14/2019 27 23 - 29 mmol/L Final     Glucose   Date Value Ref Range Status   10/14/2019 100 70 - 110 mg/dL Final     BUN, Bld   Date Value Ref Range Status   10/14/2019 20 8 - 23 mg/dL Final     Creatinine   Date Value Ref Range Status   10/14/2019 0.9 0.5 - 1.4 mg/dL Final     Calcium   Date Value Ref Range Status   10/14/2019 9.3 8.7 - 10.5 mg/dL Final     Total Protein   Date Value Ref Range Status   10/14/2019 6.3 6.0 - 8.4 g/dL Final     Albumin   Date Value Ref  Range Status   10/14/2019 3.7 3.5 - 5.2 g/dL Final     Total Bilirubin   Date Value Ref Range Status   10/14/2019 0.5 0.1 - 1.0 mg/dL Final     Comment:     For infants and newborns, interpretation of results should be based  on gestational age, weight and in agreement with clinical  observations.  Premature Infant recommended reference ranges:  Up to 24 hours.............<8.0 mg/dL  Up to 48 hours............<12.0 mg/dL  3-5 days..................<15.0 mg/dL  6-29 days.................<15.0 mg/dL       Alkaline Phosphatase   Date Value Ref Range Status   10/14/2019 72 55 - 135 U/L Final     AST   Date Value Ref Range Status   10/14/2019 30 10 - 40 U/L Final     ALT   Date Value Ref Range Status   10/14/2019 21 10 - 44 U/L Final     Anion Gap   Date Value Ref Range Status   10/14/2019 7 (L) 8 - 16 mmol/L Final     eGFR if    Date Value Ref Range Status   10/14/2019 >60.0 >60 mL/min/1.73 m^2 Final     eGFR if non    Date Value Ref Range Status   10/14/2019 59.7 (A) >60 mL/min/1.73 m^2 Final     Comment:     Calculation used to obtain the estimated glomerular filtration  rate (eGFR) is the CKD-EPI equation.        Lab Results   Component Value Date    AMYLASE 9,277 (H) 05/30/2017     Lab Results   Component Value Date    LIPASE 40 05/21/2018              Reviewed prior medical records including radiology report of CT of abdomen and pelvis 12/8/17 & endoscopy history (see surgical history).     Objective:      Physical Exam   Constitutional: She is oriented to person, place, and time. She appears well-developed and well-nourished.   Very thin   HENT:   Head: Normocephalic.   Eyes: Pupils are equal, round, and reactive to light.   Neck: Normal range of motion.   Cardiovascular: Normal rate, regular rhythm and normal heart sounds.   No murmur heard.  Pulmonary/Chest: Breath sounds normal. No respiratory distress. She has no wheezes.   Abdominal: Soft. Bowel sounds are normal. She exhibits  no distension and no mass. There is no tenderness. There is no guarding.   Musculoskeletal: Normal range of motion.   Neurological: She is alert and oriented to person, place, and time.   Skin: Skin is warm and dry. No rash noted.   Non jaundiced   Psychiatric: She has a normal mood and affect. Her speech is normal.         Assessment:       1. Frequent bowel movements    2. History of colon polyps    3. History of pancreatitis    4. Pancreatic pseudocyst           Plan:   All diagnoses and orders for this visit:    Frequent bowel movements   - Schedule Colonoscopy, discussed procedure with the patient, including risks and benefits, patient verbalized understanding   - Recommended increase fiber in diet, especially soluble fiber since this can help bulk up the stool consistency and may help to slow down how fast the stool goes through the colon    History of colon polyps   - Schedule Colonoscopy, discussed procedure with the patient, including risks and benefits, patient verbalized understanding    History of pancreatitis & Pancreatic pseudocyst   - Stay well-hydrated, avoid alcohol & smoking   - Recommend a high protein, low fat diet     If no improvement in symptoms or symptoms worsen, call/follow-up at clinic or go to ER

## 2020-02-04 NOTE — PATIENT INSTRUCTIONS
Colorectal Cancer Screening    Colorectal cancer (cancer in the colon or rectum) is a leading cause of cancer deaths in the [place] But it doesnt have to be. When this cancer is found and removed early, the chances of a full recovery are very good. Because colorectal cancer rarely causes symptoms in its early stages, screening for the disease is important. Its even more crucial if you have risk factors for the disease. Learn more about colorectal cancer and its risk factors. Then talk to your healthcare provider about being screened. You could be saving your own life.  Risk factors for colorectal cancer  Your risk of having colorectal cancer increases if you:  · Are 50 years of age or older  · Have a family history or personal history of colorectal cancer or polyps  · Have a personal history of type 2 diabetes, Crohns disease, or ulcerative colitis  · Have an inherited genetic syndrome like Godinez syndrome (also known as HNPCC) or familial adenomatous polyposis (FAP)  · Are very overweight  · Are not physically active  · Smoke  · Drink a lot of alcohol  · Eat a lot of red or processed meat  The colon and rectum  Waste from food you eat enters the colon from the small intestine. As it travels through the colon, the waste (stool) loses water and becomes more solid. Intestinal muscles push it toward the sigmoid--the last section of the colon. Stool then moves into the rectum, where its stored until its ready to leave the body during a bowel movement.  How cancer develops  Polyps are growths that form on the inner lining of the colon or rectum. Most are benign, which means they arent cancerous. But over time, some polyps can become cancer (malignant). This happens when cells in these polyps begin growing abnormally. In time, malignant cells invade more and more of the colon and rectum. The cancer may also spread to nearby organs or lymph nodes or to other parts of the body. Finding and removing polyps can help  prevent cancer from ever forming.  Your screening  Screening means looking for a health problem before you have symptoms. During screening for colorectal cancer, your healthcare provider will ask about your health history, examine you, and do one or more tests.  History and exam  The history and exam involve the following:  · Health history. Your healthcare provider will ask about your health history. Mention if a family member has had colon cancer or polyps. Also mention any health problems you have had in the past.  · Digital rectal exam (KAVITHA). During a KAVITHA, the healthcare provider inserts a lubricated gloved finger into the rectum. The test is painless and takes less than a minute. Healthcare providers agree that this test alone is not enough to screen for colorectal cancer.  Screening test choices  Fecal occult blood test (FOBT) or fecal immunochemical test (FIT)  These tests check for occult blood in stool (blood you cant see). Hidden blood may be a sign of colon polyps or cancer. A small sample of stool is tested for blood in a laboratory. Most often, you collect this sample at home using a kit your healthcare provider gives you. Follow the instructions carefully for using this kit. You might need to avoid certain foods and medicines before the test, as directed.  Barium enema with contrast (double-contrast barium enema)  This test uses X-rays to provide images of the entire colon and rectum. The day before this test, you will need to do a bowel prep to clean out the colon and rectum. A bowel prep is a liquid diet plus strong laxatives or enemas. You will be awake for the test, but you may be given medicine to help you relax. At the start of the test, a radiologist (a healthcare provider who specializes in imaging tests) places a soft tube into the rectum. The tube is used to fill the colon with a contrast liquid (barium) and air. This can be uncomfortable for some people. The liquid helps the colon show up  clearly on the X-rays. Because the test uses X-rays, it exposes you to a small amount of radiation.  Virtual colonoscopy  This exam is also called a CT colonography. It uses a series of X-ray photographs to create a 3-D view of the colon and rectum. The day before the test, you will need to do a bowel prep to clean out your colon. Your healthcare provider will give you instructions on how to do this. During the procedure, you will lie on a table that is part of a special X-ray machine called a CT scanner. A small tube will be placed into your rectum to fill the colon and rectum with air. This can be uncomfortable for some people. Then, the table will move into the machine and pictures will be taken of your colon and rectum. A computer will combine these photos to create a 3-D picture. Because the test uses X-rays, it exposes you to a small amount of radiation.  Scope exams  Here are two types of scope exams:  · Colonoscopy. This test can be used to find and remove polyps anywhere in the colon or rectum. The day before the test, you will do a bowel prep. This is a liquid diet plus a strong laxative solution or an enema. The bowel prep will cleanse your colon. You will be given instructions for this. Just before the test, you are given a medicine to make you sleepy. Then, a long, flexible, lighted tube called a colonoscope is gently inserted into the rectum and guided through the entire colon. Images of the colon are viewed on a video screen. Any polyps that are found are removed and sent to a lab for testing. If a polyp cant be removed, a sample of tissue is taken and the polyp might be removed later during surgery. You will need to bring someone with you to drive you home after this test.   · Sigmoidoscopy. This test is similar to colonoscopy, but focuses only on the sigmoid colon and rectum. As with colonoscopy, bowel prep must be done the day before this test. It might not need to be as complete as the bowel prep  for a colonoscopy. You are awake during the procedure, but you may be given medicine to help you relax. During the test, the healthcare provider guides a thin, flexible, lighted tube called a sigmoidoscope through your rectum and lower colon. The images are displayed on a video screen. Polyps are removed, if possible, and sent to a lab for testing.  Colonoscopy is the only screening test that lets your healthcare provider see the entire colon and rectum. This test also lets your healthcare provider remove any pieces of tissue that need to be looked at by a lab. If something suspicious is found using any other tests, you will likely need a colonoscopy.     When to call your healthcare provider after a test  Call your healthcare provider if you have any of the following after any screening test:  · Bleeding  · Fever of 100.4°F (38°C) or higher, or as directed by your healthcare provider  · Abdominal pain  · Vomiting   Date Last Reviewed: 11/4/2015  © 0791-1950 [employer]. [address] [place] [address], [place] [address]. All rights reserved. This information is not intended as a substitute for professional medical care. Always follow your healthcare professional's instructions.

## 2020-02-24 NOTE — TRANSFER OF CARE
"Anesthesia Transfer of Care Note    Patient: Laly Baker    Procedure(s) Performed: Procedure(s) (LRB):  COLONOSCOPY (N/A)    Patient location: PACU    Anesthesia Type: general    Transport from OR: Transported from OR on room air with adequate spontaneous ventilation    Post pain: adequate analgesia    Post assessment: no apparent anesthetic complications and tolerated procedure well    Post vital signs: stable    Level of consciousness: sedated    Nausea/Vomiting: no nausea/vomiting    Complications: none    Transfer of care protocol was followed      Last vitals:   Visit Vitals  BP (!) 108/53 (BP Location: Left arm, Patient Position: Lying)   Pulse 66   Temp 36.6 °C (97.9 °F) (Skin)   Resp 16   Ht 5' 4" (1.626 m)   Wt 45.4 kg (100 lb)   SpO2 99%   Breastfeeding? No   BMI 17.16 kg/m²     "

## 2020-02-24 NOTE — H&P
History & Physical - Short Stay  Gastroenterology      SUBJECTIVE:     Procedure: Colonoscopy    Chief Complaint/Indication for Procedure: Change in Bowel Habits and Previous Polyps    PTA Medications   Medication Sig    lisinopril (PRINIVIL,ZESTRIL) 5 MG tablet Take 1 tablet (5 mg total) by mouth once daily.    mv-mn/folic acid/calcium/vit K (WOMEN'S 50 PLUS MULTIVITAMIN ORAL) Take by mouth once daily.    protein supplement Liqd Take by mouth.    simvastatin (ZOCOR) 20 MG tablet Take 1 tablet (20 mg total) by mouth every evening.    aspirin 81 mg Tab Take 1 tablet by mouth once daily at 6am. Every day    fish oil-dha-epa (FISH OIL) 1,200-144-216 mg Cap Take 1,000 mg by mouth once daily at 6am. Every day    FLUZONE HIGH-DOSE 2019-20, PF, 180 mcg/0.5 mL Syrg        Review of patient's allergies indicates:  No Known Allergies     Past Medical History:   Diagnosis Date    Anticoagulant long-term use     Arthritis     Colon polyp     Hyperlipidemia     Hypertension     Nonexudative age-related macular degeneration, bilateral, intermediate dry stage 12/30/2019    Pancreatic pseudocyst     Pancreatitis 05/2017     Past Surgical History:   Procedure Laterality Date    CATARACT EXTRACTION W/  INTRAOCULAR LENS IMPLANT Right 08/08/2017    [facility]    CATARACT EXTRACTION W/  INTRAOCULAR LENS IMPLANT Left 10/10/2017    [facility]    CERVICAL CONIZATION   W/ LASER      COLONOSCOPY  01/12/2015    Dr. Barton; diverticulosis; ext int hemorrhoid; repeat in 5 years    COLONOSCOPY W/ POLYPECTOMY      cone      epid ster  2012. 2014    TONSILLECTOMY       Family History   Problem Relation Age of Onset    Diabetes Mother 92    Cataracts Mother     Hypertension Mother     Stroke Father     Cataracts Father     Hypertension Father     Cancer Brother 82        lung cancer    Amblyopia Neg Hx     Blindness Neg Hx     Glaucoma Neg Hx     Macular degeneration Neg Hx     Retinal detachment Neg Hx     Strabismus  Neg Hx     Thyroid disease Neg Hx      Social History     Tobacco Use    Smoking status: Never Smoker    Smokeless tobacco: Never Used   Substance Use Topics    Alcohol use: No    Drug use: No         OBJECTIVE:     Vital Signs (Most Recent)  Temp: 97.9 °F (36.6 °C) (02/24/20 0900)  Pulse: 65 (02/24/20 0900)  Resp: 16 (02/24/20 0900)  BP: (!) 147/63 (02/24/20 0900)  SpO2: 100 % (02/24/20 0900)    Physical Exam:                                                       GENERAL:  Comfortable, in no acute distress.                                 HEENT EXAM:  Nonicteric.  No adenopathy.  Oropharynx is clear.               NECK:  Supple.                                                               LUNGS:  Clear.                                                               CARDIAC:  Regular rate and rhythm.  S1, S2.  No murmur.                      ABDOMEN:  Soft, positive bowel sounds, nontender.  No hepatosplenomegaly or masses.  No rebound or guarding.                                             EXTREMITIES:  No edema.     MENTAL STATUS:  Normal, alert and oriented.      ASSESSMENT/PLAN:     Assessment: Change in Bowel Habits and Previous Polyps    Plan: Colonoscopy    Anesthesia Plan: General    ASA Grade: ASA 2 - Patient with mild systemic disease with no functional limitations    MALLAMPATI SCORE:  I (soft palate, uvula, fauces, and tonsillar pillars visible)

## 2020-02-24 NOTE — ANESTHESIA PREPROCEDURE EVALUATION
02/24/2020  Laly Baker is a 85 y.o., female.    Anesthesia Evaluation    I have reviewed the Patient Summary Reports.    I have reviewed the Nursing Notes.      Review of Systems  Anesthesia Hx:  No problems with previous Anesthesia    Cardiovascular:   Hypertension, well controlled        Physical Exam  General:  Well nourished    Airway/Jaw/Neck:  Airway Findings: Mallampati: II                Anesthesia Plan  Type of Anesthesia, risks & benefits discussed:  Anesthesia Type:  general  Patient's Preference:   Intra-op Monitoring Plan:   Intra-op Monitoring Plan Comments:   Post Op Pain Control Plan:   Post Op Pain Control Plan Comments:   Induction:   IV  Beta Blocker:  Patient is not currently on a Beta-Blocker (No further documentation required).       Informed Consent: Patient understands risks and agrees with Anesthesia plan.  Questions answered. Anesthesia consent signed with patient.  ASA Score: 2     Day of Surgery Review of History & Physical:    H&P update referred to the surgeon.         Ready For Surgery From Anesthesia Perspective.

## 2020-02-24 NOTE — ANESTHESIA POSTPROCEDURE EVALUATION
Anesthesia Post Evaluation    Patient: Laly Baker    Procedure(s) Performed: Procedure(s) (LRB):  COLONOSCOPY (N/A)    Final Anesthesia Type: general    Patient location during evaluation: PACU  Patient participation: Yes- Able to Participate  Level of consciousness: awake and alert  Post-procedure vital signs: reviewed and stable  Pain management: adequate  Airway patency: patent    PONV status at discharge: No PONV  Anesthetic complications: no      Cardiovascular status: blood pressure returned to baseline and hemodynamically stable  Respiratory status: unassisted  Hydration status: euvolemic  Follow-up not needed.          Vitals Value Taken Time   /53 2/24/2020 10:15 AM   Temp 36.6 °C (97.9 °F) 2/24/2020  9:00 AM   Pulse 66 2/24/2020 10:15 AM   Resp 16 2/24/2020 10:15 AM   SpO2 99 % 2/24/2020 10:15 AM         No case tracking events are documented in the log.      Pain/Puma Score: Puma Score: 6 (2/24/2020 10:15 AM)

## 2020-02-24 NOTE — [employer] PATIENT INSTRUCTIONS
Discharge Summary/Instructions after an Endoscopic Procedure  Patient Name: Laly Baker  Patient MRN: [id number]  Patient YOB: 1935 Monday, February 24, 2020  Ankur Barton MD  RESTRICTIONS:  During your procedure today, you received medications for sedation.  These   medications may affect your judgment, balance and coordination.  Therefore,   for 24 hours, you have the following restrictions:   - DO NOT drive a car, operate machinery, make legal/financial decisions,   sign important papers or drink alcohol.    ACTIVITY:  Today: no heavy lifting, straining or running due to procedural   sedation/anesthesia.  The following day: return to full activity including work.  DIET:  Eat and drink normally unless instructed otherwise.     TREATMENT FOR COMMON SIDE EFFECTS:  - Mild abdominal pain, nausea, belching, bloating or excessive gas:  rest,   eat lightly and use a heating pad.  - Sore Throat: treat with throat lozenges and/or gargle with warm salt   water.  - Because air was used during the procedure, expelling large amounts of air   from your rectum or belching is normal.  - If a bowel prep was taken, you may not have a bowel movement for 1-3 days.    This is normal.  SYMPTOMS TO WATCH FOR AND REPORT TO YOUR PHYSICIAN:  1. Abdominal pain or bloating, other than gas cramps.  2. Chest pain.  3. Back pain.  4. Signs of infection such as: chills or fever occurring within 24 hours   after the procedure.  5. Rectal bleeding, which would show as bright red, maroon, or black stools.   (A tablespoon of blood from the rectum is not serious, especially if   hemorrhoids are present.)  6. Vomiting.  7. Weakness or dizziness.  GO DIRECTLY TO THE NEAREST EMERGENCY ROOM IF YOU HAVE ANY OF THE FOLLOWING:      Difficulty breathing              Chills and/or fever over 101 F   Persistent vomiting and/or vomiting blood   Severe abdominal pain   Severe chest pain   Black, tarry stools   Bleeding- more than one  tablespoon   Any other symptom or condition that you feel may need urgent attention  Your doctor recommends these additional instructions:  If any biopsies were taken, your doctors clinic will contact you in 1 to 2   weeks with any results.  We are waiting for your pathology results.   Your physician has indicated that a repeat colonoscopy is not recommended   for surveillance.   You are being discharged to home.  For questions, problems or results please call your physician - Ankur Barton MD at Work:  [phone].  EMERGENCY PHONE NUMBER: [phone], LAB RESULTS: [phone]  IF A COMPLICATION OR EMERGENCY SITUATION ARISES AND YOU ARE UNABLE TO REACH   YOUR PHYSICIAN - GO DIRECTLY TO THE EMERGENCY ROOM.  ___________________________________________  Nurse Signature  ___________________________________________  Patient/Designated Responsible Party Signature  Ankur Barton MD  2/24/2020 10:18:40 AM  This report has been verified and signed electronically.  [employer]

## 2020-02-24 NOTE — DISCHARGE SUMMARY
Discharge Note  Short Stay      SUMMARY     Admit Date: 2/24/2020    Attending Physician: Ankur Barton MD     Discharge Physician: Ankur Barton MD    Discharge Date: 2/24/2020 10:19 AM    Final Diagnosis: Screening [Z13.9]  History of colon polyps [Z86.010]    Disposition: HOME OR SELF CARE    Patient Instructions:   Current Discharge Medication List      START taking these medications    Details   hydrocortisone (ANUSOL-HC) 2.5 % rectal cream Place rectally 2 (two) times daily as needed for Hemorrhoids.  Qty: 28 g, Refills: 1         CONTINUE these medications which have NOT CHANGED    Details   lisinopril (PRINIVIL,ZESTRIL) 5 MG tablet Take 1 tablet (5 mg total) by mouth once daily.  Qty: 90 tablet, Refills: 1    Associated Diagnoses: Essential hypertension      mv-mn/folic acid/calcium/vit K (WOMEN'S 50 PLUS MULTIVITAMIN ORAL) Take by mouth once daily.      protein supplement Liqd Take by mouth.      simvastatin (ZOCOR) 20 MG tablet Take 1 tablet (20 mg total) by mouth every evening.  Qty: 90 tablet, Refills: 3    Associated Diagnoses: Pure hypercholesterolemia      aspirin 81 mg Tab Take 1 tablet by mouth once daily at 6am. Every day      fish oil-dha-epa (FISH OIL) 1,200-144-216 mg Cap Take 1,000 mg by mouth once daily at 6am. Every day      FLUZONE HIGH-DOSE 2019-20, PF, 180 mcg/0.5 mL Syrg              Discharge Procedure Orders (must include Diet, Follow-up, Activity)    Follow Up:  Follow up with PCP as previously scheduled  Resume routine diet.  Activity as tolerated.    No driving day of procedure.

## 2020-04-29 NOTE — PROGRESS NOTES
Refill Authorization Note    is requesting a refill authorization.    Brief assessment and rationale for refill: APPROVE: prr               Medication reconciliation completed: No                         Comments:      Requested Prescriptions   Pending Prescriptions Disp Refills    lisinopriL (PRINIVIL,ZESTRIL) 5 MG tablet 90 tablet 0     Sig: Take 1 tablet (5 mg total) by mouth once daily.       Cardiovascular:  ACE Inhibitors Passed - 4/29/2020  3:29 PM        Passed - Patient is at least 18 years old        Passed - Last BP in normal range within 360 days.     BP Readings from Last 3 Encounters:   02/24/20 (!) 139/59   02/04/20 106/63   08/23/19 110/60              Passed - Office visit in past 12 months or future 90 days.     Recent Outpatient Visits            2 months ago Frequent bowel movements    [facility] Gastroenterology Maan Gregory NP    4 months ago Nonexudative age-related macular degeneration, bilateral, intermediate dry stage    [facility] Ophthalmology DBennie Hart MD    4 months ago Posterior vitreous detachment, bilateral    [facility] Optometry SHIRA Murillo, OD    8 months ago CKD (chronic kidney disease), stage III    [facility] Family Medicine Yayo Lindsey Jr., MD    1 year ago Posterior vitreous detachment, bilateral    [facility] SHIRA Murillo, OD                    Passed - Cr is 1.3 or below and within 360 days     Creatinine   Date Value Ref Range Status   10/14/2019 0.9 0.5 - 1.4 mg/dL Final   05/21/2018 1.3 0.5 - 1.4 mg/dL Final   12/08/2017 1.0 0.5 - 1.4 mg/dL Final              Passed - K in normal range and within 360 days     Potassium   Date Value Ref Range Status   10/14/2019 4.6 3.5 - 5.1 mmol/L Final   05/21/2018 4.9 3.5 - 5.1 mmol/L Final   10/04/2017 4.9 3.5 - 5.1 mmol/L Final              Passed - eGFR within 360 days     eGFR if non    Date Value Ref Range Status   10/14/2019 59.7 (A) >60 mL/min/1.73 m^2 Final      Comment:     Calculation used to obtain the estimated glomerular filtration  rate (eGFR) is the CKD-EPI equation.      05/21/2018 38.6 (A) >60 mL/min/1.73 m^2 Final     Comment:     Calculation used to obtain the estimated glomerular filtration  rate (eGFR) is the CKD-EPI equation.      12/08/2017 53 (A) >60 mL/min/1.73 m^2 Final     Comment:     Calculation used to obtain the estimated glomerular filtration  rate (eGFR) is the CKD-EPI equation.        eGFR if    Date Value Ref Range Status   10/14/2019 >60.0 >60 mL/min/1.73 m^2 Final   05/21/2018 44.5 (A) >60 mL/min/1.73 m^2 Final   12/08/2017 >60 >60 mL/min/1.73 m^2 Final              Powered by [employer] - 4/29/2020  3:29 PM        This is a duplicate request of medication requested 2020-04-25. Check to see if the patient is requesting a refill from a new pharmacy.         Appointments  past 12m or future 3m with PCP    Date Provider   Last Visit   8/23/2019 Yayo Lindsey Jr., MD   Next Visit   Visit date not found Yayo Lindsey Jr., MD   ED visits in past 90 days: 0     Note composed:3:29 PM 04/29/2020

## 2020-05-26 NOTE — PROGRESS NOTES
"Pt presents for evaluation change bowel habits. Pt describes increased frequency past 5 months, usually 2-4 bowel movements daily, formed stool. No loose or watery diarrhea. No constipation. No bleeding. C-scope with biopsies 2/2020 unremarkable. Denies abd pain, although describes vague "sensation" right side of abd when lying down. Good appetite. No N/V. No dysphagia. Weight down approx 10 pounds. No fever or jaundice. Prior hx pancreatitis with pseudocyst in 2017.  normal at that time.    REVIEW OF SYSTEMS:   Constitutional: Negative for fever, appetite change, Positive weight change (decreased).  HENT: Negative for sore throat and trouble swallowing.  Eyes: Negative for visual disturbance.  Respiratory: Negative for chest tightness, shortness of breath and wheezing.  Cardiovascular: Negative for chest pain.  Gastrointestinal:  as per HPI  Genitourinary: Negative for dysuria, frequency and hematuria.    PHYSICAL EXAMINATION:                                                        GENERAL:  Comfortable, in no acute distress.      SKIN: Non-jaundiced.                             HEENT EXAM:  Nonicteric.  No adenopathy.  Oropharynx is clear.               NECK:  Supple.                                                               LUNGS:  Clear.                                                               CARDIAC:  Regular rate and rhythm.  S1, S2.  No murmur.                      ABDOMEN:  Soft, positive bowel sounds, nontender.  No hepatosplenomegaly or masses.  No rebound or guarding.                                             EXTREMITIES:  No edema.     NEURO: CN II-XII intact; motor/sensory non-focal.    IMP: 1. Change bowel habits            2. Weight loss          3. Hx pancreatitis    PLAN: 1. Abd CT scan  "

## 2020-06-04 NOTE — TELEPHONE ENCOUNTER
----- Message from Omar Cardona sent at 6/4/2020  3:36 PM CDT -----  Contact: Patient  Type:  Patient Returning Call    Who Called:  Patient  Who Left Message for Patient:  unknown  Does the patient know what this is regarding?:  Unknown, no note/message  Best Call Back Number:  [phone]  Additional Information:  NA

## 2020-06-08 NOTE — TELEPHONE ENCOUNTER
I reviewed CT scan results with pt last Thurs (6/4). Recommended referral to Dr Campbell for EUS/tissue diagnosis. Pt in agreement. Discussed with Dr Campbell this AM.

## 2020-06-14 NOTE — PROGRESS NOTES
Patient is here today for COVID-19 pre-op testing. Patient is asymptomatic at this time.    Counseled patient and answered questions regarding COVID-19 testing process/procedure and/or diagnosis.  We will call with results.  Patient aware and verbalized understanding.

## 2020-06-16 NOTE — TELEPHONE ENCOUNTER
1/3 attempts    ----- Message from Charles Bang MD sent at 6/16/2020  8:15 AM CDT -----  Please call the patient regarding her not detected result.

## 2020-06-19 NOTE — TELEPHONE ENCOUNTER
[facility]      ----- Message from Charles Bang MD sent at 6/16/2020  8:15 AM CDT -----  Please call the patient regarding her not detected result.

## 2020-06-22 NOTE — TELEPHONE ENCOUNTER
Called home number and mobile, LVM on mobile to return call. Attempt 2/3      ----- Message from Charles Bang MD sent at 6/16/2020  8:15 AM CDT -----  Please call the patient regarding her not detected result.

## 2020-06-24 NOTE — TELEPHONE ENCOUNTER
----- Message from Robin Gaytan sent at 6/24/2020  2:54 PM CDT -----  Contact: Joana with Dr Okeefe office  Type: Needs Medical Advice  Who Called:  Joana    Best Call Back Number: [phone]  Additional Information: states the pt and  have called to be scheduled several times but not getting scheduled. Joana states a a referral was sent over for the pt and is needing the pt scheduled. Please call pt to advise

## 2020-06-24 NOTE — TELEPHONE ENCOUNTER
S/w pt re: referral received today.  Nurse Navigator, Lroy is off today but she will be in touch tomorrow to discuss apt scheduling.  Verb understanding

## 2020-06-25 NOTE — TELEPHONE ENCOUNTER
LVM at below number that we are currently working to move patients in order to make a new patient appointment slot.  Will call patient/son later today or tomorrow.    ----- Message from Anuradha Gregory sent at 6/25/2020  2:12 PM CDT -----  Contact: son-morgan  Pt  son Morgan calling states that pt has not heard anything in 2 days, that a navigator was supposed to ameya to setup an appointment with jose luis Taveras as soon as possible because pt has pancreat cancer...[phone]

## 2020-06-26 NOTE — TELEPHONE ENCOUNTER
----- Message from Charles Bang MD sent at 6/16/2020  8:15 AM CDT -----  Please call the patient regarding her not detected result.

## 2020-06-29 PROBLEM — K83.1 BILIARY OBSTRUCTION: Status: ACTIVE | Noted: 2020-01-01

## 2020-06-29 PROBLEM — Z71.89 ADVANCE CARE PLANNING: Status: ACTIVE | Noted: 2020-01-01

## 2020-06-29 PROBLEM — R10.9 ABDOMINAL PAIN: Status: ACTIVE | Noted: 2020-01-01

## 2020-06-29 PROBLEM — E87.1 HYPONATREMIA: Status: ACTIVE | Noted: 2020-01-01

## 2020-06-29 NOTE — TELEPHONE ENCOUNTER
Spoke with pt's . Informed Dr. Barton cannot call ER to speak with doctor.  verbalized understanding.

## 2020-06-29 NOTE — TELEPHONE ENCOUNTER
----- Message from Kenia Aponte sent at 6/29/2020  1:29 PM CDT -----  Regarding: Need medical advice  Contact: pt spouse  Type: Need medical advice      Who Called:  Pt spouse  Best Call Back Number:   [phone]  Additional Information:   Pt  would like a call back to discuss plans after pt is d/c from [facility]. Please advise

## 2020-06-29 NOTE — TELEPHONE ENCOUNTER
Patient called to inform us that she is going to the Er due to slurred speech and having a hard time walking this morning.

## 2020-06-30 PROBLEM — C80.1 MALIGNANT BILIARY OBSTRUCTION: Status: ACTIVE | Noted: 2020-01-01

## 2020-06-30 PROBLEM — K83.1 MALIGNANT BILIARY OBSTRUCTION: Status: ACTIVE | Noted: 2020-01-01

## 2020-06-30 NOTE — TELEPHONE ENCOUNTER
----- Message from Ronit Butcher LPN sent at 6/29/2020  5:32 PM CDT -----    ----- Message -----  From: Maggie Samson  Sent: 6/29/2020   4:40 PM CDT  To: Ladi Phipps Staff    Type: Needs Medical Advice  Who Called:  Ildefonso /   Brendon Call Back Number: [phone]  Additional Information: states that the patient is currently at  [facility] ED. Patient will be admitted and would like to speak to the nurse. Please give call back

## 2020-06-30 NOTE — TELEPHONE ENCOUNTER
----- Message from Princess HAY Godwin sent at 6/30/2020  9:09 AM CDT -----  Contact: Ildefonso Baker (Spouse)  Type: Needs Medical Advice  Who Called: Ildefonso Baker (Spouse)  Best Call Back Number: [phone] (home)     Additional Information: requesting a call from Nurse Bee

## 2020-06-30 NOTE — TELEPHONE ENCOUNTER
----- Message from Ronit Butcher LPN sent at 6/29/2020  5:32 PM CDT -----    ----- Message -----  From: Maggie Samson  Sent: 6/29/2020   4:40 PM CDT  To: Ladi Phipps Staff    Type: Needs Medical Advice  Who Called:  Ildefonso /   Brendon Call Back Number: [phone]  Additional Information: states that the patient is currently at  [facility] ED. Patient will be admitted and would like to speak to the nurse. Please give call back

## 2020-06-30 NOTE — TELEPHONE ENCOUNTER
Spoke with  and he was informing us that they will be in tomorrow for New Patient appointment.  Today she will have a endoscope and be discharged per .

## 2020-06-30 NOTE — TELEPHONE ENCOUNTER
Spoke with pt's . 's concerns will be handled by nurses at [facility].

## 2020-06-30 NOTE — TELEPHONE ENCOUNTER
----- Message from Princess HAY Godwin sent at 6/30/2020  9:09 AM CDT -----  Contact: Ildefonso Baker (Spouse)  Type: Needs Medical Advice  Who Called: Ildefonso Baker (Spouse)  Best Call Back Number: [phone] (home)     Additional Information: requesting a call from Nurse Bee

## 2020-06-30 NOTE — TELEPHONE ENCOUNTER
----- Message from Thaddeus Marquis sent at 6/30/2020  2:42 PM CDT -----  Regarding: pt  rey  Type: Needs Medical Advice    Who Called:      Best Call Back Number: [phone]  Additional Information:  wants to talk to dr gutierrez b/c pt is just coming out of surgery about 1/2 hour ago.

## 2020-06-30 NOTE — TELEPHONE ENCOUNTER
----- Message from Omar Cardona sent at 6/30/2020  4:09 PM CDT -----  Type: Needs Medical Advice    Who Called:  Ildefonso Clemenssumit (Spouse)  Best Call Back Number: [phone]  Additional Information: Caller would like to cancel upcoming appointment. Please call to advise. Thanks!

## 2020-06-30 NOTE — TELEPHONE ENCOUNTER
Options as to whether not to start simvastatin were briefly reviewed with her via my chart message.  She will let me know if she wants to resume simvastatin.   Patient states has a tub with curtains and grab bars. Patient owns cane, RW and grab bars. Patient is right handed and drives. Patient states  primarily responsible for IADLs (cleaning, food shopping and laundry, pt states would order out a lot instead of cook)

## 2020-06-30 NOTE — TELEPHONE ENCOUNTER
Returned call to patient's  who stated that patient is being transferred to [facility] via ambulance and wanted to let Cristal and Dr. Bledsoe know that she will not be here for appt tomorrow. Informed Mr. Baker that I will let Cristal and Dr. Bledsoe know. Mr. Baker sounded exhausted but was very appreciative for the call back,    Routed message to Dr. Bledsoe Staff

## 2020-07-01 PROBLEM — K83.1 BILIARY OBSTRUCTION DUE TO CANCER: Status: ACTIVE | Noted: 2020-01-01

## 2020-07-01 PROBLEM — R79.89 ELEVATED LFTS: Status: ACTIVE | Noted: 2020-01-01

## 2020-07-01 PROBLEM — K83.09 CHOLANGITIS: Status: ACTIVE | Noted: 2020-01-01

## 2020-07-01 PROBLEM — C25.9 PANCREATIC ADENOCARCINOMA: Status: ACTIVE | Noted: 2020-01-01

## 2020-07-01 PROBLEM — C80.1 BILIARY OBSTRUCTION DUE TO CANCER: Status: ACTIVE | Noted: 2020-01-01

## 2020-07-01 NOTE — DISCHARGE INSTRUCTIONS
Colonoscopy     A camera attached to a flexible tube with a viewing lens is used to take video pictures.     Colonoscopy is a test to view the inside of your lower digestive tract (colon and rectum). Sometimes it can show the last part of the small intestine (ileum). During the test, small pieces of tissue may be removed for testing. This is called a biopsy. Small growths, such as polyps, may also be removed.   Why is colonoscopy done?  The test is done to help look for colon cancer. And it can help find the source of abdominal pain, bleeding, and changes in bowel habits. It may be needed once a year, depending on factors such as your:  · Age  · Health history  · Family health history  · Symptoms  · Results from any prior colonoscopy  Risks and possible complications  These include:  · Bleeding               · A puncture or tear in the colon   · Risks of anesthesia  · A cancer lesion not being seen  Getting ready   To prepare for the test:  · Talk with your healthcare provider about the risks of the test (see below). Also ask your healthcare provider about alternatives to the test.  · Tell your healthcare provider about any medicines you take. Also tell him or her about any health conditions you may have.  · Make sure your rectum and colon are empty for the test. Follow the diet and bowel prep instructions exactly. If you dont, the test may need to be rescheduled.  · Plan for a friend or family member to drive you home after the test.     Colonoscopy provides an inside view of the entire colon.     You may discuss the results with your doctor right away or at a future visit.  During the test   The test is usually done in the hospital on an outpatient basis. This means you go home the same day. The procedure takes about 30 minutes. During that time:  · You are given relaxing (sedating) medicine through an IV line. You may be drowsy, or fully asleep.  · The healthcare provider will first give you a physical exam to  check for anal and rectal problems.  · Then the anus is lubricated and the scope inserted.  · If you are awake, you may have a feeling similar to needing to have a bowel movement. You may also feel pressure as air is pumped into the colon. Its OK to pass gas during the procedure.  · Biopsy, polyp removal, or other treatments may be done during the test.  After the test   You may have gas right after the test. It can help to try to pass it to help prevent later bloating. Your healthcare provider may discuss the results with you right away. Or you may need to schedule a follow-up visit to talk about the results. After the test, you can go back to your normal eating and other activities. You may be tired from the sedation and need to rest for a few hours.  Date Last Reviewed: 11/1/2016 © 2000-2017 [address] [place] [address]. All rights reserved. This information is not intended as a substitute for professional medical care. Always follow your healthcare professional's instructions.        ERCP (Endoscopic Retrograde Cholangiopancreatography)     A balloon at the tip of a catheter opens above the stone. The stone is pulled out of the duct and leaves your body through stool.     ERCP stands for endoscopic retrograde cholangiopancreatography. This procedure is used to view the biliary and pancreatic ducts.  It is used to evaluate diseases that affect the ducts and to help locate and treat blockages that may be present.  How do I get ready for ERCP?  · Talk to your doctor about any health problems or allergies you have.  · Ask your doctor about the risks of ERCP. These include pancreatitis, infection, bleeding, and tearing the bowel.  · Be sure your doctor knows about all medicines you take. You may be told to stop taking some or all of them before the test. This includes:  · All prescription medicines  · Over-the-counter medicines that don't need a prescription  ·  Any street  drugs you may use   · Herbs, vitamins, kelp, seaweed, cough syrups, and other supplements  · You may be asked to take antibiotics ahead of time.  · Avoid blood-thinning medicines for 1 week before ERCP.  · Do not eat or drink for 8 to 12 hours before ERCP.  · Have someone ready to take you home.  What happens during the procedure?  · You may be given medicine through an IV to help you relax.  · Your throat is numbed.  · A thin tube (endoscope) is placed into your throat. It is advanced from the throat through the upper digestive tract, to the common bile duct opening. The endoscope lets the doctor see the common bile and pancreatic ducts on a video screen.  · A cut may be made where the common bile duct opens to the duodenum to make it easier to remove stones.  · As blockages are located and removed, X-rays are taken.  · Contrast dye is injected through a catheter to make the duct show up better on the X-rays.  · An imaging technique that uses X-rays to obtain real-time moving images of internal organs is called fluoroscopy. Fluoroscopy is used to watch and guide progress of the procedure.   · In some cases, a plastic tube (stent) is placed to hold the ducts open. This stent may be replaced or removed in 6 to 8 weeks. Or it may be left to fall out on its own and be passed in the stool.  What happens after ERCP?  Your doctor may discuss the test results right away or a return visit may be scheduled. You may go home the same day or spend the night in the hospital. Follow these tips:  · You can return to a normal routine the day after the ERCP.  · If a cut was made in the duct, avoid blood-thinning medicines such as aspirin for 5 to 7 days.  · Call your doctor right away if you have a fever or abdominal pain. These may be signs of an infection or torn bowel.   Date Last Reviewed: 6/19/2015  © 0412-8081 [employer]. [address] [place] [address], [place] [address]. All rights reserved. This information is not  intended as a substitute for professional medical care. Always follow your healthcare professional's instructions.

## 2020-07-01 NOTE — CONSULTS
[facility]  Gastroenterology  Consult Note    Patient Name: Laly Baker  MRN: [id number]  Admission Date: 6/30/2020  Hospital Length of Stay: 1 days  Code Status: Full Code   Attending Provider: Mari Gamboa MD   Consulting Provider: Omar Fuller MD  Primary Care Physician: Yayo Lindsey Jr, MD  Principal Problem:Malignant biliary obstruction    Inpatient consult to [facility]  Consult performed by: Omar Fuller MD  Consult ordered by: Guzman Gaytan MD        Subjective:     HPI:  Ms Baker is an 85 year old female with history of pancreatic cancer, HTN, HLD, who presented to OSH due to concern for cholangitis,  transferred due to failed cannulation.    She was recently diagnosed with a pancreatic mass (neck) on EUS (6/17) with positive biopsy for adenocarcinoma. Has not yet seen an oncologist. This was diagnosed in the setting of onset of stool and urine color change since 1/2020. She presented to OSH on 6/29 due to right sided abdominal pain. She reports at baseline she has some RUQ pain and back discomfort, but they acutely worsened and was associated with a 30 minute episode of 'teeth chattering'. Due to these symptoms her son recommended she present to the hospital. Labs on admission were notable for Tb 2.3, AST 1.8k, ALT 1.4k, , lipase 38, INR 1.3, WBC WNL. HDS.    Was seen by GI (Dr Barton) on 6/30 and underwent ERCP. He was able to cannulate the bile duct but with incomplete opacification of the CBD and unable to deeply cannulate therefore procedure was stopped and patient was [place] [facility] Currently she reports frustration with difficulty with admission overnight (multiple visiting nurses/phleb/PCA, beeping machines, etc) and minimal sleep. Denies any further episodes of rigor-like symptoms. Abdominal pain and back pain are back near baseline. Denies nausea/vomitus. Endorses appetite.     CTAP (6/29) with worsening biliary  dilatation (inra/extra hep dil), 1.3cm posterior right hepatic lobe mass, and thrombosis of splenic vein. LFTs since admission have been downtrending.      Past Medical History:   Diagnosis Date    Anticoagulant long-term use     Arthritis     Colon polyp     Hyperlipidemia     Hypertension     Nonexudative age-related macular degeneration, bilateral, intermediate dry stage 12/30/2019    Pancreatic pseudocyst     Pancreatitis 05/2017       Past Surgical History:   Procedure Laterality Date    CATARACT EXTRACTION W/  INTRAOCULAR LENS IMPLANT Right 08/08/2017    [facility]man    CATARACT EXTRACTION W/  INTRAOCULAR LENS IMPLANT Left 10/10/2017    kulman    CERVICAL CONIZATION   W/ LASER      COLONOSCOPY  01/12/2015    Dr. Barton; diverticulosis; ext int hemorrhoid; repeat in 5 years    COLONOSCOPY N/A 2/24/2020    Procedure: COLONOSCOPY;  Surgeon: Ankur Barton MD;  [facility];  Service: Endoscopy;  Laterality: N/A;    COLONOSCOPY W/ POLYPECTOMY      cone      ENDOSCOPIC ULTRASOUND OF UPPER GASTROINTESTINAL TRACT Left 6/17/2020    Procedure: ULTRASOUND, UPPER GI TRACT, ENDOSCOPIC;  Surgeon: Marcello Campbell MD;  Location: [facility];  Service: Endoscopy;  Laterality: Left;  Linear scope    epid ster  2012. 2014    ERCP N/A 6/30/2020    Procedure: ERCP (ENDOSCOPIC RETROGRADE CHOLANGIOPANCREATOGRAPHY);  Surgeon: Ankur Barton MD;  Location: [facility];  Service: Endoscopy;  Laterality: N/A;    ESOPHAGOGASTRODUODENOSCOPY N/A 6/17/2020    Procedure: EGD (ESOPHAGOGASTRODUODENOSCOPY);  Surgeon: Marcello Campbell MD;  Location: [facility];  Service: Endoscopy;  Laterality: N/A;    TONSILLECTOMY         Review of patient's allergies indicates:  No Known Allergies  Family History     Problem Relation (Age of Onset)    Cancer Brother (82)    Cataracts Mother, Father    Diabetes Mother (92)    Hypertension Mother, Father    Stroke Father        Tobacco Use    Smoking status: Never Smoker     Smokeless tobacco: Never Used   Substance and Sexual Activity    Alcohol use: No    Drug use: No    Sexual activity: Not Currently     Birth control/protection: Post-menopausal     Review of Systems   Constitutional: Positive for fever (rigors prior to admission). Negative for activity change, appetite change, chills, diaphoresis, fatigue and unexpected weight change.   HENT: Negative for sore throat and trouble swallowing.    Eyes: Negative for visual disturbance.   Respiratory: Negative for chest tightness and shortness of breath.    Cardiovascular: Negative for chest pain and leg swelling.   Gastrointestinal: Positive for abdominal pain. Negative for abdominal distention, anal bleeding, blood in stool, constipation, diarrhea, nausea and vomiting.   Genitourinary: Negative for dysuria and hematuria.   Musculoskeletal: Positive for back pain. Negative for arthralgias and myalgias.   Skin: Negative for rash.   Neurological: Negative for dizziness, weakness, light-headedness and headaches.   Psychiatric/Behavioral: Negative for agitation and confusion.     Objective:     Vital Signs (Most Recent):  Temp: 98 °F (36.7 °C) (07/01/20 0813)  Pulse: 69 (07/01/20 0813)  Resp: 16 (07/01/20 0813)  BP: 136/63 (07/01/20 0813)  SpO2: 98 % (07/01/20 0900) Vital Signs (24h Range):  Temp:  [96.8 °F (36 °C)-99.3 °F (37.4 °C)] 98 °F (36.7 °C)  Pulse:  [] 69  Resp:  [14-17] 16  SpO2:  [73 %-100 %] 98 %  BP: ()/(47-72) 136/63     Weight: 39.6 kg (87 lb 4.8 oz) (06/30/20 2210)  Body mass index is 14.99 kg/m².      Intake/Output Summary (Last 24 hours) at 7/1/2020 0920  Last data filed at 7/1/2020 0815  Gross per 24 hour   Intake 292.5 ml   Output 400 ml   Net -107.5 ml       Lines/Drains/Airways     Peripheral Intravenous Line                 Peripheral IV - Single Lumen 06/29/20 1145 20 G Right Forearm 1 day                Physical Exam  Vitals signs and nursing note reviewed.   Constitutional:       General: She is  not in acute distress.     Appearance: She is well-developed. She is not diaphoretic.      Comments: Frustrated with lack of sleep, no distress, comfortable appearing. Frail.   HENT:      Head: Normocephalic and atraumatic.      Mouth/Throat:      Pharynx: No oropharyngeal exudate.   Eyes:      General: Scleral icterus present.      Conjunctiva/sclera: Conjunctivae normal.   Cardiovascular:      Rate and Rhythm: Normal rate and regular rhythm.      Heart sounds: Normal heart sounds.   Pulmonary:      Effort: Pulmonary effort is normal. No respiratory distress.      Breath sounds: Normal breath sounds.   Abdominal:      General: Bowel sounds are normal. There is no distension.      Palpations: Abdomen is soft. There is no mass.      Tenderness: There is no abdominal tenderness. There is no guarding or rebound.      Comments: Soft, non-tender.   Musculoskeletal:         General: No tenderness.   Lymphadenopathy:      Cervical: No cervical adenopathy.   Skin:     General: Skin is warm.      Capillary Refill: Capillary refill takes less than 2 seconds.      Findings: No rash.   Neurological:      Mental Status: She is alert and oriented to person, place, and time.   Psychiatric:         Behavior: Behavior normal.         Thought Content: Thought content normal.         Judgment: Judgment normal.         Significant Labs:  All pertinent lab results from the last 24 hours have been reviewed.    Significant Imaging:  Imaging results within the past 24 hours have been reviewed.    Assessment/Plan:     * Malignant biliary obstruction  Ms Baker is an 85 year old female with history of pancreatic cancer, HTN, HLD, who presented to OSH due to concern for cholangitis,  transferred due to failed cannulation.    Recently diagnosed with pancreatic adenocarcinoma (6/2020) with imaging concerning for hepatic lesion (seen on CT, not biopsied), not yet seen oncology. Now presenting with 1 day history of worsening pain and rigor-like  symptoms concerning for cholangitis. LFTs were elevated on presentation but have been declining. Afebrile, HDS, no leukocytosis, on zosyn. Underwent ERCP (6/30) with cannulation but unable to cannulate deep into bile duct and incomplete cholangiogram therefore [place] [facility].    Plan  - NPO  - ERCP today  - continue empiric antibiotics. Check blood cultures as not appear cultures were sent.        Thank you for your consult. I will follow-up with patient. Please contact us if you have any additional questions.    Omar Fuller MD  Gastroenterology  [facility]

## 2020-07-01 NOTE — PLAN OF CARE
Pt transferred to unit from [facility]. MD at bedside when arrived on unit. Plan of care reviewed with pt. Verbalized understanding. Pt AAOx4. Pt is refusing tele monitoring. VS stable on RA. Pt is not complaining of pain. States there is a fullness in her abdomen. Skin is dry and intact. Pt ambulates to BR, adequate output. Pt remained NPO. No complaints of nausea. Frequent rounds made for pt safety. Bed low and locked, call light in reach. [facility]

## 2020-07-01 NOTE — TRANSFER OF CARE
"Anesthesia Transfer of Care Note    Patient: Laly Baker    Procedure(s) Performed: Procedure(s) (LRB):  ERCP (ENDOSCOPIC RETROGRADE CHOLANGIOPANCREATOGRAPHY) (N/A)    Patient location: PACU    Anesthesia Type: general    Transport from OR: Transported from OR on 6-10 L/min O2 by face mask with adequate spontaneous ventilation    Post pain: adequate analgesia    Post assessment: no apparent anesthetic complications    Post vital signs: stable    Level of consciousness: awake and alert    Nausea/Vomiting: no nausea/vomiting    Complications: none    Transfer of care protocol was followed      Last vitals:   Visit Vitals  /61   Pulse 89   Temp 36.6 °C (97.9 °F)   Resp (!) 21   Ht 5' 4" (1.626 m)   Wt 39.6 kg (87 lb 4.8 oz)   SpO2 100%   Breastfeeding No   BMI 14.99 kg/m²     "

## 2020-07-01 NOTE — CONSULTS
"Radiology Consult    Laly Baker is a 85 y.o. female with a history of adenocarcinoma of the pancreatic neck dx on 06/17/20 presented to the OSH with RUQ pain. ERCP was performed but unable to cannulate the bile duct therefore [place] was [place] [facility]CP was performed earlier today [place] [facility] .. "Despite attempts with doublewire, and following transpancreatic precut and needle-knife biliary precut-> unable to get deep access to the proximal bile duct. Distal CBD and GB were opacified". IR is consulted for percutaneous biliary tube placement.    Past Medical History:   Diagnosis Date    Anticoagulant long-term use     Arthritis     Colon polyp     Hyperlipidemia     Hypertension     Nonexudative age-related macular degeneration, bilateral, intermediate dry stage 12/30/2019    Pancreatic pseudocyst     Pancreatitis 05/2017     Past Surgical History:   Procedure Laterality Date    CATARACT EXTRACTION W/  INTRAOCULAR LENS IMPLANT Right 08/08/2017    k[facility]man    CATARACT EXTRACTION W/  INTRAOCULAR LENS IMPLANT Left 10/10/2017    kulman    CERVICAL CONIZATION   W/ LASER      COLONOSCOPY  01/12/2015    Dr. Barton; diverticulosis; ext int hemorrhoid; repeat in 5 years    COLONOSCOPY N/A 2/24/2020    Procedure: COLONOSCOPY;  Surgeon: Ankur Barton MD;  Location: [facility];  Service: Endoscopy;  Laterality: N/A;    COLONOSCOPY W/ POLYPECTOMY      cone      ENDOSCOPIC ULTRASOUND OF UPPER GASTROINTESTINAL TRACT Left 6/17/2020    Procedure: ULTRASOUND, UPPER GI TRACT, ENDOSCOPIC;  Surgeon: Marcello Campbell MD;  Location: [facility];  Service: Endoscopy;  Laterality: Left;  Linear scope    epid ster  2012. 2014    ERCP N/A 6/30/2020    Procedure: ERCP (ENDOSCOPIC RETROGRADE CHOLANGIOPANCREATOGRAPHY);  Surgeon: Ankur Barton MD;  Location: [facility];  Service: Endoscopy;  Laterality: N/A;    ESOPHAGOGASTRODUODENOSCOPY N/A 6/17/2020    Procedure: EGD (ESOPHAGOGASTRODUODENOSCOPY);  " Surgeon: Marcello Campbell MD;  Location: [facility];  Service: Endoscopy;  Laterality: N/A;    TONSILLECTOMY         Discussed with primary team, Dr. Bee MD.    Imaging reviewed with Radiology staff, Dr. Adam MD.     Procedure: percutaneous biliary drain placement    Scheduled Meds:    lipase-protease-amylase 12,000-38,000-60,000 units  4 capsule Oral TID WM    lisinopriL  5 mg Oral Daily    piperacillin-tazobactam (ZOSYN) IVPB  4.5 g Intravenous Q8H     Continuous Infusions:    sodium chloride 0.9%       PRN Meds:dextrose 50%, dextrose 50%, glucagon (human recombinant), glucose, glucose, ibuprofen, ondansetron, ondansetron, oxyCODONE, sodium chloride 0.9%    Allergies: Review of patient's allergies indicates:  No Known Allergies    Labs:  Recent Labs   Lab 06/30/20  2356   INR 1.2       Recent Labs   Lab 07/01/20  0459   WBC 8.88   HGB 9.5*   HCT 29.0*   MCV 91   *      Recent Labs   Lab 07/01/20  0459   *   *   K 3.5   CL 97   CO2 23   BUN 14   CREATININE 0.7   CALCIUM 8.3*   MG 1.9   *   *   ALBUMIN 2.6*   BILITOT 3.2*         Vitals (Most Recent):  Temp: 97.8 °F (36.6 °C) (07/01/20 1630)  Pulse: 73 (07/01/20 1630)  Resp: 19 (07/01/20 1630)  BP: (!) 156/74 (07/01/20 1630)  SpO2: 98 % (07/01/20 1630)    Plan:   1. NPO after midnight.  2. Hold any nonvital anticoagulants.  3. Imaging guided percutaneous biliary drain placement is scheduled for tomorrow.    Henny Gaytan MD  Radiology Department/ PGY-3  [facility]

## 2020-07-01 NOTE — HPI
""86 y/o hx of pancreatic cancer causing biliary obstruction and GI attempted ERCP and unable to cannulate biliary sphincter.  She is stable, no fevers, has tenderness and is on zosyn.    Post-procedure had afib, no chronic diagnosis this  spontaneously converted to sinus.    Came in with worsening abdominal pain and RUQ pain - repeat CT scan showed biliary obstruction and Dilated CBD. No encephalopathy"    Patient seen and examined on arrival to our facility. She remains afebrile and hemodynamically stable. Alert and Oriented. Conversational. She has mild abdominal pain without tenderness or guarding. Is not requesting pain meds. Denies fevers, chest pain, nausea, vomiting, decreased appetite, shortness of breath, cough, dysuria, or confusion. Patient continued on Zosyn and started on maintenance IVF's as she continues to be npo. [facility] consulted. She has been admitted to the care of medicine for further evaluation and management.  "

## 2020-07-01 NOTE — ANESTHESIA POSTPROCEDURE EVALUATION
Anesthesia Post Evaluation    Patient: Laly Baker    Procedure(s) Performed: Procedure(s) (LRB):  ERCP (ENDOSCOPIC RETROGRADE CHOLANGIOPANCREATOGRAPHY) (N/A)    Final Anesthesia Type: general    Patient location during evaluation: PACU  Patient participation: Yes- Able to Participate  Level of consciousness: awake and alert  Post-procedure vital signs: reviewed and stable  Pain management: adequate  Airway patency: patent    PONV status at discharge: No PONV  Anesthetic complications: no      Cardiovascular status: blood pressure returned to baseline  Respiratory status: unassisted  Hydration status: euvolemic  Follow-up not needed.          Vitals Value Taken Time   /74 07/01/20 1632   Temp 36.6 °C (97.8 °F) 07/01/20 1630   Pulse 80 07/01/20 1711   Resp 20 07/01/20 1633   SpO2 100 % 07/01/20 1711   Vitals shown include unvalidated device data.      Event Time   Out of Recovery 16:51:00         Pain/Puma Score: Puma Score: 9 (7/1/2020  4:15 PM)

## 2020-07-01 NOTE — ANESTHESIA PREPROCEDURE EVALUATION
07/01/2020  Laly Baker is a 85 y.o., female with history of pancreatic cancer, HTN, HLD, who presented to OSH due to concern for cholangitis,  transferred due to failed cannulation.    Pre-operative evaluation for Procedure(s) (LRB):  ERCP (ENDOSCOPIC RETROGRADE CHOLANGIOPANCREATOGRAPHY) (N/A)         Patient Active Problem List   Diagnosis    Essential hypertension    Hx of colonic polyps    Hyperlipidemia    Spinal stenosis    DDD (degenerative disc disease), lumbar    Spondylolisthesis    Lumbar stenosis    Spondylosis of lumbar region without myelopathy or radiculopathy    Secondary osteoarthritis of multiple sites    Senile nuclear sclerosis    CKD (chronic kidney disease), stage III    Nonexudative age-related macular degeneration, bilateral, intermediate dry stage    Posterior vitreous detachment, bilateral    Biliary obstruction    Hyponatremia    Advance care planning    Abdominal pain    Malignant biliary obstruction       Review of patient's allergies indicates:  No Known Allergies    No current facility-administered medications on file prior to encounter.      Current Outpatient Medications on File Prior to Encounter   Medication Sig Dispense Refill    lipase-protease-amylase 24,000-76,000-120,000 units (CREON) 24,000-76,000 -120,000 unit capsule Take 2 capsules by mouth 3 (three) times daily with meals. 180 capsule 11    lisinopriL (PRINIVIL,ZESTRIL) 5 MG tablet Take 1 tablet (5 mg total) by mouth once daily. 90 tablet 0    mv-mn/folic acid/calcium/vit K (WOMEN'S 50 PLUS MULTIVITAMIN ORAL) Take 1 tablet by mouth once daily.       piperacillin sodium/tazobactam (PIPERACILLIN-TAZOBACTAM 4.5G/100ML D5W IVPB, READY TO MIX,) Inject 100 mLs (4.5 g total) into the vein every 8 (eight) hours.      simvastatin (ZOCOR) 20 MG tablet Take 1 tablet (20 mg total) by mouth every  evening. 90 tablet 3       Past Surgical History:   Procedure Laterality Date    CATARACT EXTRACTION W/  INTRAOCULAR LENS IMPLANT Right 08/08/2017    kullman    CATARACT EXTRACTION W/  INTRAOCULAR LENS IMPLANT Left 10/10/2017    kulman    CERVICAL CONIZATION   W/ LASER      COLONOSCOPY  01/12/2015    Dr. Barton; diverticulosis; ext int hemorrhoid; repeat in 5 years    COLONOSCOPY N/A 2/24/2020    Procedure: COLONOSCOPY;  Surgeon: Ankur aBrton MD;  Location: [facility];  Service: Endoscopy;  Laterality: N/A;    COLONOSCOPY W/ POLYPECTOMY      cone      ENDOSCOPIC ULTRASOUND OF UPPER GASTROINTESTINAL TRACT Left 6/17/2020    Procedure: ULTRASOUND, UPPER GI TRACT, ENDOSCOPIC;  Surgeon: Marcello Campbell MD;  Location: [facility];  Service: Endoscopy;  Laterality: Left;  Linear scope    epid ster  2012. 2014    ERCP N/A 6/30/2020    Procedure: ERCP (ENDOSCOPIC RETROGRADE CHOLANGIOPANCREATOGRAPHY);  Surgeon: Ankur Barton MD;  Location: [facility];  Service: Endoscopy;  Laterality: N/A;    ESOPHAGOGASTRODUODENOSCOPY N/A 6/17/2020    Procedure: EGD (ESOPHAGOGASTRODUODENOSCOPY);  Surgeon: Marcello Campbell MD;  Location: [facility];  Service: Endoscopy;  Laterality: N/A;    TONSILLECTOMY         Social History     Socioeconomic History    Marital status:      Spouse name: Not on file    Number of children: Not on file    Years of education: Not on file    Highest education level: Not on file   Occupational History    Not on file   Social Needs    Financial resource strain: Not on file    Food insecurity     Worry: Not on file     Inability: Not on file    Transportation needs     Medical: Not on file     Non-medical: Not on file   Tobacco Use    Smoking status: Never Smoker    Smokeless tobacco: Never Used   Substance and Sexual Activity    Alcohol use: No    Drug use: No    Sexual activity: Not Currently     Birth control/protection: Post-menopausal   Lifestyle    Physical  activity     Days per week: Not on file     Minutes per session: Not on file    Stress: Not on file   Relationships    Social connections     Talks on phone: Not on file     Gets together: Not on file     Attends [employer] service: Not on file     Active member of club or organization: Not on file     Attends meetings of clubs or organizations: Not on file     Relationship status: Not on file   Other Topics Concern    Not on file   Social History Narrative    Not on file         CBC:   Recent Labs     06/30/20  0655 07/01/20  0459   WBC 10.13 8.88   RBC 3.14* 3.19*   HGB 9.2* 9.5*   HCT 27.4* 29.0*   * 141*   MCV 87 91   MCH 29.3 29.8   MCHC 33.6 32.8       CMP:   Recent Labs     06/30/20  1711 07/01/20  0459   * 129*   K 3.6 3.5   CL 97 97   CO2 28 23   BUN 19* 14   CREATININE 0.72 0.7   * 112*   MG  --  1.9   PHOS  --  2.3*   CALCIUM 8.5 8.3*   ALBUMIN 3.3* 2.6*   PROT 5.9* 5.2*   ALKPHOS 392* 401*   * 469*   * 203*   BILITOT 2.4* 3.2*       INR  Recent Labs     06/29/20  1921 06/30/20  0655 06/30/20  2356   INR 1.3 1.4 1.2   APTT 33.7  --   --              2D Echo:  No results found for this or any previous visit.]      Anesthesia Evaluation    I have reviewed the Patient Summary Reports.    I have reviewed the Nursing Notes.    I have reviewed the Medications.     Review of Systems  Anesthesia Hx:  No problems with previous Anesthesia    Hematology/Oncology:  Hematology Normal   Oncology Normal     EENT/Dental:EENT/Dental Normal   Cardiovascular:   Hypertension    Pulmonary:  Pulmonary Normal    Renal/:   Chronic Renal Disease, CRI    Hepatic/GI:  Hepatic/GI Normal    Musculoskeletal:   Arthritis     Neurological:  Neurology Normal    Endocrine:  Endocrine Normal    Dermatological:  Skin Normal    Psych:  Psychiatric Normal           Physical Exam  General:  Well nourished    Airway/Jaw/Neck:  Airway Findings: Mouth Opening: Normal Tongue: Normal  General Airway  Assessment: Adult  Mallampati: II  Jaw/Neck Findings:  Neck ROM: Normal ROM     Eyes/Ears/Nose:  Eyes/Ears/Nose Findings:    Dental:  Dental Findings: In tact   Chest/Lungs:  Chest/Lungs Findings: Clear to auscultation, Normal Respiratory Rate     Heart/Vascular:  Heart Findings: Rate: Normal  Rhythm: Regular Rhythm  Sounds: Normal  Heart Murmur  Vascular Findings:        Mental Status:  Mental Status Findings:  Cooperative, Alert and Oriented         Anesthesia Plan  Type of Anesthesia, risks & benefits discussed:  Anesthesia Type:  general, MAC  Patient's Preference: General/MAC  Intra-op Monitoring Plan: standard ASA monitors  Intra-op Monitoring Plan Comments:   Post Op Pain Control Plan:   Post Op Pain Control Plan Comments: IV meds as needed  Induction:   IV  Beta Blocker:  Patient is not currently on a Beta-Blocker (No further documentation required).       Informed Consent: Patient understands risks and agrees with Anesthesia plan.  Questions answered. Anesthesia consent signed with patient.  ASA Score: 3     Day of Surgery Review of History & Physical:    H&P update referred to the provider.     Anesthesia Plan Notes: Discussed anesthetic options, pt understands and agrees with plan        Ready For Surgery From Anesthesia Perspective.

## 2020-07-01 NOTE — HPI
Ms Baker is an 85 year old female with history of pancreatic cancer, HTN, HLD, who presented to OSH due to concern for cholangitis,  transferred due to failed cannulation.    She was recently diagnosed with a pancreatic mass (neck) on EUS (6/17) with positive biopsy for adenocarcinoma. Has not yet seen an oncologist. This was diagnosed in the setting of onset of stool and urine color change since 1/2020. She presented to OSH on 6/29 due to right sided abdominal pain. She reports at baseline she has some RUQ pain and back discomfort, but they acutely worsened and was associated with a 30 minute episode of 'teeth chattering'. Due to these symptoms her son recommended she present to the hospital. Labs on admission were notable for Tb 2.3, AST 1.8k, ALT 1.4k, , lipase 38, INR 1.3, WBC WNL. HDS.    Was seen by GI (Dr Barton) on 6/30 and underwent ERCP. He was able to cannulate the bile duct but with incomplete opacification of the CBD and unable to deeply cannulate therefore procedure was stopped and [facility]. Currently she reports frustration with difficulty with admission overnight (multiple visiting nurses/phleb/PCA, beeping machines, etc) and minimal sleep. Denies any further episodes of rigor-like symptoms. Abdominal pain and back pain are back near baseline. Denies nausea/vomitus. Endorses appetite.     CTAP (6/29) with worsening biliary dilatation (inra/extra hep dil), 1.3cm posterior right hepatic lobe mass, and thrombosis of splenic vein. LFTs since admission have been downtrending.

## 2020-07-01 NOTE — ASSESSMENT & PLAN NOTE
- Patient underwent ERCP by Dr. Marie, unfortunately unable to cannulate CBD in attempt to place stent.  Patient has biliary extraction likely due to pancreatic mass.  Dr. Marie discussed with [facility] and patient has been transferred for advanced endoscopy.  - AES consulted  ;  Appreciate recs  - continue zosyn  - npo  - daily labs  - resume home Creon once diet is advanced

## 2020-07-01 NOTE — SUBJECTIVE & OBJECTIVE
Past Medical History:   Diagnosis Date    Anticoagulant long-term use     Arthritis     Colon polyp     Hyperlipidemia     Hypertension     Nonexudative age-related macular degeneration, bilateral, intermediate dry stage 2019    Pancreatic pseudocyst     Pancreatitis 2017       Past Surgical History:   Procedure Laterality Date    CATARACT EXTRACTION W/  INTRAOCULAR LENS IMPLANT Right 2017    chon    CATARACT EXTRACTION W/  INTRAOCULAR LENS IMPLANT Left 10/10/2017    kulman    CERVICAL CONIZATION   W/ LASER      COLONOSCOPY  2015    Dr. Barton; diverticulosis; ext int hemorrhoid; repeat in 5 years    COLONOSCOPY N/A 2020    Procedure: COLONOSCOPY;  Surgeon: Ankur Barton MD;  Location: [facility];  Service: Endoscopy;  Laterality: N/A;    COLONOSCOPY W/ POLYPECTOMY      cone      ENDOSCOPIC ULTRASOUND OF UPPER GASTROINTESTINAL TRACT Left 2020    Procedure: ULTRASOUND, UPPER GI TRACT, ENDOSCOPIC;  Surgeon: Marcello Campbell MD;  Location: [facility];  Service: Endoscopy;  Laterality: Left;  Linear scope    epid ster  2014    ESOPHAGOGASTRODUODENOSCOPY N/A 2020    Procedure: EGD (ESOPHAGOGASTRODUODENOSCOPY);  Surgeon: Marcello Campbell MD;  Location: [facility];  Service: Endoscopy;  Laterality: N/A;    TONSILLECTOMY         Review of patient's allergies indicates:  No Known Allergies    Current Facility-Administered Medications on File Prior to Encounter   Medication    [] 0.9%  NaCl infusion    [COMPLETED] glucagon (human recombinant) 1 mg injection    [COMPLETED] iohexoL (OMNIPAQUE 300) injection 12 mL    [DISCONTINUED] fentaNYL injection    [DISCONTINUED] glucagon (human recombinant) injection    [DISCONTINUED] indomethacin 50 mg suppository 100 mg    [DISCONTINUED] lidocaine (cardiac) injection    [DISCONTINUED] lipase-protease-amylase 12,000-38,000-60,000 units per capsule 4 capsule    [DISCONTINUED]  piperacillin-tazobactam 4.5 g in dextrose 5 % 100 mL IVPB (ready to mix system)    [DISCONTINUED] propofol (DIPRIVAN) 10 mg/mL infusion    [DISCONTINUED] rocuronium injection    [DISCONTINUED] sodium chloride 0.9% flush 10 mL     Current Outpatient Medications on File Prior to Encounter   Medication Sig    lipase-protease-amylase 24,000-76,000-120,000 units (CREON) 24,000-76,000 -120,000 unit capsule Take 2 capsules by mouth 3 (three) times daily with meals.    lisinopriL (PRINIVIL,ZESTRIL) 5 MG tablet Take 1 tablet (5 mg total) by mouth once daily.    mv-mn/folic acid/calcium/vit K (WOMEN'S 50 PLUS MULTIVITAMIN ORAL) Take 1 tablet by mouth once daily.     piperacillin sodium/tazobactam (PIPERACILLIN-TAZOBACTAM 4.5G/100ML D5W IVPB, READY TO MIX,) Inject 100 mLs (4.5 g total) into the vein every 8 (eight) hours.    simvastatin (ZOCOR) 20 MG tablet Take 1 tablet (20 mg total) by mouth every evening.     Family History     Problem Relation (Age of Onset)    Cancer Brother (82)    Cataracts Mother, Father    Diabetes Mother (92)    Hypertension Mother, Father    Stroke Father        Tobacco Use    Smoking status: Never Smoker    Smokeless tobacco: Never Used   Substance and Sexual Activity    Alcohol use: No    Drug use: No    Sexual activity: Not Currently     Birth control/protection: Post-menopausal     Review of Systems   Constitutional: Positive for chills and unexpected weight change. Negative for appetite change, diaphoresis and fever.   HENT: Negative for sore throat and trouble swallowing.    Eyes: Negative for photophobia and visual disturbance.   Respiratory: Negative for cough, shortness of breath and wheezing.    Cardiovascular: Negative for chest pain, palpitations and leg swelling.   Gastrointestinal: Positive for abdominal pain (mild). Negative for abdominal distention, diarrhea, nausea and vomiting.   Genitourinary: Negative for dysuria and hematuria.   Musculoskeletal: Negative for  arthralgias, neck pain and neck stiffness.   Skin: Negative for rash and wound.   Neurological: Negative for syncope, speech difficulty, weakness and headaches.   Psychiatric/Behavioral: Negative for confusion and decreased concentration.     Objective:     Vital Signs (Most Recent):  Temp: 96.8 °F (36 °C) (07/01/20 0020)  Pulse: 75 (07/01/20 0020)  Resp: 16 (07/01/20 0020)  BP: (!) 142/63 (07/01/20 0020)  SpO2: 100 % (07/01/20 0020) Vital Signs (24h Range):  Temp:  [96.8 °F (36 °C)-99.6 °F (37.6 °C)] 96.8 °F (36 °C)  Pulse:  [] 75  Resp:  [14-17] 16  SpO2:  [73 %-100 %] 100 %  BP: ()/(47-72) 142/63     Weight: 39.6 kg (87 lb 4.8 oz)  Body mass index is 14.99 kg/m².    Physical Exam  Constitutional:       General: She is not in acute distress.     Appearance: She is not toxic-appearing.      Comments: underweight   HENT:      Head: Normocephalic and atraumatic.      Nose: Nose normal.   Eyes:      General: No scleral icterus.     Extraocular Movements: Extraocular movements intact.      Pupils: Pupils are equal, round, and reactive to light.   Neck:      Musculoskeletal: Normal range of motion and neck supple. No neck rigidity.   Cardiovascular:      Rate and Rhythm: Normal rate and regular rhythm.      Heart sounds: No murmur.   Pulmonary:      Effort: Pulmonary effort is normal. No respiratory distress.      Breath sounds: No wheezing or rales.   Abdominal:      General: Abdomen is flat. There is no distension.      Palpations: Abdomen is soft.      Tenderness: There is no abdominal tenderness. There is no guarding.   Musculoskeletal: Normal range of motion.         General: No swelling.      Right lower leg: No edema.      Left lower leg: No edema.   Skin:     General: Skin is warm.      Capillary Refill: Capillary refill takes less than 2 seconds.   Neurological:      General: No focal deficit present.      Mental Status: She is alert and oriented to person, place, and time.      Cranial Nerves: No  cranial nerve deficit.   Psychiatric:         Mood and Affect: Mood normal.         Behavior: Behavior normal.           CRANIAL NERVES     CN III, IV, VI   Pupils are equal, round, and reactive to light.       Significant Labs:   CBC:   Recent Labs   Lab 06/29/20  1145 06/30/20  0655   WBC 10.13 10.13   HGB 10.8* 9.2*   HCT 32.4* 27.4*    141*     CMP:   Recent Labs   Lab 06/29/20  1145 06/30/20  0655 06/30/20  1711   * 128* 130*   K 4.3 3.7 3.6   CL 90* 99 97   CO2 26 25 28   * 140* 124*   BUN 25* 18 19*   CREATININE 0.87 0.71 0.72   CALCIUM 9.2 8.4 8.5   PROT 6.7 5.5* 5.9*   ALBUMIN 4.1 3.0* 3.3*   BILITOT 2.3* 2.0* 2.4*   ALKPHOS 542* 385* 392*   AST 1,856* 629* 435*   ALT 1,412* 769* 666*   ANIONGAP 10 4* 5*   EGFRNONAA >60 >60 >60     All pertinent labs within the past 24 hours have been reviewed.    Significant Imaging: I have reviewed all pertinent imaging results/findings within the past 24 hours.

## 2020-07-01 NOTE — H&P
"Inpatient Radiology Pre-procedure Note    History of Present Illness:  Laly Baker is a 85 y.o. female with a history of adenocarcinoma of the pancreatic neck dx on 06/17/20 presented to the OSH with RUQ pain. ERCP was performed but unable to cannulate the bile duct therefore patient was transferred to [facility]. ERCP was performed earlier today at [facility] .. "Despite attempts with doublewire, and following transpancreatic precut and needle-knife biliary precut-> unable to get deep access to the proximal bile duct. Distal CBD and GB were opacified". Plan for biliary drain placement scheduled for tomorrow.    Admission H&P reviewed.  Past Medical History:   Diagnosis Date    Anticoagulant long-term use     Arthritis     Colon polyp     Hyperlipidemia     Hypertension     Nonexudative age-related macular degeneration, bilateral, intermediate dry stage 12/30/2019    Pancreatic pseudocyst     Pancreatitis 05/2017     Past Surgical History:   Procedure Laterality Date    CATARACT EXTRACTION W/  INTRAOCULAR LENS IMPLANT Right 08/08/2017    kullman    CATARACT EXTRACTION W/  INTRAOCULAR LENS IMPLANT Left 10/10/2017    kulman    CERVICAL CONIZATION   W/ LASER      COLONOSCOPY  01/12/2015    Dr. Barton; diverticulosis; ext int hemorrhoid; repeat in 5 years    COLONOSCOPY N/A 2/24/2020    Procedure: COLONOSCOPY;  Surgeon: Ankur Barton MD;  Location: [facility];  Service: Endoscopy;  Laterality: N/A;    COLONOSCOPY W/ POLYPECTOMY      cone      ENDOSCOPIC ULTRASOUND OF UPPER GASTROINTESTINAL TRACT Left 6/17/2020    Procedure: ULTRASOUND, UPPER GI TRACT, ENDOSCOPIC;  Surgeon: Marcello Campbell MD;  Location: [facility];  Service: Endoscopy;  Laterality: Left;  Linear scope    epid ster  2012. 2014    ERCP N/A 6/30/2020    Procedure: ERCP (ENDOSCOPIC RETROGRADE CHOLANGIOPANCREATOGRAPHY);  Surgeon: Ankur Barton MD;  Location: [facility];  Service: Endoscopy;  Laterality: N/A;    " ESOPHAGOGASTRODUODENOSCOPY N/A 6/17/2020    Procedure: EGD (ESOPHAGOGASTRODUODENOSCOPY);  Surgeon: Marcello Campbell MD;  Location: [facility];  Service: Endoscopy;  Laterality: N/A;    TONSILLECTOMY         Review of Systems:   As documented in primary team H&P    Home Meds:   Prior to Admission medications    Medication Sig Start Date End Date Taking? Authorizing Provider   lipase-protease-amylase 24,000-76,000-120,000 units (CREON) 24,000-76,000 -120,000 unit capsule Take 2 capsules by mouth 3 (three) times daily with meals. 6/17/20 6/17/21  Marcello Campbell MD   lisinopriL (PRINIVIL,ZESTRIL) 5 MG tablet Take 1 tablet (5 mg total) by mouth once daily. 4/29/20   Yayo Lindsey Jr., MD   mv-mn/folic acid/calcium/vit K (WOMEN'S 50 PLUS MULTIVITAMIN ORAL) Take 1 tablet by mouth once daily.     Historical Provider, MD   piperacillin sodium/tazobactam (PIPERACILLIN-TAZOBACTAM 4.5G/100ML D5W IVPB, READY TO MIX,) Inject 100 mLs (4.5 g total) into the vein every 8 (eight) hours. 6/30/20   Harmeet Adams MD   simvastatin (ZOCOR) 20 MG tablet Take 1 tablet (20 mg total) by mouth every evening. 11/6/19 11/5/20  Yayo Lindsey Jr., MD     Scheduled Meds:    lipase-protease-amylase 12,000-38,000-60,000 units  4 capsule Oral TID WM    lisinopriL  5 mg Oral Daily    piperacillin-tazobactam (ZOSYN) IVPB  4.5 g Intravenous Q8H     Continuous Infusions:    sodium chloride 0.9%       PRN Meds:dextrose 50%, dextrose 50%, glucagon (human recombinant), glucose, glucose, ibuprofen, ondansetron, oxyCODONE, sodium chloride 0.9%  Anticoagulants/Antiplatelets: no anticoagulation    Allergies: Review of patient's allergies indicates:  No Known Allergies  Sedation Hx: have not been any systemic reactions    Labs:  Recent Labs   Lab 06/30/20  2356   INR 1.2       Recent Labs   Lab 07/01/20  0459   WBC 8.88   HGB 9.5*   HCT 29.0*   MCV 91   *      Recent Labs   Lab 07/01/20  0459   *   *   K 3.5   CL 97    CO2 23   BUN 14   CREATININE 0.7   CALCIUM 8.3*   MG 1.9   *   *   ALBUMIN 2.6*   BILITOT 3.2*         Vitals:  Temp: 97.8 °F (36.6 °C) (07/01/20 1630)  Pulse: 73 (07/01/20 1630)  Resp: 19 (07/01/20 1630)  BP: (!) 156/74 (07/01/20 1630)  SpO2: 98 % (07/01/20 1630)     Physical Exam:  ASA: per anesthesia  Mallampati: per anesthesia    General: no acute distress  Mental Status: alert and oriented to person, place and time  HEENT: normocephalic, atraumatic  Chest: unlabored breathing  Heart: regular heart rate  Abdomen: nondistended  Extremity: moves all extremities    Plan: imaging guided biliary drain placement scheduled for tomorrow.  Sedation Plan: per anesthesia    Henny Gaytan MD  Radiology Department/ PGY-3  [facility]

## 2020-07-01 NOTE — PLAN OF CARE
[facility] Transfer Acceptance Note    FOR [facility] ANGY HUMPHRIES -  Please call extension [phone] (if nobody answers, this will flip to a beeper, so put in your call back number) upon patient arrival to floor for Hospital Medicine admit team assignment and for additional admit orders for the patient.  Do not page the attending, staff physician associate with the patient on arrival (may not be in-house at the time of arrival).  Rather, always call [phone] to reach the triage physician for orders and team assignment.     Transferring Facility/Hospital: [facility]    Referring Provider/Specialty giving report: Dr. Harmeet Adams - [facility] medicine    Accepting Physician for admission to hospital: Carlos Sherman MD    Target Destination & Service: [facility] Angy abida _ [facility] medicine     Date of acceptance:  6/30/2020   8:14 PM    Patients name: Laly Baker     Allergies:Review of patient's allergies indicates:  No Known Allergies     Reason for transfer:  Biliary GI/AES services      Overview/ Report from Physician/Mid-Level Provider:    HPI:    86 y/o hx of pancreatic cancer causing biliary obstruction and GI attempted ERCP and unable to cannulate biliary sphincter.  She is stable, no fevers, has tenderness and is on zosyn.    Post-procedure had afib, no chronic diagnosis this  spontaneously converted to sinus.     Came in with worsening abdominal pain and RUQ pain - repeat CT scan showed biliary obstruction and Dilated CBD. No encephalopathy    VS: Temp:  [97.4 °F (36.3 °C)-99.3 °F (37.4 °C)]   Pulse:  []   Resp:  [14-17]   BP: ()/(47-72)   SpO2:  [73 %-100 %]     Labs:  Lab Results   Component Value Date    [id number] Negative 06/29/2020     CMP  Sodium   Date Value Ref Range Status   06/30/2020 130 (L) 136 - 145 mmol/L Final     Potassium   Date Value Ref Range Status   06/30/2020 3.6 3.5 - 5.1 mmol/L Final     Chloride   Date Value Ref Range Status   06/30/2020 97 95 -  110 mmol/L Final     CO2   Date Value Ref Range Status   06/30/2020 28 22 - 31 mmol/L Final     Glucose   Date Value Ref Range Status   06/30/2020 124 (H) 70 - 110 mg/dL Final     Comment:     The ADA recommends the following guidelines for fasting glucose:  Normal:       less than 100 mg/dL  Prediabetes:  100 mg/dL to 125 mg/dL  Diabetes:     126 mg/dL or higher       BUN, Bld   Date Value Ref Range Status   06/30/2020 19 (H) 7 - 18 mg/dL Final     Creatinine   Date Value Ref Range Status   06/30/2020 0.72 0.50 - 1.40 mg/dL Final     Calcium   Date Value Ref Range Status   06/30/2020 8.5 8.4 - 10.2 mg/dL Final     Total Protein   Date Value Ref Range Status   06/30/2020 5.9 (L) 6.0 - 8.4 g/dL Final     Albumin   Date Value Ref Range Status   06/30/2020 3.3 (L) 3.5 - 5.2 g/dL Final     Total Bilirubin   Date Value Ref Range Status   06/30/2020 2.4 (H) 0.2 - 1.3 mg/dL Final     Alkaline Phosphatase   Date Value Ref Range Status   06/30/2020 392 (H) 38 - 145 U/L Final     AST   Date Value Ref Range Status   06/30/2020 435 (H) 14 - 36 U/L Final     ALT   Date Value Ref Range Status   06/30/2020 666 (H) 0 - 35 U/L Final     Anion Gap   Date Value Ref Range Status   06/30/2020 5 (L) 8 - 16 mmol/L Final     eGFR if    Date Value Ref Range Status   06/30/2020 >60 >60 mL/min/1.73 m^2 Final     eGFR if non    Date Value Ref Range Status   06/30/2020 >60 >60 mL/min/1.73 m^2 Final     Comment:     Calculation used to obtain the estimated glomerular filtration  rate (eGFR) is the CKD-EPI equation.            Diagnostic Tests/Radiographs:   ERCP 6/30   Impression:             - A biliary tract obstruction was found in the common bile duct. Dilated CBD. Incomplete/partial                      cholangiogram. Failed deep cannulation.     Recommendation:   -[facility].   - Continue IV antibiotic.   - Transfer to [facility] for ERCP/stent  placement.     To Do List upon arrival:    1. Keep NPO after midnight, clear liquids otherwise    2. Start supportive IV fluids     3. Daily CBC_CMP_Mg_Phos_pt/inr x1_pTT x 1    4. Provide PRN IV anti-emetic and pain medications.     5. Consult AES in the morning.             Carlos Sherman M.D.  Department of Hospital Medicine  Physician in Lead of Transfers ()  [facility] - [phone]   Ext: [phone]  ([phone])  Pager: [phone]

## 2020-07-01 NOTE — H&P
"[facility] Hospital Medicine  History & Physical    Patient Name: Laly Baker  MRN: [id number]  Admission Date: 6/30/2020  Attending Physician: Mari Gamboa MD   Primary Care Provider: Yayo Lindsey Jr, MD    [facility] Medicine Team: [facility] X Guzman Gaytan MD     Patient information was obtained from patient, past medical records and ER records.     Subjective:     Principal Problem:Malignant biliary obstruction    Chief Complaint: No chief complaint on file.       HPI: "86 y/o hx of pancreatic cancer causing biliary obstruction and GI attempted ERCP and unable to cannulate biliary sphincter.  She is stable, no fevers, has tenderness and is on zosyn.    Post-procedure had afib, no chronic diagnosis this  spontaneously converted to sinus.    Came in with worsening abdominal pain and RUQ pain - repeat CT scan showed biliary obstruction and Dilated CBD. No encephalopathy"    Patient seen and examined on arrival to our facility. She remains afebrile and hemodynamically stable. Alert and Oriented. Conversational. She has mild abdominal pain without tenderness or guarding. Is not requesting pain meds. Denies fevers, chest pain, nausea, vomiting, decreased appetite, shortness of breath, cough, dysuria, or confusion. Patient continued on Zosyn and started on maintenance IVF's as she continues to be npo. AES consulted. She has been admitted to the care of medicine for further evaluation and management.    Past Medical History:   Diagnosis Date    Anticoagulant long-term use     Arthritis     Colon polyp     Hyperlipidemia     Hypertension     Nonexudative age-related macular degeneration, bilateral, intermediate dry stage 12/30/2019    Pancreatic pseudocyst     Pancreatitis 05/2017       Past Surgical History:   Procedure Laterality Date    CATARACT EXTRACTION W/  INTRAOCULAR LENS IMPLANT Right 08/08/2017    [facility]    CATARACT EXTRACTION W/  INTRAOCULAR LENS IMPLANT Left " 10/10/2017    trina    CERVICAL CONIZATION   W/ LASER      COLONOSCOPY  2015    Dr. Barton; diverticulosis; ext int hemorrhoid; repeat in 5 years    COLONOSCOPY N/A 2020    Procedure: COLONOSCOPY;  Surgeon: Ankur Barton MD;  Location[facility];  Service: Endoscopy;  Laterality: N/A;    COLONOSCOPY W/ POLYPECTOMY      cone      ENDOSCOPIC ULTRASOUND OF UPPER GASTROINTESTINAL TRACT Left 2020    Procedure: ULTRASOUND, UPPER GI TRACT, ENDOSCOPIC;  Surgeon: Marcello Campbell MD;  Location: [facility];  Service: Endoscopy;  Laterality: Left;  Linear scope    epid ster  2014    ESOPHAGOGASTRODUODENOSCOPY N/A 2020    Procedure: EGD (ESOPHAGOGASTRODUODENOSCOPY);  Surgeon: Marcello Campbell MD;  Location: [facility];  Service: Endoscopy;  Laterality: N/A;    TONSILLECTOMY         Review of patient's allergies indicates:  No Known Allergies    Current Facility-Administered Medications on File Prior to Encounter   Medication    [] 0.9%  NaCl infusion    [COMPLETED] glucagon (human recombinant) 1 mg injection    [COMPLETED] iohexoL (OMNIPAQUE 300) injection 12 mL    [DISCONTINUED] fentaNYL injection    [DISCONTINUED] glucagon (human recombinant) injection    [DISCONTINUED] indomethacin 50 mg suppository 100 mg    [DISCONTINUED] lidocaine (cardiac) injection    [DISCONTINUED] lipase-protease-amylase 12,000-38,000-60,000 units per capsule 4 capsule    [DISCONTINUED] piperacillin-tazobactam 4.5 g in dextrose 5 % 100 mL IVPB (ready to mix system)    [DISCONTINUED] propofol (DIPRIVAN) 10 mg/mL infusion    [DISCONTINUED] rocuronium injection    [DISCONTINUED] sodium chloride 0.9% flush 10 mL     Current Outpatient Medications on File Prior to Encounter   Medication Sig    lipase-protease-amylase 24,000-76,000-120,000 units (CREON) 24,000-76,000 -120,000 unit capsule Take 2 capsules by mouth 3 (three) times daily with meals.    lisinopriL (PRINIVIL,ZESTRIL) 5 MG  tablet Take 1 tablet (5 mg total) by mouth once daily.    mv-mn/folic acid/calcium/vit K (WOMEN'S 50 PLUS MULTIVITAMIN ORAL) Take 1 tablet by mouth once daily.     piperacillin sodium/tazobactam (PIPERACILLIN-TAZOBACTAM 4.5G/100ML D5W IVPB, READY TO MIX,) Inject 100 mLs (4.5 g total) into the vein every 8 (eight) hours.    simvastatin (ZOCOR) 20 MG tablet Take 1 tablet (20 mg total) by mouth every evening.     Family History     Problem Relation (Age of Onset)    Cancer Brother (82)    Cataracts Mother, Father    Diabetes Mother (92)    Hypertension Mother, Father    Stroke Father        Tobacco Use    Smoking status: Never Smoker    Smokeless tobacco: Never Used   Substance and Sexual Activity    Alcohol use: No    Drug use: No    Sexual activity: Not Currently     Birth control/protection: Post-menopausal     Review of Systems   Constitutional: Positive for chills and unexpected weight change. Negative for appetite change, diaphoresis and fever.   HENT: Negative for sore throat and trouble swallowing.    Eyes: Negative for photophobia and visual disturbance.   Respiratory: Negative for cough, shortness of breath and wheezing.    Cardiovascular: Negative for chest pain, palpitations and leg swelling.   Gastrointestinal: Positive for abdominal pain (mild). Negative for abdominal distention, diarrhea, nausea and vomiting.   Genitourinary: Negative for dysuria and hematuria.   Musculoskeletal: Negative for arthralgias, neck pain and neck stiffness.   Skin: Negative for rash and wound.   Neurological: Negative for syncope, speech difficulty, weakness and headaches.   Psychiatric/Behavioral: Negative for confusion and decreased concentration.     Objective:     Vital Signs (Most Recent):  Temp: 96.8 °F (36 °C) (07/01/20 0020)  Pulse: 75 (07/01/20 0020)  Resp: 16 (07/01/20 0020)  BP: (!) 142/63 (07/01/20 0020)  SpO2: 100 % (07/01/20 0020) Vital Signs (24h Range):  Temp:  [96.8 °F (36 °C)-99.6 °F (37.6 °C)] 96.8  °F (36 °C)  Pulse:  [] 75  Resp:  [14-17] 16  SpO2:  [73 %-100 %] 100 %  BP: ()/(47-72) 142/63     Weight: 39.6 kg (87 lb 4.8 oz)  Body mass index is 14.99 kg/m².    Physical Exam  Constitutional:       General: She is not in acute distress.     Appearance: She is not toxic-appearing.      Comments: underweight   HENT:      Head: Normocephalic and atraumatic.      Nose: Nose normal.   Eyes:      General: No scleral icterus.     Extraocular Movements: Extraocular movements intact.      Pupils: Pupils are equal, round, and reactive to light.   Neck:      Musculoskeletal: Normal range of motion and neck supple. No neck rigidity.   Cardiovascular:      Rate and Rhythm: Normal rate and regular rhythm.      Heart sounds: No murmur.   Pulmonary:      Effort: Pulmonary effort is normal. No respiratory distress.      Breath sounds: No wheezing or rales.   Abdominal:      General: Abdomen is flat. There is no distension.      Palpations: Abdomen is soft.      Tenderness: There is no abdominal tenderness. There is no guarding.   Musculoskeletal: Normal range of motion.         General: No swelling.      Right lower leg: No edema.      Left lower leg: No edema.   Skin:     General: Skin is warm.      Capillary Refill: Capillary refill takes less than 2 seconds.   Neurological:      General: No focal deficit present.      Mental Status: She is alert and oriented to person, place, and time.      Cranial Nerves: No cranial nerve deficit.   Psychiatric:         Mood and Affect: Mood normal.         Behavior: Behavior normal.           CRANIAL NERVES     CN III, IV, VI   Pupils are equal, round, and reactive to light.       Significant Labs:   CBC:   Recent Labs   Lab 06/29/20  1145 06/30/20  0655   WBC 10.13 10.13   HGB 10.8* 9.2*   HCT 32.4* 27.4*    141*     CMP:   Recent Labs   Lab 06/29/20  1145 06/30/20  0655 06/30/20  1711   * 128* 130*   K 4.3 3.7 3.6   CL 90* 99 97   CO2 26 25 28   * 140*  124*   BUN 25* 18 19*   CREATININE 0.87 0.71 0.72   CALCIUM 9.2 8.4 8.5   PROT 6.7 5.5* 5.9*   ALBUMIN 4.1 3.0* 3.3*   BILITOT 2.3* 2.0* 2.4*   ALKPHOS 542* 385* 392*   AST 1,856* 629* 435*   ALT 1,412* 769* 666*   ANIONGAP 10 4* 5*   EGFRNONAA >60 >60 >60     All pertinent labs within the past 24 hours have been reviewed.    Significant Imaging: I have reviewed all pertinent imaging results/findings within the past 24 hours.    Assessment/Plan:     * Malignant biliary obstruction  - Patient underwent ERCP by Dr. Marie, unfortunately unable to cannulate CBD in attempt to place stent.  Patient has biliary extraction likely due to pancreatic mass.  Dr. Marie discussed with [facility] and patient has been transferred for advanced endoscopy.  - AES consulted  ;  Appreciate recs  - continue zosyn  - npo  - daily labs  - resume home Creon once diet is advanced      Hyponatremia  - Na trending up. Now 130  - NS 75ml/hr continuous  - repeat labs in am    CKD (chronic kidney disease), stage III        Hyperlipidemia  - home statin      Essential hypertension  - home lisinopril        VTE Risk Mitigation (From admission, onward)         Ordered     Place MAURICE hose  Until discontinued      06/30/20 2329     IP VTE HIGH RISK PATIENT  Once      06/30/20 2329     Place sequential compression device  Until discontinued      06/30/20 2329                   Guzman Gaytan MD  Department of Hospital Medicine   [facility]

## 2020-07-01 NOTE — SUBJECTIVE & OBJECTIVE
Past Medical History:   Diagnosis Date    Anticoagulant long-term use     Arthritis     Colon polyp     Hyperlipidemia     Hypertension     Nonexudative age-related macular degeneration, bilateral, intermediate dry stage 12/30/2019    Pancreatic pseudocyst     Pancreatitis 05/2017       Past Surgical History:   Procedure Laterality Date    CATARACT EXTRACTION W/  INTRAOCULAR LENS IMPLANT Right 08/08/2017    [facility]    CATARACT EXTRACTION W/  INTRAOCULAR LENS IMPLANT Left 10/10/2017    k[facility]man    CERVICAL CONIZATION   W/ LASER      COLONOSCOPY  01/12/2015    Dr. Barton; diverticulosis; ext int hemorrhoid; repeat in 5 years    COLONOSCOPY N/A 2/24/2020    Procedure: COLONOSCOPY;  Surgeon: Ankur Barton MD;  [facility];  Service: Endoscopy;  Laterality: N/A;    COLONOSCOPY W/ POLYPECTOMY      cone      ENDOSCOPIC ULTRASOUND OF UPPER GASTROINTESTINAL TRACT Left 6/17/2020    Procedure: ULTRASOUND, UPPER GI TRACT, ENDOSCOPIC;  Surgeon: Marcello Campbell MD;  Location: [facility];  Service: Endoscopy;  Laterality: Left;  Linear scope    epid ster  2012. 2014    ERCP N/A 6/30/2020    Procedure: ERCP (ENDOSCOPIC RETROGRADE CHOLANGIOPANCREATOGRAPHY);  Surgeon: Ankur Barton MD;  Location: [facility];  Service: Endoscopy;  Laterality: N/A;    ESOPHAGOGASTRODUODENOSCOPY N/A 6/17/2020    Procedure: EGD (ESOPHAGOGASTRODUODENOSCOPY);  Surgeon: Marcello Campbell MD;  Location: [facility];  Service: Endoscopy;  Laterality: N/A;    TONSILLECTOMY         Review of patient's allergies indicates:  No Known Allergies  Family History     Problem Relation (Age of Onset)    Cancer Brother (82)    Cataracts Mother, Father    Diabetes Mother (92)    Hypertension Mother, Father    Stroke Father        Tobacco Use    Smoking status: Never Smoker    Smokeless tobacco: Never Used   Substance and Sexual Activity    Alcohol use: No    Drug use: No    Sexual activity: Not Currently     Birth  control/protection: Post-menopausal     Review of Systems   Constitutional: Positive for fever (rigors prior to admission). Negative for activity change, appetite change, chills, diaphoresis, fatigue and unexpected weight change.   HENT: Negative for sore throat and trouble swallowing.    Eyes: Negative for visual disturbance.   Respiratory: Negative for chest tightness and shortness of breath.    Cardiovascular: Negative for chest pain and leg swelling.   Gastrointestinal: Positive for abdominal pain. Negative for abdominal distention, anal bleeding, blood in stool, constipation, diarrhea, nausea and vomiting.   Genitourinary: Negative for dysuria and hematuria.   Musculoskeletal: Positive for back pain. Negative for arthralgias and myalgias.   Skin: Negative for rash.   Neurological: Negative for dizziness, weakness, light-headedness and headaches.   Psychiatric/Behavioral: Negative for agitation and confusion.     Objective:     Vital Signs (Most Recent):  Temp: 98 °F (36.7 °C) (07/01/20 0813)  Pulse: 69 (07/01/20 0813)  Resp: 16 (07/01/20 0813)  BP: 136/63 (07/01/20 0813)  SpO2: 98 % (07/01/20 0900) Vital Signs (24h Range):  Temp:  [96.8 °F (36 °C)-99.3 °F (37.4 °C)] 98 °F (36.7 °C)  Pulse:  [] 69  Resp:  [14-17] 16  SpO2:  [73 %-100 %] 98 %  BP: ()/(47-72) 136/63     Weight: 39.6 kg (87 lb 4.8 oz) (06/30/20 2210)  Body mass index is 14.99 kg/m².      Intake/Output Summary (Last 24 hours) at 7/1/2020 0920  Last data filed at 7/1/2020 0815  Gross per 24 hour   Intake 292.5 ml   Output 400 ml   Net -107.5 ml       Lines/Drains/Airways     Peripheral Intravenous Line                 Peripheral IV - Single Lumen 06/29/20 1145 20 G Right Forearm 1 day                Physical Exam  Vitals signs and nursing note reviewed.   Constitutional:       General: She is not in acute distress.     Appearance: She is well-developed. She is not diaphoretic.      Comments: Frustrated with lack of sleep, no distress,  comfortable appearing. Frail.   HENT:      Head: Normocephalic and atraumatic.      Mouth/Throat:      Pharynx: No oropharyngeal exudate.   Eyes:      General: Scleral icterus present.      Conjunctiva/sclera: Conjunctivae normal.   Cardiovascular:      Rate and Rhythm: Normal rate and regular rhythm.      Heart sounds: Normal heart sounds.   Pulmonary:      Effort: Pulmonary effort is normal. No respiratory distress.      Breath sounds: Normal breath sounds.   Abdominal:      General: Bowel sounds are normal. There is no distension.      Palpations: Abdomen is soft. There is no mass.      Tenderness: There is no abdominal tenderness. There is no guarding or rebound.      Comments: Soft, non-tender.   Musculoskeletal:         General: No tenderness.   Lymphadenopathy:      Cervical: No cervical adenopathy.   Skin:     General: Skin is warm.      Capillary Refill: Capillary refill takes less than 2 seconds.      Findings: No rash.   Neurological:      Mental Status: She is alert and oriented to person, place, and time.   Psychiatric:         Behavior: Behavior normal.         Thought Content: Thought content normal.         Judgment: Judgment normal.         Significant Labs:  All pertinent lab results from the last 24 hours have been reviewed.    Significant Imaging:  Imaging results within the past 24 hours have been reviewed.

## 2020-07-01 NOTE — PLAN OF CARE
07/01/20 1141   Discharge Assessment   Assessment Type Discharge Planning Assessment   Confirmed/corrected address and phone number on facesheet? Yes   Assessment information obtained from? Patient   Communicated expected length of stay with patient/caregiver yes   Prior to hospitilization cognitive status: Alert/Oriented   Prior to hospitalization functional status: Independent   Current cognitive status: Alert/Oriented   Current Functional Status: Independent   Facility Arrived From: home   Lives With spouse   Able to Return to Prior Arrangements no   Is patient able to care for self after discharge? Unable to determine at this time (comments)   Who are your caregiver(s) and their phone number(s)? Ildefonso Baker, spouse, [phone]   Patient's perception of discharge disposition home or selfcare   Readmission Within the Last 30 Days no previous admission in last 30 days   Patient currently being followed by outpatient case management? No   Patient currently receives any other outside agency services? No   Equipment Currently Used at Home none   Do you have any problems affording any of your prescribed medications? No   Is the patient taking medications as prescribed? yes   Does the patient have transportation home? Yes   Transportation Anticipated family or friend will provide   Does the patient receive services at the Coumadin Clinic? No   Discharge Plan A Home   Discharge Plan B Home Health   DME Needed Upon Discharge  none   Patient/Family in Agreement with Plan yes     Patient is a 85 year old female admitted to the ER 6/30/20 for Malignant biliary obstruction. SW met with patient at the bedside to conduct an admit assessment. Patient reports that she is currently residing in a one story home with no stairs with her spouse. She states that she does not use any DME equipment at this time, and is not on dialysis. Patient reports that her spouse will be transporting her home post discharge.    PCP  Yayo VAZQUEZ  César Healy MD  [phone]    Emergency   Ildefonso Baker, spouse [phone]    PHARMACY  [facility]   [address] [place] [address]        Parul Almonte LMSW  [facility]   [id number]

## 2020-07-01 NOTE — PLAN OF CARE
A&Ox4. VVS on RA. Adequate UOP per independent ambulation to toilet. Denies pain/n/v. Pt off unit most of day. NPO at midnight 7/2 for procedure in am. Refuses tele monitor. Up in chair. Tolerating regular diet well, call light in reach, wheels locked. WCTM.      Problem: Adult Inpatient Plan of Care  Goal: Plan of Care Review  Outcome: Ongoing, Progressing  Goal: Patient-Specific Goal (Individualization)  Outcome: Ongoing, Progressing  Goal: Absence of Hospital-Acquired Illness or Injury  Outcome: Ongoing, Progressing  Goal: Optimal Comfort and Wellbeing  Outcome: Ongoing, Progressing  Goal: Readiness for Transition of Care  Outcome: Ongoing, Progressing  Goal: Rounds/Family Conference  Outcome: Ongoing, Progressing

## 2020-07-01 NOTE — NURSING TRANSFER
Nursing Transfer Note      7/1/2020     Transfer To: 1048    Transfer via stretcher    Transfer with     Transported by transport    Medicines sent: n/a    Chart send with patient: Yes    Notified:    Patient reassessed at 7/1/2020    Upon arrival to floor:

## 2020-07-01 NOTE — PROVATION PATIENT INSTRUCTIONS
Discharge Summary/Instructions after an Endoscopic Procedure  Patient Name: Laly Baker  Patient MRN: [id number]  Patient YOB: 1935 Wednesday, July 1, 2020  Jamal Martin MD  RESTRICTIONS:  During your procedure today, you received medications for sedation.  These   medications may affect your judgment, balance and coordination.  Therefore,   for 24 hours, you have the following restrictions:   - DO NOT drive a car, operate machinery, make legal/financial decisions,   sign important papers or drink alcohol.    ACTIVITY:  Today: no heavy lifting, straining or running due to procedural   sedation/anesthesia.  The following day: return to full activity including work.  DIET:  Eat and drink normally unless instructed otherwise.     TREATMENT FOR COMMON SIDE EFFECTS:  - Mild abdominal pain, nausea, belching, bloating or excessive gas:  rest,   eat lightly and use a heating pad.  - Sore Throat: treat with throat lozenges and/or gargle with warm salt   water.  - Because air was used during the procedure, expelling large amounts of air   from your rectum or belching is normal.  - If a bowel prep was taken, you may not have a bowel movement for 1-3 days.    This is normal.  SYMPTOMS TO WATCH FOR AND REPORT TO YOUR PHYSICIAN:  1. Abdominal pain or bloating, other than gas cramps.  2. Chest pain.  3. Back pain.  4. Signs of infection such as: chills or fever occurring within 24 hours   after the procedure.  5. Rectal bleeding, which would show as bright red, maroon, or black stools.   (A tablespoon of blood from the rectum is not serious, especially if   hemorrhoids are present.)  6. Vomiting.  7. Weakness or dizziness.  GO DIRECTLY TO THE NEAREST EMERGENCY ROOM IF YOU HAVE ANY OF THE FOLLOWING:      Difficulty breathing              Chills and/or fever over 101 F   Persistent vomiting and/or vomiting blood   Severe abdominal pain   Severe chest pain   Black, tarry stools   Bleeding- more than one  tablespoon   Any other symptom or condition that you feel may need urgent attention  Your doctor recommends these additional instructions:  If any biopsies were taken, your doctors clinic will contact you in 1 to 2   weeks with any results.  - [facility].   - Recommend IR consult for percutaneous biliary drainage.  For questions, problems or results please call your physician - Jamal Martin MD at Work:  [phone].  [facility], EMERGENCY ROOM PHONE NUMBER: [phone]  IF A COMPLICATION OR EMERGENCY SITUATION ARISES AND YOU ARE UNABLE TO REACH   YOUR PHYSICIAN - GO DIRECTLY TO THE EMERGENCY ROOM.  Jamal Martin MD  7/1/2020 4:25:57 PM  This report has been verified and signed electronically.  [employer]

## 2020-07-01 NOTE — ANESTHESIA PREPROCEDURE EVALUATION
[facility]  Anesthesia Pre-Operative Evaluation         Patient Name: Laly Baker  YOB: 1935  MRN: [id number]    SUBJECTIVE:     07/01/2020    Pre-operative evaluation for Procedure(s) (LRB):  INSERTION, DRAINAGE CATHETER, BILE DUCT (N/A)    Laly Baker is a 85 y.o. female with HTN, CKD-3, and pancreatic cancer (causing biliary obstruction) who is admitted s/p ERCP with inability to cannulate CBD in attempt to place stent. IR consulted for biliary drain placement.    Patient now presents for the above procedure(s).      LDA:       Peripheral IV - Single Lumen 07/01/20 1348 20 G Left Forearm (Active)   Number of days: 0       Previous airway:   None documented.      Drips:   sodium chloride 0.9% 75 mL/hr at 07/01/20 1753       Patient Active Problem List   Diagnosis    Essential hypertension    Hx of colonic polyps    Hyperlipidemia    Spinal stenosis    DDD (degenerative disc disease), lumbar    Spondylolisthesis    Lumbar stenosis    Spondylosis of lumbar region without myelopathy or radiculopathy    Secondary osteoarthritis of multiple sites    Senile nuclear sclerosis    CKD (chronic kidney disease), stage III    Nonexudative age-related macular degeneration, bilateral, intermediate dry stage    Posterior vitreous detachment, bilateral    Biliary obstruction    Hyponatremia    Advance care planning    Abdominal pain    Malignant biliary obstruction    Biliary obstruction due to cancer    Cholangitis    Elevated LFTs    Pancreatic adenocarcinoma       Review of patient's allergies indicates:  No Known Allergies    Current Inpatient Medications:   lipase-protease-amylase 12,000-38,000-60,000 units  4 capsule Oral TID WM    lisinopriL  5 mg Oral Daily    piperacillin-tazobactam (ZOSYN) IVPB  4.5 g Intravenous Q8H       No current facility-administered medications on file prior to encounter.      Current Outpatient Medications on File Prior to  Encounter   Medication Sig Dispense Refill    lipase-protease-amylase 24,000-76,000-120,000 units (CREON) 24,000-76,000 -120,000 unit capsule Take 2 capsules by mouth 3 (three) times daily with meals. 180 capsule 11    lisinopriL (PRINIVIL,ZESTRIL) 5 MG tablet Take 1 tablet (5 mg total) by mouth once daily. 90 tablet 0    mv-mn/folic acid/calcium/vit K (WOMEN'S 50 PLUS MULTIVITAMIN ORAL) Take 1 tablet by mouth once daily.       piperacillin sodium/tazobactam (PIPERACILLIN-TAZOBACTAM 4.5G/100ML D5W IVPB, READY TO MIX,) Inject 100 mLs (4.5 g total) into the vein every 8 (eight) hours.      simvastatin (ZOCOR) 20 MG tablet Take 1 tablet (20 mg total) by mouth every evening. 90 tablet 3       Past Surgical History:   Procedure Laterality Date    CATARACT EXTRACTION W/  INTRAOCULAR LENS IMPLANT Right 08/08/2017    k[facility]man    CATARACT EXTRACTION W/  INTRAOCULAR LENS IMPLANT Left 10/10/2017    kulman    CERVICAL CONIZATION   W/ LASER      COLONOSCOPY  01/12/2015    Dr. Barton; diverticulosis; ext int hemorrhoid; repeat in 5 years    COLONOSCOPY N/A 2/24/2020    Procedure: COLONOSCOPY;  Surgeon: Ankur Barton MD;  Location: [facility];  Service: Endoscopy;  Laterality: N/A;    COLONOSCOPY W/ POLYPECTOMY      cone      ENDOSCOPIC ULTRASOUND OF UPPER GASTROINTESTINAL TRACT Left 6/17/2020    Procedure: ULTRASOUND, UPPER GI TRACT, ENDOSCOPIC;  Surgeon: Marcello Campbell MD;  Location: [facility];  Service: Endoscopy;  Laterality: Left;  Linear scope    epid ster  2012. 2014    ERCP N/A 6/30/2020    Procedure: ERCP (ENDOSCOPIC RETROGRADE CHOLANGIOPANCREATOGRAPHY);  Surgeon: Ankur Barton MD;  Location: [facility];  Service: Endoscopy;  Laterality: N/A;    ESOPHAGOGASTRODUODENOSCOPY N/A 6/17/2020    Procedure: EGD (ESOPHAGOGASTRODUODENOSCOPY);  Surgeon: Marcello Campbell MD;  Location: [facility];  Service: Endoscopy;  Laterality: N/A;    TONSILLECTOMY         Social History     Socioeconomic  History    Marital status:      Spouse name: Not on file    Number of children: Not on file    Years of education: Not on file    Highest education level: Not on file   Occupational History    Not on file   Social Needs    Financial resource strain: Not on file    Food insecurity     Worry: Not on file     Inability: Not on file    Transportation needs     Medical: Not on file     Non-medical: Not on file   Tobacco Use    Smoking status: Never Smoker    Smokeless tobacco: Never Used   Substance and Sexual Activity    Alcohol use: No    Drug use: No    Sexual activity: Not Currently     Birth control/protection: Post-menopausal   Lifestyle    Physical activity     Days per week: Not on file     Minutes per session: Not on file    Stress: Not on file   Relationships    Social connections     Talks on phone: Not on file     Gets together: Not on file     Attends L[employer]-day service: Not on file     Active member of club or organization: Not on file     Attends meetings of clubs or organizations: Not on file     Relationship status: Not on file   Other Topics Concern    Not on file   Social History Narrative    Not on file       OBJECTIVE:     Vital Signs Range (Last 24H):  Temp:  [36 °C (96.8 °F)-37.1 °C (98.8 °F)]   Pulse:  [63-93]   Resp:  [16-20]   BP: (127-158)/(60-86)   SpO2:  [98 %-100 %]       Significant Labs:  Lab Results   Component Value Date    WBC 8.88 07/01/2020    HGB 9.5 (L) 07/01/2020    HCT 29.0 (L) 07/01/2020     (L) 07/01/2020    CHOL 133 10/14/2019    TRIG 80 10/14/2019    HDL 59 10/14/2019     (H) 07/01/2020     (H) 07/01/2020     (L) 07/01/2020    K 3.5 07/01/2020    CL 97 07/01/2020    CREATININE 0.7 07/01/2020    BUN 14 07/01/2020    CO2 23 07/01/2020    TSH 3.786 10/04/2017    INR 1.2 06/30/2020         Diagnostic Studies:  No relevant studies.      Cardiac Studies:  EKG (6/30/2020):   Vent. Rate : 084 BPM     Atrial Rate : 084 BPM      P-R Int  : 214 ms          QRS Dur : 084 ms       QT Int : 400 ms       P-R-T Axes : 083 -46 069 degrees      QTc Int : 472 ms   Sinus rhythm with 1st degree A-V block   Pulmonary disease pattern   Left anterior fascicular block   Abnormal ECG   No previous ECGs available    2D Echo:  No results found for this or any previous visit.      ASSESSMENT/PLAN:     Anesthesia Evaluation    I have reviewed the Patient Summary Reports.    I have reviewed the Nursing Notes.    I have reviewed the Medications.     Review of Systems  Anesthesia Hx:  No problems with previous Anesthesia  History of prior surgery of interest to airway management or planning:  Denies Personal Hx of Anesthesia complications.   Hematology/Oncology:  Hematology Normal        EENT/Dental:EENT/Dental Normal   Cardiovascular:   Hypertension    Pulmonary:  Pulmonary Normal    Renal/:   Chronic Renal Disease, CRI    Hepatic/GI:   Pancreatic cancer with biliary obstruction   Musculoskeletal:   Arthritis     Neurological:  Neurology Normal    Endocrine:  Endocrine Normal    Dermatological:  Skin Normal    Psych:  Psychiatric Normal           Physical Exam  General:  Well nourished    Airway/Jaw/Neck:  Airway Findings: Mouth Opening: Normal Tongue: Normal  General Airway Assessment: Adult  Mallampati: II  TM Distance: Normal, at least 6 cm      Dental:  Dental Findings: In tact   Chest/Lungs:  Chest/Lungs Findings: Clear to auscultation, Normal Respiratory Rate     Heart/Vascular:  Heart Findings: Normal Heart murmur: negative       Mental Status:  Mental Status Findings:  Cooperative, Alert and Oriented         Anesthesia Plan  Type of Anesthesia, risks & benefits discussed:  Anesthesia Type:  general, MAC  Patient's Preference:   Intra-op Monitoring Plan: standard ASA monitors  Intra-op Monitoring Plan Comments:   Post Op Pain Control Plan: per primary service following discharge from PACU, multimodal analgesia and IV/PO Opioids PRN  Post Op Pain Control Plan  Comments:   Induction:   IV  Beta Blocker:  Patient is not currently on a Beta-Blocker (No further documentation required).       Informed Consent: Patient understands risks and agrees with Anesthesia plan.  Questions answered. Anesthesia consent signed with patient.  ASA Score: 3     Day of Surgery Review of History & Physical: I have interviewed and examined the patient. I have reviewed the patient's H&P dated:  There are no significant changes.  H&P update referred to the surgeon.         Ready For Surgery From Anesthesia Perspective.

## 2020-07-01 NOTE — ASSESSMENT & PLAN NOTE
Ms Baker is an 85 year old female with history of pancreatic cancer, HTN, HLD, who presented to OSH due to concern for cholangitis,  transferred due to failed cannulation.    Recently diagnosed with pancreatic adenocarcinoma (6/2020) with imaging concerning for hepatic lesion (seen on CT, not biopsied), not yet seen oncology. Now presenting with 1 day history of worsening pain and rigor-like symptoms. LFTs were elevated on presentation but have been declining. Afebrile, no leukocytosis, on zosyn. Underwent ERCP (6/30) with cannulation but unable to cannulate deep into bile duct and incomplete cholangiogram therefore transferred to [facility].    Plan  - NPO  - ERCP today  - continue empiric antibiotics. Check blood cultures as not appear cultures were sent.

## 2020-07-02 PROBLEM — D63.8 ANEMIA OF CHRONIC DISEASE: Status: ACTIVE | Noted: 2020-01-01

## 2020-07-02 NOTE — PLAN OF CARE
Pt arrived to  for cholecystostomy tube. Pt oriented to unit and staff. Plan of care reviewed with patient, patient verbalizes understanding. Comfort measures utilized. Pt safely transferred from stretcher to procedural table. Fall risk reviewed with patient, fall risk interventions maintained. Safety strap applied, positioner pillows utilized to minimize pressure points. Blankets applied. Pt prepped and draped utilizing standard sterile technique. Patient placed on continuous monitoring, as required by sedation policy. Timeouts completed utilizing standard universal time-out, per department and facility policy. RN and CRNA to remain at bedside, continuous monitoring maintained. Pt resting comfortably. Denies pain/discomfort. Will continue to monitor. See CRNA flow sheets for monitoring, medication administration, and updates.

## 2020-07-02 NOTE — PROGRESS NOTES
Report called to Lino RN, pt made ready for transport to CT enroute to room per RN, pt resting quietly,VSS, phoebe po water and pain meds,Biliary drain intact,draining green drainage to gravity, stable at present.

## 2020-07-02 NOTE — TELEPHONE ENCOUNTER
Returned calls left on Lory Adrian voicemail. Apologized that Lory is out on personal emergency and left message that the number he was calling  wa snot a manned Voicemail. If he has needs to call [phone] if there are iany questions about the upcoming appointment    Was the patient seen in the last year in this department? Yes    Does patient have an active prescription for medications requested? No     Received Request Via: Pharmacy

## 2020-07-02 NOTE — TREATMENT PLAN
AES Follow-up Note     Patient was seen and examined. Labs reviewed.   Patient states that she had significant pain last night that she has very frequently. It is currently controlled on opiates. KUB and CXR negative.  Continues to be afebrile.   Low concern for post-ERCP pancreatitis.  AES will sign off. Please call if any questions/concerns.     Ita Puente MD  Gastroenterology & Hepatology Fellow

## 2020-07-02 NOTE — PLAN OF CARE
HOSPITAL MEDICINE PLAN OF CARE    Patient admitted over night. Chart reviewed. Patient seen and evaluated on rounds today. Doing well with no acute distress.   On abd pain , no fevers, no n/v. Non toxic appearing      Underwent ERCP     Impression:      - The major papilla was on the rim of a                         diverticulum.                         - The major papilla appeared congested.                         - The major papilla appeared edematous.                         - The major papilla appeared lacerated.                         - A pancreatic sphincterotomy was performed                         (transpancreatic precut sphincterotomy to                         facilitate biliary access).                         - A biliary sphincterotomy was performed (needle                         knife precut).                         - Despite attempts with doublewire, and following                         transpancreatic precut and needle-knife biliary                         precut-> unable to get deep access to the proximal                         bile duct. Distal CBD and GB were opacified.   Recommendation:       - [facility].                         - Recommend IR consult for percutaneous biliary                         drainage.     Plan :  - IR consulted   - plan for PTC on 7/2   - cont zosyn given concern for cholangitis (septic on presentation to OSH)   - has an outpatient oncologist whom pt was supposed to see on 7/1 for the first visit for her new dx of pancreatic adenocarcinoma. Would need to reschedule her appt and f/u in short term future after this hospitalization       Signing Physician:     Mari Gamboa MD  Department of Hospital Medicine   [facility]  Pager [phone]  Spectra [phone]  7/1/2020

## 2020-07-02 NOTE — TRANSFER OF CARE
"Anesthesia Transfer of Care Note    Patient: Laly Baker    Procedure(s) Performed: Procedure(s) (LRB):  INSERTION, DRAINAGE CATHETER, BILE DUCT (N/A)    Patient location: PACU    Anesthesia Type: general    Transport from OR: Transported from OR on 6-10 L/min O2 by face mask with adequate spontaneous ventilation    Post pain: adequate analgesia    Post assessment: no apparent anesthetic complications and tolerated procedure well    Post vital signs: stable    Level of consciousness: responds to stimulation    Nausea/Vomiting: no nausea/vomiting    Complications: none    Transfer of care protocol was followed      Last vitals:   Visit Vitals  BP (!) 140/65 (BP Location: Right arm, Patient Position: Lying)   Pulse 83   Temp 37 °C (98.6 °F) (Oral)   Resp 18   Ht 5' 4" (1.626 m)   Wt 39.6 kg (87 lb 4.8 oz)   SpO2 100%   Breastfeeding No   BMI 14.99 kg/m²     "

## 2020-07-02 NOTE — TREATMENT PLAN
ERCP done today    Impression:           - The major papilla was on the rim of a                         diverticulum.                         - The major papilla appeared congested.                         - The major papilla appeared edematous.                         - The major papilla appeared lacerated.                         - A pancreatic sphincterotomy was performed                         (transpancreatic precut sphincterotomy to                         facilitate biliary access).                         - A biliary sphincterotomy was performed (needle                         knife precut).                         - Despite attempts with doublewire, and following                         transpancreatic precut and needle-knife biliary                         precut-> unable to get deep access to the proximal                         bile duct. Distal CBD and GB were opacified.   Recommendation:       - [facility].                         - Recommend IR consult for percutaneous biliary                         drainage.     Please contact us with further questions  Monitor for signs of pancreatitis or cholangitis.

## 2020-07-02 NOTE — PROCEDURES
Radiology Post-Procedure Note    Pre Op Diagnosis: Pancreatic cancer    Post Op Diagnosis: Same    Procedure: Percutaneous transhepatic cholangiography    Procedure performed by: Jeramie Castelan MD    Written Informed Consent Obtained: Yes    Specimen Removed: NO    Estimated Blood Loss: Minimal    Findings:    Local anesthesia and moderate sedation were used.    Under US and fluoro guidance, a peripheral duct in the right hepatic lobe was accessed using a 22 gauge Chiba needle and contrast was injected confirming position in the biliary tree. A microwire was advanced into the common bile duct and the system was upsized using an Accustick introducer set. A J-wire was then advanced into the small bowel and the tract was serially dilated. An 8.0 internal/external biliary drainage catheter was advanced over the wire and the pigtail was formed in the small bowel. Cholangiogram was performed demonstrating appropriate position of the pigtail and the catheter side holes. The catheter was secured to the skin using 2-0 silk sutures. The catheter was left to gravity drainage and dressed with a sterile dressing.    There were no complications.  Please see Imaging report for further details.      Jeramie Castelan M.D.  Diagnostic and Interventional Radiologist  Department of Radiology  Pager: [phone]

## 2020-07-02 NOTE — PLAN OF CARE
POC reviewed, pt verbalized understanding. VSS on RA. Pain managed with PRN pain meds. Voids per toilet, adequate UOP overnight. Pt on regular diet w/ no complaints of N/V. NPO after midnight. Pt slept between care. Frequent rounds made for safety, call light in reach. WCTM

## 2020-07-02 NOTE — PLAN OF CARE
Plan of care reviewed with pt and spouse who verbalized understanding. Pt OOB independently, AUO. NPO prior to procedure, will be on regular diet upon returning to floor. No need for pain med administration. Pt in PACU, RN called CT scan and informed them pt was in PACU, attempting to set up direct transport from PACU to CT scan rather then return to the floor. Call bell in reach, bed locked in lowest position. Frequent rounds made for pt safety. AAOx4, VSS, will continue to monitor.

## 2020-07-03 PROBLEM — K92.1 GASTROINTESTINAL HEMORRHAGE WITH MELENA: Status: ACTIVE | Noted: 2020-01-01

## 2020-07-03 PROBLEM — K92.2 LOWER GI BLEED: Status: ACTIVE | Noted: 2020-01-01

## 2020-07-03 PROBLEM — D62 ACUTE BLOOD LOSS ANEMIA: Status: ACTIVE | Noted: 2020-01-01

## 2020-07-03 NOTE — CONSULTS
[facility]  Gastroenterology  Consult Note    Patient Name: Laly Baker  MRN: [id number]  Admission Date: 6/30/2020  Hospital Length of Stay: 3 days  Code Status: Full Code   Attending Provider: Brandon Chapman MD   Consulting Provider: Brandy Mcgraw MD  Primary Care Physician: Yayo Lindsey Jr, MD  Principal Problem:Malignant biliary obstruction    Consults  Subjective:     HPI: Laly Baker is a 85 y.o. female with history of newly diagnosed pancreatic CA and HTN who was admitted for a malignant biliary obstruction. She underwent multiple ERCPs but obstruction could not be relieved so she underwent PTHD placement with IR yesterday. This morning, after having breakfast she felt like she had fluid leaking out of her rectum, and noticed that her sheet was all bloody. She went to the bathroom and continued to have 2 more bloody BMs with the last one a few hours ago with a smaller amount of blood. She reported RLQ abdominal pain associated with the bleeding and she doesn't think it's related to the drain. She also reports that she noticed some maroon color fluid from her drain. She denies any N/V. She never had GI bleeding in the past and her last colonoscopy was performed in February that revealed diverticulosis.     This AM her Hb was 6.5, that spontaneously increased to 7.9 this AM. She was given 2 U pRBC.       Past Medical History:   Diagnosis Date    Anticoagulant long-term use     Arthritis     Colon polyp     Hyperlipidemia     Hypertension     Nonexudative age-related macular degeneration, bilateral, intermediate dry stage 12/30/2019    Pancreatic pseudocyst     Pancreatitis 05/2017       Past Surgical History:   Procedure Laterality Date    CATARACT EXTRACTION W/  INTRAOCULAR LENS IMPLANT Right 08/08/2017    kullman    CATARACT EXTRACTION W/  INTRAOCULAR LENS IMPLANT Left 10/10/2017    trina    CERVICAL CONIZATION   W/ LASER      COLONOSCOPY  01/12/2015      Oralia; diverticulosis; ext int hemorrhoid; repeat in 5 years    COLONOSCOPY N/A 2/24/2020    Procedure: COLONOSCOPY;  Surgeon: Ankur Barton MD;  Location: [facility];  Service: Endoscopy;  Laterality: N/A;    COLONOSCOPY W/ POLYPECTOMY      cone      ENDOSCOPIC ULTRASOUND OF UPPER GASTROINTESTINAL TRACT Left 6/17/2020    Procedure: ULTRASOUND, UPPER GI TRACT, ENDOSCOPIC;  Surgeon: Marcello Campbell MD;  Location: [facility];  Service: Endoscopy;  Laterality: Left;  Linear scope    epid ster  2012. 2014    ERCP N/A 6/30/2020    Procedure: ERCP (ENDOSCOPIC RETROGRADE CHOLANGIOPANCREATOGRAPHY);  Surgeon: Ankur Barton MD;  Location: [facility];  Service: Endoscopy;  Laterality: N/A;    ERCP N/A 7/1/2020    Procedure: ERCP (ENDOSCOPIC RETROGRADE CHOLANGIOPANCREATOGRAPHY);  Surgeon: Jamal Martin MD;  Location: [facility] ([address] [place] [address]);  Service: Endoscopy;  Laterality: N/A;    ESOPHAGOGASTRODUODENOSCOPY N/A 6/17/2020    Procedure: EGD (ESOPHAGOGASTRODUODENOSCOPY);  Surgeon: Marcello Campbell MD;  Location: [facility];  Service: Endoscopy;  Laterality: N/A;    TONSILLECTOMY         Family History   Problem Relation Age of Onset    Diabetes Mother 92    Cataracts Mother     Hypertension Mother     Stroke Father     Cataracts Father     Hypertension Father     Cancer Brother 82        lung cancer    Amblyopia Neg Hx     Blindness Neg Hx     Glaucoma Neg Hx     Macular degeneration Neg Hx     Retinal detachment Neg Hx     Strabismus Neg Hx     Thyroid disease Neg Hx        Social History     Socioeconomic History    Marital status:      Spouse name: Not on file    Number of children: Not on file    Years of education: Not on file    Highest education level: Not on file   Occupational History    Not on file   Social Needs    Financial resource strain: Not on file    Food insecurity     Worry: Not on file     Inability: Not on file    Transportation needs     Medical: Not on  file     Non-medical: Not on file   Tobacco Use    Smoking status: Never Smoker    Smokeless tobacco: Never Used   Substance and Sexual Activity    Alcohol use: No    Drug use: No    Sexual activity: Not Currently     Birth control/protection: Post-menopausal   Lifestyle    Physical activity     Days per week: Not on file     Minutes per session: Not on file    Stress: Not on file   Relationships    Social connections     Talks on phone: Not on file     Gets together: Not on file     Attends [employer] service: Not on file     Active member of club or organization: Not on file     Attends meetings of clubs or organizations: Not on file     Relationship status: Not on file   Other Topics Concern    Not on file   Social History Narrative    Not on file       No current facility-administered medications on file prior to encounter.      Current Outpatient Medications on File Prior to Encounter   Medication Sig Dispense Refill    lipase-protease-amylase 24,000-76,000-120,000 units (CREON) 24,000-76,000 -120,000 unit capsule Take 2 capsules by mouth 3 (three) times daily with meals. 180 capsule 11    lisinopriL (PRINIVIL,ZESTRIL) 5 MG tablet Take 1 tablet (5 mg total) by mouth once daily. 90 tablet 0    mv-mn/folic acid/calcium/vit K (WOMEN'S 50 PLUS MULTIVITAMIN ORAL) Take 1 tablet by mouth once daily.       piperacillin sodium/tazobactam (PIPERACILLIN-TAZOBACTAM 4.5G/100ML D5W IVPB, READY TO MIX,) Inject 100 mLs (4.5 g total) into the vein every 8 (eight) hours.      simvastatin (ZOCOR) 20 MG tablet Take 1 tablet (20 mg total) by mouth every evening. 90 tablet 3       Review of patient's allergies indicates:  No Known Allergies    Review of Systems   Constitutional: Negative.    HENT: Negative.    Eyes: Negative.    Respiratory: Negative.    Cardiovascular: Negative.    Gastrointestinal: Positive for abdominal pain, blood in stool and constipation. Negative for diarrhea, nausea and vomiting.    Genitourinary: Negative.    Musculoskeletal: Negative.    Neurological: Negative.    Psychiatric/Behavioral: Negative.         Objective:     Vitals:    07/03/20 1641   BP: 134/65   Pulse: 75   Resp: 14   Temp: 98.4 °F (36.9 °C)         Constitutional:  not in acute distress and well developed  HENT: Head: Normal, normocephalic, atraumatic.  Eyes: conjunctiva clear and sclera nonicteric  Cardiovascular: regular rate and rhythm and no murmur  Respiratory: normal chest expansion & respiratory effort   and no accessory muscle use  GI: soft, tenderness moderate in the RLQ and biliary drain present with dark brown contents consistent with bile  Musculoskeletal: no muscular tenderness noted  Skin: negatives: color normal, no rashes or suspicious lesions  Neurological: alert, oriented x3  Psychiatric: mood and affect are within normal limits, pt is a good historian; no memory problems were noted  Rectal: dark red blood noted, external hemorrhoids noted, internal hemorrhoids protruding also noted    Significant Labs:  Recent Labs   Lab 07/02/20  2332 07/03/20  0338 07/03/20  0925   HGB 7.2* 6.5* 7.9*       Lab Results   Component Value Date    WBC 13.30 (H) 07/03/2020    HGB 7.9 (L) 07/03/2020    HCT 24.3 (L) 07/03/2020    MCV 90 07/03/2020     07/03/2020       Lab Results   Component Value Date     (L) 07/03/2020    K 3.7 07/03/2020     07/03/2020    CO2 23 07/03/2020    BUN 15 07/03/2020    CREATININE 0.8 07/03/2020    CALCIUM 7.9 (L) 07/03/2020    ANIONGAP 6 (L) 07/03/2020    ESTGFRAFRICA >60.0 07/03/2020    EGFRNONAA >60.0 07/03/2020       Lab Results   Component Value Date     (H) 07/03/2020    AST 53 (H) 07/03/2020    ALKPHOS 390 (H) 07/03/2020    BILITOT 1.8 (H) 07/03/2020       Lab Results   Component Value Date    INR 1.2 06/30/2020    INR 1.4 06/30/2020    INR 1.3 06/29/2020       Significant Imaging:  Reviewed pertinent radiology findings.       Assessment/Plan:     Laly Baker is  a 85 y.o. female with history of newly diagnosed pancreatic CA and HTN who developed hematochezia. GI was consulted.    GI  Lower GI bleed  Assessment & Plan  Patient developed hematochezia after a long period of constipation  Hb downtrended to 6.5 but spontaneously increased to 7.9, likely erroneous value  Bleeding appears to have slowed down and patient is hemodynamically stable  Likely lower GI bleed, ddx includes diverticular bleed, hemorrhoids, AVM    Plan  - Clear liquid diet  - Trend H/H q12hr, less frequently if repeatedly stable and bleeding stopped  - No need for CT at this time  - Transfuse for Hb <7, avoid overtransfusion  - Will follow Hb trend and signs of bleeding and decide on endoscopic interventions depending on further clinical course        Thank you for involving us in the care of Laly Baker. Please call with any additional questions, concerns or changes in the patient's clinical status. We will continue to follow.    Brandy Mcgraw MD  Gastroenterology Fellow PGY IV   [facility]

## 2020-07-03 NOTE — CARE UPDATE
Rapid Response Nurse Chart Check     Chart check completed, abnormal VS noted. Bedside RNUmair contacted, no concerns verbalized at this time, will reassess vitals. Instructed to call [phone] for further concerns or assistance.

## 2020-07-03 NOTE — PLAN OF CARE
Problem: Malnutrition  Goal: Improved Nutritional Intake  Outcome: Ongoing, Progressing   Recommendations    Recommendation:   1. Continue regular diet, encourage good PO intake at meal times, high calorie high protein foods   2. Continue boost plus TID to increase intake   3. RD to monitor and follow up    Goals: pt to tolerate >85% of EEN/EPN by RD follow up  Nutrition Goal Status: new  Communication of RD Recs: other (comment)(POC)

## 2020-07-03 NOTE — CONSULTS
Consult Note    Inpatient consult to Hematology/Oncology  Consult performed by: Jeff Bryant MD  Consult ordered by: Brandon Chapman MD        SUBJECTIVE:     History of Present Illness:  Laly Baker is a 86 y/o F admitted to [facility] with billiary obstruction and GIB. Oncology have been asked to evaluate newly diagnosed pancreatic adenocarcinoma. She has a PMH significant for HTN, HLD and CKD.     [place] [facility] [place]. Presented to OSH with signs of billiary obstruction, ERCP at OSH unable to cannulate billiary sphincter. Underwent PTC on 07/01, plan to discharge today but had oswald rectal bleeding today.     Of note she was recently diagnosed with pancreatic adenocarcinoma. She has an appointment with Dr. Desire Bledsoe at [facility] on 07/07. During this hospitalization she has had a CT A/P and CT chest which revealed the presence of lesions concerning for metastatic disease.  6000.       Past Medical History:   Diagnosis Date    Anticoagulant long-term use     Arthritis     Colon polyp     Hyperlipidemia     Hypertension     Nonexudative age-related macular degeneration, bilateral, intermediate dry stage 12/30/2019    Pancreatic pseudocyst     Pancreatitis 05/2017     Past Surgical History:   Procedure Laterality Date    CATARACT EXTRACTION W/  INTRAOCULAR LENS IMPLANT Right 08/08/2017    kullman    CATARACT EXTRACTION W/  INTRAOCULAR LENS IMPLANT Left 10/10/2017    kulman    CERVICAL CONIZATION   W/ LASER      COLONOSCOPY  01/12/2015    Dr. Barton; diverticulosis; ext int hemorrhoid; repeat in 5 years    COLONOSCOPY N/A 2/24/2020    Procedure: COLONOSCOPY;  Surgeon: Ankur Barton MD;  [facility];  Service: Endoscopy;  Laterality: N/A;    COLONOSCOPY W/ POLYPECTOMY      cone      ENDOSCOPIC ULTRASOUND OF UPPER GASTROINTESTINAL TRACT Left 6/17/2020    Procedure: ULTRASOUND, UPPER GI TRACT, ENDOSCOPIC;  Surgeon: Marcello OSORIO  MD Shannan;  Location: [facility];  Service: Endoscopy;  Laterality: Left;  Linear scope    epid ster  2012. 2014    ERCP N/A 6/30/2020    Procedure: ERCP (ENDOSCOPIC RETROGRADE CHOLANGIOPANCREATOGRAPHY);  Surgeon: Ankur Barton MD;  Location: [facility];  Service: Endoscopy;  Laterality: N/A;    ERCP N/A 7/1/2020    Procedure: ERCP (ENDOSCOPIC RETROGRADE CHOLANGIOPANCREATOGRAPHY);  Surgeon: Jamal Martin MD;  Location: [facility]O ([facility]);  Service: Endoscopy;  Laterality: N/A;    ESOPHAGOGASTRODUODENOSCOPY N/A 6/17/2020    Procedure: EGD (ESOPHAGOGASTRODUODENOSCOPY);  Surgeon: Marcello Campbell MD;  Location: [facility];  Service: Endoscopy;  Laterality: N/A;    TONSILLECTOMY       Family History   Problem Relation Age of Onset    Diabetes Mother 92    Cataracts Mother     Hypertension Mother     Stroke Father     Cataracts Father     Hypertension Father     Cancer Brother 82        lung cancer    Amblyopia Neg Hx     Blindness Neg Hx     Glaucoma Neg Hx     Macular degeneration Neg Hx     Retinal detachment Neg Hx     Strabismus Neg Hx     Thyroid disease Neg Hx      Social History     Tobacco Use    Smoking status: Never Smoker    Smokeless tobacco: Never Used   Substance Use Topics    Alcohol use: No    Drug use: No     Review of Systems   Constitutional: Positive for weight loss. Negative for chills and fever.   HENT: Negative for nosebleeds.    Eyes: Negative for blurred vision and double vision.   Respiratory: Negative for shortness of breath.    Cardiovascular: Negative for chest pain and palpitations.   Gastrointestinal: Positive for abdominal pain and blood in stool.   Genitourinary: Negative for dysuria.   Musculoskeletal: Negative for myalgias.   Skin: Negative for rash.   Neurological: Negative for focal weakness.   Endo/Heme/Allergies: Does not bruise/bleed easily.   Psychiatric/Behavioral: The patient is not nervous/anxious.        OBJECTIVE:     Vital Signs:  Temp:   [97.5 °F (36.4 °C)-99.3 °F (37.4 °C)]   Pulse:  []   Resp:  [16-18]   BP: (107-118)/(52-56)   SpO2:  [90 %-96 %]     Physical Exam  Constitutional:       Appearance: She is well-developed.   HENT:      Head: Normocephalic.      Nose: Nose normal.      Mouth/Throat:      Pharynx: Oropharynx is clear.   Eyes:      General: No scleral icterus.  Cardiovascular:      Heart sounds: Normal heart sounds.   Pulmonary:      Breath sounds: Normal breath sounds.   Abdominal:      General: There is distension.      Palpations: Abdomen is soft.      Tenderness: There is abdominal tenderness.   Musculoskeletal: Normal range of motion.   Lymphadenopathy:      Cervical: No cervical adenopathy.   Neurological:      General: No focal deficit present.      Mental Status: She is alert and oriented to person, place, and time. Mental status is at baseline.   Psychiatric:         Behavior: Behavior normal.       Laboratory:  Recent Labs   Lab 07/03/20  0925   WBC 13.30*   RBC 2.71*   HGB 7.9*   HCT 24.3*      MCV 90   MCH 29.2   MCHC 32.5     Recent Labs   Lab 07/03/20  0338   *   K 3.7      CO2 23   BUN 15   CREATININE 0.8        Diagnostic Results:    ASSESSMENT/PLAN:     Laly Baker is a 85 year old with newly diagnosed pancreatic adenocarcinoma, likely stage IV. Staging work up has been completed. We discussed that her options for treatment are non curative and that systemic treatment would aim to extend life with good QOL. I will ask Dr. Bledsoe's office to reschedule her appointment on 07/07 if she is not discharged by Monday. She understands the importance of keeping that appointment.    The patient will be discussed with the attending physician Dr. Cagle. Recommendations outlined in this note are not final until attending attestation. Thank you for consulting medical oncology, we will sign off the case but please page with questions.    Jeff Bryant MD  Clinical Fellow  Hematology and Medical  Oncology.  07/03/2020        I have reviewed the notes, assessments, and/or procedures performed by the housestaff, as above.  I have personally interviewed and examined the patient at the beside, and rounded with the housestaff. I concur with her/his assessment and plan and the documentation of Laly Baker.  I, Dr. Rolando Cagle, personally spent more than  65 mins during this encounter, greater than 50% was spent in direct counseling and/or coordination of care.     Rolando Cagle M.D., M.S., F.A.C.P.  Hematology/Oncology Attending  [facility]

## 2020-07-03 NOTE — ASSESSMENT & PLAN NOTE
Patient developed hematochezia after a long period of constipation  Hb downtrended to 6.5 but spontaneously increased to 7.9, likely erroneous value  Bleeding appears to have slowed down and patient is hemodynamically stable  Likely lower GI bleed, ddx includes diverticular bleed, hemorrhoids, AVM    Plan  - Clear liquid diet  - Trend H/H q12hr, less frequently if repeatedly stable and bleeding stopped  - No need for CT at this time  - Transfuse for Hb <7, avoid overtransfusion  - Will follow Hb trend and signs of bleeding and decide on endoscopic interventions depending on further clinical course

## 2020-07-03 NOTE — ASSESSMENT & PLAN NOTE
Nutrition Problem:   Moderate  Protein-Calorie Malnutrition  Malnutrition in the context of Chronic Illness/Injury     Related to (etiology):  Increased nutrient needs      Signs and Symptoms (as evidenced by):  Energy Intake: <75% of estimated energy requirement for >6 months   Body Fat Depletion: moderate depletion of orbitals and triceps   Muscle Mass Depletion: moderate depletion of temples, clavicle region, scapular region, interosseous muscle and lower extremities   Weight Loss: 13% x 1 year (per chart)       Interventions(treatment strategy):  Collaboration of care with other providers  Total Boox     Nutrition Diagnosis Status:  New

## 2020-07-03 NOTE — CONSULTS
"  [facility]  Adult Nutrition  Consult Note    SUMMARY     Recommendations    Recommendation:   1. Continue regular diet, encourage good PO intake at meal times, high calorie high protein foods   2. Continue boost plus TID to increase intake   3. RD to monitor and follow up    Goals: pt to tolerate >85% of EEN/EPN by RD follow up  Nutrition Goal Status: new  Communication of RD Recs: other (comment)(POC)    Reason for Assessment    Reason For Assessment: consult  Diagnosis: cancer diagnosis/related complications  Relevant Medical History: HTN; HLD; pancreatitis  Interdisciplinary Rounds: did not attend  General Information Comments: Pt resting in bed, reported good PO intake, when she receives meals. Awaiting bfst. States PTA good PO intake at home with boost plus daily-BID. No c/o N/V/C/D reported at this time. States wt loss of ~12 lbs over past 6 months-1 year. UBW ~105-110 lbs per pt. Goal to gain wt back to UBW. NFPE completed, pt with chronic moderate malnutrition at this time.  Nutrition Discharge Planning: adequate PO intake    Nutrition Risk Screen    Nutrition Risk Screen: no indicators present    Nutrition/Diet History    Spiritual, Cultural Beliefs, Congregation Practices, Values that Affect Care: no  Food Allergies: NKFA  Factors Affecting Nutritional Intake: None identified at this time    Anthropometrics    Temp: 97.5 °F (36.4 °C)  Height Method: Stated  Height: 5' 4" (162.6 cm)  Height (inches): 64 in  Weight Method: Standard Scale  Weight: 39.6 kg (87 lb 4.8 oz)  Weight (lb): 87.3 lb  Ideal Body Weight (IBW), Female: 120 lb  % Ideal Body Weight, Female (lb): 72.75 %  BMI (Calculated): 15  BMI Grade: less than 16 protein-energy malnutrition grade III       Lab/Procedures/Meds    Pertinent Labs Reviewed: reviewed  Pertinent Labs Comments: Na 133; Ca 7.9  Pertinent Medications Reviewed: reviewed  Pertinent Medications Comments: lisinopril     Estimated/Assessed Needs    Weight Used For " Calorie Calculations: 39.6 kg (87 lb 4.8 oz)  Energy Calorie Requirements (kcal): 8662-7128 kcal/d  Energy Need Method: Kcal/kg  Protein Requirements: 47-59 g/day  Weight Used For Protein Calculations: 39.6 kg (87 lb 4.8 oz)  Fluid Requirements (mL): 1 mL/kcal or per MD  RDA Method (mL): 1386    Nutrition Prescription Ordered    Current Diet Order: Regular diet  Oral Nutrition Supplement: boost plus    Evaluation of Received Nutrient/Fluid Intake    Energy Calories Required: meeting needs  Protein Required: meeting needs  Fluid Required: meeting needs  Comments: MEEK 7/2  Tolerance: tolerating  % Intake of Estimated Energy Needs: 50 - 75 %  % Meal Intake: 75 - 100 %    Nutrition Risk    Level of Risk/Frequency of Follow-up: low     Assessment and Plan  Nutrition Problem:   Moderate  Protein-Calorie Malnutrition  Malnutrition in the context of Chronic Illness/Injury    Related to (etiology):  Increased nutrient needs     Signs and Symptoms (as evidenced by):  Energy Intake: <75% of estimated energy requirement for >6 months   Body Fat Depletion: moderate depletion of orbitals and triceps   Muscle Mass Depletion: moderate depletion of temples, clavicle region, scapular region, interosseous muscle and lower extremities   Weight Loss: 13% x 1 year (per chart)      Interventions(treatment strategy):  Collaboration of care with other providers  Commercial beverage    Nutrition Diagnosis Status:  New    Monitor and Evaluation    Food and Nutrient Intake: energy intake, food and beverage intake  Food and Nutrient Adminstration: diet order  Knowledge/Beliefs/Attitudes: food and nutrition knowledge/skill  Anthropometric Measurements: weight, weight change  Biochemical Data, Medical Tests and Procedures: electrolyte and renal panel, gastrointestinal profile, glucose/endocrine profile, inflammatory profile, lipid profile  Nutrition-Focused Physical Findings: overall appearance     Malnutrition Assessment  Malnutrition Type:  chronic illness  Weight Loss (Malnutrition): (13% x 1 year)  Subcutaneous Fat (Malnutrition): moderate depletion  Muscle Mass (Malnutrition): moderate depletion   Orbital Region (Subcutaneous Fat Loss): moderate depletion  Upper Arm Region (Subcutaneous Fat Loss): moderate depletion   Christian Region (Muscle Loss): moderate depletion  Clavicle Bone Region (Muscle Loss): moderate depletion  Clavicle and Acromion Bone Region (Muscle Loss): moderate depletion  Dorsal Hand (Muscle Loss): moderate depletion  Patellar Region (Muscle Loss): moderate depletion  Anterior Thigh Region (Muscle Loss): moderate depletion  Posterior Calf Region (Muscle Loss): moderate depletion       Nutrition Follow-Up    RD Follow-up?: Yes

## 2020-07-03 NOTE — PLAN OF CARE
The patient woke up at this time and was very irate about how she was left in her room. I reoriented the patient and assisted her to the bathroom where she had a melena stool. Changed the patient's gown and assisted with cleaning her up. Patient returned back to her bed. Patient ambulatory with standby assist. Bed locked and in low position, call light within reach.

## 2020-07-03 NOTE — H&P (VIEW-ONLY)
[facility]  Gastroenterology  Consult Note    Patient Name: Laly Baker  MRN: [id number]  Admission Date: 6/30/2020  Hospital Length of Stay: 3 days  Code Status: Full Code   Attending Provider: Brandon Chapman MD   Consulting Provider: Brandy Mcgraw MD  Primary Care Physician: Yayo Lindsey Jr, MD  Principal Problem:Malignant biliary obstruction    Consults  Subjective:     HPI: Laly Baker is a 85 y.o. female with history of newly diagnosed pancreatic CA and HTN who was admitted for a malignant biliary obstruction. She underwent multiple ERCPs but obstruction could not be relieved so she underwent PTHD placement with IR yesterday. This morning, after having breakfast she felt like she had fluid leaking out of her rectum, and noticed that her sheet was all bloody. She went to the bathroom and continued to have 2 more bloody BMs with the last one a few hours ago with a smaller amount of blood. She reported RLQ abdominal pain associated with the bleeding and she doesn't think it's related to the drain. She also reports that she noticed some maroon color fluid from her drain. She denies any N/V. She never had GI bleeding in the past and her last colonoscopy was performed in February that revealed diverticulosis.     This AM her Hb was 6.5, that spontaneously increased to 7.9 this AM. She was given 2 U pRBC.       Past Medical History:   Diagnosis Date    Anticoagulant long-term use     Arthritis     Colon polyp     Hyperlipidemia     Hypertension     Nonexudative age-related macular degeneration, bilateral, intermediate dry stage 12/30/2019    Pancreatic pseudocyst     Pancreatitis 05/2017       Past Surgical History:   Procedure Laterality Date    CATARACT EXTRACTION W/  INTRAOCULAR LENS IMPLANT Right 08/08/2017    [place]    CATARACT EXTRACTION W/  INTRAOCULAR LENS IMPLANT Left 10/10/2017    trina    CERVICAL CONIZATION   W/ LASER      COLONOSCOPY  01/12/2015      Oralia; diverticulosis; ext int hemorrhoid; repeat in 5 years    COLONOSCOPY N/A 2/24/2020    Procedure: COLONOSCOPY;  Surgeon: Ankur Barton MD;  Location: [facility];  Service: Endoscopy;  Laterality: N/A;    COLONOSCOPY W/ POLYPECTOMY      cone      ENDOSCOPIC ULTRASOUND OF UPPER GASTROINTESTINAL TRACT Left 6/17/2020    Procedure: ULTRASOUND, UPPER GI TRACT, ENDOSCOPIC;  Surgeon: Marcello Campbell MD;  Location: [facility];  Service: Endoscopy;  Laterality: Left;  Linear scope    epid ster  2012. 2014    ERCP N/A 6/30/2020    Procedure: ERCP (ENDOSCOPIC RETROGRADE CHOLANGIOPANCREATOGRAPHY);  Surgeon: Ankur Barton MD;  Location: [facility];  Service: Endoscopy;  Laterality: N/A;    ERCP N/A 7/1/2020    Procedure: ERCP (ENDOSCOPIC RETROGRADE CHOLANGIOPANCREATOGRAPHY);  Surgeon: Jamal Martin MD;  Location: [facility] ([address] [place] [address]);  Service: Endoscopy;  Laterality: N/A;    ESOPHAGOGASTRODUODENOSCOPY N/A 6/17/2020    Procedure: EGD (ESOPHAGOGASTRODUODENOSCOPY);  Surgeon: Marcello Campbell MD;  Location: [facility];  Service: Endoscopy;  Laterality: N/A;    TONSILLECTOMY         Family History   Problem Relation Age of Onset    Diabetes Mother 92    Cataracts Mother     Hypertension Mother     Stroke Father     Cataracts Father     Hypertension Father     Cancer Brother 82        lung cancer    Amblyopia Neg Hx     Blindness Neg Hx     Glaucoma Neg Hx     Macular degeneration Neg Hx     Retinal detachment Neg Hx     Strabismus Neg Hx     Thyroid disease Neg Hx        Social History     Socioeconomic History    Marital status:      Spouse name: Not on file    Number of children: Not on file    Years of education: Not on file    Highest education level: Not on file   Occupational History    Not on file   Social Needs    Financial resource strain: Not on file    Food insecurity     Worry: Not on file     Inability: Not on file    Transportation needs     Medical: Not on  file     Non-medical: Not on file   Tobacco Use    Smoking status: Never Smoker    Smokeless tobacco: Never Used   Substance and Sexual Activity    Alcohol use: No    Drug use: No    Sexual activity: Not Currently     Birth control/protection: Post-menopausal   Lifestyle    Physical activity     Days per week: Not on file     Minutes per session: Not on file    Stress: Not on file   Relationships    Social connections     Talks on phone: Not on file     Gets together: Not on file     Attends [employer] service: Not on file     Active member of club or organization: Not on file     Attends meetings of clubs or organizations: Not on file     Relationship status: Not on file   Other Topics Concern    Not on file   Social History Narrative    Not on file       No current facility-administered medications on file prior to encounter.      Current Outpatient Medications on File Prior to Encounter   Medication Sig Dispense Refill    lipase-protease-amylase 24,000-76,000-120,000 units (CREON) 24,000-76,000 -120,000 unit capsule Take 2 capsules by mouth 3 (three) times daily with meals. 180 capsule 11    lisinopriL (PRINIVIL,ZESTRIL) 5 MG tablet Take 1 tablet (5 mg total) by mouth once daily. 90 tablet 0    mv-mn/folic acid/calcium/vit K (WOMEN'S 50 PLUS MULTIVITAMIN ORAL) Take 1 tablet by mouth once daily.       piperacillin sodium/tazobactam (PIPERACILLIN-TAZOBACTAM 4.5G/100ML D5W IVPB, READY TO MIX,) Inject 100 mLs (4.5 g total) into the vein every 8 (eight) hours.      simvastatin (ZOCOR) 20 MG tablet Take 1 tablet (20 mg total) by mouth every evening. 90 tablet 3       Review of patient's allergies indicates:  No Known Allergies    Review of Systems   Constitutional: Negative.    HENT: Negative.    Eyes: Negative.    Respiratory: Negative.    Cardiovascular: Negative.    Gastrointestinal: Positive for abdominal pain, blood in stool and constipation. Negative for diarrhea, nausea and vomiting.    Genitourinary: Negative.    Musculoskeletal: Negative.    Neurological: Negative.    Psychiatric/Behavioral: Negative.         Objective:     Vitals:    07/03/20 1641   BP: 134/65   Pulse: 75   Resp: 14   Temp: 98.4 °F (36.9 °C)         Constitutional:  not in acute distress and well developed  HENT: Head: Normal, normocephalic, atraumatic.  Eyes: conjunctiva clear and sclera nonicteric  Cardiovascular: regular rate and rhythm and no murmur  Respiratory: normal chest expansion & respiratory effort   and no accessory muscle use  GI: soft, tenderness moderate in the RLQ and biliary drain present with dark brown contents consistent with bile  Musculoskeletal: no muscular tenderness noted  Skin: negatives: color normal, no rashes or suspicious lesions  Neurological: alert, oriented x3  Psychiatric: mood and affect are within normal limits, pt is a good historian; no memory problems were noted  Rectal: dark red blood noted, external hemorrhoids noted, internal hemorrhoids protruding also noted    Significant Labs:  Recent Labs   Lab 07/02/20  2332 07/03/20  0338 07/03/20  0925   HGB 7.2* 6.5* 7.9*       Lab Results   Component Value Date    WBC 13.30 (H) 07/03/2020    HGB 7.9 (L) 07/03/2020    HCT 24.3 (L) 07/03/2020    MCV 90 07/03/2020     07/03/2020       Lab Results   Component Value Date     (L) 07/03/2020    K 3.7 07/03/2020     07/03/2020    CO2 23 07/03/2020    BUN 15 07/03/2020    CREATININE 0.8 07/03/2020    CALCIUM 7.9 (L) 07/03/2020    ANIONGAP 6 (L) 07/03/2020    ESTGFRAFRICA >60.0 07/03/2020    EGFRNONAA >60.0 07/03/2020       Lab Results   Component Value Date     (H) 07/03/2020    AST 53 (H) 07/03/2020    ALKPHOS 390 (H) 07/03/2020    BILITOT 1.8 (H) 07/03/2020       Lab Results   Component Value Date    INR 1.2 06/30/2020    INR 1.4 06/30/2020    INR 1.3 06/29/2020       Significant Imaging:  Reviewed pertinent radiology findings.       Assessment/Plan:     Laly Baker is  a 85 y.o. female with history of newly diagnosed pancreatic CA and HTN who developed hematochezia. GI was consulted.    GI  Lower GI bleed  Assessment & Plan  Patient developed hematochezia after a long period of constipation  Hb downtrended to 6.5 but spontaneously increased to 7.9, likely erroneous value  Bleeding appears to have slowed down and patient is hemodynamically stable  Likely lower GI bleed, ddx includes diverticular bleed, hemorrhoids, AVM    Plan  - Clear liquid diet  - Trend H/H q12hr, less frequently if repeatedly stable and bleeding stopped  - No need for CT at this time  - Transfuse for Hb <7, avoid overtransfusion  - Will follow Hb trend and signs of bleeding and decide on endoscopic interventions depending on further clinical course        Thank you for involving us in the care of Laly Baker. Please call with any additional questions, concerns or changes in the patient's clinical status. We will continue to follow.    Brandy Mcgraw MD  Gastroenterology Fellow PGY IV   [facility]

## 2020-07-03 NOTE — PLAN OF CARE
Plan of care reviewed with pt, who verbalized understanding. Pt with 3 moderate bloody BM's this AM. 2 units of PRBC's ran without complications. Pt placed on clear liquid diet, tolerating without issue. Pt is ambulating independently to the BR. Pain in R flank - heat packs applied, pt denies need for PRN meds at this time. R biliary drain w/ thin brown output. Call bell in reach, bed locked in lowest position. Frequent rounds made for pt safety. Aaox4, VSS, will continue to monitor.

## 2020-07-03 NOTE — NURSING
Pt w/ second oswald red blood BM this AM, team aware - new orders placed, will hang blood once consent is obtained. KYARA. QUEENIE.

## 2020-07-03 NOTE — NURSING
I assisted the patient to the bathroom where she had a large melena stool. Stat lab was already ordered but the patient requested that they come back later. On call notified  After the  CBC results were posted. Will notify if the patient has another stool.

## 2020-07-03 NOTE — PROGRESS NOTES
Progress Note   Hospital Medicine         Patient Name: Laly Baker  MRN:  [id number]  [facility] Medicine Team: [facility] X Brandon Chapman MD  Date of Admission:  6/30/2020     Length of Stay:  LOS: 2 days   Expected Discharge Date: 7/3/2020  Principal Problem:  Malignant biliary obstruction       Subjective:     Interval History/Overnight Events:  Patient states over night patient was suffering from extreme abdominal pain that was relieved with morphine and was able to sleep after; patient to go for IR PTC today; also ordered a CT chest with IV contrast today for staging purposes;  at the bedside and updated on the plan of likely d/c tomorrow; spoke with patient's son on the phone as well;   - patient has follow up scheduled with heme/onc on Tuesday;  - patient is having a drop in H/H, need to monitor, if it continues this evening, would consider CTA abdomen     Review of Systems   Constitutional: Negative for chills, fatigue, fever.   HENT: Negative for sore throat, trouble swallowing.    Eyes: Negative for photophobia, visual disturbance.   Respiratory: Negative for cough, shortness of breath.    Cardiovascular: Negative for chest pain, palpitations, leg swelling.   Gastrointestinal: Negative for abdominal pain, constipation, diarrhea, nausea, vomiting.   Endocrine: Negative for cold intolerance, heat intolerance.   Genitourinary: Negative for dysuria, frequency.   Musculoskeletal: Negative for arthralgias, myalgias.   Skin: Negative for rash, wound, erythema   Neurological: Negative for dizziness, syncope, weakness, light-headedness.   Psychiatric/Behavioral: Negative for confusion, hallucinations, anxiety  All other systems reviewed and are negative.    Objective:     Temp:  [97 °F (36.1 °C)-99.2 °F (37.3 °C)]   Pulse:  [74-92]   Resp:  [16-22]   BP: (118-166)/(58-74)   SpO2:  [95 %-100 %]       Physical Exam:  Constitutional: Appears well-developed and well-nourished.   Head: Normocephalic and  atraumatic.   Mouth/Throat: Oropharynx is clear and moist.   Eyes: EOM are normal. Pupils are equal, round, and reactive to light. No scleral icterus.   Neck: Normal range of motion. Neck supple.   Cardiovascular: Normal rate and regular rhythm.  No murmur heard.  Pulmonary/Chest: Effort normal and breath sounds normal. No respiratory distress. No wheezes, rales, or rhonchi  Abdominal: Soft. Bowel sounds are normal.  No distension or tenderness  Musculoskeletal: Normal range of motion. No edema.   Neurological: Alert and oriented to person, place, and time.   Skin: Skin is warm and dry.   Psychiatric: Normal mood and affect. Behavior is normal.     Recent Labs   Lab 06/29/20  1145 06/30/20  0655 07/01/20  0459 07/02/20  0258   WBC 10.13 10.13 8.88 10.95   HGB 10.8* 9.2* 9.5* 8.1*   HCT 32.4* 27.4* 29.0* 24.8*    141* 141* 165     Recent Labs   Lab 06/30/20  1711 07/01/20 0459 07/02/20  0258   * 129* 130*   K 3.6 3.5 4.2   CL 97 97 97   CO2 28 23 24   BUN 19* 14 15   CREATININE 0.72 0.7 0.8   * 112* 157*   CALCIUM 8.5 8.3* 8.1*   MG  --  1.9  --    PHOS  --  2.3*  --      Recent Labs   Lab 06/29/20  1921 06/30/20  0655 06/30/20  1711 06/30/20  2356 07/01/20  0459 07/02/20  0258   ALKPHOS  --  385* 392*  --  401* 444*   ALT  --  769* 666*  --  469* 327*   AST  --  629* 435*  --  203* 102*   ALBUMIN  --  3.0* 3.3*  --  2.6* 2.4*   PROT  --  5.5* 5.9*  --  5.2* 4.8*   BILITOT  --  2.0* 2.4*  --  3.2* 4.0*   INR 1.3 1.4  --  1.2  --   --      Recent Labs   Lab 06/30/20  0801   POCTGLUCOSE 119*        lipase-protease-amylase 12,000-38,000-60,000 units  4 capsule Oral TID WM    [START ON 7/3/2020] lisinopriL  5 mg Oral QHS    piperacillin-tazobactam (ZOSYN) IVPB  4.5 g Intravenous Q8H       Assessment and Plan     Ms. Laly Baker is a 85 y.o. female who presented to [facility] on 6/30/2020 with     Hospital Course:    Ms. Laly Baker was admitted to Hospital Medicine for management of      Active Hospital Problems    Diagnosis  POA    *Malignant biliary obstruction [K83.1, C80.1]  Yes    Anemia of chronic disease [D63.8]  Yes    Biliary obstruction due to cancer [K83.1, C80.1]  Yes    Cholangitis [K83.09]  Yes    Elevated LFTs [R94.5]  Yes    Pancreatic adenocarcinoma [C25.9]  Yes    Hyponatremia [E87.1]  Yes    Hyperlipidemia [E78.5]  Yes     Chronic    Essential hypertension [I10]  Yes     Chronic      Resolved Hospital Problems   No resolved problems to display.       Malignant biliary obstruction  Pancreatic adenocarcinoma   Cholangitis   Elevated LFTs  - Patient underwent ERCP by Dr. Marie, unfortunately unable to cannulate CBD in attempt to place stent.  Patient has biliary extraction likely due to pancreatic mass.  Dr. Marie discussed with [facility] and patient has been transferred for advanced endoscopy.  - AES consulted  ;  Appreciate recs  - continue zosyn   - daily labs  - resume home Creon once diet is advanced  - patient went for IR PTC today, follow up LFTs in AM  - CT chest ordered for staging  - has follow up with heme/onc on Tuesday   - CA 19-9 ordered         Hyponatremia  - Na trending up. Now 130  - s/p IVFs     CKD (chronic kidney disease), stage III     - cr stable monitor       Hyperlipidemia  - home statin        Essential hypertension  - home lisinopril      # Anemia of chronic disease  - checking iron panel and ferritin and b12  - re-check CBC this evening    Diet:  Regular   GI PPx:    DVT PPx:    Goals of Care:  full    High Risk Conditions:  Pancreatic adenocarcinoma     Disposition:  Likely tomorrow with heme/onc follow up on Tuesday for treatment options and PO abx    Brandon Chapman MD  Medical Director [facility] Medicine  Spectra:  [phone]  Pager: [phone]

## 2020-07-04 NOTE — TREATMENT PLAN
GI Treatment Plan    Laly Baker is a 85 y.o. female admitted to hospital 6/30/2020 (Hospital Day: 5) due to Malignant biliary obstruction.     Interval History  Patient continues to have hematochezia as of this AM. Hgb appropriately responded s/p transfusion.    Objective  Temp:  [97.6 °F (36.4 °C)-98.8 °F (37.1 °C)] 97.6 °F (36.4 °C) (07/04 1159)  Pulse:  [70-94] 77 (07/04 1159)  BP: (124-165)/(60-71) 133/60 (07/04 1159)  Resp:  [14-20] 17 (07/04 1322)  SpO2:  [94 %-96 %] 94 % (07/04 1159)    General: Alert, Oriented x3, no distress  Abdomen: Non-distended. Normal tympany. Soft. Non-tender. No peritoneal signs.    Laboratory    Recent Labs   Lab 07/03/20  0925 07/03/20  2333 07/04/20  0524   HGB 7.9* 9.6* 9.8*       Lab Results   Component Value Date    WBC 8.29 07/04/2020    HGB 9.8 (L) 07/04/2020    HCT 29.4 (L) 07/04/2020    MCV 89 07/04/2020     07/04/2020       Lab Results   Component Value Date     (L) 07/04/2020    K 3.7 07/04/2020     07/04/2020    CO2 22 (L) 07/04/2020    BUN 14 07/04/2020    CREATININE 0.8 07/04/2020    CALCIUM 8.4 (L) 07/04/2020    ANIONGAP 9 07/04/2020    ESTGFRAFRICA >60.0 07/04/2020    EGFRNONAA >60.0 07/04/2020       Lab Results   Component Value Date     (H) 07/04/2020    AST 32 07/04/2020    ALKPHOS 322 (H) 07/04/2020    BILITOT 1.2 (H) 07/04/2020       Lab Results   Component Value Date    INR 1.2 06/30/2020    INR 1.4 06/30/2020    INR 1.3 06/29/2020       Plan  - Clear liquid diet today and NPO after midnight  - Bedside commode and Tuck's wipes to bedside  - Golytely 1 gallon:   1/2 gallon between 6-8pm (8oz q15min)   1/2 gallon between 4-6am (8oz until complete)  - Please check stool at 5 AM prior to procedure, if not clear, order a bottle of mag citrate, make sure this is not administered past 6AM  - Please ensure Hgb >7 and plts >50  - Plan of care was discussed with primary team.  - We will continue to follow.    Thank you for involving us in  the care of Laly Baker. Please call with any additional questions, concerns or changes in the patient's clinical status.    Brandy Mcgraw MD  Gastroenterology Fellow

## 2020-07-04 NOTE — PROGRESS NOTES
Progress Note   Hospital Medicine         Patient Name: Laly Baker  MRN:  [id number]  [facility] Medicine Team: [facility] X Brandon Chapman MD  Date of Admission:  6/30/2020     Length of Stay:  LOS: 4 days   Expected Discharge Date: 7/6/2020  Principal Problem:  Malignant biliary obstruction       Subjective:     Interval History/Overnight Events:  Patient had a BM this AM and states having just a little bit of blood in it; H/H is stable s/p 2 units of PRBC; trend H/H; CTA ab/pelv does not show any acute bleed; GI planning for C-scope in the AM, NPO after midnight  - update patient's brother on the phone;     Review of Systems   Constitutional: Negative for chills, fatigue, fever.   HENT: Negative for sore throat, trouble swallowing.    Eyes: Negative for photophobia, visual disturbance.   Respiratory: Negative for cough, shortness of breath.    Cardiovascular: Negative for chest pain, palpitations, leg swelling.   Gastrointestinal: Negative for abdominal pain, constipation, diarrhea, nausea, vomiting.   Endocrine: Negative for cold intolerance, heat intolerance.   Genitourinary: Negative for dysuria, frequency.   Musculoskeletal: Negative for arthralgias, myalgias.   Skin: Negative for rash, wound, erythema   Neurological: Negative for dizziness, syncope, weakness, light-headedness.   Psychiatric/Behavioral: Negative for confusion, hallucinations, anxiety  All other systems reviewed and are negative.    Objective:     Temp:  [97.6 °F (36.4 °C)-98.8 °F (37.1 °C)]   Pulse:  [70-94]   Resp:  [12-20]   BP: (124-165)/(60-71)   SpO2:  [94 %-96 %]       Physical Exam:  Constitutional: Appears well-developed and well-nourished.   Head: Normocephalic and atraumatic.   Mouth/Throat: Oropharynx is clear and moist.   Eyes: EOM are normal. Pupils are equal, round, and reactive to light. No scleral icterus.   Neck: Normal range of motion. Neck supple.   Cardiovascular: Normal rate and regular rhythm.  No murmur  heard.  Pulmonary/Chest: Effort normal and breath sounds normal. No respiratory distress. No wheezes, rales, or rhonchi  Abdominal: Soft. Bowel sounds are normal.  No distension or tenderness  Musculoskeletal: Normal range of motion. No edema.   Neurological: Alert and oriented to person, place, and time.   Skin: Skin is warm and dry.   Psychiatric: Normal mood and affect. Behavior is normal.     Recent Labs   Lab 07/02/20  2332 07/03/20  0338 07/03/20  0925 07/03/20  2333 07/04/20  0524 07/04/20  1609   WBC 8.30 8.25 13.30* 9.29 8.29 8.85   HGB 7.2* 6.5* 7.9* 9.6* 9.8* 9.9*   HCT 21.5* 20.0* 24.3* 28.3* 29.4* 29.2*    163 233 162 179 186     Recent Labs   Lab 07/01/20  0459 07/02/20  0258 07/03/20  0338 07/04/20  0524   * 130* 133* 132*   K 3.5 4.2 3.7 3.7   CL 97 97 104 101   CO2 23 24 23 22*   BUN 14 15 15 14   CREATININE 0.7 0.8 0.8 0.8   * 157* 109 224*   CALCIUM 8.3* 8.1* 7.9* 8.4*   MG 1.9  --   --   --    PHOS 2.3*  --   --   --      Recent Labs   Lab 06/29/20  1921 06/30/20  0655  06/30/20  2356  07/02/20  0258 07/03/20  0338 07/04/20  0524   ALKPHOS  --  385*   < >  --    < > 444* 390* 322*   ALT  --  769*   < >  --    < > 327* 213* 160*   AST  --  629*   < >  --    < > 102* 53* 32   ALBUMIN  --  3.0*   < >  --    < > 2.4* 2.1* 2.2*   PROT  --  5.5*   < >  --    < > 4.8* 4.5* 5.0*   BILITOT  --  2.0*   < >  --    < > 4.0* 1.8* 1.2*   INR 1.3 1.4  --  1.2  --   --   --   --     < > = values in this interval not displayed.     Recent Labs   Lab 06/30/20  0801   POCTGLUCOSE 119*        lipase-protease-amylase 12,000-38,000-60,000 units  4 capsule Oral TID WM    pantoprozole (PROTONIX) IV  40 mg Intravenous Q12H    piperacillin-tazobactam (ZOSYN) IVPB  4.5 g Intravenous Q8H    polyethylene glycol  4,000 mL Oral Once       Assessment and Plan     Ms. Laly Baker is a 85 y.o. female who presented to [facility] on 6/30/2020 with     Hospital Course:    Ms. Laly Baker was admitted to  Hospital Medicine for management of     Active Hospital Problems    Diagnosis  POA    *Malignant biliary obstruction [K83.1, C80.1]  Yes    Pancreatic cancer metastasized to intra-abdominal lymph node [C25.9, C77.2]  Yes    Lower GI bleed [K92.2]  No    Acute blood loss anemia [D62]  No    Gastrointestinal hemorrhage with melena [K92.1]  No    Anemia of chronic disease [D63.8]  Yes    Biliary obstruction due to cancer [K83.1, C80.1]  Yes    Cholangitis [K83.09]  Yes    Elevated LFTs [R94.5]  Yes    Pancreatic adenocarcinoma [C25.9]  Yes    Hyponatremia [E87.1]  Yes    Moderate malnutrition [E44.0]  Yes    Hyperlipidemia [E78.5]  Yes     Chronic    Essential hypertension [I10]  Yes     Chronic      Resolved Hospital Problems   No resolved problems to display.       Malignant biliary obstruction  Pancreatic adenocarcinoma   Cholangitis   Elevated LFTs  - Patient underwent ERCP by Dr. Marie, unfortunately unable to cannulate CBD in attempt to place stent.  Patient has biliary extraction likely due to pancreatic mass.  Dr. Marie discussed with [facility] and patient has been transferred for advanced endoscopy.  - AES consulted  ;  Appreciate recs  - continue zosyn   - daily labs  - resume home Creon once diet is advanced  - patient went for IR PTC 7/2, LFTs improved  - CT chest ordered for staging, reviewed  - heme/onc consulted; CA 19-9       # Gastrointestinal hemorrhage with melena  # Acute blood loss anemia  - 2 units of PRBC  - protonix IV BID  - GI consulted  - planning for a C-scope in the AM        Hyponatremia  - Na trending up. Now 130  - s/p IVFs     CKD (chronic kidney disease), stage III     - cr stable monitor       Hyperlipidemia  - home statin        Essential hypertension  - home lisinopril      # Anemia of chronic disease  - checking iron panel and ferritin and b12  - re-check CBC this evening    Diet:  Regular   GI PPx:    DVT PPx:    Goals of Care:  full    High Risk  Conditions:  Pancreatic adenocarcinoma     Disposition:  Possibly Monday     Brandon Chapman MD  Medical Director [facility] Medicine  Spectra:  [phone]  Pager: [phone]

## 2020-07-04 NOTE — PLAN OF CARE
POC reviewed. AAOx4, VSS with no complaints of SOB, headaches, or dizziness. Patient up to BR with urine output as occurrences to toilet. Tolerating clear liquid diet, TPN, and lipids. One bloody BM early in shift, no complaints of N/V. Patient complaint of pain at biliary drain site with pain meds given once. Well controlled with ordered pain medications. Placed 20 gauge IV right forearm after patient inadvertently displaced her right AC 20 gauge IV. Resting quietly with side rails up and call light with in reach. Continuing to monitor patient status.

## 2020-07-04 NOTE — PLAN OF CARE
The patient is lying in bed, sleeping soundly. Easily awakened. The patient has TPN infusing at 42 ml/hr. Bedside commode at bedside. Patient had an incontinent episode earlier during the shift that had a large amt of melena. Monitoring CBC q 8 hrs. PRN pain med given as ordered. Bed locked and in low position, call light within reach. Report given to CELY Villaseñor.

## 2020-07-04 NOTE — PLAN OF CARE
Patient is alert and oriented. Able to make needs known. No c/o pain or discomfort noted. No s/s of respiratory/cardiac distress noted. RUQ biliary tube is patent and is draining well. PIVs are patent and flush well. PPN runs continuously at 42 ml/hr. IV antibiotic therapy continues with no adverse reactions noted. Ambulates independently to the bathroom. VS stable. No issues or concerns at this time. Continue to monitor.

## 2020-07-04 NOTE — PROGRESS NOTES
Progress Note   Hospital Medicine         Patient Name: Laly Baker  MRN:  [id number]  [facility] Medicine Team: [facility] MED X Brandon Chapman MD  Date of Admission:  6/30/2020     Length of Stay:  LOS: 3 days   Expected Discharge Date: 7/3/2020  Principal Problem:  Malignant biliary obstruction       Subjective:     Interval History/Overnight Events:  Patient started having a maroon colored stools early this morning, hemoglobin dropped to 6.5; two units of PRBC ordered and GI consulted and given protonix 80 mg IV bolus and started on 40 mg IV BID; CTA Ab/pelv ordered;  at the bedside;     Review of Systems   Constitutional: Negative for chills, fatigue, fever.   HENT: Negative for sore throat, trouble swallowing.    Eyes: Negative for photophobia, visual disturbance.   Respiratory: Negative for cough, shortness of breath.    Cardiovascular: Negative for chest pain, palpitations, leg swelling.   Gastrointestinal: Negative for abdominal pain, constipation, diarrhea, nausea, vomiting.   Endocrine: Negative for cold intolerance, heat intolerance.   Genitourinary: Negative for dysuria, frequency.   Musculoskeletal: Negative for arthralgias, myalgias.   Skin: Negative for rash, wound, erythema   Neurological: Negative for dizziness, syncope, weakness, light-headedness.   Psychiatric/Behavioral: Negative for confusion, hallucinations, anxiety  All other systems reviewed and are negative.    Objective:     Temp:  [97.5 °F (36.4 °C)-99.3 °F (37.4 °C)]   Pulse:  []   Resp:  [14-20]   BP: (107-165)/(52-71)   SpO2:  [90 %-97 %]       Physical Exam:  Constitutional: Appears well-developed and well-nourished.   Head: Normocephalic and atraumatic.   Mouth/Throat: Oropharynx is clear and moist.   Eyes: EOM are normal. Pupils are equal, round, and reactive to light. No scleral icterus.   Neck: Normal range of motion. Neck supple.   Cardiovascular: Normal rate and regular rhythm.  No murmur heard.  Pulmonary/Chest:  Effort normal and breath sounds normal. No respiratory distress. No wheezes, rales, or rhonchi  Abdominal: Soft. Bowel sounds are normal.  No distension or tenderness  Musculoskeletal: Normal range of motion. No edema.   Neurological: Alert and oriented to person, place, and time.   Skin: Skin is warm and dry.   Psychiatric: Normal mood and affect. Behavior is normal.     Recent Labs   Lab 06/30/20  0655 07/01/20  0459 07/02/20  0258 07/02/20  2332 07/03/20  0338 07/03/20  0925   WBC 10.13 8.88 10.95 8.30 8.25 13.30*   HGB 9.2* 9.5* 8.1* 7.2* 6.5* 7.9*   HCT 27.4* 29.0* 24.8* 21.5* 20.0* 24.3*   * 141* 165 165 163 233     Recent Labs   Lab 07/01/20  0459 07/02/20  0258 07/03/20  0338   * 130* 133*   K 3.5 4.2 3.7   CL 97 97 104   CO2 23 24 23   BUN 14 15 15   CREATININE 0.7 0.8 0.8   * 157* 109   CALCIUM 8.3* 8.1* 7.9*   MG 1.9  --   --    PHOS 2.3*  --   --      Recent Labs   Lab 06/29/20  1921 06/30/20  0655  06/30/20  2356 07/01/20  0459 07/02/20  0258 07/03/20  0338   ALKPHOS  --  385*   < >  --  401* 444* 390*   ALT  --  769*   < >  --  469* 327* 213*   AST  --  629*   < >  --  203* 102* 53*   ALBUMIN  --  3.0*   < >  --  2.6* 2.4* 2.1*   PROT  --  5.5*   < >  --  5.2* 4.8* 4.5*   BILITOT  --  2.0*   < >  --  3.2* 4.0* 1.8*   INR 1.3 1.4  --  1.2  --   --   --     < > = values in this interval not displayed.     Recent Labs   Lab 06/30/20  0801   POCTGLUCOSE 119*        fat emulsion 20%  250 mL Intravenous Daily    lipase-protease-amylase 12,000-38,000-60,000 units  4 capsule Oral TID WM    pantoprozole (PROTONIX) IV  40 mg Intravenous Q12H    piperacillin-tazobactam (ZOSYN) IVPB  4.5 g Intravenous Q8H       Assessment and Plan     Ms. Laly Baker is a 85 y.o. female who presented to [facility] on 6/30/2020 with     Hospital Course:    Ms. Laly Baker was admitted to Hospital Medicine for management of     Active Hospital Problems    Diagnosis  POA    *Malignant biliary obstruction  [K83.1, C80.1]  Yes    Lower GI bleed [K92.2]  No    Acute blood loss anemia [D62]  No    Gastrointestinal hemorrhage with melena [K92.1]  No    Anemia of chronic disease [D63.8]  Yes    Biliary obstruction due to cancer [K83.1, C80.1]  Yes    Cholangitis [K83.09]  Yes    Elevated LFTs [R94.5]  Yes    Pancreatic adenocarcinoma [C25.9]  Yes    Hyponatremia [E87.1]  Yes    Moderate malnutrition [E44.0]  Yes    Hyperlipidemia [E78.5]  Yes     Chronic    Essential hypertension [I10]  Yes     Chronic      Resolved Hospital Problems   No resolved problems to display.       Malignant biliary obstruction  Pancreatic adenocarcinoma   Cholangitis   Elevated LFTs  - Patient underwent ERCP by Dr. Marie, unfortunately unable to cannulate CBD in attempt to place stent.  Patient has biliary extraction likely due to pancreatic mass.  Dr. Marie discussed with [facility] and patient has been transferred for advanced endoscopy.  - AES consulted  ;  Appreciate recs  - continue zosyn   - daily labs  - resume home Creon once diet is advanced  - patient went for IR PTC 7/2, LFTs improved  - CT chest ordered for staging, reviewed  - heme/onc consulted; CA 19-9       # Gastrointestinal hemorrhage with melena  # Acute blood loss anemia  - 2 units of PRBC  - protonix IV BID  - GI consulted  - concern for possible diverticular bleed        Hyponatremia  - Na trending up. Now 130  - s/p IVFs     CKD (chronic kidney disease), stage III     - cr stable monitor       Hyperlipidemia  - home statin        Essential hypertension  - home lisinopril      # Anemia of chronic disease  - checking iron panel and ferritin and b12  - re-check CBC this evening    Diet:  Regular   GI PPx:    DVT PPx:    Goals of Care:  full    High Risk Conditions:  Pancreatic adenocarcinoma     Disposition:  Possibly Monday     Barndon Chapman MD  Medical Director [facility]  Spectra:  [phone]  Pager: [phone]

## 2020-07-05 NOTE — [employer]OVATION PATIENT INSTRUCTIONS
Discharge Summary/Instructions after an Endoscopic Procedure  Patient Name: Laly Baker  Patient MRN: [id number]  Patient YOB: 1935 Sunday, July 5, 2020  Dami Martin MD  RESTRICTIONS:  During your procedure today, you received medications for sedation.  These   medications may affect your judgment, balance and coordination.  Therefore,   for 24 hours, you have the following restrictions:   - DO NOT drive a car, operate machinery, make legal/financial decisions,   sign important papers or drink alcohol.    ACTIVITY:  Today: no heavy lifting, straining or running due to procedural   sedation/anesthesia.  The following day: return to full activity including work.  DIET:  Eat and drink normally unless instructed otherwise.     TREATMENT FOR COMMON SIDE EFFECTS:  - Mild abdominal pain, nausea, belching, bloating or excessive gas:  rest,   eat lightly and use a heating pad.  - Sore Throat: treat with throat lozenges and/or gargle with warm salt   water.  - Because air was used during the procedure, expelling large amounts of air   from your rectum or belching is normal.  - If a bowel prep was taken, you may not have a bowel movement for 1-3 days.    This is normal.  SYMPTOMS TO WATCH FOR AND REPORT TO YOUR PHYSICIAN:  1. Abdominal pain or bloating, other than gas cramps.  2. Chest pain.  3. Back pain.  4. Signs of infection such as: chills or fever occurring within 24 hours   after the procedure.  5. Rectal bleeding, which would show as bright red, maroon, or black stools.   (A tablespoon of blood from the rectum is not serious, especially if   hemorrhoids are present.)  6. Vomiting.  7. Weakness or dizziness.  GO DIRECTLY TO THE NEAREST EMERGENCY ROOM IF YOU HAVE ANY OF THE FOLLOWING:      Difficulty breathing              Chills and/or fever over 101 F   Persistent vomiting and/or vomiting blood   Severe abdominal pain   Severe chest pain   Black, tarry stools   Bleeding- more than one tablespoon   Any  other symptom or condition that you feel may need urgent attention  Your doctor recommends these additional instructions:  If any biopsies were taken, your doctors clinic will contact you in 1 to 2   weeks with any results.  - Discharge patient to home.   - Patient has a contact number available for emergencies.  The signs and   symptoms of potential delayed complications were discussed with the   patient.  Return to normal activities tomorrow.  Written discharge   instructions were provided to the patient.   - Resume previous diet.   - Continue present medications.   - Await pathology results.   - Return to referring physician.   - Protonix 40 mg po daily. Finish 8 week course.   - Avoid NSAIDs unless aspirin needed for cardiac prophylaxis and if so would   continue PPI daily.  For questions, problems or results please call your physician - Dami Martin MD at Work:  [phone].  [facility], EMERGENCY ROOM PHONE NUMBER: [phone]  IF A COMPLICATION OR EMERGENCY SITUATION ARISES AND YOU ARE UNABLE TO REACH   YOUR PHYSICIAN - GO DIRECTLY TO THE EMERGENCY ROOM.  Dami Martin MD  7/5/2020 6:24:07 PM  This report has been verified and signed electronically.  [employer]ATION

## 2020-07-05 NOTE — TREATMENT PLAN
GI Treatment Plan    Laly Baker is a 85 y.o. female admitted to hospital 6/30/2020 (Hospital Day: 6) due to Malignant biliary obstruction.     Interval History  - Colonoscopy and EGD performed, no evidence of bleeding on colonoscopy. EGD revealed multiple nonbleeding duodenal ulcers with no stigmata of bleeding. No evidence of bleeding from sphincterotomy site.    Objective  Temp:  [96.2 °F (35.7 °C)-98.5 °F (36.9 °C)] 97.9 °F (36.6 °C) (07/05 1852)  Pulse:  [62-78] 74 (07/05 1852)  BP: (102-171)/(55-75) 132/58 (07/05 1852)  Resp:  [10-18] 10 (07/05 1852)  SpO2:  [95 %-99 %] 97 % (07/05 1852)    Laboratory    Recent Labs   Lab 07/04/20  2307 07/05/20  0510 07/05/20  0832   HGB 10.6* 10.9* 9.8*       Lab Results   Component Value Date    WBC 8.16 07/05/2020    HGB 9.8 (L) 07/05/2020    HCT 29.0 (L) 07/05/2020    MCV 88 07/05/2020     07/05/2020       Lab Results   Component Value Date     07/05/2020    K 3.6 07/05/2020     07/05/2020    CO2 29 07/05/2020    BUN 7 (L) 07/05/2020    CREATININE 0.8 07/05/2020    CALCIUM 8.8 07/05/2020    ANIONGAP 8 07/05/2020    ESTGFRAFRICA >60.0 07/05/2020    EGFRNONAA >60.0 07/05/2020       Lab Results   Component Value Date     (H) 07/05/2020    AST 31 07/05/2020    ALKPHOS 322 (H) 07/05/2020    BILITOT 1.6 (H) 07/05/2020       Lab Results   Component Value Date    INR 1.2 06/30/2020    INR 1.4 06/30/2020    INR 1.3 06/29/2020       Plan  - Advance diet as tolerated  - Trend H/H, watch for signs of bleeding  - Can D/C PPI IV, switch to PPI PO BID  - No further endoscopic interventions planned unless the patient rebleeds  - We will continue to follow.    Thank you for involving us in the care of Laly Baker. Please call with any additional questions, concerns or changes in the patient's clinical status.    Brandy Mcgraw MD  Gastroenterology Fellow  Spectralink: [phone]

## 2020-07-05 NOTE — ANESTHESIA POSTPROCEDURE EVALUATION
Anesthesia Post Evaluation    Patient: Laly Baker    Procedure(s) Performed: Procedure(s) (LRB):  COLONOSCOPY (N/A)    Final Anesthesia Type: general    Patient location during evaluation: PACU  Patient participation: Yes- Able to Participate  Level of consciousness: awake and alert and oriented  Post-procedure vital signs: reviewed and stable  Pain management: adequate  Airway patency: patent    PONV status at discharge: No PONV  Anesthetic complications: no      Cardiovascular status: hemodynamically stable  Respiratory status: unassisted, spontaneous ventilation and room air  Hydration status: euvolemic  Follow-up not needed.          Vitals Value Taken Time   /61 07/05/20 1832   Temp 36.5 °C (97.7 °F) 07/05/20 1824   Pulse 79 07/05/20 1834   Resp 14 07/05/20 1834   SpO2 98 % 07/05/20 1834   Vitals shown include unvalidated device data.      No case tracking events are documented in the log.      Pain/Puma Score: Pain Rating Prior to Med Admin: 5 (7/5/2020  2:50 PM)

## 2020-07-05 NOTE — NURSING TRANSFER
Nursing Transfer Note      7/5/2020     Transfer To: 1048a    Transfer via stretcher    Transfer with IV    Transported by pct    Medicines sent: no    Chart send with patient: Yes    Notified: spouse    Patient reassessed at: 7/5/20@1839hrs

## 2020-07-05 NOTE — PROVATION PATIENT INSTRUCTIONS
Discharge Summary/Instructions after an Endoscopic Procedure  Patient Name: Laly Baker  Patient MRN: [id number]  Patient YOB: 1935 Sunday, July 5, 2020  Dami Martin MD  RESTRICTIONS:  During your procedure today, you received medications for sedation.  These   medications may affect your judgment, balance and coordination.  Therefore,   for 24 hours, you have the following restrictions:   - DO NOT drive a car, operate machinery, make legal/financial decisions,   sign important papers or drink alcohol.    ACTIVITY:  Today: no heavy lifting, straining or running due to procedural   sedation/anesthesia.  The following day: return to full activity including work.  DIET:  Eat and drink normally unless instructed otherwise.     TREATMENT FOR COMMON SIDE EFFECTS:  - Mild abdominal pain, nausea, belching, bloating or excessive gas:  rest,   eat lightly and use a heating pad.  - Sore Throat: treat with throat lozenges and/or gargle with warm salt   water.  - Because air was used during the procedure, expelling large amounts of air   from your rectum or belching is normal.  - If a bowel prep was taken, you may not have a bowel movement for 1-3 days.    This is normal.  SYMPTOMS TO WATCH FOR AND REPORT TO YOUR PHYSICIAN:  1. Abdominal pain or bloating, other than gas cramps.  2. Chest pain.  3. Back pain.  4. Signs of infection such as: chills or fever occurring within 24 hours   after the procedure.  5. Rectal bleeding, which would show as bright red, maroon, or black stools.   (A tablespoon of blood from the rectum is not serious, especially if   hemorrhoids are present.)  6. Vomiting.  7. Weakness or dizziness.  GO DIRECTLY TO THE NEAREST EMERGENCY ROOM IF YOU HAVE ANY OF THE FOLLOWING:      Difficulty breathing              Chills and/or fever over 101 F   Persistent vomiting and/or vomiting blood   Severe abdominal pain   Severe chest pain   Black, tarry stools   Bleeding- more than one tablespoon   Any  other symptom or condition that you feel may need urgent attention  Your doctor recommends these additional instructions:  If any biopsies were taken, your doctors clinic will contact you in 1 to 2   weeks with any results.  - Resume previous diet.   - Continue present medications.   - Await pathology results.   - No recommendation at this time regarding repeat colonoscopy due to age.   - Return patient to [facility].   For questions, problems or results please call your physician - Dami Martin MD at Work:  [phone].  [facility], EMERGENCY ROOM PHONE NUMBER: [phone]  IF A COMPLICATION OR EMERGENCY SITUATION ARISES AND YOU ARE UNABLE TO REACH   YOUR PHYSICIAN - GO DIRECTLY TO THE EMERGENCY ROOM.  Dami Martin MD  7/5/2020 6:29:45 PM  This report has been verified and signed electronically.  [employer]ATION

## 2020-07-05 NOTE — PLAN OF CARE
Patient is alert and oriented. Able to make needs known to staff. Prn Dilaudid given for pain control. No s/s of respiratory/cardiac distress noted. RUQ Biliary tube is patent and is draining well. PIVs are patent and flush well. IV antibiotic therapy continues with no adverse reactions noted. PPN runs continuously at 42 ml/hr. Patient remains NPO except meds for Colonoscopy procedure today. Ambulates independently to the bathroom. VS stable. No issues or concerns at this time. Continue to monitor.

## 2020-07-05 NOTE — INTERVAL H&P NOTE
The patient has been examined and the H&P has been reviewed:    I concur with the findings and changes have been noted since the H&P was written: patient continued to have BRBPR that cleared up during prep.    Anesthesia/Surgery risks, benefits and alternative options discussed and understood by patient/family.          Active Hospital Problems    Diagnosis  POA    *Malignant biliary obstruction [K83.1, C80.1]  Yes    Pancreatic cancer metastasized to intra-abdominal lymph node [C25.9, C77.2]  Yes    Lower GI bleed [K92.2]  No    Acute blood loss anemia [D62]  No    Gastrointestinal hemorrhage with melena [K92.1]  No    Anemia of chronic disease [D63.8]  Yes    Biliary obstruction due to cancer [K83.1, C80.1]  Yes    Cholangitis [K83.09]  Yes    Elevated LFTs [R94.5]  Yes    Pancreatic adenocarcinoma [C25.9]  Yes    Hyponatremia [E87.1]  Yes    Moderate malnutrition [E44.0]  Yes    Hyperlipidemia [E78.5]  Yes     Chronic    Essential hypertension [I10]  Yes     Chronic      Resolved Hospital Problems   No resolved problems to display.

## 2020-07-05 NOTE — ANESTHESIA PREPROCEDURE EVALUATION
[facility]  Anesthesia Pre-Operative Evaluation         Patient Name: Laly Baker  YOB: 1935  MRN: [id number]    SUBJECTIVE:     Pre-operative evaluation for Procedure(s) (LRB):  COLONOSCOPY (N/A)     07/05/2020    Laly Baker is a 85 y.o. female w/ a significant PMHx of newly diagnosed pancreatic CA, HTN, DM, malignant biliary obstruction which failied multiple ERCPs now s/p PTHD placement with IR 7/2, now presents w bloody BM.    Patient now presents for the above procedure(s).      LDA:        Peripheral IV - Single Lumen 07/02/20 1605 20 G Left Forearm (Active)   Site Assessment Dry;Intact;No redness;No swelling 07/04/20 2004   Line Status Infusing 07/04/20 2004   Dressing Status Clean;Dry;Intact 07/04/20 2004   Dressing Intervention Integrity maintained 07/04/20 2004   Dressing Change Due 07/06/20 07/04/20 2004   Site Change Due 07/06/20 07/04/20 2004   Reason Not Rotated Not due 07/04/20 2004   Number of days: 2            Peripheral IV - Single Lumen 07/04/20 0330 20 G Distal;Posterior;Right Forearm (Active)   Site Assessment Clean;Intact;Dry;No redness;No swelling 07/04/20 2004   Line Status Infusing 07/04/20 2004   Dressing Status Clean;Dry;Intact 07/04/20 2004   Dressing Intervention Integrity maintained 07/04/20 2004   Dressing Change Due 07/08/20 07/04/20 2004   Site Change Due 07/08/20 07/04/20 2004   Reason Not Rotated Not due 07/04/20 2004   Number of days: 1            Biliary Tube 07/02/20 1645 VTCB biliary 8 Fr. RUQ (Active)   Site Description Healing 07/04/20 2004   Dressing Status Clean;Dry;Intact 07/04/20 2004   Drainage Color Brown 07/04/20 2004   Drain Status Dependent drainage 07/04/20 2004   Tube Output(mL)(Include Discarded Residual) 150 mL 07/05/20 0400   Number of days: 2       Prev airway: None documented.    Drips:    TPN ADULT PERIPHERAL CUSTOM 42 mL/hr at 07/04/20 0542        Patient Active Problem List   Diagnosis    Essential hypertension    Hx of colonic polyps    Hyperlipidemia    Spinal stenosis    DDD (degenerative disc disease), lumbar    Spondylolisthesis    Lumbar stenosis    Spondylosis of lumbar region without myelopathy or radiculopathy    Moderate malnutrition    Secondary osteoarthritis of multiple sites    Senile nuclear sclerosis    CKD (chronic kidney disease), stage III    Nonexudative age-related macular degeneration, bilateral, intermediate dry stage    Posterior vitreous detachment, bilateral    Biliary obstruction    Hyponatremia    Advance care planning    Abdominal pain    Malignant biliary obstruction    Biliary obstruction due to cancer    Cholangitis    Elevated LFTs    Pancreatic adenocarcinoma    Anemia of chronic disease    Lower GI bleed    Acute blood loss anemia    Gastrointestinal hemorrhage with melena    Pancreatic cancer metastasized to intra-abdominal lymph node       Review of patient's allergies indicates:  No Known Allergies    Current Inpatient Medications:   lipase-protease-amylase 12,000-38,000-60,000 units  4 capsule Oral TID WM    pantoprozole (PROTONIX) IV  40 mg Intravenous Q12H    piperacillin-tazobactam (ZOSYN) IVPB  4.5 g Intravenous Q8H       No current facility-administered medications on file prior to encounter.      Current Outpatient Medications on File Prior to Encounter   Medication Sig Dispense Refill    lipase-protease-amylase 24,000-76,000-120,000 units (CREON) 24,000-76,000 -120,000 unit capsule Take 2 capsules by mouth 3 (three) times daily with meals. 180 capsule 11    lisinopriL (PRINIVIL,ZESTRIL) 5 MG tablet Take 1 tablet (5 mg total) by mouth once daily. 90 tablet 0    mv-mn/folic acid/calcium/vit K (WOMEN'S 50 PLUS MULTIVITAMIN ORAL) Take 1 tablet by mouth once daily.       piperacillin sodium/tazobactam (PIPERACILLIN-TAZOBACTAM 4.5G/100ML D5W IVPB, READY TO MIX,) Inject 100 mLs  (4.5 g total) into the vein every 8 (eight) hours.      simvastatin (ZOCOR) 20 MG tablet Take 1 tablet (20 mg total) by mouth every evening. 90 tablet 3       Past Surgical History:   Procedure Laterality Date    CATARACT EXTRACTION W/  INTRAOCULAR LENS IMPLANT Right 08/08/2017    k[place]man    CATARACT EXTRACTION W/  INTRAOCULAR LENS IMPLANT Left 10/10/2017    kulman    CERVICAL CONIZATION   W/ LASER      COLONOSCOPY  01/12/2015    Dr. Barton; diverticulosis; ext int hemorrhoid; repeat in 5 years    COLONOSCOPY N/A 2/24/2020    Procedure: COLONOSCOPY;  Surgeon: Ankur Barton MD;  Location: [facility];  Service: Endoscopy;  Laterality: N/A;    COLONOSCOPY W/ POLYPECTOMY      cone      ENDOSCOPIC ULTRASOUND OF UPPER GASTROINTESTINAL TRACT Left 6/17/2020    Procedure: ULTRASOUND, UPPER GI TRACT, ENDOSCOPIC;  Surgeon: Marcello Campbell MD;  Location: [facility];  Service: Endoscopy;  Laterality: Left;  Linear scope    epid ster  2012. 2014    ERCP N/A 6/30/2020    Procedure: ERCP (ENDOSCOPIC RETROGRADE CHOLANGIOPANCREATOGRAPHY);  Surgeon: Ankur Barton MD;  Location: [facility];  Service: Endoscopy;  Laterality: N/A;    ERCP N/A 7/1/2020    Procedure: ERCP (ENDOSCOPIC RETROGRADE CHOLANGIOPANCREATOGRAPHY);  Surgeon: Jamal Martin MD;  Location: [facility] ([address] [place] [address]);  Service: Endoscopy;  Laterality: N/A;    ESOPHAGOGASTRODUODENOSCOPY N/A 6/17/2020    Procedure: EGD (ESOPHAGOGASTRODUODENOSCOPY);  Surgeon: Marcello Campbell MD;  Location: [facility];  Service: Endoscopy;  Laterality: N/A;    TONSILLECTOMY         Social History     Socioeconomic History    Marital status:      Spouse name: Not on file    Number of children: Not on file    Years of education: Not on file    Highest education level: Not on file   Occupational History    Not on file   Social Needs    Financial resource strain: Not on file    Food insecurity     Worry: Not on file     Inability: Not on file     Transportation needs     Medical: Not on file     Non-medical: Not on file   Tobacco Use    Smoking status: Never Smoker    Smokeless tobacco: Never Used   Substance and Sexual Activity    Alcohol use: No    Drug use: No    Sexual activity: Not Currently     Birth control/protection: Post-menopausal   Lifestyle    Physical activity     Days per week: Not on file     Minutes per session: Not on file    Stress: Not on file   Relationships    Social connections     Talks on phone: Not on file     Gets together: Not on file     Attends Jainism service: Not on file     Active member of club or organization: Not on file     Attends meetings of clubs or organizations: Not on file     Relationship status: Not on file   Other Topics Concern    Not on file   Social History Narrative    Not on file       OBJECTIVE:     Vital Signs Range (Last 24H):  Temp:  [35.7 °C (96.2 °F)-36.9 °C (98.5 °F)]   Pulse:  [71-77]   Resp:  [12-18]   BP: (124-171)/(60-75)   SpO2:  [94 %-97 %]       Significant Labs:  Lab Results   Component Value Date    WBC 7.07 07/05/2020    HGB 10.9 (L) 07/05/2020    HCT 32.8 (L) 07/05/2020     07/05/2020    CHOL 133 10/14/2019    TRIG 80 10/14/2019    HDL 59 10/14/2019     (H) 07/05/2020    AST 31 07/05/2020     07/05/2020    K 3.6 07/05/2020     07/05/2020    CREATININE 0.8 07/05/2020    BUN 7 (L) 07/05/2020    CO2 29 07/05/2020    TSH 3.786 10/04/2017    INR 1.2 06/30/2020       Diagnostic Studies: No relevant studies.    EKG:   Results for orders placed or performed during the hospital encounter of 06/29/20   EKG 12-lead    Collection Time: 06/30/20  3:00 PM    Narrative    Test Reason : I48.91,    Vent. Rate : 084 BPM     Atrial Rate : 084 BPM     P-R Int : 214 ms          QRS Dur : 084 ms      QT Int : 400 ms       P-R-T Axes : 083 -46 069 degrees     QTc Int : 472 ms    Sinus rhythm with 1st degree A-V block  Pulmonary disease pattern  Left anterior fascicular  block  Abnormal ECG  No previous ECGs available    Referred By: [email or web address]   SELF           Confirmed By:        2D ECHO:  TTE:  No results found for this or any previous visit.    KRISTA:  No results found for this or any previous visit.    ASSESSMENT/PLAN:         Anesthesia Evaluation    I have reviewed the Patient Summary Reports.    I have reviewed the Nursing Notes.    I have reviewed the Medications.     Review of Systems  Anesthesia Hx:  No problems with previous Anesthesia  History of prior surgery of interest to airway management or planning: Denies Family Hx of Anesthesia complications.   Denies Personal Hx of Anesthesia complications.   Hematology/Oncology:  Hematology Normal        EENT/Dental:EENT/Dental Normal   Cardiovascular:   Hypertension    Pulmonary:  Pulmonary Normal    Renal/:   Chronic Renal Disease, CRI    Hepatic/GI:   Pancreatic cancer with biliary obstruction   Musculoskeletal:   Arthritis     Neurological:  Neurology Normal    Endocrine:  Endocrine Normal    Dermatological:  Skin Normal    Psych:  Psychiatric Normal           Physical Exam  General:  Cachexia    Airway/Jaw/Neck:  Airway Findings: Mouth Opening: Normal Tongue: Normal  General Airway Assessment: Adult  Mallampati: I  Jaw/Neck Findings:  Neck ROM: Normal ROM     Eyes/Ears/Nose:  EYES/EARS/NOSE FINDINGS: Normal    Chest/Lungs:  Chest/Lungs Findings: Clear to auscultation, Normal Respiratory Rate     Heart/Vascular:  Heart Findings: Rate: Normal  Rhythm: Regular Rhythm  Sounds: Normal  Vascular Findings:     Abdomen:  Abdomen Findings:  Soft, Distention  drain in place   Musculoskeletal:  Musculoskeletal Findings:    Skin:  Skin Findings:     Mental Status:  Mental Status Findings:  Cooperative, Alert and Oriented         Anesthesia Plan  Type of Anesthesia, risks & benefits discussed:  Anesthesia Type:  general, MAC  Patient's Preference:   Intra-op Monitoring Plan: standard ASA monitors  Intra-op Monitoring Plan Comments:    Post Op Pain Control Plan: per primary service following discharge from PACU, IV/PO Opioids PRN and multimodal analgesia  Post Op Pain Control Plan Comments:   Induction:   IV  Beta Blocker:         Informed Consent: Patient understands risks and agrees with Anesthesia plan.  Questions answered. Anesthesia consent signed with patient.  ASA Score: 3     Day of Surgery Review of History & Physical:            Ready For Surgery From Anesthesia Perspective.

## 2020-07-05 NOTE — PLAN OF CARE
POC reviewed with patient, states understanding. AOx4. VS WDL, elevated SBP. Clear liquid diet, tolerated well. No complaints of nausea. Pain effectively managed per MAR. NPO after midnight. Golytely resumed at 0400, multiple liquid, dark red BM with small specks of stool noted. Voiding per BSC, up independently. IR drain to RUQ to gravity. PPN infusing per order. Will continue to manage POC.

## 2020-07-05 NOTE — TRANSFER OF CARE
"Anesthesia Transfer of Care Note    Patient: Laly Baker    Procedure(s) Performed: Procedure(s) (LRB):  COLONOSCOPY (N/A)    Patient location: PACU    Anesthesia Type: general    Transport from OR: Transported from OR on 2-3 L/min O2 by NC with adequate spontaneous ventilation    Post pain: adequate analgesia    Post assessment: no apparent anesthetic complications    Post vital signs: stable    Level of consciousness: sedated    Nausea/Vomiting: no nausea/vomiting    Complications: none    Transfer of care protocol was followed      Last vitals:   Visit Vitals  BP (!) 102/55   Pulse 78   Temp 36.5 °C (97.7 °F) (Temporal)   Resp 16   Ht 5' 4" (1.626 m)   Wt 39.6 kg (87 lb 4.8 oz)   SpO2 99%   Breastfeeding No   BMI 14.99 kg/m²     "

## 2020-07-06 NOTE — PHYSICIAN QUERY
"PT Name: Laly Baker  MR #: [id number]   Physician Query Form - Documentation Clarification    Ashley Aguilera RN, CCDS; [email or web address]    This form is a permanent document in the medical record.     Query Date: July 6, 2020  By submitting this query, we are merely seeking further clarification of documentation. Please utilize your independent clinical judgment when addressing the question(s) below.    The Medical record reflects the following:  Supporting Clinical Findings Location in Medical Record   EGD 7/5  Impression:   - Mild Schatzki ring.  - Single thikckened prepyloric fold. Gastritis. Biopsied.  - Normal major papilla.  - Non-bleeding duodenal ulcers with no stigmata of    bleeding.    Protonix 40 mg daily.  Finish 8 wk course EGD report 7/5                                                                          Doctor, Please specify diagnosis or diagnoses associated with above clinical findings.     Chronic gastritis  without bleeding                  Please further specify "gastritis".     Provider Use Only    (  ) Acute with bleeding  (  ) Acute without bleeding    (  ) Chronic with bleeding  (  ) Chronic without bleeding    (  ) Acute erosive with bleeding  (  ) Acute erosive without bleeding    (  ) Chronic erosive with bleeding  (  ) Chronic erosive without bleeding    (  ) Superficial chronic with bleeding  (  ) Superficial chronic without bleeding    (  ) Other gastritis - specify type and bleeding status:                  Type: _____________                  Specify: (  )With bleeding;  (  )Without bleeding    (  ) Other diagnosis: _______________    (  ) Unspecified gastritis                                                                                                         [  ] Clinically Undetermined               "

## 2020-07-06 NOTE — PLAN OF CARE
07/06/20 1214   Post-Acute Status   Post-Acute Authorization Home Health   Home Health Status Referrals Sent    sent HH orders to N to assign agency.    Mary Lou Goel LMSW  [facility]  [id number]

## 2020-07-06 NOTE — PROGRESS NOTES
Progress Note   Hospital Medicine         Patient Name: Laly Baker  MRN:  [id number]  [facility] Medicine Team: [facility]OSP MED X Brandon Chapman MD  Date of Admission:  6/30/2020     Length of Stay:  LOS: 5 days   Expected Discharge Date: 7/6/2020  Principal Problem:  Malignant biliary obstruction       Subjective:     Interval History/Overnight Events:  Patient did not have any bloody BMs over night; H/H is stable; went for EGD/C-scope and EGD showed multiple duodenal ulcers, non bleeding, switching to PO protonix 40 mg PO BID; patient still having abdominal pain and today started having fluid coming from around the drain; will have IR take a look at it tomorrow and may need tube up sized; NPO after midnight  - plan for d/c tomorrow     Review of Systems   Constitutional: Negative for chills, fatigue, fever.   HENT: Negative for sore throat, trouble swallowing.    Eyes: Negative for photophobia, visual disturbance.   Respiratory: Negative for cough, shortness of breath.    Cardiovascular: Negative for chest pain, palpitations, leg swelling.   Gastrointestinal: Negative for abdominal pain, constipation, diarrhea, nausea, vomiting.   Endocrine: Negative for cold intolerance, heat intolerance.   Genitourinary: Negative for dysuria, frequency.   Musculoskeletal: Negative for arthralgias, myalgias.   Skin: Negative for rash, wound, erythema   Neurological: Negative for dizziness, syncope, weakness, light-headedness.   Psychiatric/Behavioral: Negative for confusion, hallucinations, anxiety  All other systems reviewed and are negative.    Objective:     Temp:  [96.2 °F (35.7 °C)-98.5 °F (36.9 °C)]   Pulse:  [62-78]   Resp:  [10-18]   BP: (102-171)/(55-75)   SpO2:  [95 %-99 %]       Physical Exam:  Constitutional: Appears well-developed and well-nourished.   Head: Normocephalic and atraumatic.   Mouth/Throat: Oropharynx is clear and moist.   Eyes: EOM are normal. Pupils are equal, round, and reactive to light. No scleral  icterus.   Neck: Normal range of motion. Neck supple.   Cardiovascular: Normal rate and regular rhythm.  No murmur heard.  Pulmonary/Chest: Effort normal and breath sounds normal. No respiratory distress. No wheezes, rales, or rhonchi  Abdominal: Soft. Bowel sounds are normal.  No distension or tenderness  Musculoskeletal: Normal range of motion. No edema.   Neurological: Alert and oriented to person, place, and time.   Skin: Skin is warm and dry.   Psychiatric: Normal mood and affect. Behavior is normal.     Recent Labs   Lab 07/03/20  2333 07/04/20  0524 07/04/20  1609 07/04/20  2307 07/05/20  0510 07/05/20  0832   WBC 9.29 8.29 8.85 9.25 7.07 8.16   HGB 9.6* 9.8* 9.9* 10.6* 10.9* 9.8*   HCT 28.3* 29.4* 29.2* 31.5* 32.8* 29.0*    179 186 227 209 186     Recent Labs   Lab 07/01/20  0459  07/03/20  0338 07/04/20 0524 07/05/20  0510   *   < > 133* 132* 137   K 3.5   < > 3.7 3.7 3.6   CL 97   < > 104 101 100   CO2 23   < > 23 22* 29   BUN 14   < > 15 14 7*   CREATININE 0.7   < > 0.8 0.8 0.8   *   < > 109 224* 232*   CALCIUM 8.3*   < > 7.9* 8.4* 8.8   MG 1.9  --   --   --   --    PHOS 2.3*  --   --   --   --     < > = values in this interval not displayed.     Recent Labs   Lab 06/29/20  1921 06/30/20  0655  06/30/20  2356  07/03/20  0338 07/04/20  0524 07/05/20  0510   ALKPHOS  --  385*   < >  --    < > 390* 322* 322*   ALT  --  769*   < >  --    < > 213* 160* 153*   AST  --  629*   < >  --    < > 53* 32 31   ALBUMIN  --  3.0*   < >  --    < > 2.1* 2.2* 2.7*   PROT  --  5.5*   < >  --    < > 4.5* 5.0* 6.1   BILITOT  --  2.0*   < >  --    < > 1.8* 1.2* 1.6*   INR 1.3 1.4  --  1.2  --   --   --   --     < > = values in this interval not displayed.     Recent Labs   Lab 06/30/20  0801   POCTGLUCOSE 119*        lipase-protease-amylase 12,000-38,000-60,000 units  4 capsule Oral TID WM    [START ON 7/6/2020] pantoprazole  40 mg Oral BID AC    piperacillin-tazobactam (ZOSYN) IVPB  4.5 g Intravenous  Q8H       Assessment and Plan     Ms. Laly Baker is a 85 y.o. female who presented to [facility] on 6/30/2020 with     Hospital Course:    Ms. Laly Baker was admitted to Hospital Medicine for management of     Active Hospital Problems    Diagnosis  POA    *Malignant biliary obstruction [K83.1, C80.1]  Yes    Pancreatic cancer metastasized to intra-abdominal lymph node [C25.9, C77.2]  Yes    Lower GI bleed [K92.2]  No    Acute blood loss anemia [D62]  No    Gastrointestinal hemorrhage with melena [K92.1]  No    Anemia of chronic disease [D63.8]  Yes    Biliary obstruction due to cancer [K83.1, C80.1]  Yes    Cholangitis [K83.09]  Yes    Elevated LFTs [R94.5]  Yes    Pancreatic adenocarcinoma [C25.9]  Yes    Hyponatremia [E87.1]  Yes    Moderate malnutrition [E44.0]  Yes    Hyperlipidemia [E78.5]  Yes     Chronic    Essential hypertension [I10]  Yes     Chronic      Resolved Hospital Problems   No resolved problems to display.       Malignant biliary obstruction  Pancreatic adenocarcinoma   Cholangitis   Elevated LFTs  - Patient underwent ERCP by Dr. Marie, unfortunately unable to cannulate CBD in attempt to place stent.  Patient has biliary extraction likely due to pancreatic mass.  Dr. Marie discussed with [facility] and patient has been transferred for advanced endoscopy.  - AES consulted  ;  Appreciate recs  - continue zosyn   - daily labs  - resume home Creon once diet is advanced  - patient went for IR PTC 7/2, LFTs improved  - CT chest ordered for staging, reviewed  - heme/onc consulted; CA 19-9   - IR re-consulted for possible tube up size tomorrow       # Gastrointestinal hemorrhage with melena  # Acute blood loss anemia  - s/p 2 units of PRBC  - protonix PO BID  - GI consulted  - EGD/C-scope done which showed duodenal ulcers, no evidence of recent bleed,         Hyponatremia  - Na trending up. Now 130  - s/p IVFs     CKD (chronic kidney disease), stage III     - cr  stable monitor       Hyperlipidemia  - home statin        Essential hypertension  - home lisinopril      # Anemia of chronic disease  - checking iron panel and ferritin and b12  - re-check CBC this evening    Diet:  Regular   GI PPx:    DVT PPx:    Goals of Care:  full    High Risk Conditions:  Pancreatic adenocarcinoma     Disposition:  Possibly Monday     Brandon Chapman MD  Medical Director [facility]  Spectra:  [phone]  Pager: [phone]

## 2020-07-06 NOTE — TREATMENT PLAN
GI Treatment Plan    Laly Baker is a 85 y.o. female admitted to hospital 6/30/2020 (Hospital Day: 7) due to Malignant biliary obstruction.     Interval History  EGD/Colonoscopy yesterday with non-bleeding duodenal ulcers on EGD, hemorrhoids on colonoscopy.    Hgb stable overnight.  HDS. VSS.  Notes continued output from biliary drain placed by IR.      Objective  Temp:  [96.2 °F (35.7 °C)-98.5 °F (36.9 °C)] 97.6 °F (36.4 °C) (07/06 0802)  Pulse:  [62-78] 73 (07/06 0802)  BP: (102-173)/(55-78) 173/78 (07/06 0802)  Resp:  [10-20] 20 (07/06 0804)  SpO2:  [93 %-99 %] 95 % (07/06 0802)    General: Alert, Oriented x3, no distress  Abdomen: Normoactive bowel sounds. Non-distended. Normal tympany. Soft. Non-tender. No peritoneal signs.  Drain in place RUQ with bilious output    Laboratory    Recent Labs   Lab 07/05/20  0510 07/05/20  0832 07/06/20  0455   HGB 10.9* 9.8* 9.1*       Lab Results   Component Value Date    WBC 5.03 07/06/2020    HGB 9.1 (L) 07/06/2020    HCT 28.6 (L) 07/06/2020    MCV 93 07/06/2020     07/06/2020       Lab Results   Component Value Date     07/05/2020    K 3.6 07/05/2020     07/05/2020    CO2 29 07/05/2020    BUN 7 (L) 07/05/2020    CREATININE 0.8 07/05/2020    CALCIUM 8.8 07/05/2020    ANIONGAP 8 07/05/2020    ESTGFRAFRICA >60.0 07/05/2020    EGFRNONAA >60.0 07/05/2020       Lab Results   Component Value Date     (H) 07/05/2020    AST 31 07/05/2020    ALKPHOS 322 (H) 07/05/2020    BILITOT 1.6 (H) 07/05/2020       Lab Results   Component Value Date    INR 1.2 06/30/2020    INR 1.4 06/30/2020    INR 1.3 06/29/2020       Plan  - continue care of biliary drain per IR  - continue to monitor for signs of major bleeding  - Plan of care was discussed with primary team.  - We are signing-off. Please call with any questions.    Thank you for involving us in the care of Laly Baker. Please call with any additional questions, concerns or changes in the patient's clinical  status.    Drew Parra MD  Gastroenterology Fellow

## 2020-07-06 NOTE — PLAN OF CARE
POC reviewed with patient, states understanding. AOx4. VS WDL. Regular diet, tolerated well. No complaints of nausea. Pain effectively managed per MAR. NPO after midnight. Voiding per BSC, up independently. No BM reported. IR drain to RUQ to gravity. PPN infusing per order. Will continue to manage POC.

## 2020-07-06 NOTE — ANESTHESIA PREPROCEDURE EVALUATION
07/06/2020  [facility]  Anesthesia Pre-Operative Evaluation         Patient Name: Laly Baker  YOB: 1935  MRN: [id number]    SUBJECTIVE:     07/06/2020    Pre-operative evaluation for Procedure(s) (LRB):  CHOLANGIOGRAM (N/A)    Laly Baker is a 85 y.o. female with HTN, CKD-3, and pancreatic cancer (causing biliary obstruction) who presents for the above procedure.   Patient had previous drain placement on 7/2/20 with persistent leakage.        LDA:        Peripheral IV - Single Lumen 07/06/20 0217 22 G Anterior;Proximal;Right Forearm (Active)   Line Status Blood return noted;Flushed;Saline locked 07/06/20 0217   Dressing Status Clean;Dry;Intact 07/06/20 0217   Dressing Intervention First dressing 07/06/20 0217   Dressing Change Due 07/10/20 07/06/20 0217   Site Change Due 07/10/20 07/06/20 0217   Reason Not Rotated Not due 07/06/20 0217   Number of days: 0            Peripheral IV - Single Lumen 20 G Anterior;Distal;Left Forearm (Active)   Line Status Blood return noted;Flushed;Infusing 07/06/20 0218   Dressing Status Clean;Dry;Intact 07/06/20 0218   Dressing Intervention First dressing 07/06/20 0218   Dressing Change Due 07/10/20 07/06/20 0218   Site Change Due 07/10/20 07/06/20 0218   Number of days:             Biliary Tube 07/02/20 1645 VTCB biliary 8 Fr. RUQ (Active)   Site Description Healing 07/05/20 1939   Dressing Status Clean;Dry;Intact 07/05/20 1939   Drainage Color Brown 07/05/20 1939   Drain Status Dependent drainage 07/05/20 1939   Tube Output(mL)(Include Discarded Residual) 100 mL 07/06/20 0000   Number of days: 3       Previous airway:   None documented.      Drips:    TPN ADULT PERIPHERAL CUSTOM 42 mL/hr at 07/05/20 2109       Patient Active Problem List   Diagnosis    Essential hypertension    Hx of colonic polyps    Hyperlipidemia    Spinal  stenosis    DDD (degenerative disc disease), lumbar    Spondylolisthesis    Lumbar stenosis    Spondylosis of lumbar region without myelopathy or radiculopathy    Moderate malnutrition    Secondary osteoarthritis of multiple sites    Senile nuclear sclerosis    CKD (chronic kidney disease), stage III    Nonexudative age-related macular degeneration, bilateral, intermediate dry stage    Posterior vitreous detachment, bilateral    Biliary obstruction    Hyponatremia    Advance care planning    Abdominal pain    Malignant biliary obstruction    Biliary obstruction due to cancer    Cholangitis    Elevated LFTs    Pancreatic adenocarcinoma    Anemia of chronic disease    Lower GI bleed    Acute blood loss anemia    Gastrointestinal hemorrhage with melena    Pancreatic cancer metastasized to intra-abdominal lymph node       Review of patient's allergies indicates:  No Known Allergies    Current Inpatient Medications:   lipase-protease-amylase 12,000-38,000-60,000 units  4 capsule Oral TID WM    pantoprazole  40 mg Oral BID AC    piperacillin-tazobactam (ZOSYN) IVPB  4.5 g Intravenous Q8H       No current facility-administered medications on file prior to encounter.      Current Outpatient Medications on File Prior to Encounter   Medication Sig Dispense Refill    lipase-protease-amylase 24,000-76,000-120,000 units (CREON) 24,000-76,000 -120,000 unit capsule Take 2 capsules by mouth 3 (three) times daily with meals. 180 capsule 11    lisinopriL (PRINIVIL,ZESTRIL) 5 MG tablet Take 1 tablet (5 mg total) by mouth once daily. 90 tablet 0    mv-mn/folic acid/calcium/vit K (WOMEN'S 50 PLUS MULTIVITAMIN ORAL) Take 1 tablet by mouth once daily.          Past Surgical History:   Procedure Laterality Date    CATARACT EXTRACTION W/  INTRAOCULAR LENS IMPLANT Right 08/08/2017    [facility]on    CATARACT EXTRACTION W/  INTRAOCULAR LENS IMPLANT Left 10/10/2017    [facility]    CERVICAL CONIZATION   W/ LASER       COLONOSCOPY  01/12/2015    Dr. Barton; diverticulosis; ext int hemorrhoid; repeat in 5 years    COLONOSCOPY N/A 2/24/2020    Procedure: COLONOSCOPY;  Surgeon: Ankur Barton MD;  [facility];  Service: Endoscopy;  Laterality: N/A;    COLONOSCOPY N/A 7/5/2020    Procedure: COLONOSCOPY;  Surgeon: Dami Martin MD;  Location: [facility] (2ND FLR);  Service: Endoscopy;  Laterality: N/A;    COLONOSCOPY W/ POLYPECTOMY      cone      ENDOSCOPIC ULTRASOUND OF UPPER GASTROINTESTINAL TRACT Left 6/17/2020    Procedure: ULTRASOUND, UPPER GI TRACT, ENDOSCOPIC;  Surgeon: Marcello Campbell MD;  Location: [facility];  Service: Endoscopy;  Laterality: Left;  Linear scope    epid ster  2012. 2014    ERCP N/A 6/30/2020    Procedure: ERCP (ENDOSCOPIC RETROGRADE CHOLANGIOPANCREATOGRAPHY);  Surgeon: Ankur Barton MD;  Location: [facility];  Service: Endoscopy;  Laterality: N/A;    ERCP N/A 7/1/2020    Procedure: ERCP (ENDOSCOPIC RETROGRADE CHOLANGIOPANCREATOGRAPHY);  Surgeon: Jamal Martin MD;  Location: [facility] ([address] [place] [address]);  Service: Endoscopy;  Laterality: N/A;    ESOPHAGOGASTRODUODENOSCOPY N/A 6/17/2020    Procedure: EGD (ESOPHAGOGASTRODUODENOSCOPY);  Surgeon: Marcello Campbell MD;  Location: [facility];  Service: Endoscopy;  Laterality: N/A;    INSERTION OF BILIARY DRAINAGE CATHETER N/A 7/2/2020    Procedure: INSERTION, DRAINAGE CATHETER, BILE DUCT;  Surgeon: Peri Surgeon;  Location: [facility];  Service: Anesthesiology;  Laterality: N/A;    TONSILLECTOMY         Social History     Socioeconomic History    Marital status:      Spouse name: Not on file    Number of children: Not on file    Years of education: Not on file    Highest education level: Not on file   Occupational History    Not on file   Social Needs    Financial resource strain: Not on file    Food insecurity     Worry: Not on file     Inability: Not on file    Transportation needs     Medical: Not on file      Non-medical: Not on file   Tobacco Use    Smoking status: Never Smoker    Smokeless tobacco: Never Used   Substance and Sexual Activity    Alcohol use: No    Drug use: No    Sexual activity: Not Currently     Birth control/protection: Post-menopausal   Lifestyle    Physical activity     Days per week: Not on file     Minutes per session: Not on file    Stress: Not on file   Relationships    Social connections     Talks on phone: Not on file     Gets together: Not on file     Attends Anabaptism service: Not on file     Active member of club or organization: Not on file     Attends meetings of clubs or organizations: Not on file     Relationship status: Not on file   Other Topics Concern    Not on file   Social History Narrative    Not on file       OBJECTIVE:     Vital Signs Range (Last 24H):  Temp:  [35.7 °C (96.2 °F)-36.9 °C (98.4 °F)]   Pulse:  [62-78]   Resp:  [10-20]   BP: (102-173)/(55-78)   SpO2:  [93 %-99 %]       Significant Labs:  Lab Results   Component Value Date    WBC 5.03 07/06/2020    HGB 9.1 (L) 07/06/2020    HCT 28.6 (L) 07/06/2020     07/06/2020    CHOL 133 10/14/2019    TRIG 80 10/14/2019    HDL 59 10/14/2019     (H) 07/05/2020    AST 31 07/05/2020     07/05/2020    K 3.6 07/05/2020     07/05/2020    CREATININE 0.8 07/05/2020    BUN 7 (L) 07/05/2020    CO2 29 07/05/2020    TSH 3.786 10/04/2017    INR 1.2 06/30/2020         Diagnostic Studies:      Cardiac Studies:  EKG:   Vent. Rate : 084 BPM     Atrial Rate : 084 BPM      P-R Int : 214 ms          QRS Dur : 084 ms       QT Int : 400 ms       P-R-T Axes : 083 -46 069 degrees      QTc Int : 472 ms     Sinus rhythm with 1st degree A-V block   Pulmonary disease pattern   Left anterior fascicular block   Abnormal ECG   No previous ECGs available     Unconfirmed study.         ASSESSMENT/PLAN:       Anesthesia Evaluation    I have reviewed the Patient Summary Reports.    I have reviewed the Nursing Notes.    I have  reviewed the Medications.     Review of Systems  Anesthesia Hx:  No problems with previous Anesthesia  History of prior surgery of interest to airway management or planning:  Denies Personal Hx of Anesthesia complications.   Hematology/Oncology:         -- Anemia: Current/Recent Cancer.   EENT/Dental:EENT/Dental Normal   Cardiovascular:   Hypertension    Pulmonary:  Pulmonary Normal    Renal/:   Chronic Renal Disease, CRI    Hepatic/GI:   Pancreatic cancer with biliary obstruction   Musculoskeletal:   Arthritis     Neurological:  Neurology Normal    Endocrine:  Endocrine Normal    Dermatological:  Skin Normal    Psych:  Psychiatric Normal           Physical Exam  General:  Cachexia    Airway/Jaw/Neck:  Airway Findings: Mouth Opening: Normal Tongue: Normal  General Airway Assessment: Adult  Mallampati: I  Jaw/Neck Findings:  Neck ROM: Normal ROM     Eyes/Ears/Nose:  EYES/EARS/NOSE FINDINGS: Normal    Chest/Lungs:  Chest/Lungs Findings: Clear to auscultation, Normal Respiratory Rate     Heart/Vascular:  Heart Findings: Rate: Normal  Rhythm: Regular Rhythm  Sounds: Normal  Vascular Findings:     Abdomen:  Abdomen Findings:  Soft, Distention  Cholecystostomy tube in place with bilious output  drain in place     Mental Status:  Mental Status Findings:  Cooperative, Alert and Oriented         Anesthesia Plan  Type of Anesthesia, risks & benefits discussed:  Anesthesia Type:  general, MAC  Patient's Preference:   Intra-op Monitoring Plan: standard ASA monitors  Intra-op Monitoring Plan Comments:   Post Op Pain Control Plan: multimodal analgesia, IV/PO Opioids PRN and per primary service following discharge from PACU  Post Op Pain Control Plan Comments:   Induction:   IV  Beta Blocker:  Patient is not currently on a Beta-Blocker (No further documentation required).       Informed Consent: Patient understands risks and agrees with Anesthesia plan.  Questions answered. Anesthesia consent signed with patient.  ASA Score: 3      Day of Surgery Review of History & Physical:    H&P update referred to the provider.         Ready For Surgery From Anesthesia Perspective.

## 2020-07-06 NOTE — PLAN OF CARE
POC is reviewed and understood by patient. Oriented x4. NPO earlier, but is advanced to a reg diet to see if patient tolerates it well before being discharged in the am. Up ad louann to toilet. Receives dilaudid and oxy 30mg PRN for pain. No signs of distress noted. WCTM.

## 2020-07-06 NOTE — PLAN OF CARE
[facility]    HOME HEALTH ORDERS  FACE TO FACE ENCOUNTER    Patient Name: Laly Baker  YOB: 1935    PCP: Yayo Lindsey Jr, MD   PCP Address: [address] / [place] [address]  PCP Phone Number: [phone]  PCP Fax: [phone]    Encounter Date: 07/06/2020    Admit to Home Health    Diagnoses:  Active Hospital Problems    Diagnosis  POA    *Malignant biliary obstruction [K83.1, C80.1]  Yes    Pancreatic cancer metastasized to intra-abdominal lymph node [C25.9, C77.2]  Yes    Lower GI bleed [K92.2]  No    Acute blood loss anemia [D62]  No    Gastrointestinal hemorrhage with melena [K92.1]  No    Anemia of chronic disease [D63.8]  Yes    Biliary obstruction due to cancer [K83.1, C80.1]  Yes    Cholangitis [K83.09]  Yes    Elevated LFTs [R94.5]  Yes    Pancreatic adenocarcinoma [C25.9]  Yes    Hyponatremia [E87.1]  Yes    Moderate malnutrition [E44.0]  Yes    Hyperlipidemia [E78.5]  Yes     Chronic    Essential hypertension [I10]  Yes     Chronic      Resolved Hospital Problems   No resolved problems to display.       Future Appointments   Date Time Provider Department Center   7/7/2020  8:40 AM Desire Bledsoe MD [facility]           I have seen and examined this patient face to face today. My clinical findings that support the need for the home health skilled services and home bound status are the following:  Weakness/numbness causing balance and gait disturbance due to Weakness/Debility making it taxing to leave home.    Allergies:Review of patient's allergies indicates:  No Known Allergies    Diet: regular diet    Activities: activity as tolerated    Nursing:   SN to complete comprehensive assessment including routine vital signs. Instruct on disease process and s/s of complications to report to MD. Review/verify medication list sent home with the patient at time of discharge  and instruct patient/caregiver as needed. Frequency may  be adjusted depending on start of care date.    Notify MD if SBP > 160 or < 90; DBP > 90 or < 50; HR > 120 or < 50; Temp > 101; Other:        CONSULTS:    Physical Therapy to evaluate and treat. Evaluate for home safety and equipment needs; Establish/upgrade home exercise program. Perform / instruct on therapeutic exercises, gait training, transfer training, and Range of Motion.  Occupational Therapy to evaluate and treat. Evaluate home environment for safety and equipment needs. Perform/Instruct on transfers, ADL training, ROM, and therapeutic exercises.       SN to do biliary drain checks and help with teaching patient how to remove fluid and record output.             Medications: Review discharge medications with patient and family and provide education.      Current Discharge Medication List      START taking these medications    Details   amoxicillin-clavulanate 875-125mg (AUGMENTIN) 875-125 mg per tablet Take 1 tablet by mouth 2 (two) times daily.  Qty: 7 tablet, Refills: 0      oxyCODONE (ROXICODONE) 20 mg Tab immediate release tablet Take 1 tablet (20 mg total) by mouth every 6 (six) hours as needed for Pain.  Qty: 25 tablet, Refills: 0    Comments: Quantity prescribed more than 7 day supply? No      pantoprazole (PROTONIX) 40 MG tablet Take 1 tablet (40 mg total) by mouth 2 (two) times daily before meals.  Qty: 60 tablet, Refills: 2         CONTINUE these medications which have NOT CHANGED    Details   lipase-protease-amylase 24,000-76,000-120,000 units (CREON) 24,000-76,000 -120,000 unit capsule Take 2 capsules by mouth 3 (three) times daily with meals.  Qty: 180 capsule, Refills: 11      lisinopriL (PRINIVIL,ZESTRIL) 5 MG tablet Take 1 tablet (5 mg total) by mouth once daily.  Qty: 90 tablet, Refills: 0    Comments: Please inactivate all prior scripts with same name and strength including on holds.  Associated Diagnoses: Essential hypertension      mv-mn/folic acid/calcium/vit K (WOMEN'S 50 PLUS  MULTIVITAMIN ORAL) Take 1 tablet by mouth once daily.          STOP taking these medications       piperacillin sodium/tazobactam (PIPERACILLIN-TAZOBACTAM 4.5G/100ML D5W IVPB, READY TO MIX,) Comments:   Reason for Stopping:         simvastatin (ZOCOR) 20 MG tablet Comments:   Reason for Stopping:               I certify that this patient is confined to her home and needs intermittent skilled nursing care, physical therapy and occupational therapy.

## 2020-07-06 NOTE — H&P
Radiology History & Physical      SUBJECTIVE:     History of Present Illness:  Laly Baker is a 85 y.o. female with history of pancreatic CA admitted for malignant biliary obstruction. Recent internal external drain placed on 7/2/20. Persistent leakage around site noted. IR consulted for cholangiogram and potential upsize.    Past Medical History:   Diagnosis Date    Anticoagulant long-term use     Arthritis     Colon polyp     Hyperlipidemia     Hypertension     Nonexudative age-related macular degeneration, bilateral, intermediate dry stage 12/30/2019    Pancreatic pseudocyst     Pancreatitis 05/2017     Past Surgical History:   Procedure Laterality Date    CATARACT EXTRACTION W/  INTRAOCULAR LENS IMPLANT Right 08/08/2017    [facility]    CATARACT EXTRACTION W/  INTRAOCULAR LENS IMPLANT Left 10/10/2017    [facility]    CERVICAL CONIZATION   W/ LASER      COLONOSCOPY  01/12/2015    Dr. Barton; diverticulosis; ext int hemorrhoid; repeat in 5 years    COLONOSCOPY N/A 2/24/2020    Procedure: COLONOSCOPY;  Surgeon: Ankur Barton MD;  Location: [facility];  Service: Endoscopy;  Laterality: N/A;    COLONOSCOPY N/A 7/5/2020    Procedure: COLONOSCOPY;  Surgeon: Dami Martin MD;  Location: [facility] ([address] [place] [address]);  Service: Endoscopy;  Laterality: N/A;    COLONOSCOPY W/ POLYPECTOMY      cone      ENDOSCOPIC ULTRASOUND OF UPPER GASTROINTESTINAL TRACT Left 6/17/2020    Procedure: ULTRASOUND, UPPER GI TRACT, ENDOSCOPIC;  Surgeon: Marcello Campbell MD;  Location: [facility];  Service: Endoscopy;  Laterality: Left;  Linear scope    epid ster  2012. 2014    ERCP N/A 6/30/2020    Procedure: ERCP (ENDOSCOPIC RETROGRADE CHOLANGIOPANCREATOGRAPHY);  Surgeon: Ankur Barton MD;  Location: [facility];  Service: Endoscopy;  Laterality: N/A;    ERCP N/A 7/1/2020    Procedure: ERCP (ENDOSCOPIC RETROGRADE CHOLANGIOPANCREATOGRAPHY);  Surgeon: Jamal Martin MD;  [facility]  Service:  Endoscopy;  Laterality: N/A;    ESOPHAGOGASTRODUODENOSCOPY N/A 6/17/2020    Procedure: EGD (ESOPHAGOGASTRODUODENOSCOPY);  Surgeon: Marcello Campbell MD;  Location: [facility];  Service: Endoscopy;  Laterality: N/A;    INSERTION OF BILIARY DRAINAGE CATHETER N/A 7/2/2020    Procedure: INSERTION, DRAINAGE CATHETER, BILE DUCT;  Surgeon: Peri Surgeon;  Location: [facility];  Service: Anesthesiology;  Laterality: N/A;    TONSILLECTOMY         Home Meds:   Prior to Admission medications    Medication Sig Start Date End Date Taking? Authorizing Provider   amoxicillin-clavulanate 875-125mg (AUGMENTIN) 875-125 mg per tablet Take 1 tablet by mouth 2 (two) times daily. 7/6/20   Brandon Chapman MD   lipase-protease-amylase 24,000-76,000-120,000 units (CREON) 24,000-76,000 -120,000 unit capsule Take 2 capsules by mouth 3 (three) times daily with meals. 6/17/20 6/17/21  Marcello Campbell MD   lisinopriL (PRINIVIL,ZESTRIL) 5 MG tablet Take 1 tablet (5 mg total) by mouth once daily. 4/29/20   Yayo Lindsey Jr., MD   mv-mn/folic acid/calcium/vit K (WOMEN'S 50 PLUS MULTIVITAMIN ORAL) Take 1 tablet by mouth once daily.     Historical Provider, MD   oxyCODONE (ROXICODONE) 20 mg Tab immediate release tablet Take 1 tablet (20 mg total) by mouth every 6 (six) hours as needed for Pain. 7/6/20   Brandon Chapman MD   pantoprazole (PROTONIX) 40 MG tablet Take 1 tablet (40 mg total) by mouth 2 (two) times daily before meals. 7/6/20 7/6/21  Brandon Chapman MD   piperacillin sodium/tazobactam (PIPERACILLIN-TAZOBACTAM 4.5G/100ML D5W IVPB, READY TO MIX,) Inject 100 mLs (4.5 g total) into the vein every 8 (eight) hours. 6/30/20 7/6/20  Harmeet Adams MD   simvastatin (ZOCOR) 20 MG tablet Take 1 tablet (20 mg total) by mouth every evening. 11/6/19 7/6/20  Yayo Lindsey Jr., MD     Anticoagulants/Antiplatelets: no anticoagulation    Allergies: Review of patient's allergies indicates:  No Known Allergies  Sedation History:  no adverse  reactions    Review of Systems:   Hematological: no known coagulopathies  Respiratory: no shortness of breath  Cardiovascular: no chest pain  Gastrointestinal: no abdominal pain  Genito-Urinary: no dysuria  Musculoskeletal: negative  Neurological: no TIA or stroke symptoms         OBJECTIVE:     Vital Signs (Most Recent)  Temp: 98.1 °F (36.7 °C) (07/06/20 1219)  Pulse: 71 (07/06/20 1219)  Resp: 16 (07/06/20 1409)  BP: (!) 161/70 (07/06/20 1219)  SpO2: 97 % (07/06/20 1219)    Physical Exam:  ASA: 2  Mallampati: 2    General: no acute distress  Mental Status: alert and oriented to person, place and time  HEENT: normocephalic, atraumatic  Chest: unlabored breathing  Heart: regular heart rate  Abdomen: nondistended  Extremity: moves all extremities    Laboratory  Lab Results   Component Value Date    INR 1.2 06/30/2020       Lab Results   Component Value Date    WBC 5.03 07/06/2020    HGB 9.1 (L) 07/06/2020    HCT 28.6 (L) 07/06/2020    MCV 93 07/06/2020     07/06/2020      Lab Results   Component Value Date     (H) 07/05/2020     07/05/2020    K 3.6 07/05/2020     07/05/2020    CO2 29 07/05/2020    BUN 7 (L) 07/05/2020    CREATININE 0.8 07/05/2020    CALCIUM 8.8 07/05/2020    MG 1.9 07/01/2020     (H) 07/05/2020    AST 31 07/05/2020    ALBUMIN 2.7 (L) 07/05/2020    BILITOT 1.6 (H) 07/05/2020    BILIDIR 0.4 (H) 06/01/2017       ASSESSMENT/PLAN:     Sedation Plan: Per Anesthesia.  Patient will undergo cholangiogram + potential change.    Balaji Lovett MD  PGY-4 Radiology Resident  Pager: [phone]

## 2020-07-06 NOTE — PHYSICIAN QUERY
"PT Name: Laly Baker  MR #: [id number]     ACUITY OF CONDITION CLARIFICATION    Ashley Aguilera RN, CCDS; [email or web address]    This form is a permanent document in the medical record.     Query Date: July 6, 2020    By submitting this query, we are merely seeking further clarification of documentation to reflect the severity of illness of your patient. Please utilize your independent clinical judgment when addressing the question(s) below.    The Medical record reflects the following:     Indicators   Supporting Clinical Findings Location in Medical Record   x Documentation of condition multiple nonbleeding duodenal ulcers with no stigmata of bleeding   GI Trx Plan 7/5     x Lab Value(s) Hb downtrended to 6.5 but spontaneously increased to 7.9, likely erroneous value GI H&P VO/GI CN 7/3    Radiology Findings     x Treatment/Medication Protonix 40mg daily.  Finish 8 week course    2 units pRBC   EGD Report 7/5    Blood Bank   x Other EGD 7/5            Impression:   - Mild Schatzki ring.  - Single thikckened prepyloric fold.Gastritis.  Biopsied.  - Normal major papilla.  - Non-bleeding duodenal ulcers with no stigmata of bleeding.   EGD 7/5      Provider, please specify the acuity/chronicity of  "duodenal ulcers".     Acute without bleeding    [   ] Acute   [   ] Chronic   [   ] Acute on chronic   [   ] Other - specify: ______________   [   ]  Clinically Undetermined     Please document in your progress notes daily for the duration of treatment until resolved, and include in your discharge summary.  "

## 2020-07-06 NOTE — ANESTHESIA POSTPROCEDURE EVALUATION
Anesthesia Post Evaluation    Patient: Laly Baker    Procedure(s) Performed: Procedure(s) (LRB):  INSERTION, DRAINAGE CATHETER, BILE DUCT (N/A)    Final Anesthesia Type: general    Patient location during evaluation: floor  Patient participation: Yes- Able to Participate  Level of consciousness: awake and alert and oriented  Post-procedure vital signs: reviewed and stable  Pain management: adequate  Airway patency: patent    PONV status at discharge: No PONV  Anesthetic complications: no      Cardiovascular status: blood pressure returned to baseline and hemodynamically stable  Respiratory status: unassisted and spontaneous ventilation  Hydration status: euvolemic  Follow-up not needed.          Vitals Value Taken Time   /78 07/06/20 0802   Temp 36.4 °C (97.6 °F) 07/06/20 0802   Pulse 73 07/06/20 0802   Resp 20 07/06/20 0804   SpO2 95 % 07/06/20 0802         Event Time   Out of Recovery 07/02/2020 17:58:04         Pain/Puma Score: Pain Rating Prior to Med Admin: 5 (7/6/2020  8:04 AM)  Puma Score: 10 (7/5/2020  6:52 PM)

## 2020-07-07 NOTE — PLAN OF CARE
Pt arrived to IR room 188 for cholangiogram + potential change, no acute distress noted. Orders, consents and labs reviewed on chart. Anesthesia at bedside.

## 2020-07-07 NOTE — PLAN OF CARE
07/07/20 1049   Post-Acute Status   Post-Acute Authorization Home Health   Home Health Status Set-up Complete   Pt set up with [facility].    Mary Lou Goel LMSW  [facility]  [id number]

## 2020-07-07 NOTE — PROCEDURES
Radiology Post-Procedure Note    Pre Op Diagnosis: Biliary drain malfunction    Post Op Diagnosis: Same    Procedure: cholangiogram and tube upsize    Procedure performed by: Jeramie Castelan MD    Written Informed Consent Obtained: Yes  Specimen Removed: NO  Estimated Blood Loss: Minimal    Findings:   Cholangiogram shows adequate placement of biliary drain. Tube upsized to 10 Fr. No complications. Keep open to drainage bag.    Patient tolerated procedure well.    Jeramie Castelan M.D.  Diagnostic and Interventional Radiologist  Department of Radiology  Pager: [phone]

## 2020-07-07 NOTE — PLAN OF CARE
Bili tube upsize completed, pt tolerated well. No apparent distress noted. Dressing applied CDI. Pt to be transferred to PACU, report to be given at bedside.

## 2020-07-07 NOTE — NURSING TRANSFER
Nursing Transfer Note      7/7/2020     Transfer To: Interventional Radiology    Transfer via stretcher    Transfer with IV Zosyn    Transported by [facility] transport    Medicines sent: none    Chart send with patient: Yes    Notified: spouse    AMBER Serrano RN

## 2020-07-07 NOTE — NURSING TRANSFER
Nursing Transfer Note      7/7/2020     Report called to pt's primary nurse.  Pt tolerated procedure well.  No complaints of pain or nausea, and vital signs stable.  Pt to be transported back to room 1048A.   notified that pt's procedure complete and she will be up to her room soon.

## 2020-07-07 NOTE — NURSING
MD notified pt of discharge this AM.  at bedside and will take pt home. Cholangiogram scheduled for 0700. Verified with interventional radiology that pt still scheduled for procedure. IR nurse stated that pt will likely be sent down at noon. Notified pt. WCTM.

## 2020-07-07 NOTE — PLAN OF CARE
07/07/20 1026   Post-Acute Status   Post-Acute Authorization Home Health   Home Health Status Pending Clinical Review   Sw spoke with Liane at [facility] and she stated they are still working on setting pt up with a  agency.    Mary Lou Goel LMSW  [facility]  [phone]

## 2020-07-07 NOTE — PROGRESS NOTES
Progress Note   Hospital Medicine         Patient Name: Laly Baker  MRN:  [id number]  [facility] Medicine Team: [facility] HOSP MED X Brandon Chapman MD  Date of Admission:  6/30/2020     Length of Stay:  LOS: 6 days   Expected Discharge Date: 7/6/2020  Principal Problem:  Malignant biliary obstruction       Subjective:     Interval History/Overnight Events:  Patient still having leakage around biliary drain, prn narcotics changed; patient to go for tube exchange in the AM and plan for d/c after that; patient needs to tolerate a diet;     Review of Systems   Constitutional: Negative for chills, fatigue, fever.   HENT: Negative for sore throat, trouble swallowing.    Eyes: Negative for photophobia, visual disturbance.   Respiratory: Negative for cough, shortness of breath.    Cardiovascular: Negative for chest pain, palpitations, leg swelling.   Gastrointestinal: Negative for abdominal pain, constipation, diarrhea, nausea, vomiting.   Endocrine: Negative for cold intolerance, heat intolerance.   Genitourinary: Negative for dysuria, frequency.   Musculoskeletal: Negative for arthralgias, myalgias.   Skin: Negative for rash, wound, erythema   Neurological: Negative for dizziness, syncope, weakness, light-headedness.   Psychiatric/Behavioral: Negative for confusion, hallucinations, anxiety  All other systems reviewed and are negative.    Objective:     Temp:  [97.5 °F (36.4 °C)-98.4 °F (36.9 °C)]   Pulse:  [68-74]   Resp:  [12-20]   BP: (155-173)/(67-78)   SpO2:  [93 %-98 %]       Physical Exam:  Constitutional: Appears well-developed and well-nourished.   Head: Normocephalic and atraumatic.   Mouth/Throat: Oropharynx is clear and moist.   Eyes: EOM are normal. Pupils are equal, round, and reactive to light. No scleral icterus.   Neck: Normal range of motion. Neck supple.   Cardiovascular: Normal rate and regular rhythm.  No murmur heard.  Pulmonary/Chest: Effort normal and breath sounds normal. No respiratory distress. No  wheezes, rales, or rhonchi  Abdominal: Soft. Bowel sounds are normal.  No distension or tenderness  Musculoskeletal: Normal range of motion. No edema.   Neurological: Alert and oriented to person, place, and time.   Skin: Skin is warm and dry.   Psychiatric: Normal mood and affect. Behavior is normal.     Recent Labs   Lab 07/04/20  0524 07/04/20  1609 07/04/20  2307 07/05/20  0510 07/05/20  0832 07/06/20  0455   WBC 8.29 8.85 9.25 7.07 8.16 5.03   HGB 9.8* 9.9* 10.6* 10.9* 9.8* 9.1*   HCT 29.4* 29.2* 31.5* 32.8* 29.0* 28.6*    186 227 209 186 187     Recent Labs   Lab 07/01/20  0459  07/03/20  0338 07/04/20  0524 07/05/20  0510   *   < > 133* 132* 137   K 3.5   < > 3.7 3.7 3.6   CL 97   < > 104 101 100   CO2 23   < > 23 22* 29   BUN 14   < > 15 14 7*   CREATININE 0.7   < > 0.8 0.8 0.8   *   < > 109 224* 232*   CALCIUM 8.3*   < > 7.9* 8.4* 8.8   MG 1.9  --   --   --   --    PHOS 2.3*  --   --   --   --     < > = values in this interval not displayed.     Recent Labs   Lab 06/30/20  0655  06/30/20  2356  07/03/20  0338 07/04/20  0524 07/05/20  0510   ALKPHOS 385*   < >  --    < > 390* 322* 322*   *   < >  --    < > 213* 160* 153*   *   < >  --    < > 53* 32 31   ALBUMIN 3.0*   < >  --    < > 2.1* 2.2* 2.7*   PROT 5.5*   < >  --    < > 4.5* 5.0* 6.1   BILITOT 2.0*   < >  --    < > 1.8* 1.2* 1.6*   INR 1.4  --  1.2  --   --   --   --     < > = values in this interval not displayed.     Recent Labs   Lab 06/30/20  0801   POCTGLUCOSE 119*        lipase-protease-amylase 12,000-38,000-60,000 units  4 capsule Oral TID WM    pantoprazole  40 mg Oral BID AC    piperacillin-tazobactam (ZOSYN) IVPB  4.5 g Intravenous Q8H       Assessment and Plan     Ms. Laly Baker is a 85 y.o. female who presented to [facility] on 6/30/2020 with     Hospital Course:    Ms. Laly Baker was admitted to Hospital Medicine for management of     Active Hospital Problems    Diagnosis  POA    *Malignant  biliary obstruction [K83.1, C80.1]  Yes    Pancreatic cancer metastasized to intra-abdominal lymph node [C25.9, C77.2]  Yes    Lower GI bleed [K92.2]  No    Acute blood loss anemia [D62]  No    Gastrointestinal hemorrhage with melena [K92.1]  No    Anemia of chronic disease [D63.8]  Yes    Biliary obstruction due to cancer [K83.1, C80.1]  Yes    Cholangitis [K83.09]  Yes    Elevated LFTs [R94.5]  Yes    Pancreatic adenocarcinoma [C25.9]  Yes    Hyponatremia [E87.1]  Yes    Moderate malnutrition [E44.0]  Yes    Hyperlipidemia [E78.5]  Yes     Chronic    Essential hypertension [I10]  Yes     Chronic      Resolved Hospital Problems   No resolved problems to display.       Malignant biliary obstruction  Pancreatic adenocarcinoma   Cholangitis   Elevated LFTs  - Patient underwent ERCP by Dr. Marie, unfortunately unable to cannulate CBD in attempt to place stent.  Patient has biliary extraction likely due to pancreatic mass.  Dr. Marie discussed with [facility] and patient has been transferred for advanced endoscopy.  - AES consulted  ;  Appreciate recs  - continue zosyn   - daily labs  - resume home Creon once diet is advanced  - patient went for IR PTC 7/2, LFTs improved  - CT chest ordered for staging, reviewed  - heme/onc consulted; CA 19-9   - IR re-consulted for possible tube up size tomorrow       # Gastrointestinal hemorrhage with melena  # Acute blood loss anemia  - s/p 2 units of PRBC  - protonix PO BID  - GI consulted  - EGD/C-scope done which showed duodenal ulcers, no evidence of recent bleed,         Hyponatremia  - Na trending up. Now 130  - s/p IVFs     CKD (chronic kidney disease), stage III     - cr stable monitor       Hyperlipidemia  - home statin        Essential hypertension  - home lisinopril      # Anemia of chronic disease  - checking iron panel and ferritin and b12  - re-check CBC this evening    Diet:  Regular   GI PPx:    DVT PPx:    Goals of Care:  full    High  Risk Conditions:  Pancreatic adenocarcinoma     Disposition: tomorrow, heme/onc follow up Thursday     Brandon Chapman MD  Medical Director [facility] Medicine  Spectra:  [phone]  Pager: [phone]

## 2020-07-07 NOTE — PLAN OF CARE
POC reviewed with patient, states understanding. AOx4. VS WDL. Regular diet, tolerated well. No complaints of nausea. Pain effectively managed per MAR. NPO after midnight. Voiding per BSC, up independently. No BM reported. IR drain to RUQ to gravity. Will continue to manage POC.

## 2020-07-08 NOTE — PROGRESS NOTES
Progress Note   Hospital Medicine         Patient Name: Laly Baker  MRN:  [id number]  [facility] Medicine Team: [facility]OSP MED X Brandon Chapman MD  Date of Admission:  6/30/2020     Length of Stay:  LOS: 7 days   Expected Discharge Date: 7/7/2020  Principal Problem:  Malignant biliary obstruction       Subjective:     Interval History/Overnight Events:  Patient doing ok today, pain controlled, went for IR tube exchange with biliary tube exchange for large 10 Fr; monitor over night and d/c in AM  - restarting home lisinopril     Review of Systems   Constitutional: Negative for chills, fatigue, fever.   HENT: Negative for sore throat, trouble swallowing.    Eyes: Negative for photophobia, visual disturbance.   Respiratory: Negative for cough, shortness of breath.    Cardiovascular: Negative for chest pain, palpitations, leg swelling.   Gastrointestinal: Negative for abdominal pain, constipation, diarrhea, nausea, vomiting.   Endocrine: Negative for cold intolerance, heat intolerance.   Genitourinary: Negative for dysuria, frequency.   Musculoskeletal: Negative for arthralgias, myalgias.   Skin: Negative for rash, wound, erythema   Neurological: Negative for dizziness, syncope, weakness, light-headedness.   Psychiatric/Behavioral: Negative for confusion, hallucinations, anxiety  All other systems reviewed and are negative.    Objective:     Temp:  [97.2 °F (36.2 °C)-98.8 °F (37.1 °C)]   Pulse:  [67-84]   Resp:  [13-20]   BP: (143-195)/(65-84)   SpO2:  [94 %-100 %]       Physical Exam:  Constitutional: Appears well-developed and well-nourished.   Head: Normocephalic and atraumatic.   Mouth/Throat: Oropharynx is clear and moist.   Eyes: EOM are normal. Pupils are equal, round, and reactive to light. No scleral icterus.   Neck: Normal range of motion. Neck supple.   Cardiovascular: Normal rate and regular rhythm.  No murmur heard.  Pulmonary/Chest: Effort normal and breath sounds normal. No respiratory distress. No wheezes,  rales, or rhonchi  Abdominal: Soft. Bowel sounds are normal.  No distension or tenderness  Musculoskeletal: Normal range of motion. No edema.   Neurological: Alert and oriented to person, place, and time.   Skin: Skin is warm and dry.   Psychiatric: Normal mood and affect. Behavior is normal.     Recent Labs   Lab 07/04/20  1609 07/04/20  2307 07/05/20  0510 07/05/20  0832 07/06/20  0455 07/07/20  0439   WBC 8.85 9.25 7.07 8.16 5.03 5.99   HGB 9.9* 10.6* 10.9* 9.8* 9.1* 9.2*   HCT 29.2* 31.5* 32.8* 29.0* 28.6* 28.1*    227 209 186 187 233     Recent Labs   Lab 07/01/20  0459  07/03/20  0338 07/04/20  0524 07/05/20  0510   *   < > 133* 132* 137   K 3.5   < > 3.7 3.7 3.6   CL 97   < > 104 101 100   CO2 23   < > 23 22* 29   BUN 14   < > 15 14 7*   CREATININE 0.7   < > 0.8 0.8 0.8   *   < > 109 224* 232*   CALCIUM 8.3*   < > 7.9* 8.4* 8.8   MG 1.9  --   --   --   --    PHOS 2.3*  --   --   --   --     < > = values in this interval not displayed.     Recent Labs   Lab 06/30/20  2356  07/03/20  0338 07/04/20  0524 07/05/20  0510   ALKPHOS  --    < > 390* 322* 322*   ALT  --    < > 213* 160* 153*   AST  --    < > 53* 32 31   ALBUMIN  --    < > 2.1* 2.2* 2.7*   PROT  --    < > 4.5* 5.0* 6.1   BILITOT  --    < > 1.8* 1.2* 1.6*   INR 1.2  --   --   --   --     < > = values in this interval not displayed.     No results for input(s): POCTGLUCOSE in the last 168 hours.     lipase-protease-amylase 12,000-38,000-60,000 units  4 capsule Oral TID WM    lisinopriL  20 mg Oral Daily    pantoprazole  40 mg Oral BID AC    piperacillin-tazobactam (ZOSYN) IVPB  4.5 g Intravenous Q8H       Assessment and Plan     Ms. Laly Baker is a 85 y.o. female who presented to [facility] on 6/30/2020 with     Hospital Course:    Ms. Laly Baker was admitted to Hospital Medicine for management of     Active Hospital Problems    Diagnosis  POA    *Malignant biliary obstruction [K83.1, C80.1]  Yes    Pancreatic cancer  metastasized to intra-abdominal lymph node [C25.9, C77.2]  Yes    Lower GI bleed [K92.2]  No    Acute blood loss anemia [D62]  No    Gastrointestinal hemorrhage with melena [K92.1]  No    Anemia of chronic disease [D63.8]  Yes    Biliary obstruction due to cancer [K83.1, C80.1]  Yes    Cholangitis [K83.09]  Yes    Elevated LFTs [R94.5]  Yes    Pancreatic adenocarcinoma [C25.9]  Yes    Hyponatremia [E87.1]  Yes    Moderate malnutrition [E44.0]  Yes    Hyperlipidemia [E78.5]  Yes     Chronic    Essential hypertension [I10]  Yes     Chronic      Resolved Hospital Problems   No resolved problems to display.       Malignant biliary obstruction  Pancreatic adenocarcinoma   Cholangitis   Elevated LFTs  - Patient underwent ERCP by Dr. Marie, unfortunately unable to cannulate CBD in attempt to place stent.  Patient has biliary extraction likely due to pancreatic mass.  Dr. Marie discussed with [facility] and patient has been transferred for advanced endoscopy.  - AES consulted  ;  Appreciate recs  - continue zosyn   - daily labs  - resume home Creon once diet is advanced  - patient went for IR PTC 7/2, LFTs improved  - CT chest ordered for staging, reviewed  - heme/onc consulted; CA 19-9   - IR tube upsize today to 10 Fr to stop leak      # Gastrointestinal hemorrhage with melena  # Acute blood loss anemia  - s/p 2 units of PRBC  - protonix PO BID  - GI consulted  - EGD/C-scope done which showed duodenal ulcers, no evidence of recent bleed,         Hyponatremia  - Na trending up. Now 130  - s/p IVFs     CKD (chronic kidney disease), stage III     - cr stable monitor       Hyperlipidemia  - home statin        Essential hypertension  - home lisinopril      # Anemia of chronic disease  - checking iron panel and ferritin and b12  -    Diet:  Regular   GI PPx:    DVT PPx:    Goals of Care:  full    High Risk Conditions:  Pancreatic adenocarcinoma     Disposition: tomorrow, heme/onc follow up  Thursday     Brandon Chapman MD  Medical Director [facility] Medicine  Spectra:  [phone]  Pager: [phone]

## 2020-07-08 NOTE — PHYSICIAN QUERY
"PT Name: Laly Baker  MR #: [id number]    Relationship to Procedure Clarification   Ashley Aguilera RN, CCDS; [email or web address]    This form is a permanent document in the medical record.     Query Date: July 8, 2020    Dear Provider,  By submitting this query, we are merely seeking further clarification of documentation.   Please utilize your independent clinical judgment when addressing the question(s) below.    The medical record contains the following:  Supporting Clinical Findings Location in Medical Record   · 85 y.o. female with a history of adenocarcinoma of the pancreatic neck dx on 06/17/20  · ERCP was performed but unable to cannulate the bile duct therefore [place] [facility]   · [place] was performed earlier [place] [facility] [place] "Despite attempts with doublewire, and following transpancreatic precut and needle-knife biliary precut-> unable to get deep access to the proximal bile duct. Distal CBD and GB were opacified".     ERCP 6/30   Impression:             - A biliary tract obstruction was found in the common bile duct. Dilated CBD.       Incomplete/partial cholangiogram. Failed deep cannulation.     ERCP 7/1  Veronica WOODS  Impression:    - The major papilla was on the rim of a diverticulum.                         - The major papilla appeared congested.                         - The major papilla appeared edematous.                         - The major papilla appeared lacerated.                         - A pancreatic sphincterotomy was performed (transpancreatic precut                          sphincterotomy to facilitate biliary access).                         - A biliary sphincterotomy was performed (needle knife precut).                         - Despite attempts with doublewire, and following transpancreatic precut and                          needle-knife biliary precut-> unable to get deep access to the proximal                           bile duct. Distal CBD and GB were opacified. " "  Recommendation:         - Recommend IR consult for percutaneous biliary drainage.  Monitor for signs of pancreatitis or cholangitis    Ari JAUREGUI 7/2  Cholangitis; Present on admit = yes      Malignant biliary obstruction;  Pancreatic adenocarcinoma;  Cholangitis; Elevated LFTs  H&P                MD PoC Transfer Note 6/30                ERCP 7/1  Cindi/Veronica JAUREGUI 7/2     Please clarify if "Cholangitids"  (as it relates to "ERCP 6/30") is:    [ x ] Inherent/Integral to the procedure   [  ] Complication of the procedure   [  ] Present, but not a complication of the procedure   [  ] Incidental finding, not clinically significant   [  ] Intended/required to complete procedure   [  ] Other (please specify): __________________   [  ] Clinically Undetermined     Please document in your progress notes daily for the duration of treatment until resolved and include in your discharge summary.  "

## 2020-07-08 NOTE — PLAN OF CARE
POC reviewed, pt verbalized understanding. VSS on RA, HTN treated with PRN Hydralazine. Pain managed with PRN pain meds. Voids per toilet, adequate UOP overnight. Pt on regular diet w/ no complaints of N/V. Biliary drain intact and patent. Pt slept between care. Frequent rounds made for safety, call light in reach. WC      Problem: Adult Inpatient Plan of Care  Goal: Plan of Care Review  Outcome: Ongoing, Progressing  Goal: Patient-Specific Goal (Individualization)  Outcome: Ongoing, Progressing  Goal: Absence of Hospital-Acquired Illness or Injury  Outcome: Ongoing, Progressing  Goal: Optimal Comfort and Wellbeing  Outcome: Ongoing, Progressing  Goal: Rounds/Family Conference  Outcome: Ongoing, Progressing     Problem: Fall Injury Risk  Goal: Absence of Fall and Fall-Related Injury  Outcome: Ongoing, Progressing     Problem: Malnutrition  Goal: Improved Nutritional Intake  Outcome: Ongoing, Progressing

## 2020-07-08 NOTE — TRANSFER OF CARE
"Anesthesia Transfer of Care Note    Patient: Laly Baker    Procedure(s) Performed: Procedure(s) (LRB):  CHOLANGIOGRAM (N/A)    Patient location: PACU    Anesthesia Type: MAC    Transport from OR: Transported from OR on 6-10 L/min O2 by face mask with adequate spontaneous ventilation    Post pain: adequate analgesia    Post assessment: no apparent anesthetic complications and tolerated procedure well    Post vital signs: stable    Level of consciousness: awake, alert and oriented    Nausea/Vomiting: no nausea/vomiting    Complications: none    Transfer of care protocol was followed      Last vitals:   Visit Vitals  BP (!) 140/64 (Patient Position: Lying)   Pulse 88   Temp 36.9 °C (98.4 °F) (Oral)   Resp 18   Ht 5' 4" (1.626 m)   Wt 39.6 kg (87 lb 4.8 oz)   SpO2 (!) 92%   Breastfeeding No   BMI 14.99 kg/m²     "

## 2020-07-08 NOTE — PLAN OF CARE
Patient discharged home today with [facility].     Future Appointments   Date Time Provider Department Center   7/9/2020  2:00 PM Desire Bledsoe MD [facility]          07/08/20 1517   Final Note   Assessment Type Final Discharge Note   Anticipated Discharge Disposition Home-Health   Right Care Referral Info   Post Acute Recommendation Home-care   Post-Acute Status   Post-Acute Authorization Home Health   Home Health Status Set-up Complete

## 2020-07-08 NOTE — NURSING
Pt discharged home with home health. Dr Chapman came bedside to relay discharge info to pt and son via phone call per pt request. Discharge instructions verbally given and hard copy provided to pt. Hard copy prescription given to patient to fill in [place] and other prescription delivered bedside. Removed IV with cath tip in place. Patient tolerated IV removal well with bleeding controlled. Patient remains free of falls with no acute pain or distress noted. Patient to leave floor via wheelchair with family. Pt to leave with drain in tact - education and supplies provided.

## 2020-07-09 NOTE — PROGRESS NOTES
INITIAL CONSULTATION     DATE: 07/09/2020  Patient Name: Laly Baker  Referred by: Marcello Campbell MD  Reason for referral:  Pancreatic cancer      Subjective:       Patient ID: Laly Baker is a 85 y.o. female.    Chief Complaint: New Patient    HPI    85-year-old female presents for new diagnosis pancreatic cancer  Reviewed prior to visit:     05/26/2020-GI visit Dr. Barton  Presents for evaluation with change in bowel habits, increased frequency past 5 months.  History of pancreatitis, weight loss, change in bowel habits.  CT abdomen pelvis ordered.   06/03/2020-CT abdomen pelvis-previously noted cystic mass involving the body and tail of the pancreas is further decrease in size measuring 2.9 x 1 cm today.  There is however change in the appearance of the head proximal body of the pancreas which is heterogeneous masslike low-density cystic area measuring 2.5 cm and encasing vessels.  There was also interval development of dilatation of the pancreatic duct in the head of the pancreas measuring 7 mm.  The common duct remains non dilated measuring 6-7 mm.  There is also a new 12 mm hypodense lesion in the liver.  06/17/2020-EUS - Dr. Campbell    Pathology: FNA-positive for adenocarcinoma.  Unable to biopsy liver lesion, highly suspicious  06/29/2020-CT abdomen pelvis - degree of intrahepatic biliary ductal dilatation has increased since 06/03/2020.  Low density pancreatic head mass measures 3.1 x 3.1 cm.  This is similar in size when compared to 06/03/2020.  Cystic lesion centered in the pancreatic tail measures 3.6 x 1.5 cm.  There is a low-density lesion in the medial aspect of the right hepatic lobe measuring 1.8 x 1.7 cm, unchanged from prior scan 06/03/2020.  0.5 cm low-density nodule in the posterior segment right hepatic lobe.  This is unchanged.  Thrombosis of the splenic vein unchanged.  Scheduled for initial medical oncology consult on 06/30/2020, [facility]  06/29/2020-emergency department visit-admitted to [facility], increased T bili  06/30/2020-transferred to [facility] for biliary obstruction and ERCP unable to cannulate biliary sphincter at [facility]  07/01/2020-underwent PTC, percutaneous transhepatic cholangiography  07/02/2020-CT chest shows several right pulmonary nodules measuring up to 6 mm, 2 mm left upper lobe, 5 mm right upper lobe, 4 mm right middle lobe, 6 mm right lower lobe..  07/03/2020-CT a abdomen and pelvis - percutaneous transhepatic biliary drain in place with distal tip terminating in the 2nd part of the duodenum.  Multiple stable hypoattenuating foci in the liver compatible with metastatic disease.  Stable appearance of heterogeneous pancreatic head mass abutting the duodenum and common bile duct.  Impression includes mild colonic distention with multiple air-fluid levels suspicious for non obstructive ileus The largest liver lesion is 1.3 cm hypoenhancing mass in the right hepatic lobe inferiorly.  07/03/2020-CA 19.9 - 6263  07/04/2020-medical oncology inpatient consult Dr. Cagle - consult reflects discussion about stage IV, non curative treatment options available.  GI bleeding this day, discharge held  07/05/2020-EGD-  07/08/2020-CMP shows bilirubin improved to 1.0, albumin is low at 2.4.  Calcium 8.3.  CBC shows a hemoglobin of 9.3, white count normal and platelets normal.  ANC is 6.8 normal  07/08/2020-discharge from [facility]  07/09/2020-initial consult [facility]    Patient and her son present and confirm history above  They are both quite anxious today    She reports Pain brand new two weeks ago. Chills overnight. Spoke to her brother who is a general practitioner in [place]. She went to ED [facility] as above. Then sent to [facility]. Home health is coming to her home and OT.     She reports "doing well" except the pain from incision at the point of tube.     Swelling new and " we reviewed her low albumin likely related they will obtain compression stockings and focus on her nutrition and recovery from hospitalization.    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 Feels this is waxing and waning.     She reports recent history prior to diagnosis:   BM more frequent since January 2020. cscope good 2/2020. March 2020 and BM worsened. She waited until may due to pandemic. She then had  No pain or diarrhea - light colored stool then gold in appearance but no pain.   One episode of pancreatitis - this was a few years ago in hospital at age 83yo, hospitalized a few weeks. 2 drinks per day prior to this.   One time a year dirnk since that time.     No smoking  No MI or stroke.   Eats healthy all her life.  Med dedicated exercise    Lost 12lbs in last 6 months.   ROS: no HA, CP, dizziness, no vomiting, no diarrhea ever my life .   No indigestion or acid reflux. More burping and more gas.   Urinating normal for her.   Minimal BM recent. Soft BM not solid and this is first since discharge yesterday morning. 1 week    External biliary Drain - emptied - 7/8 - 7oz, 10oz today on 7/9/20.     Daily routine - housework, grocery shop, works in yards, cooks.   Lives with , son in [place], son in [place] and son in [place] for support.     No falls at home.  Drinking lots of water, drinking boost, orange juice, coffee.   No smoking      Past Medical History:   Diagnosis Date    Anticoagulant long-term use     Arthritis     Colon polyp     Hyperlipidemia     Hypertension     Nonexudative age-related macular degeneration, bilateral, intermediate dry stage 12/30/2019    Pancreatic pseudocyst     Pancreatitis 05/2017     Past Surgical History:   Procedure Laterality Date    CATARACT EXTRACTION W/  INTRAOCULAR LENS IMPLANT Right 08/08/2017    [facility]     CATARACT EXTRACTION W/  INTRAOCULAR LENS IMPLANT Left 10/10/2017    [place]    CERVICAL CONIZATION   W/ LASER      CHOLANGIOGRAM N/A 7/7/2020    Procedure: CHOLANGIOGRAM;  Surgeon: Peri Surgeon;  Location: [facility];  Service: Anesthesiology;  Laterality: N/A;    COLONOSCOPY  01/12/2015    Dr. Barton; diverticulosis; ext int hemorrhoid; repeat in 5 years    COLONOSCOPY N/A 2/24/2020    Procedure: COLONOSCOPY;  Surgeon: Ankur Barton MD;  Location: [facility];  Service: Endoscopy;  Laterality: N/A;    COLONOSCOPY N/A 7/5/2020    Procedure: COLONOSCOPY;  Surgeon: Dami Martin MD;  [facility]  Service: Endoscopy;  Laterality: N/A;    COLONOSCOPY W/ POLYPECTOMY      cone      ENDOSCOPIC ULTRASOUND OF UPPER GASTROINTESTINAL TRACT Left 6/17/2020    Procedure: ULTRASOUND, UPPER GI TRACT, ENDOSCOPIC;  Surgeon: Marcello Campbell MD;  Location: [facility];  Service: Endoscopy;  Laterality: Left;  Linear scope    epid ster  2012. 2014    ERCP N/A 6/30/2020    Procedure: ERCP (ENDOSCOPIC RETROGRADE CHOLANGIOPANCREATOGRAPHY);  Surgeon: Ankur Barton MD;  Location: [facility];  Service: Endoscopy;  Laterality: N/A;    ERCP N/A 7/1/2020    Procedure: ERCP (ENDOSCOPIC RETROGRADE CHOLANGIOPANCREATOGRAPHY);  Surgeon: Jamal Martin MD;  [facility]);  Service: Endoscopy;  Laterality: N/A;    ESOPHAGOGASTRODUODENOSCOPY N/A 6/17/2020    Procedure: EGD (ESOPHAGOGASTRODUODENOSCOPY);  Surgeon: Marcello Campbell MD;  Location: [facility];  Service: Endoscopy;  Laterality: N/A;    INSERTION OF BILIARY DRAINAGE CATHETER N/A 7/2/2020    Procedure: INSERTION, DRAINAGE CATHETER, BILE DUCT;  Surgeon: Peri Surgeon;  Location: [facility];  Service: Anesthesiology;  Laterality: N/A;    TONSILLECTOMY       Social History     Socioeconomic History    Marital status:      Spouse name: Not on file    Number of children: Not on file    Years of education: Not on file     Highest education level: Not on file   Occupational History    Not on file   Social Needs    Financial resource strain: Not on file    Food insecurity     Worry: Not on file     Inability: Not on file    Transportation needs     Medical: Not on file     Non-medical: Not on file   Tobacco Use    Smoking status: Never Smoker    Smokeless tobacco: Never Used   Substance and Sexual Activity    Alcohol use: No    Drug use: No    Sexual activity: Not Currently     Birth control/protection: Post-menopausal   Lifestyle    Physical activity     Days per week: Not on file     Minutes per session: Not on file    Stress: Not on file   Relationships    Social connections     Talks on phone: Not on file     Gets together: Not on file     Attends Religion service: Not on file     Active member of club or organization: Not on file     Attends meetings of clubs or organizations: Not on file     Relationship status: Not on file   Other Topics Concern    Not on file   Social History Narrative    Not on file     Family History   Problem Relation Age of Onset    Diabetes Mother 92    Cataracts Mother     Hypertension Mother     Stroke Father     Cataracts Father     Hypertension Father     Cancer Brother 82        lung cancer    Amblyopia Neg Hx     Blindness Neg Hx     Glaucoma Neg Hx     Macular degeneration Neg Hx     Retinal detachment Neg Hx     Strabismus Neg Hx     Thyroid disease Neg Hx        Current Outpatient Medications   Medication Sig Dispense Refill    amoxicillin-clavulanate 875-125mg (AUGMENTIN) 875-125 mg per tablet Take 1 tablet by mouth 2 (two) times daily. 7 tablet 0    lipase-protease-amylase 24,000-76,000-120,000 units (CREON) 24,000-76,000 -120,000 unit capsule Take 2 capsules by mouth 3 (three) times daily with meals. 180 capsule 11    lisinopriL (PRINIVIL,ZESTRIL) 5 MG tablet Take 2 tablets (10 mg total) by mouth once daily. 90 tablet 0    mv-mn/folic acid/calcium/vit K (WOMEN'S  "50 PLUS MULTIVITAMIN ORAL) Take 1 tablet by mouth once daily.       oxyCODONE (ROXICODONE) 20 mg Tab immediate release tablet Take 1 tablet (20 mg total) by mouth every 6 (six) hours as needed for Pain. 25 tablet 0    pantoprazole (PROTONIX) 40 MG tablet Take 1 tablet (40 mg total) by mouth 2 (two) times daily before meals. 60 tablet 2    dexAMETHasone (DECADRON) 2 MG tablet Take 1 tablet (2 mg total) by mouth 2 (two) times daily as needed (appetite). Can take 1 to 2 tablets per dose. 40 tablet 5    oxyCODONE-acetaminophen (PERCOCET) 5-325 mg per tablet Take 1 tablet by mouth every 8 (eight) hours as needed for Pain. 30 tablet 0     No current facility-administered medications for this visit.      ALLERGIES REVIEWED    Review of Systems    Constitutional: Negative for activity change until last 2 weeks, positive appetite change, negative fatigue, fever and positive unexpected weight change.   HENT: Negative for mouth sores and sore throat.    Respiratory: Negative for cough and shortness of breath.    Cardiovascular: Negative for chest pain, palpitations and leg swelling.   Gastrointestinal:  Positive for last 2 weeks abdominal pain, constipation and diarrhea.   Genitourinary: Negative for dysuria and frequency.   Musculoskeletal: Negative for back pain and joint swelling.   Neurological: Negative for weakness, light-headedness and numbness.   Hematological: Does not bruise/bleed easily.   Skin: no rash, no lesions, no itching    Objective:      BP (!) 121/59 (BP Location: Right arm, Patient Position: Sitting, BP Method: Medium (Automatic))   Pulse 78   Temp 99.2 °F (37.3 °C) (Temporal)   Resp 20   Ht 5' 2" (1.575 m)   Wt 40.6 kg (89 lb 8.1 oz)   SpO2 96%   BMI 16.37 kg/m²    Wt Readings from Last 25 Encounters:   07/09/20 40.6 kg (89 lb 8.1 oz)   06/30/20 39.6 kg (87 lb 4.8 oz)   06/30/20 39.6 kg (87 lb 4.8 oz)   06/30/20 39.6 kg (87 lb 4.8 oz)   06/17/20 39.6 kg (87 lb 4.8 oz)   05/26/20 40.8 kg (89 lb " 15.2 oz)   02/24/20 45.4 kg (100 lb)   02/04/20 43.8 kg (96 lb 9 oz)   08/23/19 45.8 kg (100 lb 15.5 oz)   11/26/18 46.6 kg (102 lb 11.8 oz)   05/17/18 45.8 kg (100 lb 15.5 oz)   10/06/17 43.1 kg (95 lb)   10/03/17 42.1 kg (92 lb 13 oz)   08/03/17 43.5 kg (96 lb)   07/10/17 42.2 kg (93 lb 0.6 oz)   06/16/17 48.8 kg (107 lb 9.6 oz)   06/04/17 42.5 kg (93 lb 11.1 oz)   05/23/17 46.8 kg (103 lb 2.8 oz)   11/15/16 47.5 kg (104 lb 11.5 oz)   04/29/16 47.3 kg (104 lb 4.4 oz)   10/27/15 47 kg (103 lb 9.9 oz)   09/21/15 46.7 kg (103 lb)   09/14/15 46.9 kg (103 lb 6.3 oz)   09/01/15 47 kg (103 lb 9.9 oz)   08/04/15 46.2 kg (101 lb 13.6 oz)       Physical Exam    Constitutional: patient is oriented to person, place, and time. No distress.  She is thin but very alert and energetic  HENT: Mouth/Throat:   Eyes: Conjunctivae and EOM are normal.   Neck: Normal range of motion. Neck supple.   Cardiovascular: Normal rate, regular rhythm, normal heart sounds and intact distal pulses.    Pulmonary/Chest: Effort normal and breath sounds normal. he has no wheezes.   He has no rales.    Abdominal: Soft. Bowel sounds are normal. he exhibits no distension. There is no tenderness.  Biliary drain in place with dark green fluid  Musculoskeletal: patient exhibits no edema or tenderness.   Lymphadenopathy: Pt has no cervical adenopathy. no supraclavicular adenopathy. No axillary LAD.   Neurological: Pt is alert and oriented to person, place, and time. normal strength. No cranial nerve deficit or sensory deficit.   Skin: Skin is warm and dry. No rash noted. No erythema.   Psychiatric: Pt has a anxious mood and affect. speech is normal.   Nursing note and vitals reviewed.      Assessment:       1. Malignant neoplasm of pancreas, unspecified location of malignancy    2. Cancer associated pain    3. Poor appetite    4. Metastasis to liver      Cancer Staging  Stage IV  ECOG SCORE         ECOG 0-1 prior to diagnosis and only recent clinical decline  "since hospitalization and discharged yesterday.    Patient is a  85 y.o. female here with new diagnosis metastatic pancreatic cancer      Discussed diagnosis, work-up, staging and treatment recommendations in great detail with patient and son    There are overall quite shocked by her diagnosis and prognosis and "devastated", discussed stage IV and incurable and they showed good understanding of this.    There very adamant to pursue systemic chemotherapy.  Her performance status and health prior to this is appropriate and we discussed single agent Gemzar followed by close re-evaluation and consider add Abraxane if when she improves    We discussed close re-evaluation and must maintain good performance status and nutrition to have benefit from chemotherapy.    Reviewed her weight loss nutrition edema related to low albumin, low appetite, pain related to tube in great detail and management of these.    Discussed plan for MediPort placement followed by chemotherapy Education and schedule chemotherapy.    Will also send genetic testing and attempt NexGen sequencing per guidelines and to see if there are other treatment options, though they know this is low probability.        Plan:       Laly was seen today for new patient.    Diagnoses and all orders for this visit:    Malignant neoplasm of pancreas, unspecified location of malignancy  -     Ambulatory referral/consult to Hematology / Oncology  -     Ambulatory referral/consult to Palliative Care; Future  -     Ambulatory referral/consult to General Surgery; Future    Cancer associated pain  -     oxyCODONE-acetaminophen (PERCOCET) 5-325 mg per tablet; Take 1 tablet by mouth every 8 (eight) hours as needed for Pain.    Poor appetite  -     dexAMETHasone (DECADRON) 2 MG tablet; Take 1 tablet (2 mg total) by mouth 2 (two) times daily as needed (appetite). Can take 1 to 2 tablets per dose.    Metastasis to liver    Other orders  -     gemcitabine (GEMZAR) 1,065 mg in " sodium chloride 0.9% 250 mL chemo infusion            PLAN:    Plan to move forward with single agent Gemzar on days 1, 8, 15 every 28 days  MediPort placement  Chemo education    Decadron and Percocet sent to pharmacy, she will discontinue oxycodone 20 for now    Goal is to start chemotherapy by the end of next week or beginning of the following week, starting at the latest on 07/20/2020    They request we call her brother who is a physician in [place], Carloz blackman - [phone] home, cell [phone]  [email or web address]    discussion with her brother on the Phone per their request, he showed good understanding    Also contact information for her children, Morgan is son present today.  Morgan - son in [place] - [phone]  [facility] - [phone]  Santy - [place] - [phone]      - memory and trouble walking.    Dr burgess - referral placed in discussed with rayray, to assist in understanding of illness and treatment goals.     Exercise/nutrition emphasize and reviewed    Important to keep follow-up with PCP and palliative Care and surgery    Discussed plan above in great detail with patient and son and all questions answered to their satisfaction. Proceed with plan above. Greater than 50% of visit today was spent in discussing condition and continued treatment plan, counseling, education, for >80 minutes.        Thank you for the referral. Please call with any questions.           Desire Bledsoe M.D.  Hematology Oncology  [facility]  [place]

## 2020-07-09 NOTE — PLAN OF CARE
START ON PATHWAY REGIMEN - Pancreatic Adenocarcinoma    PANOS10        Gemcitabine (Gemzar)     **Always confirm dose/schedule in your pharmacy ordering system**    Patient Characteristics:  Metastatic Disease, First Line, PS  ?  2, BRCA1/2 and PALB2 Mutation   Absent/Unknown  Current evidence of distant metastases<= Yes  AJCC T Category: TX  AJCC N Category: NX  AJCC M Category: M1  AJCC 8 Stage Grouping: IV  Line of Therapy: First Line  ECOG Performance Status: 2  BRCA1/2 Mutation Status: Awaiting Test Results  PALB2 Mutation Status: Awaiting Test Results  Intent of Therapy:  Non-Curative / Palliative Intent, Discussed with Patient

## 2020-07-09 NOTE — DISCHARGE SUMMARY
"Discharge Summary  Hospital Medicine    Patient Name: Laly Baker  MRN:  [id number]  Hospital Medicine Team: [facility] MED X Brandon Chapman MD  Date of Admission:  6/30/2020     Date of Discharge:  07/09/2020  Length of Stay:  LOS: 8 days   Principal Problem:  Malignant biliary obstruction     Active Hospital Problems    Diagnosis  POA    *Malignant biliary obstruction [K83.1, C80.1]  Yes    Pancreatic cancer metastasized to intra-abdominal lymph node [C25.9, C77.2]  Yes    Lower GI bleed [K92.2]  No    Acute blood loss anemia [D62]  No    Gastrointestinal hemorrhage with melena [K92.1]  No    Anemia of chronic disease [D63.8]  Yes    Biliary obstruction due to cancer [K83.1, C80.1]  Yes    Cholangitis [K83.09]  Yes    Elevated LFTs [R94.5]  Yes    Pancreatic adenocarcinoma [C25.9]  Yes    Hyponatremia [E87.1]  Yes    Moderate malnutrition [E44.0]  Yes    Hyperlipidemia [E78.5]  Yes     Chronic    Essential hypertension [I10]  Yes     Chronic      Resolved Hospital Problems   No resolved problems to display.       History of Present Illness:      "84 y/o hx of pancreatic cancer causing biliary obstruction and GI attempted ERCP and unable to cannulate biliary sphincter.  She is stable, no fevers, has tenderness and is on zosyn.    Post-procedure had afib, no chronic diagnosis this  spontaneously converted to sinus.    Came in with worsening abdominal pain and RUQ pain - repeat CT scan showed biliary obstruction and Dilated CBD. No encephalopathy"     Patient seen and examined on arrival to our facility. She remains afebrile and hemodynamically stable. Alert and Oriented. Conversational. She has mild abdominal pain without tenderness or guarding. Is not requesting pain meds. Denies fevers, chest pain, nausea, vomiting, decreased appetite, shortness of breath, cough, dysuria, or confusion. Patient continued on Zosyn and started on maintenance IVF's as she continues to be npo. AES consulted. She has been " admitted to the care of medicine for further evaluation and management.    Hospital Course of Principle Problem Addressed:       Malignant biliary obstruction  Pancreatic adenocarcinoma   Cholangitis   Elevated LFTs  - Patient underwent ERCP by Dr. Marie, unfortunately unable to cannulate CBD in attempt to place stent.  Patient has biliary extraction likely due to pancreatic mass.  Dr. Marie discussed with [facility] and patient has been transferred for advanced endoscopy.  - AES consulted  ;  Appreciate recs  - continue zosyn   - daily labs  - resume home Creon once diet is advanced  - patient went for IR PTC 7/2, LFTs improved  - CT chest ordered for staging, reviewed  - heme/onc consulted; CA 19-9 reviewed   - IR tube upsize today to 10 Fr to stop leak  - treating cholangitis for a total of ten days, was on zosyn, sending home on augmentin to complete ten day course        # Gastrointestinal hemorrhage with melena  # Acute blood loss anemia  - s/p 2 units of PRBC  - protonix PO BID  - GI consulted  - EGD/C-scope done which showed duodenal ulcers, no evidence of recent bleed, likely due to NSAID use  - H. Pylori serology pending        Hyponatremia  - Na trending up. Now 130  - s/p IVFs     CKD (chronic kidney disease), stage III     - cr stable monitor       Hyperlipidemia  - home statin        Essential hypertension  - home lisinopril, increased to 10 mg        # Anemia of chronic disease  - checking iron panel and ferritin and b12  -     Diet:  Regular   GI PPx:    DVT PPx:    Goals of Care:  full     High Risk Conditions:  Pancreatic adenocarcinoma      Disposition: today, heme/onc follow up Thursday      Brandon Chapman MD  Medical Director [facility] Medicine  Spectra:  [phone]  Pager: [phone]       Other Medical Problems Addressed in the Hospital:         Significant Diagnostic Tests/Imaging:     Recent Labs   Lab 07/05/20  0510 07/05/20  0832 07/06/20  0455 07/07/20  0430  07/08/20  0457   WBC 7.07 8.16 5.03 5.99 8.10   HGB 10.9* 9.8* 9.1* 9.2* 9.3*   HCT 32.8* 29.0* 28.6* 28.1* 29.3*    186 187 233 232     Recent Labs   Lab 07/03/20  0338 07/04/20  0524 07/05/20  0510 07/08/20  1033   * 132* 137 135*   K 3.7 3.7 3.6 3.7    101 100 99   CO2 23 22* 29 27   BUN 15 14 7* 8   CREATININE 0.8 0.8 0.8 0.8   CALCIUM 7.9* 8.4* 8.8 8.3*   PROT 4.5* 5.0* 6.1 5.5*   BILITOT 1.8* 1.2* 1.6* 1.0   ALKPHOS 390* 322* 322* 208*   * 160* 153* 68*   AST 53* 32 31 29         Special Treatments/Procedures:         Discharge Medications:      Discharge Medication List as of 7/8/2020 11:12 AM      START taking these medications    Details   amoxicillin-clavulanate 875-125mg (AUGMENTIN) 875-125 mg per tablet Take 1 tablet by mouth 2 (two) times daily., Starting Mon 7/6/2020, Normal      oxyCODONE (ROXICODONE) 20 mg Tab immediate release tablet Take 1 tablet (20 mg total) by mouth every 6 (six) hours as needed for Pain., Starting Mon 7/6/2020, Print      pantoprazole (PROTONIX) 40 MG tablet Take 1 tablet (40 mg total) by mouth 2 (two) times daily before meals., Starting Mon 7/6/2020, Until Tue 7/6/2021, Normal         CONTINUE these medications which have CHANGED    Details   lisinopriL (PRINIVIL,ZESTRIL) 5 MG tablet Take 2 tablets (10 mg total) by mouth once daily., Starting Wed 7/8/2020, Normal         CONTINUE these medications which have NOT CHANGED    Details   lipase-protease-amylase 24,000-76,000-120,000 units (CREON) 24,000-76,000 -120,000 unit capsule Take 2 capsules by mouth 3 (three) times daily with meals., Starting Wed 6/17/2020, Until Thu 6/17/2021, Print      mv-mn/folic acid/calcium/vit K (WOMEN'S 50 PLUS MULTIVITAMIN ORAL) Take 1 tablet by mouth once daily. , Historical Med         STOP taking these medications       piperacillin sodium/tazobactam (PIPERACILLIN-TAZOBACTAM 4.5G/100ML D5W IVPB, READY TO MIX,) Comments:   Reason for Stopping:         simvastatin (ZOCOR)  20 MG tablet Comments:   Reason for Stopping:               Discharge Diet:regular diet    Activity: activity as tolerated    Discharge Condition: Good    Disposition: Home-Health Care c    Time spent on the discharge of the patient including review of hospital course with the patient. reviewing discharge medications and arranging follow-up care 35 minutes.  Patient was seen and examined on the date of discharge and determined to be suitable for discharge.    Future Appointments   Date Time Provider Department Center   7/9/2020  2:00 PM Desire Bledsoe MD [facility]       Brandon Chapman MD  Medical Director [facility] Medicine  Spectra:  [phone]  Pager: [phone]

## 2020-07-09 NOTE — LETTER
July 9, 2020      Marcello Campbell MD  [address]   [facility], [facility] [address]           [facility] [place] [address] [facility]  [facility] [address]  Phone: [phone]  Fax: [phone]          Patient: Laly Baker   MR Number: [id number]   YOB: 1935   Date of Visit: 7/9/2020       Dear Dr. Marcello Campbell:    Thank you for referring Laly Baker to me for evaluation. Attached you will find relevant portions of my assessment and plan of care.    If you have questions, please do not hesitate to call me. I look forward to following Laly Baker along with you.    Sincerely,    Desire Bledsoe MD    Enclosure  CC:  No Recipients    If you would like to receive this communication electronically, please contact [email or web address] or [phone] to request more information on [facility] [employer] access.    For providers and/or their staff who would like to refer a patient to [facility], please contact us through our one-stop-shop provider referral line, [facility], at [phone].    If you feel you have received this communication in error or would no longer like to receive these types of communications, please e-mail [email or web address]

## 2020-07-13 NOTE — TELEPHONE ENCOUNTER
----- Message from Flor Hadley sent at 7/13/2020 12:09 PM CDT -----  Regarding: increasing her  dexAMETHasone (DECADRON) 2 MG tablet  Contact: Morgan son  Type: Needs Medical Advice  Who Called:  Morgan  Symptoms (please be specific):  pt has lost 5lbs in one week  How long has patient had these symptoms:  ongoing  Pharmacy name and phone #:    [facility] [address] [facility] [address] [place] [address]  Phone: [phone] Fax: [phone]      Best Call Back Number: [phone]  Additional Information: pt is down to 84lbs. Son req to know if the  dexAMETHasone (DECADRON) 2 MG tablet  can be increased

## 2020-07-13 NOTE — PROGRESS NOTES
[1:39 PM] JOCELYNE Baker [id number] needs to start Gemzar on Monday 7/20  ?  [1:49 PM] Haily luon [id number] 07/20/20 @2:30

## 2020-07-13 NOTE — PROGRESS NOTES
.Requests COVID testing after exposure. VSS, no symptoms. Will call with results.     Patient Instructions   Instructions for Patients Awaiting COVID-19 Test Results    You will either be called with your test result or it will be released to the patient portal.  If you have any questions about your test, please visit [email or web address] or call our COVID-19 information line at [phone].    Prevention steps for patients with confirmed or suspected COVID-19     Stay home except to get medical care.   Separate yourself from other people and animals in your home   Call ahead before visiting your doctor.   Wear a facemask.   Cover your coughs and sneezes.   Clean your hands often.   Avoid sharing personal household items.   Clean all high-touch surfaces every day.   Monitor your symptoms. Seek prompt medical attention if your illness is worsening (e.g., difficulty breathing). Before seeking care, call your healthcare provider.   If you have a medical emergency and need to call 911, notify the dispatch personnel that you have, or are being evaluated for COVID-19. If possible, put on a facemask before emergency medical services arrive.   Discontinuing home isolation. Call your provider about guidance to discontinue home isolation.    Recommended precautions for household members, intimate partners, and caregivers in a nonhealthcare setting of a patient with symptomatic laboratory-confirmed COVID-19 or a patient under investigation.  Household members, intimate partners, and caregivers in a nonhealthcare setting may have close contact with a person with symptomatic, laboratory-confirmed COVID-19 or a person under investigation. Close contacts should monitor their health; they should call their healthcare provider right away if they develop symptoms suggestive of COVID-19 (e.g., fever, cough, shortness of breath).    Close contacts should also follow these recommendations:   Make sure that you  understand and can help the patient follow their healthcare provider's instructions for medication(s) and care. You should help the patient with basic needs in the home and provide support for getting groceries, prescriptions, and other personal needs.   Monitor the patient's symptoms. If the patient is getting sicker, call his or her healthcare provider and tell them that the patient has laboratory-confirmed COVID-19. This will help the healthcare provider's office take steps to keep other people in the office or waiting room from getting infected. Ask the healthcare provider to call the local or [facility] health department for additional guidance. If the patient has a medical emergency and you need to call 911, notify the dispatch personnel that the patient has, or is being evaluated for COVID-19.   Household members should stay in another room or be  from the patient as much as possible. Household members should use a separate bedroom and bathroom, if available.   Prohibit visitors who do not have an essential need to be in the home.   Household members should care for any pets in the home. Do not handle pets or other animals while sick.   Make sure that shared spaces in the home have good air flow, such as by an air conditioner or an opened window, weather permitting.   Perform hand hygiene frequently. Wash your hands often with soap and water for at least 20 seconds or use an alcohol-based hand  that contains 60 to 95% alcohol, covering all surfaces of your hands and rubbing them together until they feel dry. Soap and water should be used preferentially if hands are visibly dirty.   Avoid touching your eyes, nose, and mouth with unwashed hands.   The patient should wear a facemask when you are around other people. If the patient is not able to wear a facemask (for example, because it causes trouble breathing), you, as the caregiver should wear a mask when you are in the same room as the  patient.   Wear a disposable facemask and gloves when you touch or have contact with the patient's blood, stool, or body fluids, such as saliva, sputum, nasal mucus, vomit, urine.  o Throw out disposable facemasks and gloves after using them. Do not reuse.  o When removing personal protective equipment, first remove and dispose of gloves. Then, immediately clean your hands with soap and water or alcohol-based hand . Next, remove and dispose of facemask, and immediately clean your hands again with soap and water or alcohol-based hand .   Avoid sharing household items with the patient. You should not share dishes, drinking glasses, cups, eating utensils, towels, bedding, or other items. After the patient uses these items, you should wash them thoroughly (see below Wash laundry thoroughly).   Clean all high-touch surfaces, such as counters, tabletops, doorknobs, bathroom fixtures, toilets, phones, keyboards, tablets, and bedside tables, every day. Also, clean any surfaces that may have blood, stool, or body fluids on them.   Use a household cleaning spray or wipe, according to the label instructions. Labels contain instructions for safe and effective use of the cleaning product including precautions you should take when applying the product, such as wearing gloves and making sure you have good ventilation during use of the product.   Wash laundry thoroughly.  o Immediately remove and wash clothes or bedding that have blood, stool, or body fluids on them.  o Wear disposable gloves while handling soiled items and keep soiled items away from your body. Clean your hands (with soap and water or an alcohol-based hand ) immediately after removing your gloves.  o Read and follow directions on labels of laundry or clothing items and detergent. In general, using a normal laundry detergent according to washing machine instructions and dry thoroughly using the warmest temperatures recommended on  the clothing label.   Place all used disposable gloves, facemasks, and other contaminated items in a lined container before disposing of them with other household waste. Clean your hands (with soap and water or an alcohol-based hand ) immediately after handling these items. Soap and water should be used preferentially if hands are visibly dirty.   Discuss any additional questions with your [facility] or local health department or healthcare provider. Check available hours when contacting your local health department.    For more information see CDC link below.      https://www.cdc.gov/coronavirus/2019-ncov/hcp/guidance-prevent-spread.html#precautions        Sources:  [facility], [place] Department of Health and \A Chronology of [place] Hospitals\""        If not allergic,take tylenol (acetominophen) for fever control, chills, or body aches every 4 hours. Do not exceed 4000 mg/ day.If not allergic, take Motrin (Ibuprofen) every 4 hours for fever, chills, pain or inflammation. Do not exceed 2400 mg/day. You can alternate taking tylenol and motrin.    If you were prescribed a narcotic medication, do not drive or operate heavy equipment or machinery while taking these medications.  You must understand that you've received an Urgent Care treatment only and that you may be released before all your medical problems are known or treated. You, the patient, will arrange for follow up care as instructed.  Follow up with your PCP or specialty clinic as directed in the next 1-2 weeks if not improved or as needed.  You can call [phone] to schedule an appointment with the appropriate provider.  If your condition worsens we recommend that you receive another evaluation at the emergency room immediately or contact your primary medical clinics after hours call service to discuss your concerns.    Please return here or go to the Emergency Department for any concerns or worsening of condition.    If you have been referred to another provider and wish to  call to check on the status of your referral, please call [facility] at [phone]

## 2020-07-14 NOTE — PROGRESS NOTES
"Subjective:       Patient ID: Laly Baker is a 85 y.o. female.    Chief Complaint: Patient Education    HPI This is an 85 year old white female known to Dr. Bledsoe for a new diagnosis of Stage IV pancreatic cancer with liver involvement.  The patient presents to the clinic today with her son, Morgan, for chemotherapy education.    Oncology History   Pancreatic adenocarcinoma   7/1/2020 Initial Diagnosis    Pancreatic adenocarcinoma     7/15/2020 -  Chemotherapy    Treatment Summary   Plan Name: OP PANC GEMCITABINE QW  Treatment Goal: Supportive  Status: Active  Start Date: 7/15/2020 (Planned)  End Date: 12/16/2020 (Planned)  Provider: Desire Bledsoe MD  Chemotherapy: gemcitabine (GEMZAR) in sodium chloride 0.9% chemo infusion, 800 mg/m2 = 1,065 mg (100 % of original dose 800 mg/m2), Intravenous, Clinic/HOD 1 time, 0 of 6 cycles  Dose modification: 800 mg/m2 (original dose 800 mg/m2, Cycle 1)               Objective:       Vitals:    07/14/20 1409   BP: (!) 147/58   BP Location: Left arm   Patient Position: Sitting   Pulse: 79   Resp: 11   Temp: 97.6 °F (36.4 °C)   TempSrc: Temporal   SpO2: 99%   Weight: 38 kg (83 lb 12.4 oz)   Height: 5' 2" (1.575 m)       Physical Exam  Vitals signs reviewed.   Constitutional:       Comments: Thin, frail, elderly   HENT:      Head: Normocephalic and atraumatic.      Nose: Nose normal.      Mouth/Throat:      Mouth: Mucous membranes are moist.      Pharynx: Oropharynx is clear.   Eyes:      Conjunctiva/sclera: Conjunctivae normal.      Pupils: Pupils are equal, round, and reactive to light.   Neck:      Musculoskeletal: Normal range of motion.   Cardiovascular:      Rate and Rhythm: Normal rate and regular rhythm.      Pulses: Normal pulses.      Heart sounds: Normal heart sounds.   Pulmonary:      Effort: Pulmonary effort is normal.      Breath sounds: Normal breath sounds.   Abdominal:      Palpations: Abdomen is soft.      Tenderness: There is abdominal tenderness. "   Musculoskeletal: Normal range of motion.   Skin:     General: Skin is warm and dry.      Capillary Refill: Capillary refill takes less than 2 seconds.   Neurological:      Mental Status: She is alert and oriented to person, place, and time.   Psychiatric:         Mood and Affect: Mood normal.         Behavior: Behavior normal.         Assessment:       1. Pancreatic cancer metastasized to intra-abdominal lymph node        2.  Metastases to liver    Plan:       1.  Treatment plan of single agent Gemcitabine on Days 1, 8, 15/28 day cycle discussed with patient and son.  2.  Handouts provided from [employer] on chemotherapy agents.    3.  Discussed the mechanism of action, potential side effects of this treatment as well as ways to manage them at home. Some of these side effects include but not limited to fever, nausea, vomiting, decreased appetite, fatigue, weakness, cytopenias, myalgia/arthralgia, constipation, diarrhea, bleeding, headache, nail changes, taste change, hair thinning/loss, peripheral neuropathy.   4.  Dietary modifications discussed.  Detail instructions to be provided by dietician.   5.  Chemotherapy Binder supplied with contact information.   6.  Discussed follow-up with the physician for toxicity monitoring throughout treatment.    7.  Scripts were escribed (Zofran, Phenergan, EMLA) and explained for home use.   To note:  Patient has Decadron.  8.  The patient and son verbalized understanding. All questions were answered and an informed consent was obtained.     Assessment/plan reviewed and approved by Dr. Bledsoe.

## 2020-07-16 PROBLEM — K59.00 CONSTIPATION: Status: ACTIVE | Noted: 2020-01-01

## 2020-07-16 PROBLEM — E11.01 HYPERGLYCEMIC HYPEROSMOLAR NONKETOTIC COMA: Status: ACTIVE | Noted: 2020-01-01

## 2020-07-16 PROBLEM — R73.9 HYPERGLYCEMIA: Status: ACTIVE | Noted: 2020-01-01

## 2020-07-16 NOTE — TELEPHONE ENCOUNTER
Spoke to son he had given her a glucose tab this AM due to shaking, she had ensure plus and fruit for breakfast.  Home Health took blood 2 hours after eating and drinking breakfast.  Report given to

## 2020-07-16 NOTE — TELEPHONE ENCOUNTER
----- Message from Maggie Samson sent at 7/16/2020 11:21 AM CDT -----  Type: Needs Medical Advice  Who Called:  Morgan/ son  Best Call Back Number: [phone]  Additional Information: please give call back to discuss to instructions for lidocaine.

## 2020-07-16 NOTE — TELEPHONE ENCOUNTER
Report received from Cristal re: pt had glucose reported from [facility] drawn today.  Message relayed to Dr. Bledsoe.  Need to advise pt to go to ED for eval/tx      S/w Morgan re: Glucose 827 and need to bring mom Laly to the ED.  Verb understanding.  Requested I conference in with his uncle (pt's brother) who is a doctor out of state.  After speaking with both of them to their satisfaction about plan and pt possibly not being able to get port placed due to current crisis, he is going to take her immediately to [facility] ED.    Call placed to charge CELY Pandey to give report of pt's current situation.      Message left with Dr. Shankar's answering service re: pt's current situation.

## 2020-07-16 NOTE — TELEPHONE ENCOUNTER
----- Message from Jessica Young sent at 7/16/2020 10:45 AM CDT -----  Regarding: Brock with [facility]  Contact: brock Lipscomb with [facility] she is asking if she can add a Hepat Panel to the   Current lab orders.    Please call back [phone]

## 2020-07-16 NOTE — TELEPHONE ENCOUNTER
----- Message from Cheri Lincoln sent at 7/16/2020  4:08 PM CDT -----  Contact: Mary london/ [facility] [phone]  Mary is calling to let office know specimen was collected and submitted today by [facility].  Critical lab tried to contact them via fax and phone no answer. Glucose is critically High 857 - [id number]. Please advise.

## 2020-07-16 NOTE — PROGRESS NOTES
Pharmacy Treatment Plan Review    Patient  Laly Baker, 85 y.o.  1935    Indication: Pancreatic Cancer    Labs:  CBC  Lab Results   Component Value Date    WBC 8.10 07/08/2020    HGB 9.3 (L) 07/08/2020    HCT 29.3 (L) 07/08/2020    MCV 93 07/08/2020     07/08/2020       BMP  Lab Results   Component Value Date     (L) 07/08/2020    K 3.7 07/08/2020    CL 99 07/08/2020    CO2 27 07/08/2020    BUN 8 07/08/2020    CREATININE 0.8 07/08/2020    CALCIUM 8.3 (L) 07/08/2020    ANIONGAP 9 07/08/2020    ESTGFRAFRICA >60.0 07/08/2020    EGFRNONAA >60.0 07/08/2020       LFTs  Lab Results   Component Value Date    ALT 68 (H) 07/08/2020    AST 29 07/08/2020    ALKPHOS 208 (H) 07/08/2020    BILITOT 1.0 07/08/2020       The patient is scheduled to start the following infusion:   OP PANC GEMCITABINE QW    28-day cycle for 6 cycles of:    Chemotherapy:   gemcitabine (GEMZAR) 800 mg/m2  in sodium chloride 0.9% 250 mL chemo infusion, Intravenous, on day 1, 8, 15  -First cycle dose reduced from 1000 mg/m2, remaining cycles are currently at 1000 mg/m2    Premeds  -Dexamethasone 10 mg IV on day 1, 8, 15     The treatment plan matches NCCN template     Coleman YangD

## 2020-07-17 PROBLEM — E08.65 DIABETES MELLITUS DUE TO UNDERLYING CONDITION WITH HYPERGLYCEMIA, WITHOUT LONG-TERM CURRENT USE OF INSULIN: Status: ACTIVE | Noted: 2020-01-01

## 2020-07-17 NOTE — TELEPHONE ENCOUNTER
Spoke to son Morgan and his mom will get port today in hospital and explain she will have to get discharged to get the chemotherapy.

## 2020-07-17 NOTE — TELEPHONE ENCOUNTER
----- Message from Kristina Richey MA sent at 7/17/2020  9:29 AM CDT -----  Regarding: call back  PT 's son is requesting a call back in reference to the plan for today chemo   Best Call Back # [phone]

## 2020-07-17 NOTE — TELEPHONE ENCOUNTER
----- Message from Lorrie Elizabeth sent at 7/17/2020 12:27 PM CDT -----  Regarding: pt cancer  [facility] needing a call back from the office regarding  the information for what stage the pt is in with the cancer. The sample is still on hold                      please contact [facility] # [phone] option 1 client services

## 2020-07-20 NOTE — TELEPHONE ENCOUNTER
Patient on [facility] post procedure call back system tracker  Patient had hospital encounters on July 16 and 17  No contact required

## 2020-07-21 NOTE — PLAN OF CARE
Problem: Adult Inpatient Plan of Care  Goal: Plan of Care Review  Outcome: Ongoing, Progressing  Flowsheets (Taken 7/21/2020 1626)  Plan of Care Reviewed With: patient  Goal: Patient-Specific Goal (Individualization)  Outcome: Ongoing, Progressing  Flowsheets (Taken 7/21/2020 1626)  Individualized Care Needs: warm blanket [place]  Anxieties, Fears or Concerns: none  Patient-Specific Goals (Include Timeframe): prevent infections during tx     Problem: Fatigue  Goal: Improved Activity Tolerance  Outcome: Ongoing, Progressing  Intervention: Promote Energy Conservation  Flowsheets (Taken 7/21/2020 1626)  Fatigue Management: frequent rest breaks encouraged  Sleep/Rest Enhancement: relaxation techniques promoted  Activity Management: activity encouraged

## 2020-07-21 NOTE — PLAN OF CARE
Pt tolerated tx well today. Vitals remain stable.  Reviewed follow-up appointments. All questions were answered, ambulated independently at d/c.

## 2020-07-21 NOTE — PROGRESS NOTES
FOLLOW-UP VISIT    Patient name: Laly Baker  Date: 7/21/20      Subjective:       Patient ID: Laly Baker is a 85 y.o. female.    Chief Complaint: Follow-up    HPI  Presents to start chemo    Her and sons report improvement overall in function after last week ED visit and hospitalized with hyperglycemia, mediport placed, insulin sensitive and had hypoglycemia, now on - insulin 2U am and 1U at night. Seeing endocrinology tomorrow.     Eating three full meals.     Swelling resolved and albumin is better    No pain    BM regular    Needing less BP medication.     She feels she is ready for chemo as well as sons.         See detailed oncology history below, updated with most recent scans, labs, pertinent medical history.   Oncology History   Pancreatic adenocarcinoma   7/1/2020 Initial Diagnosis    Pancreatic adenocarcinoma    05/26/2020-GI visit Dr. Barton  Presents for evaluation with change in bowel habits, increased frequency past 5 months.  History of pancreatitis, weight loss, change in bowel habits.  CT abdomen pelvis ordered.   06/03/2020-CT abdomen pelvis-previously noted cystic mass involving the body and tail of the pancreas is further decrease in size measuring 2.9 x 1 cm today.  There is however change in the appearance of the head proximal body of the pancreas which is heterogeneous masslike low-density cystic area measuring 2.5 cm and encasing vessels.  There was also interval development of dilatation of the pancreatic duct in the head of the pancreas measuring 7 mm.  The common duct remains non dilated measuring 6-7 mm.  There is also a new 12 mm hypodense lesion in the liver.  06/17/2020-EUS - Dr. Campbell               Pathology: FNA-positive for adenocarcinoma.  Unable to biopsy liver lesion, highly suspicious  06/29/2020-CT abdomen pelvis - degree of intrahepatic biliary ductal dilatation has increased since 06/03/2020.  Low density pancreatic head mass measures 3.1 x 3.1 cm.  This is  similar in size when compared to 06/03/2020.  Cystic lesion centered in the pancreatic tail measures 3.6 x 1.5 cm.  There is a low-density lesion in the medial aspect of the right hepatic lobe measuring 1.8 x 1.7 cm, unchanged from prior scan 06/03/2020.  0.5 cm low-density nodule in the posterior segment right hepatic lobe.  This is unchanged.  Thrombosis of the splenic vein unchanged.  Scheduled for initial medical oncology consult on 06/30/2020, admitted to [facility]  06/29/2020-emergency department visit-admitted to [facility] hospital, increased T bili  06/30/2020-transferred to [facility] for biliary obstruction and ERCP unable to cannulate biliary sphincter at [facility]  07/01/2020-underwent PTC, percutaneous transhepatic cholangiography  07/02/2020-CT chest shows several right pulmonary nodules measuring up to 6 mm, 2 mm left upper lobe, 5 mm right upper lobe, 4 mm right middle lobe, 6 mm right lower lobe..  07/03/2020-CT a abdomen and pelvis - percutaneous transhepatic biliary drain in place with distal tip terminating in the 2nd part of the duodenum.  Multiple stable hypoattenuating foci in the liver compatible with metastatic disease.  Stable appearance of heterogeneous pancreatic head mass abutting the duodenum and common bile duct.  Impression includes mild colonic distention with multiple air-fluid levels suspicious for non obstructive ileus The largest liver lesion is 1.3 cm hypoenhancing mass in the right hepatic lobe inferiorly.  07/03/2020-CA 19.9 - 6263  07/04/2020-medical oncology inpatient consult Dr. Cagle - consult reflects discussion about stage IV, non curative treatment options available.  GI bleeding this day, discharge held  07/05/2020-EGD-  07/08/2020-CMP shows bilirubin improved to 1.0, albumin is low at 2.4.  Calcium 8.3.  CBC shows a hemoglobin of 9.3, white count normal and platelets normal.  ANC is 6.8 normal  07/08/2020-discharge from [facility]  07/09/2020-initial  "consult [facility]     Patient and her son present and confirm history above  They are both quite anxious today     She reports Pain brand new two weeks ago. Chills overnight. Spoke to her brother who is a general practitioner in [place]. She went to ED [facility] as above. Then sent to [facility]. Home health is coming to her home and OT.      She reports "doing well" except the pain from incision at the point of tube.      Swelling new and we reviewed her low albumin likely related they will obtain compression stockings and focus on her nutrition and recovery from hospitalization.     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 Feels this is waxing and waning.   7/16/20 - hyperglycemia, hospital admission, mediport placed on 7/17/20 7/21/20 - C1D1 gemzar - 675mg/m2 and will increase  7/28/20 - C1D8 gemzar -      7/21/2020 -  Chemotherapy    Treatment Summary   Plan Name: OP PANC GEMCITABINE QW  Treatment Goal: Supportive  Status: Active  Start Date: 7/21/2020  End Date: 12/22/2020 (Planned)  Provider: Desire Bledsoe MD  Chemotherapy: gemcitabine (GEMZAR) 865 mg in sodium chloride 0.9% 250 mL chemo infusion, 675 mg/m2 = 865 mg (84.4 % of original dose 800 mg/m2), Intravenous, Clinic/HOD 1 time, 1 of 6 cycles  Dose modification: 800 mg/m2 (original dose 800 mg/m2, Cycle 1), 675 mg/m2 (original dose 800 mg/m2, Cycle 1)  Administration: 865 mg (7/21/2020)         I have reviewed my initial consult note with detailed PMH/ROS from initial encounter and all following progress notes.   Past medical, surgical, family, and social history were reviewed today and there are no changes of note unless mentioned in HPI.     MEDS and ALLERGIES were reviewed with patient and meds reconciled.     Fatigue  Very mild    Weight/appetite  better   Constitutional  " Denies F/C    Pain  Denies    Sleep  Ok    Mucositis  Denies    Cardiovascular  Denies CP    Respiratory  Denies SOB    Nausea  Denies    BMs  Normal    GI  Denies abdominal pain    Bleeding  Denies epistaxis, hemoptysis, BRBPR, melena, hematuria or any other bleeding    Extremities  Denies edema    Skin/nail/hair  Denies toxicity    Neuropathy  Denies    Psych  In good spirits    Misc  n/a    Exercise  None but very active          Objective:      /68 (BP Location: Right arm, Patient Position: Sitting, BP Method: Medium (Automatic))   Pulse 82   Temp 97 °F (36.1 °C) (Temporal)   Resp 20   Wt 36.1 kg (79 lb 9.4 oz)   SpO2 99%   BMI 13.66 kg/m²   Wt Readings from Last 30 Encounters:   07/21/20 36.1 kg (79 lb 9.4 oz)   07/21/20 36.1 kg (79 lb 9.4 oz)   07/20/20 36.7 kg (81 lb)   07/16/20 37.6 kg (83 lb)   07/14/20 38 kg (83 lb 12.4 oz)   07/13/20 38.3 kg (84 lb 6.4 oz)   07/09/20 40.6 kg (89 lb 8.1 oz)   06/30/20 39.6 kg (87 lb 4.8 oz)   06/30/20 39.6 kg (87 lb 4.8 oz)   06/30/20 39.6 kg (87 lb 4.8 oz)   06/17/20 39.6 kg (87 lb 4.8 oz)   05/26/20 40.8 kg (89 lb 15.2 oz)   02/24/20 45.4 kg (100 lb)   02/04/20 43.8 kg (96 lb 9 oz)   08/23/19 45.8 kg (100 lb 15.5 oz)   11/26/18 46.6 kg (102 lb 11.8 oz)   05/17/18 45.8 kg (100 lb 15.5 oz)   10/06/17 43.1 kg (95 lb)   10/03/17 42.1 kg (92 lb 13 oz)   08/03/17 43.5 kg (96 lb)   07/10/17 42.2 kg (93 lb 0.6 oz)   06/16/17 48.8 kg (107 lb 9.6 oz)   06/04/17 42.5 kg (93 lb 11.1 oz)   05/23/17 46.8 kg (103 lb 2.8 oz)   11/15/16 47.5 kg (104 lb 11.5 oz)   04/29/16 47.3 kg (104 lb 4.4 oz)   10/27/15 47 kg (103 lb 9.9 oz)   09/21/15 46.7 kg (103 lb)   09/14/15 46.9 kg (103 lb 6.3 oz)   09/01/15 47 kg (103 lb 9.9 oz)       Constitutional: Patient is oriented to person, place, and time. No distress. Very alert  HENT: Mouth/Throat: Oropharynx is clear and moist. No oropharyngeal exudate.   Eyes: Conjunctivae and EOM are normal.   Neck: Normal range of motion. Neck supple.  "  Cardiovascular: Normal rate, regular rhythm, normal heart sounds and intact distal pulses.    Pulmonary/Chest: Effort normal and breath sounds normal. no wheezes. no rales.    mediport c/d/i  Drain RUQ green liquid.  Abdominal: Soft. Bowel sounds are normal. Patient exhibits no distension. There is no tenderness.   Musculoskeletal: patient exhibits no edema or tenderness.   Lymphadenopathy:  patient  has no cervical adenopathy. no supraclavicular adenopathy. No axillary LAD.   Neurological: Patient is alert and oriented to person, place, and time. normal strength. No cranial nerve deficit or sensory deficit.   Skin: Skin is warm and dry. No rash noted. No erythema.   Psychiatric: Patient has a normal mood and affect. speech is normal.   Nursing note and vitals reviewed.      CBC, CMP reviewed, noted are overall improvement. Albumin better. CBC adequate. Pattern of dehydration reviewed.   Assessment:       1. Pancreatic adenocarcinoma    2. Pancreatic cancer metastasized to intra-abdominal lymph node    3. Metastasis to liver    4. Poor appetite    5. Anemia in neoplastic disease    6. Underweight    7. Abnormal weight loss      Stage IV    ECOG 2    ECOG 0-1 prior to diagnosis and only recent clinical decline since hospitalization and discharged yesterday.     Patient is a  85 y.o. female here with new diagnosis metastatic pancreatic cancer        Discussed diagnosis, work-up, staging and treatment recommendations in great detail with patient and son     There are overall quite shocked by her diagnosis and prognosis and "devastated", discussed stage IV and incurable and they showed good understanding of this.     There very adamant to pursue systemic chemotherapy.  Her performance status and health prior to this is appropriate and we discussed single agent Gemzar followed by close re-evaluation and consider add Abraxane if when she improves     We discussed close re-evaluation and must maintain good performance " status and nutrition to have benefit from chemotherapy.     Reviewed her weight loss nutrition edema related to low albumin, low appetite, pain related to tube in great detail and management of these.     Discussed plan for MediPort placement followed by chemotherapy Education and schedule chemotherapy.     Will also send genetic testing and attempt NexGen sequencing per guidelines and to see if there are other treatment options, though they know this is low probability.     TODAY 7/21/20 - will move forward with chemo start, PS and labs adequate, close re-evaluation and guarded prognosis due to age, palliative care established and appreciate their assistance.       Plan:       Laly was seen today for follow-up.    Diagnoses and all orders for this visit:    Pancreatic adenocarcinoma  -     Cancel: 0.9%  NaCl infusion  -     Cancel: gemcitabine (GEMZAR) 865 mg in sodium chloride 0.9% 250 mL chemo infusion    Pancreatic cancer metastasized to intra-abdominal lymph node    Metastasis to liver    Poor appetite    Anemia in neoplastic disease    Underweight    Abnormal weight loss            Proceed with chemo today - gemzar day 1 675mg/m2 and will increase as tolerated  Additional IVF today    MD vis 1 week with chemo lab.     Exercise/nutrition important, reviewed, careful supportive care needed.     Continue current medications, reviewed.      Important to keep follow-up with PCP and endocrine as scheduled and pall care.     Discussed plan above in great detail with patient and all questions answered to their satisfaction. Proceed with plan above.          Desire Bledsoe M.D.  Hematology Oncology  [facility]  [facility]

## 2020-07-27 NOTE — TELEPHONE ENCOUNTER
----- Message from Jessica Young sent at 7/27/2020  3:56 PM CDT -----  Regarding: brock concerned [facility] health  Contact: brock from [facility]  brock [facility]  [phone]    Call placed to pod Brock called she needs orders to flush Biliary drain.   She is heading to see the patient now.    Call back [phone]

## 2020-07-27 NOTE — TELEPHONE ENCOUNTER
Spoke with Ailyn from Home Health will order saline flushes from [facility]. Patient having pain around biliary site will discuss with  Tomorrow in appointment.

## 2020-07-28 NOTE — PROGRESS NOTES
"FOLLOW-UP VISIT    Patient name: Laly Baker  Date: 7/28/20      Subjective:       Patient ID: Laly Baker is a 85 y.o. female.    Chief Complaint: Follow-up    HPI  Presents today for chemo  Feels tolerated well last week  No inc nausea  appetite remains low which she is eating with good support from her son for nutrition  BG better  intake better  Weight - poor, she is continued to lose weight, her son feels this is stabilizing the we reviewed she was under weight at diagnosis so this is concerning.  She does remain extremely active and energetic around her home which is a very good sign.     Pain - increasing "5am like clockwork", related to the site of the drain.  Drain continues with output.  Tramadol does help a causes sedation, the sedation is less so than the Percocet.    Blood sugars have been improved  Reviewed pros cons of any appetite stimulant medication.     See detailed oncology history below, updated with most recent scans, labs, pertinent medical history.   Oncology History   Pancreatic adenocarcinoma   7/1/2020 Initial Diagnosis    Pancreatic adenocarcinoma    05/26/2020-GI visit Dr. Barton  Presents for evaluation with change in bowel habits, increased frequency past 5 months.  History of pancreatitis, weight loss, change in bowel habits.  CT abdomen pelvis ordered.   06/03/2020-CT abdomen pelvis-previously noted cystic mass involving the body and tail of the pancreas is further decrease in size measuring 2.9 x 1 cm today.  There is however change in the appearance of the head proximal body of the pancreas which is heterogeneous masslike low-density cystic area measuring 2.5 cm and encasing vessels.  There was also interval development of dilatation of the pancreatic duct in the head of the pancreas measuring 7 mm.  The common duct remains non dilated measuring 6-7 mm.  There is also a new 12 mm hypodense lesion in the liver.  06/17/2020-EUS - Dr. Campbell               Pathology: " FNA-positive for adenocarcinoma.  Unable to biopsy liver lesion, highly suspicious.  Additional studies: TESSIE and PDL1 0%  06/29/2020-CT abdomen pelvis - degree of intrahepatic biliary ductal dilatation has increased since 06/03/2020.  Low density pancreatic head mass measures 3.1 x 3.1 cm.  This is similar in size when compared to 06/03/2020.  Cystic lesion centered in the pancreatic tail measures 3.6 x 1.5 cm.  There is a low-density lesion in the medial aspect of the right hepatic lobe measuring 1.8 x 1.7 cm, unchanged from prior scan 06/03/2020.  0.5 cm low-density nodule in the posterior segment right hepatic lobe.  This is unchanged.  Thrombosis of the splenic vein unchanged.  Scheduled for initial medical oncology consult on 06/30/2020, admitted to [facility]  06/29/2020-emergency department visit-admitted to [facility], increased T bili  06/30/2020-[place] [facility] [place] and ER[place] unable to cannulate biliary sphincter at [facility]  07/01/2020-underwent PTC, percutaneous transhepatic cholangiography  07/02/2020-CT chest shows several right pulmonary nodules measuring up to 6 mm, 2 mm left upper lobe, 5 mm right upper lobe, 4 mm right middle lobe, 6 mm right lower lobe..  07/03/2020-CT a abdomen and pelvis - percutaneous transhepatic biliary drain in place with distal tip terminating in the 2nd part of the duodenum.  Multiple stable hypoattenuating foci in the liver compatible with metastatic disease.  Stable appearance of heterogeneous pancreatic head mass abutting the duodenum and common bile duct.  Impression includes mild colonic distention with multiple air-fluid levels suspicious for non obstructive ileus The largest liver lesion is 1.3 cm hypoenhancing mass in the right hepatic lobe inferiorly.  07/03/2020-CA 19.9 - 6263  07/04/2020-medical oncology inpatient consult Dr. Cagle - consult reflects discussion about stage IV, non curative treatment options available.  GI  "bleeding this day, discharge held  07/05/2020-EGD-  07/08/2020-CMP shows bilirubin improved to 1.0, albumin is low at 2.4.  Calcium 8.3.  CBC shows a hemoglobin of 9.3, white count normal and platelets normal.  ANC is 6.8 normal  07/08/2020-discharge from [facility]  07/09/2020-initial consult [facility]     Patient and her son present and confirm history above  They are both quite anxious today     She reports Pain brand new two weeks ago. Chills overnight. Spoke to her brother who is a general practitioner in [place]. She went to ED [facility] as above. Then sent to [facility]. Home health is coming to her home and OT.      She reports "doing well" except the pain from incision at the point of tube.      Swelling new and we reviewed her low albumin likely related they will obtain compression stockings and focus on her nutrition and recovery from hospitalization.     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 Feels this is waxing and waning.   7/16/20 - hyperglycemia, hospital admission, mediport placed on 7/17/20 7/21/20 - C1D1 gemzar - 675mg/m2 and will increase  7/28/20 - C1D8 gemzar -      7/21/2020 -  Chemotherapy    Treatment Summary   Plan Name: OP PANC GEMCITABINE QW  Treatment Goal: Supportive  Status: Active  Start Date: 7/21/2020  End Date: 12/22/2020 (Planned)  Provider: Desire Bledsoe MD  Chemotherapy: gemcitabine (GEMZAR) 865 mg in sodium chloride 0.9% 250 mL chemo infusion, 675 mg/m2 = 865 mg (84.4 % of original dose 800 mg/m2), Intravenous, Clinic/HOD 1 time, 1 of 6 cycles  Dose modification: 800 mg/m2 (original dose 800 mg/m2, Cycle 1), 675 mg/m2 (original dose 800 mg/m2, Cycle 1)  Administration: 865 mg (7/21/2020)         I have reviewed my initial consult note with detailed PMH/ROS from initial encounter and all " "following progress notes.   Past medical, surgical, family, and social history were reviewed today and there are no changes of note unless mentioned in HPI.     MEDS and ALLERGIES were reviewed with patient and meds reconciled.     Fatigue  No change, energy quite good    Weight/appetite  suboptimal   Constitutional  Denies F/C    Pain  Denies    Sleep  Ok    Mucositis  Denies    Cardiovascular  Denies CP    Respiratory  Denies SOB    Nausea  Denies    BMs  Normal    GI  Positive abdominal pain    Bleeding  Denies epistaxis, hemoptysis, BRBPR, melena, hematuria or any other bleeding    Extremities  Denies edema    Skin/nail/hair  Denies toxicity    Neuropathy  Denies    Psych  In good spirits    Misc  n/a    Exercise  Quite active          Objective:      /63 (BP Location: Left arm, Patient Position: Sitting, BP Method: Medium (Automatic))   Pulse 81   Temp 97.5 °F (36.4 °C) (Temporal)   Resp 20   Ht 5' 4" (1.626 m)   Wt 35.8 kg (78 lb 14.8 oz)   SpO2 100%   BMI 13.55 kg/m²   Wt Readings from Last 30 Encounters:   07/28/20 35.8 kg (78 lb 14.8 oz)   07/21/20 36.1 kg (79 lb 9.4 oz)   07/21/20 36.1 kg (79 lb 9.4 oz)   07/20/20 36.7 kg (81 lb)   07/16/20 37.6 kg (83 lb)   07/14/20 38 kg (83 lb 12.4 oz)   07/13/20 38.3 kg (84 lb 6.4 oz)   07/09/20 40.6 kg (89 lb 8.1 oz)   06/30/20 39.6 kg (87 lb 4.8 oz)   06/30/20 39.6 kg (87 lb 4.8 oz)   06/30/20 39.6 kg (87 lb 4.8 oz)   06/17/20 39.6 kg (87 lb 4.8 oz)   05/26/20 40.8 kg (89 lb 15.2 oz)   02/24/20 45.4 kg (100 lb)   02/04/20 43.8 kg (96 lb 9 oz)   08/23/19 45.8 kg (100 lb 15.5 oz)   11/26/18 46.6 kg (102 lb 11.8 oz)   05/17/18 45.8 kg (100 lb 15.5 oz)   10/06/17 43.1 kg (95 lb)   10/03/17 42.1 kg (92 lb 13 oz)   08/03/17 43.5 kg (96 lb)   07/10/17 42.2 kg (93 lb 0.6 oz)   06/16/17 48.8 kg (107 lb 9.6 oz)   06/04/17 42.5 kg (93 lb 11.1 oz)   05/23/17 46.8 kg (103 lb 2.8 oz)   11/15/16 47.5 kg (104 lb 11.5 oz)   04/29/16 47.3 kg (104 lb 4.4 oz)   10/27/15 47 " "kg (103 lb 9.9 oz)   09/21/15 46.7 kg (103 lb)   09/14/15 46.9 kg (103 lb 6.3 oz)       Constitutional: Patient is oriented to person, place, and time. No distress.  She is extremely thin  HENT: Mouth/Throat: Oropharynx is clear and moist. No oropharyngeal exudate.   Eyes: Conjunctivae and EOM are normal.   Neck: Normal range of motion. Neck supple.   Cardiovascular: Normal rate, regular rhythm, normal heart sounds and intact distal pulses.    Pulmonary/Chest: Effort normal and breath sounds normal. no wheezes. no rales.    Abdominal: Soft. Bowel sounds are normal. Patient exhibits no distension. There is mild tenderness.  Biliary drain right upper quadrant  Musculoskeletal: patient exhibits no edema or tenderness.   Lymphadenopathy:  patient  has no cervical adenopathy. no supraclavicular adenopathy. No axillary LAD.   Neurological: Patient is alert and oriented to person, place, and time. normal strength. No cranial nerve deficit or sensory deficit.   Skin: Skin is warm and dry. No rash noted. No erythema.   Psychiatric: Patient has a normal mood and affect. speech is normal.   Nursing note and vitals reviewed.      CBC, CMP reviewed, noted are lower sodium.  Lower hemoglobin  Assessment:       1. Pancreatic adenocarcinoma    2. Pancreatic cancer metastasized to intra-abdominal lymph node    3. Metastasis to liver    4. Poor appetite    5. Anemia in neoplastic disease    6. Underweight    7. Abnormal weight loss    8. Cancer associated pain      Stage IV     ECOG 2     ECOG 0-1 prior to diagnosis and only recent clinical decline since hospitalization and discharged yesterday.     Patient is a  85 y.o. female here with new diagnosis metastatic pancreatic cancer        Discussed diagnosis, work-up, staging and treatment recommendations in great detail with patient and son     There are overall quite shocked by her diagnosis and prognosis and "devastated", discussed stage IV and incurable and they showed good " understanding of this.     There very adamant to pursue systemic chemotherapy.  Her performance status and health prior to this is appropriate and we discussed single agent Gemzar followed by close re-evaluation and consider add Abraxane if when she improves     We discussed close re-evaluation and must maintain good performance status and nutrition to have benefit from chemotherapy.     Reviewed her weight loss nutrition edema related to low albumin, low appetite, pain related to tube in great detail and management of these.     Discussed plan for MediPort placement followed by chemotherapy Education and schedule chemotherapy.     Will also send genetic testing and attempt NexGen sequencing per guidelines and to see if there are other treatment options, though they know this is low probability.     7/21/20 - will move forward with chemo start, PS and labs adequate, close re-evaluation and guarded prognosis due to age, palliative care established and appreciate their assistance.     7/27/20 - stable clinically after start of chemo without severe SE. Reviewed ongoing symptoms and mgmt in detail. Will continue proceed with chemo.  Plan will be Gemzar 3 weeks on and 1 week off.  Ultimate plan is that if she clinically stabilizes, will add Abraxane since more effective regimen.  Thus far she is doing quite well considering her age and clinical situation.      Plan:       Laly was seen today for follow-up.    Diagnoses and all orders for this visit:    Pancreatic adenocarcinoma    Pancreatic cancer metastasized to intra-abdominal lymph node    Metastasis to liver    Poor appetite    Anemia in neoplastic disease    Underweight    Abnormal weight loss    Cancer associated pain    Other orders  -     0.9%  NaCl infusion  -     dexamethasone IVPB 10 mg  -     dexamethasone injection 4 mg  -     gemcitabine (GEMZAR) 1,025 mg in sodium chloride 0.9% 250 mL chemo infusion  -     sodium chloride 0.9% 250 mL flush bag  -      sodium chloride 0.9% flush 10 mL  -     heparin, porcine (PF) 100 unit/mL injection flush 500 Units  -     alteplase injection 2 mg            Proceed with chemo C1D8.     Then DUC Lozoya or MD don for C1D15 followed by off week.  They would like to consider move this to Wednesdays which is fine with me, see note below    To review:  Schedule her chemo single agent Gemzar here on out to be three weeks on and one week off from start date 7/21/20.  Patient's son requests Monday or Wednesday because they will have more assistance, this is fine with us, they request avoid early mornings, recommended late morning or early afternoon should be fine    I have inquired with Interventional Radiology about goal of internalizing biliary drain    She has had a very high volume water intake, they will decrease and re-evaluate close monitoring of her sodium, increase calorie intake    Exercise/nutrition important, reviewed    Continue current medications, reviewed.      Important to keep follow-up with PCP.     Discussed plan above in great detail with patient and all questions answered to their satisfaction. Proceed with plan above.          Desire Bledsoe M.D.  Hematology Oncology  [facility]

## 2020-07-30 NOTE — TELEPHONE ENCOUNTER
The medication pended was discontinued at discharge from hospital and may not be appropriate for refill.     PCP is out of office.

## 2020-08-05 NOTE — PLAN OF CARE
Pt tolerated Gemzar and additional IVF's well.  Encouraged to increase oral fluid and caloric intake.  Instructed to call MD with any problems.

## 2020-08-05 NOTE — PROGRESS NOTES
FOLLOW-UP VISIT    Patient name: Laly Baker  Date: 8/5/20      Subjective:       Patient ID: Laly Baker is a 85 y.o. female.    Chief Complaint: Chemotherapy clearance for Cycle 1, Day 15 Gemzar    HPI This is an 85 year old white female known to Dr. Bledsoe for a  diagnosis of Stage IV pancreatic cancer with liver involvement.  The patient presents to the clinic today with her son, Seven, for evaluation and clearance prior to Cycle 1, Day 15 of Gemzar.    Loss of taste and appetite.  Complains of dry mouth.  Abdominal pain/discomfort - Issue with constipation; stopped Percocet on Saturday, 08/01, laxative to clear stool - feels much better  Taking tylenol for any discomfort - minimal  No incident of nausea  BG better  intake poor   Starting Glucerna  Weight - Loss of 3 additional pounds this past week.  She remains active around her home    Biliary drain continues with output. [facility] - son requesting bag changed out/Aqua Guard to cover for showers    Blood sugars have been improved    Reinforced small frequent meals and the need to maintain/gain weight to continue to move forward with treatment.    See detailed oncology history below, updated with most recent scans, labs, pertinent medical history.   Oncology History   Pancreatic adenocarcinoma   7/1/2020 Initial Diagnosis    Pancreatic adenocarcinoma    05/26/2020-GI visit Dr. Barton  Presents for evaluation with change in bowel habits, increased frequency past 5 months.  History of pancreatitis, weight loss, change in bowel habits.  CT abdomen pelvis ordered.   06/03/2020-CT abdomen pelvis-previously noted cystic mass involving the body and tail of the pancreas is further decrease in size measuring 2.9 x 1 cm today.  There is however change in the appearance of the head proximal body of the pancreas which is heterogeneous masslike low-density cystic area measuring 2.5 cm and encasing vessels.  There was also interval development of  dilatation of the pancreatic duct in the head of the pancreas measuring 7 mm.  The common duct remains non dilated measuring 6-7 mm.  There is also a new 12 mm hypodense lesion in the liver.  06/17/2020-EUS - Dr. Campbell               Pathology: FNA-positive for adenocarcinoma.  Unable to biopsy liver lesion, highly suspicious.  Additional studies: TESSIE and PDL1 0%  06/29/2020-CT abdomen pelvis - degree of intrahepatic biliary ductal dilatation has increased since 06/03/2020.  Low density pancreatic head mass measures 3.1 x 3.1 cm.  This is similar in size when compared to 06/03/2020.  Cystic lesion centered in the pancreatic tail measures 3.6 x 1.5 cm.  There is a low-density lesion in the medial aspect of the right hepatic lobe measuring 1.8 x 1.7 cm, unchanged from prior scan 06/03/2020.  0.5 cm low-density nodule in the posterior segment right hepatic lobe.  This is unchanged.  Thrombosis of the splenic vein unchanged.  Scheduled for initial medical oncology consult on 06/30/2020, admitted to [facility]  06/29/2020-emergency department visit-admitted to [facility] hospital, increased T bili  06/30/2020-transferred to [facility] for biliary obstruction and ERCP unable to cannulate biliary sphincter at [facility]  07/01/2020-underwent PTC, percutaneous transhepatic cholangiography  07/02/2020-CT chest shows several right pulmonary nodules measuring up to 6 mm, 2 mm left upper lobe, 5 mm right upper lobe, 4 mm right middle lobe, 6 mm right lower lobe..  07/03/2020-CT a abdomen and pelvis - percutaneous transhepatic biliary drain in place with distal tip terminating in the 2nd part of the duodenum.  Multiple stable hypoattenuating foci in the liver compatible with metastatic disease.  Stable appearance of heterogeneous pancreatic head mass abutting the duodenum and common bile duct.  Impression includes mild colonic distention with multiple air-fluid levels suspicious for non obstructive ileus The largest liver lesion  "is 1.3 cm hypoenhancing mass in the right hepatic lobe inferiorly.  07/03/2020-CA 19.9 - 6263  07/04/2020-medical oncology inpatient consult Dr. Cagle - consult reflects discussion about stage IV, non curative treatment options available.  GI bleeding this day, discharge held  07/05/2020-EGD-  07/08/2020-CMP shows bilirubin improved to 1.0, albumin is low at 2.4.  Calcium 8.3.  CBC shows a hemoglobin of 9.3, white count normal and platelets normal.  ANC is 6.8 normal  07/08/2020-discharge from [facility]  07/09/2020-initial consult [facility]     Patient and her son present and confirm history above  They are both quite anxious today     She reports Pain brand new two weeks ago. Chills overnight. Spoke to her brother who is a general practitioner in [place]. She went to ED [facility] as above. Then sent to [facility]. Home health is coming to her home and OT.      She reports "doing well" except the pain from incision at the point of tube.      Swelling new and we reviewed her low albumin likely related they will obtain compression stockings and focus on her nutrition and recovery from hospitalization.     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 Feels this is waxing and waning.   7/16/20 - hyperglycemia, hospital admission, mediport placed on 7/17/20 7/21/20 - C1D1 gemzar - 675mg/m2 and will increase  7/28/20 - C1D8 gemzar -      7/21/2020 -  Chemotherapy    Treatment Summary   Plan Name: OP PANC GEMCITABINE QW  Treatment Goal: Supportive  Status: Active  Start Date: 7/21/2020  End Date: 12/23/2020 (Planned)  Provider: Desire Bledsoe MD  Chemotherapy: gemcitabine (GEMZAR) 865 mg in sodium chloride 0.9% 250 mL chemo infusion, 675 mg/m2 = 865 mg (84.4 % of original dose 800 mg/m2), Intravenous, Clinic/HOD 1 time, 1 of 6 cycles  Dose " modification: 800 mg/m2 (original dose 800 mg/m2, Cycle 1), 675 mg/m2 (original dose 800 mg/m2, Cycle 1)  Administration: 865 mg (7/21/2020), 1,000 mg (7/28/2020)       Past medical, surgical, family, and social history were reviewed today and there are no changes of note unless mentioned in HPI.     MEDS and ALLERGIES were reviewed with patient and meds reconciled.     Fatigue  No change, energy quite good    Weight/appetite  Poor - loss of 3 pounds in 1 week   Constitutional  Denies F/C    Pain  Denies    Sleep  Ok    Mucositis  Denies    Cardiovascular  Denies CP    Respiratory  Denies SOB    Nausea  Denies    BMs  Normal    GI  Less abdominal pain with constipation resolved   Bleeding  Denies epistaxis, hemoptysis, BRBPR, melena, hematuria or any other bleeding    Extremities  Denies edema    Skin/nail/hair  Denies toxicity    Neuropathy  Denies    Psych  In good spirits    Misc  n/a    Exercise  Remains active          Objective:      BP 96/61 (BP Location: Left arm, Patient Position: Sitting, BP Method: Medium (Automatic))   Pulse 102   Temp 98.4 °F (36.9 °C) (Temporal)   Resp 20   Wt 34.3 kg (75 lb 9.9 oz)   SpO2 100%   BMI 12.98 kg/m²   Wt Readings from Last 30 Encounters:   08/05/20 34.3 kg (75 lb 9.9 oz)   08/05/20 34.3 kg (75 lb 9.9 oz)   07/28/20 35.8 kg (78 lb 14.8 oz)   07/21/20 36.1 kg (79 lb 9.4 oz)   07/21/20 36.1 kg (79 lb 9.4 oz)   07/20/20 36.7 kg (81 lb)   07/16/20 37.6 kg (83 lb)   07/14/20 38 kg (83 lb 12.4 oz)   07/13/20 38.3 kg (84 lb 6.4 oz)   07/09/20 40.6 kg (89 lb 8.1 oz)   06/30/20 39.6 kg (87 lb 4.8 oz)   06/30/20 39.6 kg (87 lb 4.8 oz)   06/30/20 39.6 kg (87 lb 4.8 oz)   06/17/20 39.6 kg (87 lb 4.8 oz)   05/26/20 40.8 kg (89 lb 15.2 oz)   02/24/20 45.4 kg (100 lb)   02/04/20 43.8 kg (96 lb 9 oz)   08/23/19 45.8 kg (100 lb 15.5 oz)   11/26/18 46.6 kg (102 lb 11.8 oz)   05/17/18 45.8 kg (100 lb 15.5 oz)   10/06/17 43.1 kg (95 lb)   10/03/17 42.1 kg (92 lb 13 oz)   08/03/17 43.5 kg  (96 lb)   07/10/17 42.2 kg (93 lb 0.6 oz)   06/16/17 48.8 kg (107 lb 9.6 oz)   06/04/17 42.5 kg (93 lb 11.1 oz)   05/23/17 46.8 kg (103 lb 2.8 oz)   11/15/16 47.5 kg (104 lb 11.5 oz)   04/29/16 47.3 kg (104 lb 4.4 oz)   10/27/15 47 kg (103 lb 9.9 oz)     Constitutional: Thin, elderly WF in NAD, Alert & oriented x 3.  HENT: Mouth/Throat: Oropharynx is clear and moist. No oropharyngeal exudate.   Eyes: Conjunctivae and EOM are normal.   Neck: Normal range of motion. Neck supple.   Cardiovascular: Normal rate, regular rhythm, normal heart sounds and intact distal pulses.    Pulmonary/Chest: Effort normal and breath sounds normal. no wheezes. no rales.    Abdominal: Soft. Bowel sounds are normal. Patient exhibits no distension. There is mild tenderness.  Biliary drain right upper quadrant with dressing D/I flowing to bag.  Musculoskeletal: No CCE, +DP's.  Lymphadenopathy: No cervical, supraclavicular nor axillary LAD.   Neurological: Normal strength. No cranial nerve deficit or sensory deficit.   Skin: Warm and dry. No rash or erythema.   Psychiatric: Normal mood and affect. Speech is normal.   Nursing note and vitals reviewed.  Lab Results   Component Value Date    WBC 4.72 08/05/2020    RBC 3.39 (L) 08/05/2020    HGB 9.8 (L) 08/05/2020    HCT 30.0 (L) 08/05/2020    MCV 89 08/05/2020    MCH 28.9 08/05/2020    MCHC 32.7 08/05/2020    RDW 14.6 (H) 08/05/2020     (L) 08/05/2020    MPV 9.5 08/05/2020    GRAN 3.9 08/05/2020    GRAN 81.8 (H) 08/05/2020    LYMPH 0.4 (L) 08/05/2020    LYMPH 9.3 (L) 08/05/2020    MONO 0.4 08/05/2020    MONO 8.3 08/05/2020    EOS 0.0 08/05/2020    BASO 0.00 08/05/2020    EOSINOPHIL 0.2 08/05/2020    BASOPHIL 0.0 08/05/2020     ANC = 3861    CMP  Sodium   Date Value Ref Range Status   08/05/2020 133 (L) 136 - 145 mmol/L Final     Potassium   Date Value Ref Range Status   08/05/2020 4.1 3.5 - 5.1 mmol/L Final     Chloride   Date Value Ref Range Status   08/05/2020 104 95 - 110 mmol/L Final      CO2   Date Value Ref Range Status   08/05/2020 19 (L) 22 - 31 mmol/L Final     Glucose   Date Value Ref Range Status   08/05/2020 152 (H) 70 - 110 mg/dL Final     Comment:     The ADA recommends the following guidelines for fasting glucose:  Normal:       less than 100 mg/dL  Prediabetes:  100 mg/dL to 125 mg/dL  Diabetes:     126 mg/dL or higher       BUN, Bld   Date Value Ref Range Status   08/05/2020 37 (H) 7 - 18 mg/dL Final     Creatinine   Date Value Ref Range Status   08/05/2020 0.92 0.50 - 1.40 mg/dL Final     Calcium   Date Value Ref Range Status   08/05/2020 9.7 8.4 - 10.2 mg/dL Final     Total Protein   Date Value Ref Range Status   08/05/2020 6.7 6.0 - 8.4 g/dL Final     Albumin   Date Value Ref Range Status   08/05/2020 3.7 3.5 - 5.2 g/dL Final     Total Bilirubin   Date Value Ref Range Status   08/05/2020 0.5 0.2 - 1.3 mg/dL Final     Alkaline Phosphatase   Date Value Ref Range Status   08/05/2020 118 38 - 145 U/L Final     AST   Date Value Ref Range Status   08/05/2020 33 14 - 36 U/L Final     ALT   Date Value Ref Range Status   08/05/2020 24 0 - 35 U/L Final     Anion Gap   Date Value Ref Range Status   08/05/2020 10 8 - 16 mmol/L Final     eGFR if    Date Value Ref Range Status   08/05/2020 >60 >60 mL/min/1.73 m^2 Final     eGFR if non    Date Value Ref Range Status   08/05/2020 58 (A) >60 mL/min/1.73 m^2 Final     Comment:     Calculation used to obtain the estimated glomerular filtration  rate (eGFR) is the CKD-EPI equation.        CBC, CMP reviewed, noted :  Hgb stable, platelets drop/stable; sodium improved; slightly dehydrated  Reviewed labs & plan with Dr. Bledsoe    Assessment:       1. Pancreatic adenocarcinoma    2. Pancreatic cancer metastasized to intra-abdominal lymph node    3. Hyponatremia    4. CKD (chronic kidney disease), stage III    5. Biliary obstruction due to cancer      Stage IV     ECOG 2     ECOG 0-1 prior to diagnosis and only recent  "clinical decline since hospitalization and discharged yesterday.     Patient is a  85 y.o. female here with new diagnosis metastatic pancreatic cancer      Discussed diagnosis, work-up, staging and treatment recommendations in great detail with patient and son     There are overall quite shocked by her diagnosis and prognosis and "devastated", discussed stage IV and incurable and they showed good understanding of this.     There very adamant to pursue systemic chemotherapy.  Her performance status and health prior to this is appropriate and we discussed single agent Gemzar followed by close re-evaluation and consider add Abraxane if when she improves     We discussed close re-evaluation and must maintain good performance status and nutrition to have benefit from chemotherapy.     Reviewed her weight loss nutrition edema related to low albumin, low appetite, pain related to tube in great detail and management of these.     Discussed plan for MediPort placement followed by chemotherapy Education and schedule chemotherapy.     Will also send genetic testing and attempt NexGen sequencing per guidelines and to see if there are other treatment options, though they know this is low probability.     7/21/20 - will move forward with chemo start, PS and labs adequate, close re-evaluation and guarded prognosis due to age, palliative care established and appreciate their assistance.     7/27/20 - stable clinically after start of chemo without severe SE. Reviewed ongoing symptoms and mgmt in detail. Will continue proceed with chemo.  Plan will be Gemzar 3 weeks on and 1 week off.  Ultimate plan is that if she clinically stabilizes, will add Abraxane since more effective regimen.  Thus far she is doing quite well considering her age and clinical situation.      08/05/2020 - Will proceed with Cycle 1, Day 15 of treatment.  Give an additional bolus of  ml again today.  Encouraged small, frequent meals with additional " calories.  Biotene mouthwash bid/Xylimelts to assist with providing moisture to mouth.    Explained the need to maintain hydration, gain weight, keep up performance in order to provide chemotherapy treatment as desired.    Plan:       Laly was seen today for follow-up.    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        Proceed with chemo C1D15.     MD visit for C2D1 in 2 weeks with labs CBC, CMP prior.    Dr. Bledsoe reached out again in regards to Interventional Radiology about goal of internalizing biliary drain    Close monitoring of sodium & increase calorie intake.    Exercise/nutrition important, reviewed    Call to [facility], spoke with Naina Beckman RN Case manager in regards to biliary bag replacement & Aqua Guard cover for patient care.   [facility] to supply bag, but unable to obtain AquaGuard.  Will supplement with Tegaderm 6 x 6.   Son, Morgan, notified of materials to be supplied & IR to internalize biliary drain.    Assessment/plan reviewed and approve by Dr. Bledsoe.

## 2020-08-12 NOTE — TELEPHONE ENCOUNTER
I called [facility]. We received orders for approval of AIM (advanced illness management)  Dr saenz wanted to make sure pt and family are aware of change in care.   I spoke with them they have not talked to the family. They said they will send it again when they have talked to family fax was not sent back to [facility].

## 2020-08-19 NOTE — PROGRESS NOTES
FOLLOW-UP VISIT    Patient name: Laly Baker  Date: 8/19/20      Subjective:       Patient ID: Laly Baker is a 85 y.o. female.    Chief Complaint: Follow-up    HPI  Patient presents today for followup on chemo, cycle 2 day 1 gemzar for pancreatic cancer    Tolerating chemo well  GI - good PO intake, poor appetite, mild change in taste, mild nausea, no vomiting, no diarrhea but soft stool, +mild constipation, or blood in stool  Weight - gain which is good  Energy - dec, not severe  mouth sores - none  Exercising/activity - remains quite active.     Pain - no major    Blood sugar better control    Scalp itching and dry skin mild worse, dry mouth - she attributes to chemo.    She wants to stop protonix, no heart burn or nausea.   She has new bag for her drain, reports would like this removed if possible and we tried to arrange this previous.   She does still have pain there and sometimes has pain across her back - she is unsure if her bowels may not be related to this. Tramadol did help  Neuropathy - none    Signs of infection - none      Chemotherapy 7/21/2020 7/28/2020 8/5/2020   Day, Cycle Day 1, Cycle 1 Day 8, Cycle 1    gemcitabine (GEMZAR)  mg/m2 800mg/m2 800 mg/m2         See detailed oncology history below, updated with most recent scans, labs, pertinent medical history.   Oncology History   Pancreatic adenocarcinoma   7/1/2020 Initial Diagnosis    Pancreatic adenocarcinoma    05/26/2020-GI visit Dr. Barton  Presents for evaluation with change in bowel habits, increased frequency past 5 months.  History of pancreatitis, weight loss, change in bowel habits.  CT abdomen pelvis ordered.   06/03/2020-CT abdomen pelvis-previously noted cystic mass involving the body and tail of the pancreas is further decrease in size measuring 2.9 x 1 cm today.  There is however change in the appearance of the head proximal body of the pancreas which is heterogeneous masslike low-density cystic area measuring 2.5  cm and encasing vessels.  There was also interval development of dilatation of the pancreatic duct in the head of the pancreas measuring 7 mm.  The common duct remains non dilated measuring 6-7 mm.  There is also a new 12 mm hypodense lesion in the liver.  06/17/2020-EUS - Dr. Campbell               Pathology: FNA-positive for adenocarcinoma.  Unable to biopsy liver lesion, highly suspicious.  Additional studies: TESSIE and PDL1 0%  06/29/2020-CT abdomen pelvis - degree of intrahepatic biliary ductal dilatation has increased since 06/03/2020.  Low density pancreatic head mass measures 3.1 x 3.1 cm.  This is similar in size when compared to 06/03/2020.  Cystic lesion centered in the pancreatic tail measures 3.6 x 1.5 cm.  There is a low-density lesion in the medial aspect of the right hepatic lobe measuring 1.8 x 1.7 cm, unchanged from prior scan 06/03/2020.  0.5 cm low-density nodule in the posterior segment right hepatic lobe.  This is unchanged.  Thrombosis of the splenic vein unchanged.  Scheduled for initial medical oncology consult on 06/30/2020, admitted to [facility]  06/29/2020-emergency department visit-admitted to [facility], increased T bili  06/30/2020-transferred to [facility] for biliary obstruction and ERCP unable to cannulate biliary sphincter at [facility]  07/01/2020-underwent PTC, percutaneous transhepatic cholangiography  07/02/2020-CT chest shows several right pulmonary nodules measuring up to 6 mm, 2 mm left upper lobe, 5 mm right upper lobe, 4 mm right middle lobe, 6 mm right lower lobe..  07/03/2020-CT a abdomen and pelvis - percutaneous transhepatic biliary drain in place with distal tip terminating in the 2nd part of the duodenum.  Multiple stable hypoattenuating foci in the liver compatible with metastatic disease.  Stable appearance of heterogeneous pancreatic head mass abutting the duodenum and common bile duct.  Impression includes mild colonic distention with multiple air-fluid  "levels suspicious for non obstructive ileus The largest liver lesion is 1.3 cm hypoenhancing mass in the right hepatic lobe inferiorly.  07/03/2020-CA 19.9 - 6263  07/04/2020-medical oncology inpatient consult Dr. Cagle - consult reflects discussion about stage IV, non curative treatment options available.  GI bleeding this day, discharge held  07/05/2020-EGD-  07/08/2020-CMP shows bilirubin improved to 1.0, albumin is low at 2.4.  Calcium 8.3.  CBC shows a hemoglobin of 9.3, white count normal and platelets normal.  ANC is 6.8 normal  07/08/2020-discharge from [facility]  07/09/2020-initial consult [facility]     Patient and her son present and confirm history above  They are both quite anxious today     She reports Pain brand new two weeks ago. Chills overnight. Spoke to her brother who is a general practitioner in [place]. She went to ED [facility] as above. Then sent to [facility]. Home health is coming to her home and OT.      She reports "doing well" except the pain from incision at the point of tube.      Swelling new and we reviewed her low albumin likely related they will obtain compression stockings and focus on her nutrition and recovery from hospitalization.     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 Feels this is waxing and waning.   7/16/20 - hyperglycemia, hospital admission, mediport placed on 7/17/20 7/21/20 - C1D1 gemzar - 675mg/m2 and will increase  7/28/20 - C1D8 gemzar -      7/21/2020 -  Chemotherapy    Treatment Summary   Plan Name: OP PANC GEMCITABINE QW  Treatment Goal: Supportive  Status: Active  Start Date: 7/21/2020  End Date: 12/23/2020 (Planned)  Provider: Desire Bledsoe MD  Chemotherapy: gemcitabine (GEMZAR) 865 mg in sodium chloride 0.9% 250 mL chemo infusion, 675 mg/m2 = 865 mg (84.4 % of original " dose 800 mg/m2), Intravenous, Clinic/HOD 1 time, 2 of 6 cycles  Dose modification: 800 mg/m2 (original dose 800 mg/m2, Cycle 1), 675 mg/m2 (original dose 800 mg/m2, Cycle 1), 800 mg/m2 (original dose 800 mg/m2, Cycle 2)  Administration: 865 mg (7/21/2020), 1,000 mg (7/28/2020), 975 mg (8/19/2020)         I have reviewed my initial consult note with detailed PMH/ROS from initial encounter and all following progress notes.   Past medical, surgical, family, and social history were reviewed today and there are no changes of note unless mentioned in HPI.     MEDS and ALLERGIES were reviewed with patient and meds reconciled.     Fatigue  Not major    Weight/appetite  Good    Constitutional  Denies F/C    Pain  See hpi   Sleep  Ok    Mucositis  Denies    Cardiovascular  Denies CP    Respiratory  Denies SOB    Nausea  Denies    BMs  See hpi   GI  Denies abdominal pain    Bleeding  Denies epistaxis, hemoptysis, BRBPR, melena, hematuria or any other bleeding    Extremities  Denies edema    Skin/nail/hair  Denies toxicity    Neuropathy  Denies    Psych  In good spirits    Misc  n/a    Exercise  active          Objective:      BP (!) 109/59 (BP Location: Left arm, Patient Position: Sitting, BP Method: Medium (Automatic))   Pulse 79   Temp 97 °F (36.1 °C) (Temporal)   Resp 20   Ht 5' (1.524 m)   Wt 35.4 kg (78 lb 0.7 oz)   SpO2 98%   BMI 15.24 kg/m²   Wt Readings from Last 30 Encounters:   08/19/20 35.4 kg (78 lb 0.7 oz)   08/19/20 35.4 kg (78 lb 0.7 oz)   08/05/20 34.3 kg (75 lb 9.9 oz)   08/05/20 34.3 kg (75 lb 9.9 oz)   07/28/20 35.8 kg (78 lb 14.8 oz)   07/21/20 36.1 kg (79 lb 9.4 oz)   07/21/20 36.1 kg (79 lb 9.4 oz)   07/20/20 36.7 kg (81 lb)   07/16/20 37.6 kg (83 lb)   07/14/20 38 kg (83 lb 12.4 oz)   07/13/20 38.3 kg (84 lb 6.4 oz)   07/09/20 40.6 kg (89 lb 8.1 oz)   06/30/20 39.6 kg (87 lb 4.8 oz)   06/30/20 39.6 kg (87 lb 4.8 oz)   06/30/20 39.6 kg (87 lb 4.8 oz)   06/17/20 39.6 kg (87 lb 4.8 oz)   05/26/20  40.8 kg (89 lb 15.2 oz)   02/24/20 45.4 kg (100 lb)   02/04/20 43.8 kg (96 lb 9 oz)   08/23/19 45.8 kg (100 lb 15.5 oz)   11/26/18 46.6 kg (102 lb 11.8 oz)   05/17/18 45.8 kg (100 lb 15.5 oz)   10/06/17 43.1 kg (95 lb)   10/03/17 42.1 kg (92 lb 13 oz)   08/03/17 43.5 kg (96 lb)   07/10/17 42.2 kg (93 lb 0.6 oz)   06/16/17 48.8 kg (107 lb 9.6 oz)   06/04/17 42.5 kg (93 lb 11.1 oz)   05/23/17 46.8 kg (103 lb 2.8 oz)   11/15/16 47.5 kg (104 lb 11.5 oz)       Constitutional: Patient is oriented to person, place, and time. No distress. Thin, alert, no decline, pleasant, talkative.  HENT: Mouth/Throat: Oropharynx is clear and moist. No oropharyngeal exudate.   Eyes: Conjunctivae and EOM are normal.   Neck: Normal range of motion. Neck supple.   Cardiovascular: Normal rate, regular rhythm, normal heart sounds and intact distal pulses.    Pulmonary/Chest: Effort normal and breath sounds normal. no wheezes. no rales.    Abdominal: Soft. Bowel sounds are normal. Patient exhibits no distension. There is no tenderness. Drain in place.   Musculoskeletal: patient exhibits no edema or tenderness.   Lymphadenopathy:  patient  has no cervical adenopathy. no supraclavicular adenopathy. No axillary LAD.   Neurological: Patient is alert and oriented to person, place, and time. normal strength. No cranial nerve deficit or sensory deficit.   Skin: Skin is warm and dry. No rash noted. No erythema.   Psychiatric: Patient has a normal mood and affect. speech is normal.   Nursing note and vitals reviewed.      CBC, CMP reviewed, noted are anemia, wbc,plts good. CMP overall good, BG noted.   Assessment:       1. Pancreatic adenocarcinoma    2. Pancreatic cancer metastasized to intra-abdominal lymph node    3. Metastasis to liver    4. Underweight    5. Anemia associated with chemotherapy    6. Anxiety about health    7. Abdominal pain, unspecified abdominal location    8. Constipation, unspecified constipation type   "    Stage  IV     ECOG 2     ECOG 0-1 prior to diagnosis and only recent clinical decline since hospitalization and discharged yesterday.     Patient is a  85 y.o. female here with new diagnosis metastatic pancreatic cancer        Discussed diagnosis, work-up, staging and treatment recommendations in great detail with patient and son     There are overall quite shocked by her diagnosis and prognosis and "devastated", discussed stage IV and incurable and they showed good understanding of this.     There very adamant to pursue systemic chemotherapy.  Her performance status and health prior to this is appropriate and we discussed single agent Gemzar followed by close re-evaluation and consider add Abraxane if when she improves     We discussed close re-evaluation and must maintain good performance status and nutrition to have benefit from chemotherapy.     Reviewed her weight loss nutrition edema related to low albumin, low appetite, pain related to tube in great detail and management of these.     Discussed plan for MediPort placement followed by chemotherapy Education and schedule chemotherapy.     Will also send genetic testing and attempt NexGen sequencing per guidelines and to see if there are other treatment options, though they know this is low probability.     7/21/20 - will move forward with chemo start, PS and labs adequate, close re-evaluation and guarded prognosis due to age, palliative care established and appreciate their assistance.      7/27/20 - stable clinically after start of chemo without severe SE. Reviewed ongoing symptoms and mgmt in detail. Will continue proceed with chemo.  Plan will be Gemzar 3 weeks on and 1 week off.  Ultimate plan is that if she clinically stabilizes, will add Abraxane since more effective regimen.  Thus far she is doing quite well considering her age and clinical situation.    8/19/20 today clinically doing well after one cycle chemo, reviewed plan at length and careful " benefit/risk with her age and weight and chemo side effects. We will continue same chemo and dose (800mg/m2) and have new scans after cycle 2 and then re-eval treatment regimen. [email or web address] will work to have drain internalized, continue close monitoring of diabetes mgmt, weight.        Plan:       Laly was seen today for follow-up.    Diagnoses and all orders for this visit:    Pancreatic adenocarcinoma  -     Cancel: 0.9%  NaCl infusion  -     Cancel: dexamethasone injection 4 mg  -     Cancel: gemcitabine (GEMZAR) 975 mg in sodium chloride 0.9% 250 mL chemo infusion  -     Cancel: sodium chloride 0.9% 250 mL flush bag  -     Cancel: sodium chloride 0.9% flush 10 mL  -     Cancel: heparin, porcine (PF) 100 unit/mL injection flush 500 Units  -     Cancel: alteplase injection 2 mg  -     Cancer antigen 19-9; Standing  -     Magnesium; Standing  -     Phosphorus; Standing    Pancreatic cancer metastasized to intra-abdominal lymph node    Metastasis to liver    Underweight    Anemia associated with chemotherapy    Anxiety about health    Abdominal pain, unspecified abdominal location    Constipation, unspecified constipation type    Other orders  -     0.9%  NaCl infusion  -     dexamethasone injection 4 mg  -     gemcitabine (GEMZAR) 975 mg in sodium chloride 0.9% 250 mL chemo infusion  -     sodium chloride 0.9% 250 mL flush bag  -     sodium chloride 0.9% flush 10 mL  -     heparin, porcine (PF) 100 unit/mL injection flush 500 Units  -     alteplase injection 2 mg  -     0.9%  NaCl infusion  -     dexamethasone injection 4 mg  -     gemcitabine (GEMZAR) 975 mg in sodium chloride 0.9% 250 mL chemo infusion  -     sodium chloride 0.9% 250 mL flush bag  -     sodium chloride 0.9% flush 10 mL  -     heparin, porcine (PF) 100 unit/mL injection flush 500 Units  -     alteplase injection 2 mg            Proceed with chemo c2d1    NP or MD chemo lab for c2d8. Lab that day: CBC, CMP, MAG, PHOS, CA19.9, ferritin, iron profile  with plan to add ferraheme if labs indicate benefit.     NP or MD chemo lab for c2d15    Will order scans (CT C/A/P) and MD follow-up at visit C2D15    Will try to schedule drain internalized.     Exercise/nutrition important, reviewed    Continue current medications, reviewed.     Important to keep follow-up with PCP.     Discussed plan above in great detail with patient and all questions answered to their satisfaction. Proceed with plan above.          Desire Bledsoe M.D.  Hematology Oncology  [facility]     PROCEDURES:  Debridement, skin and subcutaneous tissue of open fracture 04-Oct-2019 16:27:58 CPT code 11192 Bassam Pink  ORIF, fracture, calcaneus, right 04-Oct-2019 16:27:36 CPT code 66788 Bassam Pink

## 2020-08-19 NOTE — PLAN OF CARE
Problem: Adult Inpatient Plan of Care  Goal: Patient-Specific Goal (Individualization)  Outcome: Ongoing, Progressing  Flowsheets (Taken 8/19/2020 5264)  Individualized Care Needs: warm blanket [place]  Anxieties, Fears or Concerns: none  Patient-Specific Goals (Include Timeframe): infection prevention during tx

## 2020-08-19 NOTE — PLAN OF CARE
Problem: Fatigue  Goal: Improved Activity Tolerance  Intervention: Promote Energy Conservation  Flowsheets (Taken 8/19/2020 1614)  Fatigue Management: frequent rest breaks encouraged  Sleep/Rest Enhancement: regular sleep/rest pattern promoted

## 2020-08-26 PROBLEM — K86.9 DIABETES MELLITUS ASSOCIATED WITH PANCREATIC DISEASE: Status: ACTIVE | Noted: 2020-01-01

## 2020-08-26 PROBLEM — D64.81 ANEMIA ASSOCIATED WITH CHEMOTHERAPY: Status: ACTIVE | Noted: 2020-01-01

## 2020-08-26 PROBLEM — E11.69 DIABETES MELLITUS ASSOCIATED WITH PANCREATIC DISEASE: Status: ACTIVE | Noted: 2020-01-01

## 2020-08-26 PROBLEM — T45.1X5A ANEMIA ASSOCIATED WITH CHEMOTHERAPY: Status: ACTIVE | Noted: 2020-01-01

## 2020-08-26 NOTE — PLAN OF CARE
"Pt glucose today was 41  She states they gave her lots of candy upstairs to bring up her sugar.  She also stated she fell Monday in the middle of the night. She said her glucose reading was low but still took her insulin  She thinks it was because her glucose was low then as well but did not retake her "sugar level" after she fell and just went back to bed.    "

## 2020-08-26 NOTE — PROGRESS NOTES
"FOLLOW-UP VISIT    Patient name: Laly Baker  Date: 8/26/20      Subjective:       Patient ID: Laly Baker is a 85 y.o. female.    Chief Complaint: Follow-up    HPI  Patient presents today for followup on chemo, cycle 2 day 8 of gemzar.   BG low at 41 on CMP, she is asymptoamtic.   States issues with adjusting this recent and working on this.  Had fall at night after humalog, she thinks BG possible.     she continues very mentally sharp today and quite energetic.    8/27/20 - having finger/hand checked tomorrow with orthopedic. Jone Hunter.    8/28/20 - to have drain internalized for stent    Tolerating chemo well otherwise.   GI - good PO intake better, family still says needs to eat more  poor appetite continues  Some change in taste, food tastes salty and flavor is "off"    mild nausea, no vomiting, no diarrhea, constipation, or blood in stool    Weight - gain 2lbs which is great  Energy - very good overall  mouth sores - none  Exercising/activity - active  Pain - no new  Still has lump on buttock not larger    Neuropathy - none    Signs of infection - none      See detailed oncology history below, updated with most recent scans, labs, pertinent medical history.   Oncology History   Pancreatic adenocarcinoma   7/1/2020 Initial Diagnosis    Pancreatic adenocarcinoma    05/26/2020-GI visit Dr. Barton  Presents for evaluation with change in bowel habits, increased frequency past 5 months.  History of pancreatitis, weight loss, change in bowel habits.  CT abdomen pelvis ordered.   06/03/2020-CT abdomen pelvis-previously noted cystic mass involving the body and tail of the pancreas is further decrease in size measuring 2.9 x 1 cm today.  There is however change in the appearance of the head proximal body of the pancreas which is heterogeneous masslike low-density cystic area measuring 2.5 cm and encasing vessels.  There was also interval development of dilatation of the pancreatic duct in the head of the " pancreas measuring 7 mm.  The common duct remains non dilated measuring 6-7 mm.  There is also a new 12 mm hypodense lesion in the liver.  06/17/2020-EUS - Dr. Campbell               Pathology: FNA-positive for adenocarcinoma.  Unable to biopsy liver lesion, highly suspicious.  Additional studies: TESSIE and PDL1 0%  06/29/2020-CT abdomen pelvis - degree of intrahepatic biliary ductal dilatation has increased since 06/03/2020.  Low density pancreatic head mass measures 3.1 x 3.1 cm.  This is similar in size when compared to 06/03/2020.  Cystic lesion centered in the pancreatic tail measures 3.6 x 1.5 cm.  There is a low-density lesion in the medial aspect of the right hepatic lobe measuring 1.8 x 1.7 cm, unchanged from prior scan 06/03/2020.  0.5 cm low-density nodule in the posterior segment right hepatic lobe.  This is unchanged.  Thrombosis of the splenic vein unchanged.  Scheduled for initial medical oncology consult on 06/30/2020, admitted to [facility]  06/29/2020-emergency department visit-admitted to [facility], increased T bili  06/30/2020-transferred to [facility] for biliary obstruction and ERCP unable to cannulate biliary sphincter at [facility]  07/01/2020-underwent PTC, percutaneous transhepatic cholangiography  07/02/2020-CT chest shows several right pulmonary nodules measuring up to 6 mm, 2 mm left upper lobe, 5 mm right upper lobe, 4 mm right middle lobe, 6 mm right lower lobe..  07/03/2020-CT a abdomen and pelvis - percutaneous transhepatic biliary drain in place with distal tip terminating in the 2nd part of the duodenum.  Multiple stable hypoattenuating foci in the liver compatible with metastatic disease.  Stable appearance of heterogeneous pancreatic head mass abutting the duodenum and common bile duct.  Impression includes mild colonic distention with multiple air-fluid levels suspicious for non obstructive ileus The largest liver lesion is 1.3 cm hypoenhancing mass in the right hepatic lobe  "inferiorly.  07/03/2020-CA 19.9 - 6263  07/04/2020-medical oncology inpatient consult Dr. Cagle - consult reflects discussion about stage IV, non curative treatment options available.  GI bleeding this day, discharge held  07/05/2020-EGD-  07/08/2020-CMP shows bilirubin improved to 1.0, albumin is low at 2.4.  Calcium 8.3.  CBC shows a hemoglobin of 9.3, white count normal and platelets normal.  ANC is 6.8 normal  07/08/2020-discharge from [facility]  07/09/2020-initial consult [facility]     Patient and her son present and confirm history above  They are both quite anxious today     She reports Pain brand new two weeks ago. Chills overnight. Spoke to her brother who is a general practitioner in [place]. She went to ED [facility] as above. Then sent to [facility]. Home health is coming to her home and OT.      She reports "doing well" except the pain from incision at the point of tube.      Swelling new and we reviewed her low albumin likely related they will obtain compression stockings and focus on her nutrition and recovery from hospitalization.     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 Feels this is waxing and waning.   7/16/20 - hyperglycemia, hospital admission, mediport placed on 7/17/20 7/21/20 - C1D1 gemzar - 675mg/m2 and will increase  7/28/20 - C1D8 gemzar -      7/21/2020 -  Chemotherapy    Treatment Summary   Plan Name: OP PANC GEMCITABINE QW  Treatment Goal: Supportive  Status: Active  Start Date: 7/21/2020  End Date: 12/23/2020 (Planned)  Provider: Desire Bledsoe MD  Chemotherapy: gemcitabine (GEMZAR) 865 mg in sodium chloride 0.9% 250 mL chemo infusion, 675 mg/m2 = 865 mg (84.4 % of original dose 800 mg/m2), Intravenous, Clinic/HOD 1 time, 2 of 6 cycles  Dose modification: 800 mg/m2 (original dose 800 mg/m2, " "Cycle 1), 675 mg/m2 (original dose 800 mg/m2, Cycle 1), 800 mg/m2 (original dose 800 mg/m2, Cycle 2)  Administration: 865 mg (7/21/2020), 1,000 mg (7/28/2020), 975 mg (8/19/2020), 1,000 mg (8/26/2020)         I have reviewed my initial consult note with detailed PMH/ROS from initial encounter and all following progress notes.   Past medical, surgical, family, and social history were reviewed today and there are no changes of note unless mentioned in HPI.     MEDS and ALLERGIES were reviewed with patient and meds reconciled.     Fatigue  some    Weight/appetite  Good    Constitutional  Denies F/C    Pain  Denies    Sleep  Ok    Mucositis  Denies    Cardiovascular  Denies CP    Respiratory  Denies SOB    Nausea  Denies    BMs  Normal    GI  Denies abdominal pain    Bleeding  Denies epistaxis, hemoptysis, BRBPR, melena, hematuria or any other bleeding    Extremities  Denies edema    Skin/nail/hair  Denies toxicity    Neuropathy  Denies    Psych  In good spirits    Misc  n/a    Exercise  Very active          Objective:      /62 (BP Location: Right arm, Patient Position: Sitting, BP Method: Small (Automatic))   Pulse 75   Temp 98.7 °F (37.1 °C) (Temporal)   Resp 20   Ht 5' 4" (1.626 m)   Wt 36.3 kg (80 lb 0.4 oz)   SpO2 100%   BMI 13.74 kg/m²   Wt Readings from Last 30 Encounters:   08/26/20 36.3 kg (80 lb 0.4 oz)   08/26/20 36.3 kg (80 lb 0.4 oz)   08/19/20 35.4 kg (78 lb 0.7 oz)   08/19/20 35.4 kg (78 lb 0.7 oz)   08/05/20 34.3 kg (75 lb 9.9 oz)   08/05/20 34.3 kg (75 lb 9.9 oz)   07/28/20 35.8 kg (78 lb 14.8 oz)   07/21/20 36.1 kg (79 lb 9.4 oz)   07/21/20 36.1 kg (79 lb 9.4 oz)   07/20/20 36.7 kg (81 lb)   07/16/20 37.6 kg (83 lb)   07/14/20 38 kg (83 lb 12.4 oz)   07/13/20 38.3 kg (84 lb 6.4 oz)   07/09/20 40.6 kg (89 lb 8.1 oz)   06/30/20 39.6 kg (87 lb 4.8 oz)   06/30/20 39.6 kg (87 lb 4.8 oz)   06/30/20 39.6 kg (87 lb 4.8 oz)   06/17/20 39.6 kg (87 lb 4.8 oz)   05/26/20 40.8 kg (89 lb 15.2 oz) "   02/24/20 45.4 kg (100 lb)   02/04/20 43.8 kg (96 lb 9 oz)   08/23/19 45.8 kg (100 lb 15.5 oz)   11/26/18 46.6 kg (102 lb 11.8 oz)   05/17/18 45.8 kg (100 lb 15.5 oz)   10/06/17 43.1 kg (95 lb)   10/03/17 42.1 kg (92 lb 13 oz)   08/03/17 43.5 kg (96 lb)   07/10/17 42.2 kg (93 lb 0.6 oz)   06/16/17 48.8 kg (107 lb 9.6 oz)   06/04/17 42.5 kg (93 lb 11.1 oz)       Constitutional: Patient is oriented to person, place, and time. No distress. Well appearing, alert  HENT: Mouth/Throat: Oropharynx is clear and moist. No oropharyngeal exudate.   Eyes: Conjunctivae and EOM are normal.   Neck: Normal range of motion. Neck supple.   Cardiovascular: Normal rate, regular rhythm, normal heart sounds and intact distal pulses.    Pulmonary/Chest: Effort normal and breath sounds normal. no wheezes. no rales.    Abdominal: Soft. Bowel sounds are normal. Patient exhibits no distension. There is no tenderness.   Musculoskeletal: patient exhibits no edema or tenderness.   Lymphadenopathy:  patient  has no cervical adenopathy. no supraclavicular adenopathy. No axillary LAD.   Neurological: Patient is alert and oriented to person, place, and time. normal strength. No cranial nerve deficit or sensory deficit.   Skin: Skin is warm and dry. No rash noted. No erythema.   Psychiatric: Patient has a normal mood and affect. speech is normal.   Nursing note and vitals reviewed.      CBC, CMP reviewed, noted are anemia, iron profile - FIDA. BG critical low. Labs otherwise stable/good  Assessment:       1. Pancreatic adenocarcinoma    2. Pancreatic cancer metastasized to intra-abdominal lymph node    3. Metastasis to liver    4. Underweight    5. Anemia associated with chemotherapy    6. Anxiety about health    7. Abdominal pain, unspecified abdominal location    8. Poor appetite    9. Diabetes mellitus associated with pancreatic disease    10. Hypoglycemia      Stage  IV     ECOG 2     ECOG 0-1 prior to diagnosis and only recent clinical decline  "since hospitalization and discharged yesterday.     Patient is a  85 y.o. female here with new diagnosis metastatic pancreatic cancer        Discussed diagnosis, work-up, staging and treatment recommendations in great detail with patient and son     There are overall quite shocked by her diagnosis and prognosis and "devastated", discussed stage IV and incurable and they showed good understanding of this.     There very adamant to pursue systemic chemotherapy.  Her performance status and health prior to this is appropriate and we discussed single agent Gemzar followed by close re-evaluation and consider add Abraxane if when she improves     We discussed close re-evaluation and must maintain good performance status and nutrition to have benefit from chemotherapy.     Reviewed her weight loss nutrition edema related to low albumin, low appetite, pain related to tube in great detail and management of these.     Discussed plan for MediPort placement followed by chemotherapy Education and schedule chemotherapy.     Will also send genetic testing and attempt NexGen sequencing per guidelines and to see if there are other treatment options, though they know this is low probability.     7/21/20 - will move forward with chemo start, PS and labs adequate, close re-evaluation and guarded prognosis due to age, palliative care established and appreciate their assistance.      7/27/20 - stable clinically after start of chemo without severe SE. Reviewed ongoing symptoms and mgmt in detail. Will continue proceed with chemo.  Plan will be Gemzar 3 weeks on and 1 week off.  Ultimate plan is that if she clinically stabilizes, will add Abraxane since more effective regimen.  Thus far she is doing quite well considering her age and clinical situation.       8/19/20 today clinically doing well after one cycle chemo, reviewed plan at length and careful benefit/risk with her age and weight and chemo side effects. We will continue same chemo " and dose (800mg/m2) and have new scans after cycle 2 and then re-eval treatment regimen. othewrise will work to have drain internalized, continue close monitoring of diabetes mgmt, weight.    8/26/20 - doing okay today here for C2D8, functional iron def and will tx with IV iron with chemo, otherwise adequate tolerance of chemo and will continue with plan. Weight better, appetite and PO intake continue to try to improve, energy/fatigue overall good. To have pain swelling palm left hand eval'd which is good since this has been consistently bothering her, also to have stent/drain internalized this week, has had minimal output.   Scans planned as well discussed at length and plan overall and with her brother who is a physician outside [place]      Plan:       Laly was seen today for follow-up.    Diagnoses and all orders for this visit:    Pancreatic adenocarcinoma    Pancreatic cancer metastasized to intra-abdominal lymph node  -     CT Chest Abdomen Pelvis W W/O Contrast (XPD); Future    Metastasis to liver  -     CT Chest Abdomen Pelvis W W/O Contrast (XPD); Future    Underweight    Anemia associated with chemotherapy    Anxiety about health    Abdominal pain, unspecified abdominal location    Poor appetite    Diabetes mellitus associated with pancreatic disease    Hypoglycemia    Other orders  -     ferumoxytoL (FERAHEME) 510 mg in dextrose 5 % 100 mL IVPB  -     heparin, porcine (PF) 100 unit/mL injection flush 500 Units  -     sodium chloride 0.9% flush 10 mL  -     sodium chloride 0.9% 100 mL flush bag            Proceed with chemo  Check BG in chemo infusion to repeat    NP chemo lab 1 week. For C2D15  IV iron with chemo that day, plan for two doses, can be with chemo or separate for second dose    Schedule CT scans for the following week at that time (week of 9/7/20). CT c/a/p. Then MD vis 1-2 days after scans. No scans fridays. Please alert me if can't schedule MD vis within 2 days of scans    Then will  continue C3D1 per protocol and she will have labs with no MD vis at that time (the MD clearance will be the week prior with scan results)    Exercise/nutrition important, reviewed    Continue current medications, reviewed.      Important to keep follow-up with PCP. And ortho followup     Discussed plan above in great detail with patient and all questions answered to their satisfaction. Proceed with plan above.          Desire Bledsoe M.D.  Hematology Oncology  [facility]  [place]

## 2020-08-26 NOTE — PLAN OF CARE
Tolerated infusion well today Discharge instructions given Verbally acknowledged understanding Ambulated to private vehicle without difficulty  NAD

## 2020-08-26 NOTE — PLAN OF CARE
Cesilia presented to infusion suite for treatment.  Assessment completed and POC discussed  Verbally acknowledged understanding

## 2020-08-28 PROBLEM — K83.1 OBSTRUCTION OF BILE DUCT: Status: ACTIVE | Noted: 2020-01-01

## 2020-09-02 NOTE — PROGRESS NOTES
"FOLLOW-UP VISIT    Patient name: Laly Baker  Date: 9/2/20      Subjective:       Patient ID: Laly Baker is a 85 y.o. female.    Chief Complaint: Follow-up    HPI here for chemo - mets pancreatic    She is doing well today    Had stent internalized and very happy about this    Recent insulin change, BG stable.    Her taste is abnormal, bitter and salty taste, dry mouth, but she continues to eat - chornic issue.   Her  feels she is not doing well with this and she does. Her weight is stable.   Urination is good  Active around house continues and walks frequent. She reports does "little jobs here and there" and then sits 5 minutes and then moves.     No fever    No new pain.     Some pain in back when needs to have BM or gas, then relieved. Advised gas x and re-eval, continue monitoring for regular BM.         See detailed oncology history below, updated with most recent scans, labs, pertinent medical history.   Oncology History   Pancreatic adenocarcinoma   7/1/2020 Initial Diagnosis    Pancreatic adenocarcinoma    05/26/2020-GI visit Dr. Barton  Presents for evaluation with change in bowel habits, increased frequency past 5 months.  History of pancreatitis, weight loss, change in bowel habits.  CT abdomen pelvis ordered.   06/03/2020-CT abdomen pelvis-previously noted cystic mass involving the body and tail of the pancreas is further decrease in size measuring 2.9 x 1 cm today.  There is however change in the appearance of the head proximal body of the pancreas which is heterogeneous masslike low-density cystic area measuring 2.5 cm and encasing vessels.  There was also interval development of dilatation of the pancreatic duct in the head of the pancreas measuring 7 mm.  The common duct remains non dilated measuring 6-7 mm.  There is also a new 12 mm hypodense lesion in the liver.  06/17/2020-EUS - Dr. Campbell               Pathology: FNA-positive for adenocarcinoma.  Unable to biopsy liver lesion, " highly suspicious.  Additional studies: TESSIE and PDL1 0%  06/29/2020-CT abdomen pelvis - degree of intrahepatic biliary ductal dilatation has increased since 06/03/2020.  Low density pancreatic head mass measures 3.1 x 3.1 cm.  This is similar in size when compared to 06/03/2020.  Cystic lesion centered in the pancreatic tail measures 3.6 x 1.5 cm.  There is a low-density lesion in the medial aspect of the right hepatic lobe measuring 1.8 x 1.7 cm, unchanged from prior scan 06/03/2020.  0.5 cm low-density nodule in the posterior segment right hepatic lobe.  This is unchanged.  Thrombosis of the splenic vein unchanged.  Scheduled for initial medical oncology consult on 06/30/2020, admitted to [facility]  06/29/2020-emergency department visit-admitted to [facility], increased T bili  06/30/2020-transferred to [facility] for biliary obstruction and ERCP unable to cannulate biliary sphincter at [facility]  07/01/2020-underwent PTC, percutaneous transhepatic cholangiography  07/02/2020-CT chest shows several right pulmonary nodules measuring up to 6 mm, 2 mm left upper lobe, 5 mm right upper lobe, 4 mm right middle lobe, 6 mm right lower lobe..  07/03/2020-CT a abdomen and pelvis - percutaneous transhepatic biliary drain in place with distal tip terminating in the 2nd part of the duodenum.  Multiple stable hypoattenuating foci in the liver compatible with metastatic disease.  Stable appearance of heterogeneous pancreatic head mass abutting the duodenum and common bile duct.  Impression includes mild colonic distention with multiple air-fluid levels suspicious for non obstructive ileus The largest liver lesion is 1.3 cm hypoenhancing mass in the right hepatic lobe inferiorly.  07/03/2020-CA 19.9 - 6263  07/04/2020-medical oncology inpatient consult Dr. Cagle - consult reflects discussion about stage IV, non curative treatment options available.  GI bleeding this day, discharge held  07/05/2020-EGD-  07/08/2020-CMP  "shows bilirubin improved to 1.0, albumin is low at 2.4.  Calcium 8.3.  CBC shows a hemoglobin of 9.3, white count normal and platelets normal.  ANC is 6.8 normal  07/08/2020-discharge from [facility]  07/09/2020-initial consult [facility]     Patient and her son present and confirm history above  They are both quite anxious today     She reports Pain brand new two weeks ago. Chills overnight. Spoke to her brother who is a general practitioner in [place]. She went to ED [facility] as above. Then sent to [facility]. Home health is coming to her home and OT.      She reports "doing well" except the pain from incision at the point of tube.      Swelling new and we reviewed her low albumin likely related they will obtain compression stockings and focus on her nutrition and recovery from hospitalization.     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 Feels this is waxing and waning.   7/16/20 - hyperglycemia, hospital admission, mediport placed on 7/17/20 7/21/20 - C1D1 gemzar - 675mg/m2 and will increase  7/28/20 - C1D8 gemzar -      7/21/2020 -  Chemotherapy    Treatment Summary   Plan Name: OP PANC GEMCITABINE QW  Treatment Goal: Supportive  Status: Active  Start Date: 7/21/2020  End Date: 12/23/2020 (Planned)  Provider: Desire Bledsoe MD  Chemotherapy: gemcitabine (GEMZAR) 865 mg in sodium chloride 0.9% 250 mL chemo infusion, 675 mg/m2 = 865 mg (84.4 % of original dose 800 mg/m2), Intravenous, Clinic/HOD 1 time, 2 of 6 cycles  Dose modification: 800 mg/m2 (original dose 800 mg/m2, Cycle 1), 675 mg/m2 (original dose 800 mg/m2, Cycle 1), 800 mg/m2 (original dose 800 mg/m2, Cycle 2)  Administration: 865 mg (7/21/2020), 1,000 mg (7/28/2020), 975 mg (8/19/2020), 1,000 mg (8/26/2020), 1,000 mg (9/2/2020)         I have reviewed my initial " "consult note with detailed PMH/ROS from initial encounter and all following progress notes.   Past medical, surgical, family, and social history were reviewed today and there are no changes of note unless mentioned in HPI.     MEDS and ALLERGIES were reviewed with patient and meds reconciled.     Fatigue  minimal    Weight/appetite  adequate   Constitutional  Denies F/C    Pain  some   Sleep  Ok    Mucositis  Denies    Cardiovascular  Denies CP    Respiratory  Denies SOB    Nausea  Denies    BMs  Normal    GI  Denies abdominal pain    Bleeding  Denies epistaxis, hemoptysis, BRBPR, melena, hematuria or any other bleeding    Extremities  Denies edema    Skin/nail/hair  Denies toxicity    Neuropathy  Denies    Psych  In good spirits    Misc  n/a    Exercise  Very active          Objective:      BP (!) 136/49 (BP Location: Left arm, Patient Position: Sitting)   Pulse 95   Temp 98.4 °F (36.9 °C) (Temporal)   Ht 5' 4" (1.626 m)   Wt 37.1 kg (81 lb 12.7 oz)   SpO2 99%   BMI 14.04 kg/m²   Wt Readings from Last 30 Encounters:   09/02/20 37.1 kg (81 lb 12.7 oz)   09/02/20 37.1 kg (81 lb 12.7 oz)   08/28/20 36.7 kg (81 lb)   08/27/20 36.3 kg (80 lb)   08/26/20 36.3 kg (80 lb 0.4 oz)   08/26/20 36.3 kg (80 lb 0.4 oz)   08/19/20 35.4 kg (78 lb 0.7 oz)   08/19/20 35.4 kg (78 lb 0.7 oz)   08/05/20 34.3 kg (75 lb 9.9 oz)   08/05/20 34.3 kg (75 lb 9.9 oz)   07/28/20 35.8 kg (78 lb 14.8 oz)   07/21/20 36.1 kg (79 lb 9.4 oz)   07/21/20 36.1 kg (79 lb 9.4 oz)   07/20/20 36.7 kg (81 lb)   07/16/20 37.6 kg (83 lb)   07/14/20 38 kg (83 lb 12.4 oz)   07/13/20 38.3 kg (84 lb 6.4 oz)   07/09/20 40.6 kg (89 lb 8.1 oz)   06/30/20 39.6 kg (87 lb 4.8 oz)   06/30/20 39.6 kg (87 lb 4.8 oz)   06/30/20 39.6 kg (87 lb 4.8 oz)   06/17/20 39.6 kg (87 lb 4.8 oz)   05/26/20 40.8 kg (89 lb 15.2 oz)   02/24/20 45.4 kg (100 lb)   02/04/20 43.8 kg (96 lb 9 oz)   08/23/19 45.8 kg (100 lb 15.5 oz)   11/26/18 46.6 kg (102 lb 11.8 oz)   05/17/18 45.8 kg " (100 lb 15.5 oz)   10/06/17 43.1 kg (95 lb)   10/03/17 42.1 kg (92 lb 13 oz)       Constitutional: Patient is oriented to person, place, and time. No distress. Well appearing. Thin. No change.   HENT: Mouth/Throat: Oropharynx is clear and moist. No oropharyngeal exudate.   Eyes: Conjunctivae and EOM are normal.   Neck: Normal range of motion. Neck supple.   Cardiovascular: Normal rate, regular rhythm, normal heart sounds and intact distal pulses.    Pulmonary/Chest: Effort normal and breath sounds normal. no wheezes. no rales.    mediport c/d/i  Abdominal: Soft. Bowel sounds are normal. Patient exhibits no distension. There is no tenderness.   Musculoskeletal: patient exhibits no edema or tenderness.   Lymphadenopathy:  patient  has no cervical adenopathy. no supraclavicular adenopathy. No axillary LAD.   Neurological: Patient is alert and oriented to person, place, and time. normal strength. No cranial nerve deficit or sensory deficit.   Skin: Skin is warm and dry. No rash noted. No erythema.   Psychiatric: Patient has a normal mood and affect. speech is normal.   Nursing note and vitals reviewed.      CBC, CMP reviewed, noted are good result - stable. Anemia no worse. Clare marialuisa today  Assessment:       1. Pancreatic cancer metastasized to intra-abdominal lymph node    2. Metastasis to liver    3. Underweight    4. Poor appetite    5. Anxiety about health    6. 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    ECOG 2     ECOG 0-1 prior to diagnosis and only recent clinical decline since hospitalization and discharged yesterday.     Patient is a  85 y.o. female here with new diagnosis metastatic pancreatic cancer        Discussed diagnosis, work-up, staging and treatment recommendations in great detail with patient and son     There are overall quite shocked by her diagnosis and prognosis  "and "devastated", discussed stage IV and incurable and they showed good understanding of this.     There very adamant to pursue systemic chemotherapy.  Her performance status and health prior to this is appropriate and we discussed single agent Gemzar followed by close re-evaluation and consider add Abraxane if when she improves     We discussed close re-evaluation and must maintain good performance status and nutrition to have benefit from chemotherapy.     Reviewed her weight loss nutrition edema related to low albumin, low appetite, pain related to tube in great detail and management of these.     Discussed plan for MediPort placement followed by chemotherapy Education and schedule chemotherapy.     Will also send genetic testing and attempt NexGen sequencing per guidelines and to see if there are other treatment options, though they know this is low probability.     7/21/20 - will move forward with chemo start, PS and labs adequate, close re-evaluation and guarded prognosis due to age, palliative care established and appreciate their assistance.      7/27/20 - stable clinically after start of chemo without severe SE. Reviewed ongoing symptoms and mgmt in detail. Will continue proceed with chemo.  Plan will be Gemzar 3 weeks on and 1 week off.  Ultimate plan is that if she clinically stabilizes, will add Abraxane since more effective regimen.  Thus far she is doing quite well considering her age and clinical situation.        8/19/20 today clinically doing well after one cycle chemo, reviewed plan at length and careful benefit/risk with her age and weight and chemo side effects. We will continue same chemo and dose (800mg/m2) and have new scans after cycle 2 and then re-eval treatment regimen. othewrise will work to have drain internalized, continue close monitoring of diabetes mgmt, weight.     8/26/20 - doing okay today here for C2D8, functional iron def and will tx with IV iron with chemo, otherwise adequate " tolerance of chemo and will continue with plan. Weight better, appetite and PO intake continue to try to improve, energy/fatigue overall good. To have pain swelling palm left hand eval'd which is good since this has been consistently bothering her, also to have stent/drain internalized this week, has had minimal output.   Scans planned as well discussed at length and plan overall and with her brother who is a physician outside [place]    9/2/20 - clinically stable continue with chemo  Scans next week  Ca19.9 notable elevation but will awiat scans for assessment of chemo efficacy.  For her age and new onset diabetes, she is tolerating tx very well.   IV iron for anemia of malignancy, functional iron def.       Plan:       Laly was seen today for follow-up.    Diagnoses and all orders for this visit:    Pancreatic cancer metastasized to intra-abdominal lymph node    Metastasis to liver    Underweight    Poor appetite    Anxiety about health    Anemia associated with chemotherapy    Diabetes mellitus associated with pancreatic disease            Keep CT scans and MD vis next week    Cancel feraheme next week - give with next dose of chemo so she doesn't have to come an extra visit    Exercise/nutrition important, reviewed    Continue current medications, reviewed.      Important to keep follow-up with PCP and endocrine.     Discussed plan above in great detail with patient and all questions answered to their satisfaction. Proceed with plan above.          Desire Bledsoe M.D.  Hematology Oncology  [facility]  [facility]

## 2020-09-09 NOTE — Clinical Note
Hey team, can yall schedule to see this lady again soon? She has had progression of disease and will continue on chemo per her goals of care, but I think she needs further more persistent discussion on goals of care, EOL talk. They were receptive today but still very reluctant. She is doing clinically very well right now but of course concerning that could change quickly! Thanks.

## 2020-09-09 NOTE — PROGRESS NOTES
FOLLOW-UP VISIT    Patient name: Laly Baker  Date: 9/9/20      Subjective:       Patient ID: Laly Baker is a 85 y.o. female.    Chief Complaint: Results    HPI   85-year-old pleasant female presents for follow-up of her metastatic pancreatic cancer with new scans after 2 cycles of single agent Gemzar  Scans show progression of disease  panc head mass now 4.5 x 2.5cm previous - 2.5cm  Liver lesions- largest is 21mm and was previous 12 mm.  There is also additional lesions.    Also ascites noted    She reports she has had additional abdominal pain recently radiating to her back.  We discussed this probably related to her progression of disease.  It has been severe and limiting on occasion.  Tramadol and Tylenol do not help very much.  She feels her p.o. intake is good and activity remains good, frequently very active around her home continues.  Her weight is stable and increased which is good news.      See detailed oncology history below, updated with most recent scans, labs, pertinent medical history.   Oncology History   Pancreatic adenocarcinoma   7/1/2020 Initial Diagnosis    Pancreatic adenocarcinoma    05/26/2020-GI visit Dr. Barton  Presents for evaluation with change in bowel habits, increased frequency past 5 months.  History of pancreatitis, weight loss, change in bowel habits.  CT abdomen pelvis ordered.   06/03/2020-CT abdomen pelvis-previously noted cystic mass involving the body and tail of the pancreas is further decrease in size measuring 2.9 x 1 cm today.  There is however change in the appearance of the head proximal body of the pancreas which is heterogeneous masslike low-density cystic area measuring 2.5 cm and encasing vessels.  There was also interval development of dilatation of the pancreatic duct in the head of the pancreas measuring 7 mm.  The common duct remains non dilated measuring 6-7 mm.  There is also a new 12 mm hypodense lesion in the liver.  06/17/2020-EUS - Dr. Campbell                Pathology: FNA-positive for adenocarcinoma.  Unable to biopsy liver lesion, highly suspicious.  Additional studies: TESSIE and PDL1 0%  06/29/2020-CT abdomen pelvis - degree of intrahepatic biliary ductal dilatation has increased since 06/03/2020.  Low density pancreatic head mass measures 3.1 x 3.1 cm.  This is similar in size when compared to 06/03/2020.  Cystic lesion centered in the pancreatic tail measures 3.6 x 1.5 cm.  There is a low-density lesion in the medial aspect of the right hepatic lobe measuring 1.8 x 1.7 cm, unchanged from prior scan 06/03/2020.  0.5 cm low-density nodule in the posterior segment right hepatic lobe.  This is unchanged.  Thrombosis of the splenic vein unchanged.  Scheduled for initial medical oncology consult on 06/30/2020, admitted to [facility]  06/29/2020-emergency department visit-admitted to [facility], increased T bili  06/30/2020-[facility] and ERCP unable to cannulate biliary sphincter at [facility]  07/01/2020-underwent PTC, percutaneous transhepatic cholangiography  07/02/2020-CT chest shows several right pulmonary nodules measuring up to 6 mm, 2 mm left upper lobe, 5 mm right upper lobe, 4 mm right middle lobe, 6 mm right lower lobe..  07/03/2020-CT a abdomen and pelvis - percutaneous transhepatic biliary drain in place with distal tip terminating in the 2nd part of the duodenum.  Multiple stable hypoattenuating foci in the liver compatible with metastatic disease.  Stable appearance of heterogeneous pancreatic head mass abutting the duodenum and common bile duct.  Impression includes mild colonic distention with multiple air-fluid levels suspicious for non obstructive ileus The largest liver lesion is 1.3 cm hypoenhancing mass in the right hepatic lobe inferiorly.  07/03/2020-CA 19.9 - 6263  07/04/2020-medical oncology inpatient consult Dr. Cagle - consult reflects discussion about stage IV, non curative treatment  "options available.  GI bleeding this day, discharge held  07/05/2020-EGD-  07/08/2020-CMP shows bilirubin improved to 1.0, albumin is low at 2.4.  Calcium 8.3.  CBC shows a hemoglobin of 9.3, white count normal and platelets normal.  ANC is 6.8 normal  07/08/2020-discharge from [facility]  07/09/2020-initial consult [facility]     Patient and her son present and confirm history above  They are both quite anxious today     She reports Pain brand new two weeks ago. Chills overnight. Spoke to her brother who is a general practitioner in [place]. She went to ED [facility] as above. Then sent to [facility]. Home health is coming to her home and OT.      She reports "doing well" except the pain from incision at the point of tube.      Swelling new and we reviewed her low albumin likely related they will obtain compression stockings and focus on her nutrition and recovery from hospitalization.     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 Feels this is waxing and waning.   7/16/20 - hyperglycemia, hospital admission, mediport placed on 7/17/20 7/21/20 - C1D1 gemzar - 675mg/m2 and will increase  7/28/20 - C1D8 gemzar -      7/21/2020 - 9/15/2020 Chemotherapy    Treatment Summary   Plan Name: OP PANC GEMCITABINE QW  Treatment Goal: Supportive  Status: Inactive  Start Date: 7/21/2020  End Date: 9/2/2020  Provider: Desire Bledsoe MD  Chemotherapy: gemcitabine (GEMZAR) 865 mg in sodium chloride 0.9% 250 mL chemo infusion, 675 mg/m2 = 865 mg (84.4 % of original dose 800 mg/m2), Intravenous, Clinic/HOD 1 time, 2 of 6 cycles  Dose modification: 800 mg/m2 (original dose 800 mg/m2, Cycle 1), 675 mg/m2 (original dose 800 mg/m2, Cycle 1), 800 mg/m2 (original dose 800 mg/m2, Cycle 2)  Administration: 865 mg (7/21/2020), 1,000 mg (7/28/2020), 975 mg " "(8/19/2020), 1,000 mg (8/26/2020), 1,000 mg (9/2/2020)     9/16/2020 -  Chemotherapy    Treatment Summary   Plan Name: OP PANC NAB-PACLITAXEL + GEMCITABINE Q4W  Treatment Goal: Supportive  Status: Active  Start Date: 9/16/2020 (Planned)  End Date: 8/4/2021 (Planned)  Provider: Desire Bledsoe MD  Chemotherapy: PACLitaxel-protein bound (ABRAXANE) in 32 mL infusion, 125 mg/m2 = 160 mg, Intravenous, Clinic/HOD 1 time, 0 of 12 cycles  gemcitabine (GEMZAR) in sodium chloride 0.9% chemo infusion, 1,000 mg/m2 = 1,300 mg, Intravenous, Clinic/HOD 1 time, 0 of 12 cycles         I have reviewed my initial consult note with detailed PMH/ROS from initial encounter and all following progress notes.   Past medical, surgical, family, and social history were reviewed today and there are no changes of note unless mentioned in HPI.     MEDS and ALLERGIES were reviewed with patient and meds reconciled.     Fatigue  Not severe    Weight/appetite  Good    Constitutional  Denies F/C    Pain  Denies    Sleep  Ok    Mucositis  Denies    Cardiovascular  Denies CP    Respiratory  Denies SOB    Nausea  Denies    BMs  Normal    GI  Positive abdominal pain    Bleeding  Denies epistaxis, hemoptysis, BRBPR, melena, hematuria or any other bleeding    Extremities  Denies edema    Skin/nail/hair  Denies toxicity    Neuropathy  Denies    Psych  In good spirits    Misc  n/a    Exercise  Very active          Objective:      /63 (BP Location: Left arm, Patient Position: Sitting)   Pulse 100   Temp 98.5 °F (36.9 °C) (Temporal)   Resp 17   Ht 5' 4" (1.626 m)   Wt 37.5 kg (82 lb 10.8 oz)   SpO2 98%   BMI 14.19 kg/m²   Wt Readings from Last 30 Encounters:   09/09/20 37.5 kg (82 lb 10.8 oz)   09/02/20 37.1 kg (81 lb 12.7 oz)   09/02/20 37.1 kg (81 lb 12.7 oz)   08/28/20 36.7 kg (81 lb)   08/27/20 36.3 kg (80 lb)   08/26/20 36.3 kg (80 lb 0.4 oz)   08/26/20 36.3 kg (80 lb 0.4 oz)   08/19/20 35.4 kg (78 lb 0.7 oz)   08/19/20 35.4 kg (78 lb " 0.7 oz)   08/05/20 34.3 kg (75 lb 9.9 oz)   08/05/20 34.3 kg (75 lb 9.9 oz)   07/28/20 35.8 kg (78 lb 14.8 oz)   07/21/20 36.1 kg (79 lb 9.4 oz)   07/21/20 36.1 kg (79 lb 9.4 oz)   07/20/20 36.7 kg (81 lb)   07/16/20 37.6 kg (83 lb)   07/14/20 38 kg (83 lb 12.4 oz)   07/13/20 38.3 kg (84 lb 6.4 oz)   07/09/20 40.6 kg (89 lb 8.1 oz)   06/30/20 39.6 kg (87 lb 4.8 oz)   06/30/20 39.6 kg (87 lb 4.8 oz)   06/30/20 39.6 kg (87 lb 4.8 oz)   06/17/20 39.6 kg (87 lb 4.8 oz)   05/26/20 40.8 kg (89 lb 15.2 oz)   02/24/20 45.4 kg (100 lb)   02/04/20 43.8 kg (96 lb 9 oz)   08/23/19 45.8 kg (100 lb 15.5 oz)   11/26/18 46.6 kg (102 lb 11.8 oz)   05/17/18 45.8 kg (100 lb 15.5 oz)   10/06/17 43.1 kg (95 lb)       Constitutional: Patient is oriented to person, place, and time. No distress. She is quite thin but well-appearing and very alert, no change  HENT: Mouth/Throat: Oropharynx is clear and moist. No oropharyngeal exudate.   Eyes: Conjunctivae and EOM are normal.   Neck: Normal range of motion. Neck supple.   Cardiovascular: Normal rate, regular rhythm, normal heart sounds and intact distal pulses.    Pulmonary/Chest: Effort normal and breath sounds normal. no wheezes. no rales.    Abdominal: Soft. Bowel sounds are normal. Patient exhibits mild distension. There is mild tenderness.   Musculoskeletal: patient exhibits no edema or tenderness.   Lymphadenopathy:  patient  has no cervical adenopathy. no supraclavicular adenopathy. No axillary LAD.   Neurological: Patient is alert and oriented to person, place, and time. normal strength. No cranial nerve deficit or sensory deficit.   Skin: Skin is warm and dry. No rash noted. No erythema.   Psychiatric: Patient has a normal mood and affect. speech is normal.   Nursing note and vitals reviewed.      CBC, CMP reviewed, noted are stable  Assessment:       1. Pancreatic cancer metastasized to intra-abdominal lymph node    2. Metastasis to liver    3. Underweight    4. Anxiety about  health    5. Anemia associated with chemotherapy    6. Diabetes mellitus associated with pancreatic disease    7. Abdominal pain, unspecified abdominal location      Stage IV    ECOG 1-2      2020 today presents with progression of disease on recent scans.  Reviewed in detail.  Since her performance status is good and organ function, labs are adequate, she is still a candidate to receive chemotherapy.  Obstacles/risk include her advanced age and underweight.    Discussed add Abraxane to Gemzar, benefit risk and rationale.  She is in agreement for this plan  Discussed very important to have close reassessment in the event she has toxicities, side effects, would need to adjust plan quickly and possibly discontinue.     Discussed importance of considering when she is no longer a candidate for treatment defined by side effects/ toxicity or when we may not have a good treatment option for her cancer and need to change goals of care to supportive only, treatment of symptoms.  Her  directed he understood this and mentioned plans to visit  home to make arrangements - they showed understanding of this but reluctance to discuss it which is understandable.  They agreed to continue discussion with palliative care doctor Deangelo who they have met with previously.  I have also relayed all of the above information to her brother who is a physician in [place] and whom she relies upon to direct her care.       Plan:       Laly was seen today for results.    Diagnoses and all orders for this visit:    Pancreatic cancer metastasized to intra-abdominal lymph node    Metastasis to liver    Underweight    Anxiety about health    Anemia associated with chemotherapy    Diabetes mellitus associated with pancreatic disease    Abdominal pain, unspecified abdominal location            Proceed with Gemzar Abraxane cycle 1 day 1 next week  This is already scheduled (this will be day 1 and 8 every 21 days and re-eval  tolerability).  Treatment plan changed    Complete 2nd dose of IV iron  Stop oral iron    Continue close monitoring of blood glucose reviewed again today  Continue close monitoring of pain in inform us if this changes/increases.  No change in plan in regards to pain today    MD visit next week with labs for chemo clearance.     Exercise/nutrition important, reviewed    Continue current medications, reviewed.      Important to keep follow-up with PCP .     Discussed plan above in great detail with patient and all questions answered to their satisfaction. Proceed with plan above.          Desire Bledsoe M.D.  Hematology Oncology  [facility]

## 2020-09-10 NOTE — TELEPHONE ENCOUNTER
----- Message from Francesco Zarate sent at 9/10/2020 12:01 PM CDT -----  Contact: Patient  Type: Needs Medical Advice  Who Called:  patient  Symptoms (please be specific):    How long has patient had these symptoms:    Pharmacy name and phone #:    Best Call Back Number: [phone]  Additional Information: requesting a call back  from Diane regarding order for walk

## 2020-09-10 NOTE — PLAN OF CARE
DISCONTINUE ON PATHWAY REGIMEN - Pancreatic Adenocarcinoma    PANOS10        Gemcitabine (Gemzar)     **Always confirm dose/schedule in your pharmacy ordering system**    REASON: Disease Progression  PRIOR TREATMENT: PANOS10: Gemcitabine 1,000 mg/m2 D1, 8, 15 q28 Days Until   Progression or Unacceptable Toxicity  TREATMENT RESPONSE: Progressive Disease (PD)    START ON PATHWAY REGIMEN - Pancreatic Adenocarcinoma    PANOS51        Nab-paclitaxel (protein bound) (Abraxane)       Gemcitabine (Gemzar)           Additional Orders: Hold therapy and notify physician if ANC < 1500.    **Always confirm dose/schedule in your pharmacy ordering system**    Patient Characteristics:  Metastatic Disease, Second Line, TESSIE/pMMR or MSI Unknown, Fluoropyrimidine-Based   Therapy First Line  Current evidence of distant metastases<= Yes  AJCC T Category: TX  AJCC N Category: NX  AJCC M Category: M1  AJCC 8 Stage Grouping: IV  Line of Therapy: Second Line  Microsatellite/Mismatch Repair Status: TESSIE/pMMR  Intent of Therapy:  Non-Curative / Palliative Intent, Discussed with Patient

## 2020-09-14 NOTE — PROGRESS NOTES
Subjective:       Patient ID: Laly Baker is a 85 y.o. female.    Chief Complaint: CLASS    HPI This is an 85 year old white female known to Dr. Bledsoe for a diagnosis of Stage IV pancreatic cancer with liver involvement.  The patient presents to the clinic today with her  for chemotherapy education.    She was initially placed on Gemcitabine Days 1, 8, 15/28 day cycle.  Due to disease progression after 2 cycles, she will transition to Abraxane/Gemzar. She presents to the clinic today for education on Abraxane as she will embark upon Abraxane + Gemzar Days 1, 8 of a 21 day cycle.    Oncology History   Pancreatic adenocarcinoma   7/1/2020 Initial Diagnosis    Pancreatic adenocarcinoma    05/26/2020-GI visit Dr. Barton  Presents for evaluation with change in bowel habits, increased frequency past 5 months.  History of pancreatitis, weight loss, change in bowel habits.  CT abdomen pelvis ordered.   06/03/2020-CT abdomen pelvis-previously noted cystic mass involving the body and tail of the pancreas is further decrease in size measuring 2.9 x 1 cm today.  There is however change in the appearance of the head proximal body of the pancreas which is heterogeneous masslike low-density cystic area measuring 2.5 cm and encasing vessels.  There was also interval development of dilatation of the pancreatic duct in the head of the pancreas measuring 7 mm.  The common duct remains non dilated measuring 6-7 mm.  There is also a new 12 mm hypodense lesion in the liver.  06/17/2020-EUS - Dr. Campbell               Pathology: FNA-positive for adenocarcinoma.  Unable to biopsy liver lesion, highly suspicious.  Additional studies: TESSIE and PDL1 0%  06/29/2020-CT abdomen pelvis - degree of intrahepatic biliary ductal dilatation has increased since 06/03/2020.  Low density pancreatic head mass measures 3.1 x 3.1 cm.  This is similar in size when compared to 06/03/2020.  Cystic lesion centered in the pancreatic tail measures  3.6 x 1.5 cm.  There is a low-density lesion in the medial aspect of the right hepatic lobe measuring 1.8 x 1.7 cm, unchanged from prior scan 06/03/2020.  0.5 cm low-density nodule in the posterior segment right hepatic lobe.  This is unchanged.  Thrombosis of the splenic vein unchanged.  Scheduled for initial medical oncology consult on 06/30/2020, admitted to [facility]  06/29/2020-emergency department visit-admitted to [facility], increased T bili  06/30/2020-transfer[place] [facility] [place] and ERCP unable to cannulate biliary sphincter at [facility]  07/01/2020-underwent PTC, percutaneous transhepatic cholangiography  07/02/2020-CT chest shows several right pulmonary nodules measuring up to 6 mm, 2 mm left upper lobe, 5 mm right upper lobe, 4 mm right middle lobe, 6 mm right lower lobe..  07/03/2020-CT a abdomen and pelvis - percutaneous transhepatic biliary drain in place with distal tip terminating in the 2nd part of the duodenum.  Multiple stable hypoattenuating foci in the liver compatible with metastatic disease.  Stable appearance of heterogeneous pancreatic head mass abutting the duodenum and common bile duct.  Impression includes mild colonic distention with multiple air-fluid levels suspicious for non obstructive ileus The largest liver lesion is 1.3 cm hypoenhancing mass in the right hepatic lobe inferiorly.  07/03/2020-CA 19.9 - 6263  07/04/2020-medical oncology inpatient consult Dr. Cagle - consult reflects discussion about stage IV, non curative treatment options available.  GI bleeding this day, discharge held  07/05/2020-EGD-  07/08/2020-CMP shows bilirubin improved to 1.0, albumin is low at 2.4.  Calcium 8.3.  CBC shows a hemoglobin of 9.3, white count normal and platelets normal.  ANC is 6.8 normal  07/08/2020-discharge from [facility]  07/09/2020-initial consult [facility]     Patient and her son present and confirm history above  They  "are both quite anxious today     She reports Pain brand new two weeks ago. Chills overnight. Spoke to her brother who is a general practitioner in [place]. She went to ED [facility] as above. Then sent to [facility]. Home health is coming to her home and OT.      She reports "doing well" except the pain from incision at the point of tube.      Swelling new and we reviewed her low albumin likely related they will obtain compression stockings and focus on her nutrition and recovery from hospitalization.     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 Feels this is waxing and waning.   7/16/20 - hyperglycemia, hospital admission, mediport placed on 7/17/20 7/21/20 - C1D1 gemzar - 675mg/m2 and will increase  7/28/20 - C1D8 gemzar -      7/21/2020 - 9/15/2020 Chemotherapy    Treatment Summary   Plan Name: OP PANC GEMCITABINE QW  Treatment Goal: Supportive  Status: Inactive  Start Date: 7/21/2020  End Date: 9/2/2020  Provider: Desire Bledsoe MD  Chemotherapy: gemcitabine (GEMZAR) 865 mg in sodium chloride 0.9% 250 mL chemo infusion, 675 mg/m2 = 865 mg (84.4 % of original dose 800 mg/m2), Intravenous, Clinic/HOD 1 time, 2 of 6 cycles  Dose modification: 800 mg/m2 (original dose 800 mg/m2, Cycle 1), 675 mg/m2 (original dose 800 mg/m2, Cycle 1), 800 mg/m2 (original dose 800 mg/m2, Cycle 2)  Administration: 865 mg (7/21/2020), 1,000 mg (7/28/2020), 975 mg (8/19/2020), 1,000 mg (8/26/2020), 1,000 mg (9/2/2020)     9/16/2020 -  Chemotherapy    Treatment Summary   Plan Name: OP PANC NAB-PACLITAXEL + GEMCITABINE Q4W  Treatment Goal: Supportive  Status: Active  Start Date: 9/16/2020 (Planned)  End Date: 8/4/2021 (Planned)  Provider: Desire Bledsoe MD  Chemotherapy: PACLitaxel-protein bound (ABRAXANE) in 20 mL infusion, 75 mg/m2 = 100 mg (original dose ), Intravenous, " "Clinic/HOD 1 time, 0 of 12 cycles  Dose modification: 75 mg/m2 (Cycle 1)  gemcitabine (GEMZAR) in sodium chloride 0.9% chemo infusion, 800 mg/m2 = 1,040 mg (original dose ), Intravenous, Clinic/HOD 1 time, 0 of 12 cycles  Dose modification: 800 mg/m2 (Cycle 1)       She complains of abdominal discomfort.  She states she had 8 bowel movement that were normal, but she is tender and tired.        Objective:       Vitals:    09/14/20 0959   BP: (!) 142/56   BP Location: Left arm   Patient Position: Sitting   BP Method: Small (Automatic)   Pulse: (!) 111   Resp: 20   Temp: 99.4 °F (37.4 °C)   TempSrc: Temporal   SpO2: 96%   Weight: 36.9 kg (81 lb 5.6 oz)   Height: 5' 4" (1.626 m)       Physical Exam  Vitals signs reviewed.   Constitutional:       Comments: Thin, frail, elderly   HENT:      Head: Normocephalic and atraumatic.      Nose: Nose normal.      Mouth/Throat:      Mouth: Mucous membranes are moist.      Pharynx: Oropharynx is clear.   Eyes:      Conjunctiva/sclera: Conjunctivae normal.      Pupils: Pupils are equal, round, and reactive to light.   Neck:      Musculoskeletal: Normal range of motion.   Cardiovascular:      Rate and Rhythm: Normal rate and regular rhythm.      Pulses: Normal pulses.      Heart sounds: Normal heart sounds.   Pulmonary:      Effort: Pulmonary effort is normal.      Breath sounds: Normal breath sounds.   Abdominal:      Palpations: Abdomen is soft.      Tenderness: There is abdominal tenderness.   Musculoskeletal: Normal range of motion.   Skin:     General: Skin is warm and dry.      Capillary Refill: Capillary refill takes less than 2 seconds.   Neurological:      Mental Status: She is alert and oriented to person, place, and time.   Psychiatric:         Mood and Affect: Mood normal.         Behavior: Behavior normal.         Assessment:       1. Pancreatic adenocarcinoma    2. Pancreatic cancer metastasized to intra-abdominal lymph node        2.  Metastases to liver    Plan:     "   1.  Treatment plan of Abraxane, Gemcitabine on Days 1, 8 of a 21 day cycle discussed with patient and .  2.  Handouts provided from [email or web address] on chemotherapy agents.    3.  Discussed the mechanism of action, potential side effects of this treatment as well as ways to manage them at home. Some of these side effects include but not limited to fever, nausea, vomiting, decreased appetite, fatigue, weakness, cytopenias, myalgia/arthralgia, constipation, diarrhea, bleeding, headache, nail changes, taste change, hair thinning/loss, peripheral neuropathy.   4.  Dietary modifications reinforced.  Dietician to follow up chairside.   5.  The patient and  verbalized understanding. All questions were answered and an informed consent was obtained.     Assessment/plan reviewed and approved by Dr. Bledsoe.

## 2020-09-15 NOTE — PROGRESS NOTES
Pharmacy Treatment Plan Review    Patient  Laly Baker, 85 y.o.  1935    Indication: Pancreatic Cancer     History:  -metastatic pancreatic cancer with new scans after 2 cycles of single agent Gemzar  Scans show progression of disease    Labs:  CBC  Lab Results   Component Value Date    WBC 5.08 09/02/2020    HGB 8.2 (L) 09/02/2020    HCT 25.0 (L) 09/02/2020    MCV 89 09/02/2020     (L) 09/02/2020       BMP  Lab Results   Component Value Date     (L) 09/02/2020    K 4.6 09/02/2020    CL 98 09/02/2020    CO2 29 09/02/2020    BUN 20 (H) 09/02/2020    CREATININE 0.68 09/02/2020    CALCIUM 8.9 09/02/2020    ANIONGAP 4 (L) 09/02/2020    ESTGFRAFRICA >60 09/02/2020    EGFRNONAA >60 09/02/2020       LFTs  Lab Results   Component Value Date    ALT 35 09/02/2020    AST 44 (H) 09/02/2020    ALKPHOS 120 09/02/2020    BILITOT 0.5 09/02/2020       The patient is scheduled to start the following infusion:   OP PANC NAB-PACLITAXEL + GEMCITABINE Q3W    21-day cycle for 12 cycles of:    Chemotherapy:   PACLitaxel-protein bound (ABRAXANE) 75 mg/m2 in 20 mL infusion, Intravenous, on day 1, 8    gemcitabine (GEMZAR) 800 mg/m2 in sodium chloride 0.9% chemo infusion, Intravenous, on day 1, 8    Premeds  -Ondansetron 8mg IV     The treatment plan per [facility], Dosing and cycle length modifications for tolerability     Coleman YangD

## 2020-09-17 NOTE — PROGRESS NOTES
LINKS immunization registry not responding  Care Everywhere updated  Health Maintenance updated  Chart reviewed for overdue Proactive [facility] Encounters (JIMENEZ) health maintenance testing (CRS, Breast Ca, Diabetic Eye Exam)   Orders entered:N/A

## 2020-09-21 NOTE — PROCEDURES
Tendon Sheath    Date/Time: 9/21/2020 2:20 PM  Performed by: Stefan Bell MD  Authorized by: Stefan Bell MD     Consent Done?:  Yes (Verbal)  Indications:  Pain  Site marked: the procedure site was marked    Timeout: prior to procedure the correct patient, procedure, and site was verified    Prep: patient was prepped and draped in usual sterile fashion      Local anesthetic:  Lidocaine 1% without epinephrine  Anesthetic total (ml):  0.5    Location:  Ring finger  Ring finger joint: Right ring finger flexor tendon sheath.  Needle size:  25 G  Medications:  40 mg triamcinolone acetonide 40 mg/mL (20mg injected)  Patient tolerance:  Patient tolerated the procedure well with no immediate complications

## 2020-09-21 NOTE — PROGRESS NOTES
9/21/2020    Chief Complaint:  Chief Complaint   Patient presents with    Hand Problem     NP, c/o right ring finger triggering       HPI:  Laly Baker is a 85 y.o. female, who presents to clinic today she states that while she was in the hospital 4-6 weeks ago she started having pain in her right hand and had some bruising.  She does not remember an injury.  Since that time she has had some difficulty flexing the middle finger.  She did have a set of x-rays of her hand performed.  She states that those did not show any injury.  She is here today for further evaluation and treatment options.    PMHX:  Past Medical History:   Diagnosis Date    Arthritis     Cancer     pancreatic    Colon polyp     Hyperlipidemia     Hypertension     Nonexudative age-related macular degeneration, bilateral, intermediate dry stage 12/30/2019    Pancreatic pseudocyst     Pancreatitis 05/2017       PSHX:  Past Surgical History:   Procedure Laterality Date    CATARACT EXTRACTION W/  INTRAOCULAR LENS IMPLANT Right 08/08/2017    [place]    CATARACT EXTRACTION W/  INTRAOCULAR LENS IMPLANT Left 10/10/2017    kulman    CERVICAL CONIZATION   W/ LASER      CHOLANGIOGRAM N/A 7/7/2020    Procedure: CHOLANGIOGRAM;  Surgeon: Peri Surgeon;  Location: [facility];  Service: Anesthesiology;  Laterality: N/A;    COLONOSCOPY  01/12/2015    Dr. Barton; diverticulosis; ext int hemorrhoid; repeat in 5 years    COLONOSCOPY N/A 2/24/2020    Procedure: COLONOSCOPY;  Surgeon: Ankur Barton MD;  Location: [facility];  Service: Endoscopy;  Laterality: N/A;    COLONOSCOPY N/A 7/5/2020    Procedure: COLONOSCOPY;  Surgeon: Dami Martin MD;  Location: [facility] ([address] [place] [address]);  Service: Endoscopy;  Laterality: N/A;    COLONOSCOPY W/ POLYPECTOMY      cone      ENDOSCOPIC ULTRASOUND OF UPPER GASTROINTESTINAL TRACT Left 6/17/2020    Procedure: ULTRASOUND, UPPER GI TRACT, ENDOSCOPIC;  Surgeon: Marcello Campbell MD;  Location: [facility];   Service: Endoscopy;  Laterality: Left;  Linear scope    epid ster  2012. 2014    ERCP N/A 6/30/2020    Procedure: ERCP (ENDOSCOPIC RETROGRADE CHOLANGIOPANCREATOGRAPHY);  Surgeon: Ankur Barton MD;  Location: [facility] ENDO;  Service: Endoscopy;  Laterality: N/A;    ERCP N/A 7/1/2020    Procedure: ERCP (ENDOSCOPIC RETROGRADE CHOLANGIOPANCREATOGRAPHY);  Surgeon: Jamal Martin MD;  Location: [facility] ENDO ([facility]);  Service: Endoscopy;  Laterality: N/A;    ESOPHAGOGASTRODUODENOSCOPY N/A 6/17/2020    Procedure: EGD (ESOPHAGOGASTRODUODENOSCOPY);  Surgeon: Marcello Campbell MD;  Location: [facility] ENDO;  Service: Endoscopy;  Laterality: N/A;    INSERTION OF BILIARY DRAINAGE CATHETER N/A 7/2/2020    Procedure: INSERTION, DRAINAGE CATHETER, BILE DUCT;  Surgeon: Peri Surgeon;  Location: [facility] PERI;  Service: Anesthesiology;  Laterality: N/A;    INSERTION OF TUNNELED CENTRAL VENOUS CATHETER (CVC) WITH SUBCUTANEOUS PORT N/A 7/17/2020    Procedure: [id number];  Surgeon: Frandy Shankar MD;  Location: [facility] OR;  Service: General;  Laterality: N/A;    TONSILLECTOMY         FMHX:  Family History   Problem Relation Age of Onset    Diabetes Mother 92    Cataracts Mother     Hypertension Mother     Stroke Father     Cataracts Father     Hypertension Father     Cancer Brother 82        lung cancer    Amblyopia Neg Hx     Blindness Neg Hx     Glaucoma Neg Hx     Macular degeneration Neg Hx     Retinal detachment Neg Hx     Strabismus Neg Hx     Thyroid disease Neg Hx        SOCHX:  Social History     Tobacco Use    Smoking status: Never Smoker    Smokeless tobacco: Never Used   Substance Use Topics    Alcohol use: No       ALLERGIES:  Poison ivy extract    CURRENT MEDICATIONS:  Current Outpatient Medications on File Prior to Visit   Medication Sig Dispense Refill    b complex vitamins capsule Take 1 capsule by mouth once daily.      blood sugar diagnostic Strp 1 strip by Misc.(Non-Drug; Combo Route)  route 3 (three) times daily with meals. 200 strip 2    blood-glucose meter (FREESTYLE SYSTEM KIT) kit Use as instructed 1 each 0    dexAMETHasone (DECADRON) 2 MG tablet Take 1 tablet (2 mg total) by mouth 2 (two) times daily as needed (appetite). Can take 1 to 2 tablets per dose. 40 tablet 5    docusate sodium (COLACE) 100 MG capsule Take 1 capsule (100 mg total) by mouth once daily. 90 capsule 0    glucagon (GVOKE HYPOPEN) 1 mg/0.2 mL AtIn Inject under the skin as needed for low bloodsugar 0.2 mL 12    insulin degludec (TRESIBA FLEXTOUCH U-100) 100 unit/mL (3 mL) InPn Inject 10 units under the skin every morning, or as directed. Stop levemir. 15 mL 7    insulin detemir U-100 (LEVEMIR FLEXTOUCH) 100 unit/mL (3 mL) SubQ InPn pen Inject 10 Units into the skin every evening. 9 mL 0    insulin lispro (HUMALOG KWIKPEN INSULIN) 100 unit/mL pen Inject 4 Units into the skin 3 (three) times daily. 6 mL 12    insulin lispro 100 unit/mL pen Inject 4 Units into the skin 3 (three) times daily. 6 mL 5    lancets (ACCU-CHEK MULTICLIX LANCET) Misc 1 lancet by Misc.(Non-Drug; Combo Route) route 3 (three) times daily with meals. 200 each 2    lipase-protease-amylase 24,000-76,000-120,000 units (CREON) 24,000-76,000 -120,000 unit capsule Take 2 capsules by mouth 3 (three) times daily with meals. 180 capsule 11    lisinopriL (PRINIVIL,ZESTRIL) 5 MG tablet Take 2 tablets (10 mg total) by mouth once daily. 90 tablet 1    mv-mn/folic acid/calcium/vit K (WOMEN'S 50 PLUS MULTIVITAMIN ORAL) Take 1 tablet by mouth once daily.       ondansetron (ZOFRAN-ODT) 8 MG TbDL Take 1 tablet (8 mg total) by mouth every 8 (eight) hours as needed (nausea). 40 tablet 3    oxyCODONE (ROXICODONE) 20 mg Tab immediate release tablet Take 1 tablet (20 mg total) by mouth every 6 (six) hours as needed for Pain. 25 tablet 0    oxyCODONE-acetaminophen (PERCOCET)  mg per tablet Take 1 tablet by mouth every 6 (six) hours as needed for Pain. 28  "tablet 0    pantoprazole (PROTONIX) 40 MG tablet Take 1 tablet (40 mg total) by mouth 2 (two) times daily before meals. 60 tablet 2    pen needle, diabetic (BD GASPER 2ND GEN PEN NEEDLE) 32 gauge x 5/32" Ndle Use to administer insulin three times daily. 100 each 5    pen needle, diabetic (PEN NEEDLE) 31 gauge x 1/4" Ndle 1 Device by Misc.(Non-Drug; Combo Route) route once daily. 90 each 0    promethazine (PHENERGAN) 12.5 MG Tab Take 1-2 tablets (12.5-25 mg total) by mouth every 6 (six) hours as needed for nausea persistent despite zofran (ondansetron) 40 tablet 3    traMADoL (ULTRAM) 50 mg tablet Take 1 tablet (50 mg total) by mouth every 6 (six) hours as needed for Pain. 120 tablet 0    bisacodyL (DULCOLAX) 5 mg EC tablet Take 5 mg by mouth daily as needed for Constipation.      chlorhexidine (PERIDEX) 0.12 % solution Swish with 10ml for 30 seconds, then spit out twice daily for 5 days. Start 7/15/20 473 mL 0    insulin lispro 100 unit/mL pen Inject 4 units under the skin three times a day. 6 mL 12    lidocaine-prilocaine (EMLA) cream Apply topically as needed. 30 g 0    mupirocin (BACTROBAN) 2 % ointment apply 1 gram to each nostril twice daily for 5 days. Start 7/15/20 22 g 0    oxyCODONE-acetaminophen (PERCOCET) 5-325 mg per tablet Take 1 tablet by mouth every 8 (eight) hours as needed for Pain. 30 tablet 0     No current facility-administered medications on file prior to visit.        REVIEW OF SYSTEMS:  Review of Systems   Constitutional: Negative.    HENT: Positive for hearing loss. Negative for congestion, ear discharge, ear pain, nosebleeds, sinus pain, sore throat and tinnitus.    Eyes: Negative.    Respiratory: Negative.  Negative for stridor.    Cardiovascular: Negative.    Gastrointestinal: Negative.    Genitourinary: Negative.    Musculoskeletal: Negative.    Skin: Negative.    Neurological: Negative.    Endo/Heme/Allergies: Negative for environmental allergies and polydipsia. Bruises/bleeds " easily.   Psychiatric/Behavioral: Negative.        GENERAL PHYSICAL EXAM:   There were no vitals taken for this visit.   GEN: well developed, well nourished, no acute distress   HENT: Normocephalic, atraumatic   EYES: No discharge, conjunctiva normal   NECK: Supple, non-tender   PULM: No wheezing, no respiratory distress   CV: RRR   ABD: Soft, non-tender    ORTHO EXAM:   Examination the right hand middle finger reveals that there is very mild ecchymosis overlying the palmar surface of the middle finger and A1 pulley.  Palpation about the elbow or pulley does produce mild tenderness.  There is no mass palpated.  Flexion and extension of the finger is somewhat limited but she does have an obvious nodule on the flexor tendon which is painful.  She does have sensation which is grossly intact in the median radial ulnar distribution.  Capillary refill is less than 2 sec in the digits    RADIOLOGY:   X-rays of the right hand from her previous visit have been reviewed.  She is noted have no fractures or dislocations.  There are no obvious bony or soft tissue masses    ASSESSMENT:   Right ring finger triggering    PLAN:  1.  After informed consent was obtained and injection was placed to the right ring finger flexor tendon sheath.  The patient tolerated that well.    2.  Follow up with me on a p.r.n. basis

## 2020-09-23 NOTE — PLAN OF CARE
Problem: Adult Inpatient Plan of Care  Goal: Plan of Care Review  Flowsheets (Taken 9/23/2020 0621)  Plan of Care Reviewed With: patient     Pt tolerated infusion well, NAD.  Pt ambulated w/ assistance from staff out of clinic without difficulty.

## 2020-09-23 NOTE — PROGRESS NOTES
"FOLLOW-UP VISIT    Patient name: Laly Baker  Date: 9/23/20      Subjective:       Patient ID: Laly Baker is a 85 y.o. female.    Chief Complaint: Follow-up (Pt feeling very weak. Has pitting edema in ankles x 1 week)    HPI  Here today for follow-up and chemo clearance.  Recent start gem Abraxane after progression of disease, dose reduced, day 1 and 8 every 21 days.     She reports: not doing well  Depressed as her  directed  - last week he told a nurse this and we directed see pall care, they did not scheudle this. We directed this last vsiit also and they did not attend. She states right now she "cannot" go until her feet are "fixed", distressed about fluid retention in her feet.     Lies down all day, on her back. Back is achy  New swelling to feet and ankles bilateral and states that's why she has to lay down.  She feels weak so she feels lying on her back helps and then uses heating pad and feels this helps.     She reports wakes at 6:30am - goes to bathroom - back and forth small bowel movements urgency and has to hurry, not diarrhea.     She also has hemorrhoids, not bleeding consistent but scant occasional.     Chemotherapy 9/16/2020   Day, Cycle Day 1, Cycle 1   gemcitabine (GEMZAR) IV 1,000 mg   PACLitaxel-protein bound (ABRAXANE) IV 75 mg/m2       See detailed oncology history below, updated with most recent scans, labs, pertinent medical history.   Oncology History   Pancreatic adenocarcinoma   7/1/2020 Initial Diagnosis    Pancreatic adenocarcinoma    05/26/2020-GI visit Dr. Barton  Presents for evaluation with change in bowel habits, increased frequency past 5 months.  History of pancreatitis, weight loss, change in bowel habits.  CT abdomen pelvis ordered.   06/03/2020-CT abdomen pelvis-previously noted cystic mass involving the body and tail of the pancreas is further decrease in size measuring 2.9 x 1 cm today.  There is however change in the appearance of the head proximal body " of the pancreas which is heterogeneous masslike low-density cystic area measuring 2.5 cm and encasing vessels.  There was also interval development of dilatation of the pancreatic duct in the head of the pancreas measuring 7 mm.  The common duct remains non dilated measuring 6-7 mm.  There is also a new 12 mm hypodense lesion in the liver.  06/17/2020-[facility] - Dr. Campbell               Pathology: FNA-positive for adenocarcinoma.  Unable to biopsy liver lesion, highly suspicious.  Additional studies: TESSIE and PDL1 0%  06/29/2020-CT abdomen pelvis - degree of intrahepatic biliary ductal dilatation has increased since 06/03/2020.  Low density pancreatic head mass measures 3.1 x 3.1 cm.  This is similar in size when compared to 06/03/2020.  Cystic lesion centered in the pancreatic tail measures 3.6 x 1.5 cm.  There is a low-density lesion in the medial aspect of the right hepatic lobe measuring 1.8 x 1.7 cm, unchanged from prior scan 06/03/2020.  0.5 cm low-density nodule in the posterior segment right hepatic lobe.  This is unchanged.  Thrombosis of the splenic vein unchanged.  Scheduled for initial medical oncology consult on 06/30/2020, admitted to [facility]  06/29/2020-emergency department visit-admitted to [facility], increased T bili  06/30/2020-transferred to [facility] for biliary obstruction and ERCP unable to cannulate biliary sphincter at [facility]  07/01/2020-underwent PTC, percutaneous transhepatic cholangiography  07/02/2020-CT chest shows several right pulmonary nodules measuring up to 6 mm, 2 mm left upper lobe, 5 mm right upper lobe, 4 mm right middle lobe, 6 mm right lower lobe..  07/03/2020-CT a abdomen and pelvis - percutaneous transhepatic biliary drain in place with distal tip terminating in the 2nd part of the duodenum.  Multiple stable hypoattenuating foci in the liver compatible with metastatic disease.  Stable appearance of heterogeneous pancreatic head mass abutting the duodenum and common  "bile duct.  Impression includes mild colonic distention with multiple air-fluid levels suspicious for non obstructive ileus The largest liver lesion is 1.3 cm hypoenhancing mass in the right hepatic lobe inferiorly.  07/03/2020-CA 19.9 - 6263  07/04/2020-medical oncology inpatient consult Dr. Cagle - consult reflects discussion about stage IV, non curative treatment options available.  GI bleeding this day, discharge held  07/05/2020-EGD-  07/08/2020-CMP shows bilirubin improved to 1.0, albumin is low at 2.4.  Calcium 8.3.  CBC shows a hemoglobin of 9.3, white count normal and platelets normal.  ANC is 6.8 normal  07/08/2020-discharge from [facility]  07/09/2020-initial consult [facility]     Patient and her son present and confirm history above  They are both quite anxious today     She reports Pain brand new two weeks ago. Chills overnight. Spoke to her brother who is a general practitioner in [place]. She went to ED [facility] as above. Then sent to [facility]. Home health is coming to her home and OT.      She reports "doing well" except the pain from incision at the point of tube.      Swelling new and we reviewed her low albumin likely related they will obtain compression stockings and focus on her nutrition and recovery from hospitalization.     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 Feels this is waxing and waning.   7/16/20 - hyperglycemia, hospital admission, mediport placed on 7/17/20 7/21/20 - C1D1 gemzar - 675mg/m2 and will increase  7/28/20 - C1D8 gemzar -      7/21/2020 - 9/15/2020 Chemotherapy    Treatment Summary   Plan Name: OP PANC GEMCITABINE QW  Treatment Goal: Supportive  Status: Inactive  Start Date: 7/21/2020  End Date: 9/2/2020  Provider: Desire Bledsoe MD  Chemotherapy: gemcitabine (GEMZAR) 865 mg in " sodium chloride 0.9% 250 mL chemo infusion, 675 mg/m2 = 865 mg (84.4 % of original dose 800 mg/m2), Intravenous, Clinic/HOD 1 time, 2 of 6 cycles  Dose modification: 800 mg/m2 (original dose 800 mg/m2, Cycle 1), 675 mg/m2 (original dose 800 mg/m2, Cycle 1), 800 mg/m2 (original dose 800 mg/m2, Cycle 2)  Administration: 865 mg (7/21/2020), 1,000 mg (7/28/2020), 975 mg (8/19/2020), 1,000 mg (8/26/2020), 1,000 mg (9/2/2020)     9/16/2020 -  Chemotherapy    Treatment Summary   Plan Name: OP PANC NAB-PACLITAXEL + GEMCITABINE Q3W  Treatment Goal: Supportive  Status: Active  Start Date: 9/16/2020  End Date: 5/12/2021 (Planned)  Provider: Desire Bledsoe MD  Chemotherapy: PACLitaxel-protein bound (ABRAXANE) 75 mg/m2 = 100 mg in 20 mL infusion, 75 mg/m2 = 100 mg (100 % of original dose 75 mg/m2), Intravenous, Clinic/HOD 1 time, 1 of 12 cycles  Dose modification: 75 mg/m2 (original dose 75 mg/m2, Cycle 1)  Administration: 100 mg (9/16/2020), 100 mg (9/23/2020)  gemcitabine (GEMZAR) 1,000 mg in sodium chloride 0.9% 250 mL chemo infusion, 1,030 mg (100 % of original dose 800 mg/m2), Intravenous, Clinic/HOD 1 time, 1 of 12 cycles  Dose modification: 800 mg/m2 (original dose 800 mg/m2, Cycle 1)  Administration: 1,000 mg (9/16/2020), 1,000 mg (9/23/2020)         I have reviewed my initial consult note with detailed PMH/ROS from initial encounter and all following progress notes.   Past medical, surgical, family, and social history were reviewed today and there are no changes of note unless mentioned in HPI.     MEDS and ALLERGIES were reviewed with patient and meds reconciled.     Fatigue  yes    Weight/appetite  poor   Constitutional  Denies F/C    Pain  yes   Sleep  poor   Mucositis  Denies    Cardiovascular  Denies CP    Respiratory  Denies SOB    Nausea  Denies    BMs  Normal    GI  Denies abdominal pain    Bleeding  Denies epistaxis, hemoptysis, BRBPR, melena, hematuria or any other bleeding    Extremities  +edema   "  Skin/nail/hair  Denies toxicity    Neuropathy  Denies    Psych  depressed    Misc  n/a    Exercise  none          Objective:      /65 (BP Location: Left arm, Patient Position: Sitting, BP Method: Pediatric (Automatic))   Pulse 86   Temp 97.1 °F (36.2 °C) (Temporal)   Resp 20   Ht 5' 4" (1.626 m)   Wt 37.1 kg (81 lb 12.7 oz)   SpO2 98%   BMI 14.04 kg/m²   Wt Readings from Last 30 Encounters:   09/23/20 37.1 kg (81 lb 12.7 oz)   09/23/20 37.1 kg (81 lb 12.7 oz)   09/16/20 36.9 kg (81 lb 5.6 oz)   09/14/20 36.9 kg (81 lb 5.6 oz)   09/09/20 37.5 kg (82 lb 10.8 oz)   09/02/20 37.1 kg (81 lb 12.7 oz)   09/02/20 37.1 kg (81 lb 12.7 oz)   08/28/20 36.7 kg (81 lb)   08/27/20 36.3 kg (80 lb)   08/26/20 36.3 kg (80 lb 0.4 oz)   08/26/20 36.3 kg (80 lb 0.4 oz)   08/19/20 35.4 kg (78 lb 0.7 oz)   08/19/20 35.4 kg (78 lb 0.7 oz)   08/05/20 34.3 kg (75 lb 9.9 oz)   08/05/20 34.3 kg (75 lb 9.9 oz)   07/28/20 35.8 kg (78 lb 14.8 oz)   07/21/20 36.1 kg (79 lb 9.4 oz)   07/21/20 36.1 kg (79 lb 9.4 oz)   07/20/20 36.7 kg (81 lb)   07/16/20 37.6 kg (83 lb)   07/14/20 38 kg (83 lb 12.4 oz)   07/13/20 38.3 kg (84 lb 6.4 oz)   07/09/20 40.6 kg (89 lb 8.1 oz)   06/30/20 39.6 kg (87 lb 4.8 oz)   06/30/20 39.6 kg (87 lb 4.8 oz)   06/30/20 39.6 kg (87 lb 4.8 oz)   06/17/20 39.6 kg (87 lb 4.8 oz)   05/26/20 40.8 kg (89 lb 15.2 oz)   02/24/20 45.4 kg (100 lb)   02/04/20 43.8 kg (96 lb 9 oz)       Constitutional: Patient is oriented to person, place, and time. No distress currently. Remains very thin but weight stable.   HENT: Mouth/Throat: Oropharynx is clear and moist. No oropharyngeal exudate.   Eyes: Conjunctivae and EOM are normal.   Neck: Normal range of motion. Neck supple.   Cardiovascular: Normal rate, irregular rhythm, normal heart sounds and intact distal pulses.    Pulmonary/Chest: Effort normal and breath sounds normal. no wheezes. no rales.    mediport c/d/i  Abdominal: Soft. Bowel sounds are normal. Patient exhibits " no distension. There is no tenderness.   Musculoskeletal: patient exhibits +edema bilateral feet, mild to legs,  no tenderness.   Lymphadenopathy:  patient  has no cervical adenopathy. no supraclavicular adenopathy. No axillary LAD.   Neurological: Patient is alert and oriented to person, place, and time. normal strength. No cranial nerve deficit or sensory deficit.   Skin: Skin is warm and dry. No rash noted. No erythema.   Psychiatric: Patient has a normal mood and affect. speech is normal.   Nursing note and vitals reviewed.    LABS anemia worse, plts and wbc good, CMP with low albumin, ast/alt mild elevated, not changed.     Assessment:       1. Pancreatic adenocarcinoma    2. Pancreatic cancer metastasized to intra-abdominal lymph node    3. Metastasis to liver    4. Underweight    5. Anxiety about health    6. 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 Abdominal pain, unspecified abdominal location    9. Poor appetite    10. Hypoglycemia    11. Tachycardia    12. Edema, unspecified type        Stage IV     ECOG 1-2        09/09/2020 today presents with progression of disease on recent scans.  Reviewed in detail.  Since her performance status is good and organ function, labs are adequate, she is still a candidate to receive chemotherapy.  Obstacles/risk include her advanced age and underweight.     Discussed add Abraxane to Gemzar, benefit risk and rationale.  She is in agreement for this plan  Discussed very important to have close reassessment in the event she has toxicities, side effects, would need to adjust plan quickly and possibly discontinue.      Discussed importance of considering when she is no longer a candidate for treatment defined by side effects/ toxicity or when we may not have a good treatment option for her cancer and need to change goals of care to supportive only, treatment of symptoms.  Her  directed he understood this and  "mentioned plans to visit  home to make arrangements - they showed understanding of this but reluctance to discuss it which is understandable.  They agreed to continue discussion with palliative care doctor Deangelo who they have met with previously.  I have also relayed all of the above information to her brother who is a physician in [place] and whom she relies upon to direct her care.    TODAY 20 she did tolerate chemo last week and blood counts adequate but depression/poor coping continues. She agrees to schedule with pall care next week after we have evaluated and addressed her feet/ankles. She otherwise will proceed with chemo today D8 of C1 with close re-eval. She is currently still a candidate for chemotherapy however high risk for clinical decline and would disconitnue. Nutrition reviewed at length.      Plan:       Laly was seen today for follow-up.    Diagnoses and all orders for this visit:    Pancreatic adenocarcinoma    Pancreatic cancer metastasized to intra-abdominal lymph node    Metastasis to liver    Underweight    Anxiety about health    Anemia associated with chemotherapy    Diabetes mellitus associated with pancreatic disease    Abdominal pain, unspecified abdominal location    Poor appetite    Hypoglycemia    Tachycardia    Edema, unspecified type  -     furosemide (LASIX) 40 MG tablet; Take 1 tablet (40 mg total) by mouth daily as needed (swelling).            Chemo today    Ultrasound BLE tomorrow afternoon for swelling  EKG tomorrow also for irreg heart rate    Lasix start, stop quickly if dec urine output.     Biotene mouthwash  Compression socks OTC    Please find out if patient can see Dr. Bright or NP next week, continues with depression and anger and telling  "you dont care that i'm dying!" when she is upset. She requests we address swelling in her feet first so we will have U/S tomorrow, lasix, compression socks and assume she is okay for this visit by next week. "     Exercise/nutrition important, reviewed    Continue current medications, reviewed.      Important to keep follow-up with PCP and pall care.    MD chemo lab for C2D1 in two weeks.     CBC, CMP, mag, phos next week mid-week     Discussed plan above in great detail with patient and all questions answered to their satisfaction. Proceed with plan above.          Desire Bledsoe M.D.  Hematology Oncology  [facility]  [facility]

## 2020-09-23 NOTE — PROGRESS NOTES
Oncology Nutrition   Chemotherapy Infusion Visit  Laly Baker   1935    Nutrition Education   This is a 85 y.o.female with a medical diagnosis of malignant biliary obstruction and pancreatic adenocarcinoma. Met w/ pt today, pt weight stable since last infusion - but pt still severely underweight for age, muscular and subcutaneous wasting observed during interview. Pt stated she is trying to eat but things taste strange but knows she needs to eat. Tries to eat at least 3 meals a day, does not snack. Encouraged small high calorie/high protein snacks between meals. Pt also reports consuming Ensure Protein Max - when inquired why that was her choice of nutritional supplement - she stated someone told her she needed to drink those with her new chemo regimen. Explained that that particular supplement does have high protein but not enough calories for her needs. She also stated she cannot consume one entire supplement at one sitting. Explained Ensure compact - pt willing. Provided sample, encouraged pt to drink at least 2x daily - encouraged changing to a higher calorie supplement as well - pt appeared frustrated. RD to communicate with MD regarding pt's nutritional status. Pt severely malnourished at this time.    ALMA MTZ MA, RD, LDN  09/23/2020  4:52 PM

## 2020-09-23 NOTE — PLAN OF CARE
Problem: Adult Inpatient Plan of Care  Goal: Patient-Specific Goal (Individualization)  Flowsheets (Taken 9/23/2020 1610)  Individualized Care Needs: [place]  Anxieties, Fears or Concerns: Pt not feeling well: Both ankles swollen  Patient-Specific Goals (Include Timeframe): infection prevention during tx     Problem: Fatigue (Oncology Care)  Goal: Improved Activity Tolerance  Intervention: Promote Energy Conservation  Flowsheets (Taken 9/23/2020 1610)  Fatigue Management:   frequent rest breaks encouraged   paced activity encouraged  Sleep/Rest Enhancement:   awakenings minimized   noise level reduced   regular sleep/rest pattern promoted

## 2020-10-01 NOTE — TELEPHONE ENCOUNTER
----- Message from Ann Marie Frank MA sent at 10/1/2020  3:03 PM CDT -----  Contact: , rey  Injected her hands,  hurting worse, wants to be seen sooner   Call back

## 2020-10-02 NOTE — TELEPHONE ENCOUNTER
Spoke to son Morgan mom is weak and has a 99.6 temperature.  The DrBennie In [place] would like labs and chest x-ray.  Son did say patient and  cancelled the appointment with Dr. Bright again and had  talk with his parents but they are both not ready at this time. This is the second cancellation with Dr. Bright.

## 2020-10-07 NOTE — Clinical Note
Proceed with C2, Day1; f/u in 1 week with Dr. Bledsoe with labs prior (CBC, CMP, Magnesium, Phosphorus)

## 2020-10-07 NOTE — PROGRESS NOTES
"FOLLOW-UP VISIT    Patient name: Laly Baker  Date: 10/7/20      Subjective:       Patient ID: Laly Baker is a 85 y.o. female.    Chief Complaint: Evaluation prior to Cycle 1, Day 1 of Abraxane/Gemzar    HPI  Here today with her  for follow-up and chemo clearance.    (Recent start gem Abraxane after progression of disease, dose reduced, day 1 and 8 every 21 days; appt with MD missed yesterday)     Patient states she is eating better, but "cannot taste" anything.  Doing good oral care.  Patient reports that they cancelled their appt with Dr. Bright because she cannot make morning appointments.  Offered to schedule an appt in the afternoon:  "Onur will make the appointment".  Reports that Lasix, compression socks helped tremendously with the fluid in her feet.  She did not take Lasix today and has some pedal edema.    Doing light housework.  DexCom G6 placed to abdomen on 10/05 by [facility] (change every 10 days)    No diarrhea, abdominal cramps or constipation.  GasX at night is helpful.  OP PANC NAB-PACLITAXEL + GEMCITABINE Q3W    See detailed oncology history below, updated with most recent scans, labs, pertinent medical history.   Oncology History   Pancreatic adenocarcinoma   7/1/2020 Initial Diagnosis    Pancreatic adenocarcinoma    05/26/2020-GI visit Dr. Barton  Presents for evaluation with change in bowel habits, increased frequency past 5 months.  History of pancreatitis, weight loss, change in bowel habits.  CT abdomen pelvis ordered.   06/03/2020-CT abdomen pelvis-previously noted cystic mass involving the body and tail of the pancreas is further decrease in size measuring 2.9 x 1 cm today.  There is however change in the appearance of the head proximal body of the pancreas which is heterogeneous masslike low-density cystic area measuring 2.5 cm and encasing vessels.  There was also interval development of dilatation of the pancreatic duct in the head of the pancreas measuring 7 " mm.  The common duct remains non dilated measuring 6-7 mm.  There is also a new 12 mm hypodense lesion in the liver.  06/17/2020-EUS - Dr. Campbell               Pathology: FNA-positive for adenocarcinoma.  Unable to biopsy liver lesion, highly suspicious.  Additional studies: TESSIE and PDL1 0%  06/29/2020-CT abdomen pelvis - degree of intrahepatic biliary ductal dilatation has increased since 06/03/2020.  Low density pancreatic head mass measures 3.1 x 3.1 cm.  This is similar in size when compared to 06/03/2020.  Cystic lesion centered in the pancreatic tail measures 3.6 x 1.5 cm.  There is a low-density lesion in the medial aspect of the right hepatic lobe measuring 1.8 x 1.7 cm, unchanged from prior scan 06/03/2020.  0.5 cm low-density nodule in the posterior segment right hepatic lobe.  This is unchanged.  Thrombosis of the splenic vein unchanged.  Scheduled for initial medical oncology consult on 06/30/2020, admitted to [facility]  06/29/2020-emergency department visit-admitted to [facility], increased T bili  06/30/2020-transfer[place] [facility] [place] and ERCP unable to cannulate biliary sphincter at [facility]  07/01/2020-underwent PTC, percutaneous transhepatic cholangiography  07/02/2020-CT chest shows several right pulmonary nodules measuring up to 6 mm, 2 mm left upper lobe, 5 mm right upper lobe, 4 mm right middle lobe, 6 mm right lower lobe..  07/03/2020-CT a abdomen and pelvis - percutaneous transhepatic biliary drain in place with distal tip terminating in the 2nd part of the duodenum.  Multiple stable hypoattenuating foci in the liver compatible with metastatic disease.  Stable appearance of heterogeneous pancreatic head mass abutting the duodenum and common bile duct.  Impression includes mild colonic distention with multiple air-fluid levels suspicious for non obstructive ileus The largest liver lesion is 1.3 cm hypoenhancing mass in the right hepatic lobe  "inferiorly.  07/03/2020-CA 19.9 - 6263  07/04/2020-medical oncology inpatient consult Dr. Cagle - consult reflects discussion about stage IV, non curative treatment options available.  GI bleeding this day, discharge held  07/05/2020-EGD-  07/08/2020-CMP shows bilirubin improved to 1.0, albumin is low at 2.4.  Calcium 8.3.  CBC shows a hemoglobin of 9.3, white count normal and platelets normal.  ANC is 6.8 normal  07/08/2020-discharge from [facility]  07/09/2020-initial consult [facility]     Patient and her son present and confirm history above  They are both quite anxious today     She reports Pain brand new two weeks ago. Chills overnight. Spoke to her brother who is a general practitioner in [place]. She went to ED [facility] as above. Then sent to [facility]. Home health is coming to her home and OT.      She reports "doing well" except the pain from incision at the point of tube.      Swelling new and we reviewed her low albumin likely related they will obtain compression stockings and focus on her nutrition and recovery from hospitalization.     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 Feels this is waxing and waning.   7/16/20 - hyperglycemia, hospital admission, mediport placed on 7/17/20 7/21/20 - C1D1 gemzar - 675mg/m2 and will increase  7/28/20 - C1D8 gemzar -      7/21/2020 - 9/15/2020 Chemotherapy    Treatment Summary   Plan Name: OP PANC GEMCITABINE QW  Treatment Goal: Supportive  Status: Inactive  Start Date: 7/21/2020  End Date: 9/2/2020  Provider: Desire Bledsoe MD  Chemotherapy: gemcitabine (GEMZAR) 865 mg in sodium chloride 0.9% 250 mL chemo infusion, 675 mg/m2 = 865 mg (84.4 % of original dose 800 mg/m2), Intravenous, Clinic/HOD 1 time, 2 of 6 cycles  Dose modification: 800 mg/m2 (original dose 800 mg/m2, Cycle " 1), 675 mg/m2 (original dose 800 mg/m2, Cycle 1), 800 mg/m2 (original dose 800 mg/m2, Cycle 2)  Administration: 865 mg (7/21/2020), 1,000 mg (7/28/2020), 975 mg (8/19/2020), 1,000 mg (8/26/2020), 1,000 mg (9/2/2020)     9/16/2020 -  Chemotherapy    Treatment Summary   Plan Name: OP PANC NAB-PACLITAXEL + GEMCITABINE Q3W  Treatment Goal: Supportive  Status: Active  Start Date: 9/16/2020  End Date: 5/12/2021 (Planned)  Provider: Desire Bledsoe MD  Chemotherapy: PACLitaxel-protein bound (ABRAXANE) 75 mg/m2 = 100 mg in 20 mL infusion, 75 mg/m2 = 100 mg (100 % of original dose 75 mg/m2), Intravenous, Clinic/HOD 1 time, 2 of 12 cycles  Dose modification: 75 mg/m2 (original dose 75 mg/m2, Cycle 1)  Administration: 100 mg (9/16/2020), 100 mg (9/23/2020)  gemcitabine (GEMZAR) 1,000 mg in sodium chloride 0.9% 250 mL chemo infusion, 1,030 mg (100 % of original dose 800 mg/m2), Intravenous, Clinic/HOD 1 time, 2 of 12 cycles  Dose modification: 800 mg/m2 (original dose 800 mg/m2, Cycle 1)  Administration: 1,000 mg (9/16/2020), 1,000 mg (9/23/2020)       MEDS and ALLERGIES were reviewed with patient and meds reconciled.     Fatigue  yes    Weight/appetite  Poor  (Gain of 2 pounds in 2 weeks); dietary visiting at chairside today   Constitutional  Denies F/C    Pain  yes   Sleep  poor   Mucositis  Denies    Cardiovascular  Denies CP    Respiratory  Denies SOB    Nausea  Denies    BMs  Normal    GI  Denies abdominal pain    Bleeding  Denies epistaxis, hemoptysis, BRBPR, melena, hematuria or any other bleeding    Extremities  +pedal edema   Skin/nail/hair  Denies toxicity    Neuropathy  Denies    Psych  Denies depression    Misc  n/a    Exercise  none      Objective:    Weight:  Gain of 2 pounds in 2 weeks  BP (!) 144/66 (BP Location: Left arm, Patient Position: Sitting)   Pulse 91   Temp 98.3 °F (36.8 °C) (Temporal)   Wt 38 kg (83 lb 12.4 oz)   SpO2 98%   BMI 14.38 kg/m²   Wt Readings from Last 30 Encounters:   10/07/20  38 kg (83 lb 12.4 oz)   09/23/20 37.1 kg (81 lb 12.7 oz)   09/23/20 37.1 kg (81 lb 12.7 oz)   09/16/20 36.9 kg (81 lb 5.6 oz)   09/14/20 36.9 kg (81 lb 5.6 oz)   09/09/20 37.5 kg (82 lb 10.8 oz)   09/02/20 37.1 kg (81 lb 12.7 oz)   09/02/20 37.1 kg (81 lb 12.7 oz)   08/28/20 36.7 kg (81 lb)   08/27/20 36.3 kg (80 lb)   08/26/20 36.3 kg (80 lb 0.4 oz)   08/26/20 36.3 kg (80 lb 0.4 oz)   08/19/20 35.4 kg (78 lb 0.7 oz)   08/19/20 35.4 kg (78 lb 0.7 oz)   08/05/20 34.3 kg (75 lb 9.9 oz)   08/05/20 34.3 kg (75 lb 9.9 oz)   07/28/20 35.8 kg (78 lb 14.8 oz)   07/21/20 36.1 kg (79 lb 9.4 oz)   07/21/20 36.1 kg (79 lb 9.4 oz)   07/20/20 36.7 kg (81 lb)   07/16/20 37.6 kg (83 lb)   07/14/20 38 kg (83 lb 12.4 oz)   07/13/20 38.3 kg (84 lb 6.4 oz)   07/09/20 40.6 kg (89 lb 8.1 oz)   06/30/20 39.6 kg (87 lb 4.8 oz)   06/30/20 39.6 kg (87 lb 4.8 oz)   06/30/20 39.6 kg (87 lb 4.8 oz)   06/17/20 39.6 kg (87 lb 4.8 oz)   05/26/20 40.8 kg (89 lb 15.2 oz)   02/24/20 45.4 kg (100 lb)     Physical Exam  Vitals signs reviewed.   Constitutional:       Comments: Thin, frail, elderly   HENT:      Head: Normocephalic and atraumatic.      Nose: Nose normal.      Mouth/Throat:      Mouth: Mucous membranes are moist.      Pharynx: Oropharynx is clear.   Eyes:      Conjunctiva/sclera: Conjunctivae normal.      Pupils: Pupils are equal, round, and reactive to light.   Neck:      Musculoskeletal: Normal range of motion.   Cardiovascular:      Rate and Rhythm: Irregular rhythm      Pulses: Normal pulses.   Pulmonary:      Effort: Pulmonary effort is normal.      Breath sounds: Normal breath sounds.   Abdominal:      Palpations: Abdomen is soft.      DexComG6 to lower abdomen. +BS  Musculoskeletal: Normal range of motion.   Skin:     General: Skin is warm and dry.      Capillary Refill: Capillary refill takes less than 2 seconds.   Neurological:      Mental Status: She is alert and oriented to person, place, and time.   Psychiatric:         Mood  and Affect: Mood normal.         Behavior: Behavior normal.   Lab Results   Component Value Date    WBC 12.17 10/07/2020    RBC 3.03 (L) 10/07/2020    HGB 9.4 (L) 10/07/2020    HCT 29.4 (L) 10/07/2020    MCV 97 10/07/2020    MCH 31.0 10/07/2020    MCHC 32.0 10/07/2020    RDW 23.0 (H) 10/07/2020     (H) 10/07/2020    MPV 9.2 10/07/2020    GRAN 11.1 (H) 10/07/2020    GRAN 90.9 (H) 10/07/2020    LYMPH 0.4 (L) 10/07/2020    LYMPH 3.1 (L) 10/07/2020    MONO 0.6 10/07/2020    MONO 5.0 10/07/2020    EOS 0.0 10/07/2020    BASO 0.02 10/07/2020    EOSINOPHIL 0.1 10/07/2020    BASOPHIL 0.2 10/07/2020     CMP  Sodium   Date Value Ref Range Status   10/07/2020 128 (L) 136 - 145 mmol/L Final     Potassium   Date Value Ref Range Status   10/07/2020 4.2 3.5 - 5.1 mmol/L Final     Chloride   Date Value Ref Range Status   10/07/2020 96 95 - 110 mmol/L Final     CO2   Date Value Ref Range Status   10/07/2020 27 22 - 31 mmol/L Final     Glucose   Date Value Ref Range Status   10/07/2020 111 (H) 70 - 110 mg/dL Final     Comment:     The ADA recommends the following guidelines for fasting glucose:  Normal:       less than 100 mg/dL  Prediabetes:  100 mg/dL to 125 mg/dL  Diabetes:     126 mg/dL or higher       BUN, Bld   Date Value Ref Range Status   10/07/2020 26 (H) 7 - 18 mg/dL Final     Creatinine   Date Value Ref Range Status   10/07/2020 0.64 0.50 - 1.40 mg/dL Final     Calcium   Date Value Ref Range Status   10/07/2020 8.8 8.4 - 10.2 mg/dL Final     Total Protein   Date Value Ref Range Status   10/07/2020 6.1 6.0 - 8.4 g/dL Final     Albumin   Date Value Ref Range Status   10/07/2020 3.2 (L) 3.5 - 5.2 g/dL Final     Total Bilirubin   Date Value Ref Range Status   10/07/2020 0.4 0.2 - 1.3 mg/dL Final     Alkaline Phosphatase   Date Value Ref Range Status   10/07/2020 112 38 - 145 U/L Final     AST   Date Value Ref Range Status   10/07/2020 73 (H) 14 - 36 U/L Final     ALT   Date Value Ref Range Status   10/07/2020 84 (H) 0 -  35 U/L Final     Anion Gap   Date Value Ref Range Status   10/07/2020 5 (L) 8 - 16 mmol/L Final     eGFR if    Date Value Ref Range Status   10/07/2020 >60 >60 mL/min/1.73 m^2 Final     eGFR if non    Date Value Ref Range Status   10/07/2020 >60 >60 mL/min/1.73 m^2 Final     Comment:     Calculation used to obtain the estimated glomerular filtration  rate (eGFR) is the CKD-EPI equation.        Magnesium:  1.9  Phosphorus:  2.8    Assessment:       1. Pancreatic cancer metastasized to intra-abdominal lymph node      Stage IV     ECOG 1-2     2020 presents with progression of disease on recent scans.  Reviewed in detail.  Since her performance status is good and organ function, labs are adequate, she is still a candidate to receive chemotherapy.  Obstacles/risk include her advanced age and underweight.     Discussed add Abraxane to Gemzar, benefit risk and rationale.  She is in agreement for this plan  Discussed very important to have close reassessment in the event she has toxicities, side effects, would need to adjust plan quickly and possibly discontinue.      Discussed importance of considering when she is no longer a candidate for treatment defined by side effects/ toxicity or when we may not have a good treatment option for her cancer and need to change goals of care to supportive only, treatment of symptoms.  Her  directed he understood this and mentioned plans to visit  home to make arrangements - they showed understanding of this but reluctance to discuss it which is understandable.  They agreed to continue discussion with palliative care doctor Deangelo who they have met with previously.  I have also relayed all of the above information to her brother who is a physician in [place] and whom she relies upon to direct her care.    20 she did tolerate chemo last week and blood counts adequate but depression/poor coping continues. She agrees to schedule with  "pall care next week after we have evaluated and addressed her feet/ankles. She otherwise will proceed with chemo today D8 of C1 with close re-eval. She is currently still a candidate for chemotherapy however high risk for clinical decline and would disconitnue. Nutrition reviewed at length.    10/07/2020 - Today - Tolerating chemo; denies depression, "I'm doing what I have to do, like I always do"; understands measures to control pedal edema.  Dietary to visit chairside today. Reviewed CXR with patient and .  Will have imaging later to evaluate; MD aware.      Plan:       Laly was seen today for results.    Diagnoses and all orders for this visit:    Pancreatic cancer metastasized to intra-abdominal lymph node          Chemo today - Cycle 2, Day 1    Lasix prn, elevate legs when sitting; increase protein in diet.     Encouraged patient to notify palliative care for any feelings of depression, sadness, anger, hopelessness, etc.    Exercise/nutrition important, reviewed    Continue current medications, reviewed.      Important to keep follow-up with PCP and palliative care.    MD chemo lab for C2D8 in 1 week.    CBC, CMP, mag, phos next week (Wednesday)     Assessment/plan reviewed and approved by Dr. Bledsoe.      "

## 2020-10-07 NOTE — PROGRESS NOTES
1705  and patient would prefer to reschedule to 10/8. They do not want to wait any longer for the medication to arrive on site.  Informed them the medication is in route.  Reiterated that they would prefer to come back.   informed apt was made for 10/8 at 1530pm  1730 Dr. Bledsoe informed pt has been reschedule for tomorrow and stated that it is pt preference if they would like to changed future infusion apts to thursdays or keep on wednesdays.  Will follow up with patient tomorrow on preference of treatments days.

## 2020-10-13 PROBLEM — D64.9 SYMPTOMATIC ANEMIA: Status: ACTIVE | Noted: 2020-01-01

## 2020-10-13 NOTE — TELEPHONE ENCOUNTER
Spoke with  patient very weak since Sunday , having a hard time walking,labored breathing and chest  pain on Monday.  Will cancel chemotherapy for tomorrow and  will bring her to the ER.

## 2020-10-13 NOTE — PROGRESS NOTES
[11:32 AM] Cristal Esposito      We can cancel pilar Lowrie [phone] for tomorrow, going to Er and weak!    ?[11:33 AM] Haily Cherry      ok thanks  ?[11:40 AM] Haily Cherry      on lowrie [phone] are we delaying day 8?     ?[11:50 AM] Cristal Esposito      yes

## 2020-10-14 PROBLEM — K92.1 BLACK STOOLS: Status: ACTIVE | Noted: 2020-01-01

## 2020-10-14 PROBLEM — E43 SEVERE MALNUTRITION: Status: ACTIVE | Noted: 2020-01-01

## 2020-10-14 PROBLEM — E87.6 HYPOKALEMIA: Status: ACTIVE | Noted: 2020-01-01

## 2020-10-19 NOTE — TELEPHONE ENCOUNTER
----- Message from Desire Bledsoe MD sent at 10/18/2020  4:25 PM CDT -----  Regarding: RE: advice  Contact:  Dante Baker  Perfect! Prob need her on schedule for me without chemo the following week if she doesn't want to come this week   ----- Message -----  From: Cristal Esposito RN  Sent: 10/16/2020   4:52 PM CDT  To: Desire Bledsoe MD  Subject: FW: advice                                       Just wanted you to know she doesn't want chemotherapy right now she feels to weak and needs to recover.    ----- Message -----  From: Viktoria Soliman, Patient Care Assistant  Sent: 10/16/2020   3:20 PM CDT  To: Ladi Phipps Staff  Subject: advice                                           Type: Needs Medical Advice  Who Called:  Dante Baker   Symptoms (please be specific):  both feet and ankles are swollen  How long has patient had these symptoms: 2 weeks  Pharmacy name and phone #:    Best Call Back Number: [phone] (home) [phone] (work)  Additional Information: Patient  Dante Baker states his wife Rangel Baker was discharged from [facility] 10/15/2020. Please call patient to advice. Thanks!

## 2020-10-19 NOTE — TELEPHONE ENCOUNTER
Called patients  to F/U with patients next appointment.  Patient is in the ER due to a fall last night. He will call me back.

## 2020-10-19 NOTE — TELEPHONE ENCOUNTER
----- Message from Desire Bledsoe MD sent at 10/18/2020  4:25 PM CDT -----  Regarding: RE: advice  Contact:  Dante Baker  Perfect! Prob need her on schedule for me without chemo the following week if she doesn't want to come this week   ----- Message -----  From: Cristal Esposito RN  Sent: 10/16/2020   4:52 PM CDT  To: Desire Bledsoe MD  Subject: FW: advice                                       Just wanted you to know she doesn't want chemotherapy right now she feels to weak and needs to recover.    ----- Message -----  From: Viktoria Soliman, Patient Care Assistant  Sent: 10/16/2020   3:20 PM CDT  To: Ladi Phipps Staff  Subject: advice                                           Type: Needs Medical Advice  Who Called:  Dante Baker   Symptoms (please be specific):  both feet and ankles are swollen  How long has patient had these symptoms: 2 weeks  Pharmacy name and phone #:    Best Call Back Number: [phone] (home) [phone] (work)  Additional Information: Patient  Dante Baker states his wife Rangel Baker was discharged from [facility] 10/15/2020. Please call patient to advice. Thanks!

## 2020-10-19 NOTE — PROGRESS NOTES
[10/16 4:47 PM] Cristal Esposito      can you cancel Lowrie on the 21th no more chemo for now.    ?[10/16 4:48 PM] Cristal Esposito      [id number]      Appts cancelled as requested.

## 2020-10-19 NOTE — TELEPHONE ENCOUNTER
----- Message from Desire Bledsoe MD sent at 10/18/2020  4:25 PM CDT -----  Regarding: RE: advice  Contact:  Dante Baker  Perfect! Prob need her on schedule for me without chemo the following week if she doesn't want to come this week   ----- Message -----  From: Cristal Esposito RN  Sent: 10/16/2020   4:52 PM CDT  To: Desire Bledsoe MD  Subject: FW: advice                                       Just wanted you to know she doesn't want chemotherapy right now she feels to weak and needs to recover.    ----- Message -----  From: Viktoria Soliman, Patient Care Assistant  Sent: 10/16/2020   3:20 PM CDT  To: Ladi Phipps Staff  Subject: advice                                           Type: Needs Medical Advice  Who Called:  Dante Baker   Symptoms (please be specific):  both feet and ankles are swollen  How long has patient had these symptoms: 2 weeks  Pharmacy name and phone #:    Best Call Back Number: [phone] (home) [phone] (work)  Additional Information: Patient  Dante Baker states his wife Rangel Baker was discharged from [facility] 10/15/2020. Please call patient to advice. Thanks!

## 2020-10-27 NOTE — TELEPHONE ENCOUNTER
----- Message from Talon Cardoso sent at 10/27/2020  3:27 PM CDT -----  Contact: Ashley from [facility] at [phone]  Type:  Patient Returning Call  Who Called:  Ashley  Who Left Message for Patient:  Diane  Does the patient know what this is regarding?:  yes  Best Call Back Number:  [phone]  Additional Information:  Ashley from [facility]  spoke to pt's  and he does want to put PT on hold he was confused with the nurse and Ashley is asking for a call back from Diane. Please call back and advise

## 2020-10-27 NOTE — TELEPHONE ENCOUNTER
----- Message from Rowena Vogel sent at 10/27/2020  3:07 PM CDT -----  Regarding: returning call  Contact: patient  Type:  Patient Returning Call    Who Called:  Patient   Who Left Message for Patient:  Notsure  Does the patient know what this is regarding?:  No  Best Call Back Number:  [phone] (home) [phone] (work)    Case number [id number]

## 2020-10-27 NOTE — TELEPHONE ENCOUNTER
----- Message from Viktoria Soliman, Patient Care Assistant sent at 10/27/2020  2:24 PM CDT -----  Regarding: advice  Contact: Ashley with home health PTA concern care  Type: Needs Medical Advice  Who Called:  Ashley with home health PTA concern care  Symptoms (please be specific):  feeling weak  How long has patient had these symptoms:  week  Pharmacy name and phone #:    Best Call Back Number: [phone] (home) [phone] (work)  Additional Information: Ashley with home health PTA concern care states the  requesting PT services on hold. Please call Ashley with home health PTA concern care  to advice. Thanks!

## 2020-10-27 NOTE — TELEPHONE ENCOUNTER
Mr byrd is requesting to put PT on hold for now due to weakness. This is a notification from her Physical therapy dept.

## 2020-10-27 NOTE — TELEPHONE ENCOUNTER
She called pt back and said that mr byrd was confused when I called him and he does not want her to have physical therapy.

## 2020-10-28 NOTE — TELEPHONE ENCOUNTER
----- Message from Anuradha Gregory sent at 10/28/2020  9:50 AM CDT -----  Contact: Ildefonso Baker (Spouse)  Ildefonso Baker (Spouse) calling states that someone called him...[phone]

## 2020-10-28 NOTE — TELEPHONE ENCOUNTER
----- Message from Anuradha Gregory sent at 10/28/2020  9:50 AM CDT -----  Contact: Ildefonso Baker (Spouse)  Ildefonso Baker (Spouse) calling states that someone called him...[phone]

## 2020-10-29 NOTE — TELEPHONE ENCOUNTER
----- Message from Brissa Mayankana sent at 10/29/2020  3:37 PM CDT -----  Contact:  Ildefonso  Patients  is very concerned about what happened today.  They were in route to see the doctor when his son called to let them know that you had cancelled the appt for today and rescheduled it to tomorrow. He went ahead and rescheduled the appt w/her gastro doctor and they will be there at 11 AM tomorrow. Thanks

## 2020-10-30 NOTE — PROGRESS NOTES
FOLLOW-UP VISIT    Patient name: Laly Baker  Date: 10/30/20      Subjective:       Patient ID: Laly Baker is a 85 y.o. female.    Chief Complaint: Follow-up    HPI  She presents today 10/30/20 for her mets pancreatic cancer.   Recent clinical decline, admit to hospital for GIB, found to have ulcer In stomach abutting her pancreatic head mass, concern for risk of re-bleed. She then delayed follow-up due to weakness. She then presented to ED again for a fall at home on 10/19/20, weakness again, was discharged. We have stressed palliative care followup repeatedly; the [facility] care team saw inpatient and patient declines further follow-up with them. Has been resistant to discussion of no chemo.     Continues with poor appetite, underweight, weakness, swelling to legs, swelling to abdomen extreme new.  Eating is a challenge - Taste is salty and bitter.   Early satiety related to abdomen.  She is taking lasix for the fluid without much relief.   She raises her voice and very frustrated/angry about this - we reviewed at length that this is very hard to reverse, could continue to try diuretics, she showed good understanding.    They also presented a list of type questions form her brother who is a primary care doctor in [place] including benefit/risk of paracentesis, IV iron, IV albumin, other chemo regimens, current chemo taking a break, Aldactone rather than Lasix for edema.  Discussed all of these at length, they declined IV iron or paracentesis, and advise against albumin since benefit is very short.  Reviewed all chemo regimens would cause more harm than good at this point they show good understanding of this.    See detailed oncology history below, updated with most recent scans, labs, pertinent medical history.   Oncology History   Pancreatic adenocarcinoma   7/1/2020 Initial Diagnosis    Pancreatic adenocarcinoma    05/26/2020-GI visit Dr. Barton  Presents for evaluation with change in bowel habits,  increased frequency past 5 months.  History of pancreatitis, weight loss, change in bowel habits.  CT abdomen pelvis ordered.   06/03/2020-CT abdomen pelvis-previously noted cystic mass involving the body and tail of the pancreas is further decrease in size measuring 2.9 x 1 cm today.  There is however change in the appearance of the head proximal body of the pancreas which is heterogeneous masslike low-density cystic area measuring 2.5 cm and encasing vessels.  There was also interval development of dilatation of the pancreatic duct in the head of the pancreas measuring 7 mm.  The common duct remains non dilated measuring 6-7 mm.  There is also a new 12 mm hypodense lesion in the liver.  06/17/2020-EUS - Dr. Campbell               Pathology: FNA-positive for adenocarcinoma.  Unable to biopsy liver lesion, highly suspicious.  Additional studies: TESSIE and PDL1 0%  06/29/2020-CT abdomen pelvis - degree of intrahepatic biliary ductal dilatation has increased since 06/03/2020.  Low density pancreatic head mass measures 3.1 x 3.1 cm.  This is similar in size when compared to 06/03/2020.  Cystic lesion centered in the pancreatic tail measures 3.6 x 1.5 cm.  There is a low-density lesion in the medial aspect of the right hepatic lobe measuring 1.8 x 1.7 cm, unchanged from prior scan 06/03/2020.  0.5 cm low-density nodule in the posterior segment right hepatic lobe.  This is unchanged.  Thrombosis of the splenic vein unchanged.  Scheduled for initial medical oncology consult on 06/30/2020, admitted to [facility]  06/29/2020-emergency department visit-admitted to [facility] hospital, increased T bili  06/30/2020-transferred to [facility] for biliary obstruction and ERCP unable to cannulate biliary sphincter at [facility]  07/01/2020-underwent PTC, percutaneous transhepatic cholangiography  07/02/2020-CT chest shows several right pulmonary nodules measuring up to 6 mm, 2 mm left upper lobe, 5 mm right upper lobe, 4 mm right middle  "lobe, 6 mm right lower lobe..  07/03/2020-CT a abdomen and pelvis - percutaneous transhepatic biliary drain in place with distal tip terminating in the 2nd part of the duodenum.  Multiple stable hypoattenuating foci in the liver compatible with metastatic disease.  Stable appearance of heterogeneous pancreatic head mass abutting the duodenum and common bile duct.  Impression includes mild colonic distention with multiple air-fluid levels suspicious for non obstructive ileus The largest liver lesion is 1.3 cm hypoenhancing mass in the right hepatic lobe inferiorly.  07/03/2020-CA 19.9 - 6263  07/04/2020-medical oncology inpatient consult Dr. Cagle - consult reflects discussion about stage IV, non curative treatment options available.  GI bleeding this day, discharge held  07/05/2020-EGD-  07/08/2020-CMP shows bilirubin improved to 1.0, albumin is low at 2.4.  Calcium 8.3.  CBC shows a hemoglobin of 9.3, white count normal and platelets normal.  ANC is 6.8 normal  07/08/2020-discharge from [facility]  07/09/2020-initial consult [facility]     Patient and her son present and confirm history above  They are both quite anxious today     She reports Pain brand new two weeks ago. Chills overnight. Spoke to her brother who is a general practitioner in [place]. She went to ED [facility] as above. Then sent to [facility]. Home health is coming to her home and OT.      She reports "doing well" except the pain from incision at the point of tube.      Swelling new and we reviewed her low albumin likely related they will obtain compression stockings and focus on her nutrition and recovery from hospitalization.     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 Feels this is waxing and waning.   7/16/20 - hyperglycemia, hospital admission, mediport placed on " 7/17/20 7/21/20 - C1D1 gemzar - 675mg/m2 and will increase  7/28/20 - C1D8 gemzar -      7/21/2020 - 9/15/2020 Chemotherapy    Treatment Summary   Plan Name: OP PANC GEMCITABINE QW  Treatment Goal: Supportive  Status: Inactive  Start Date: 7/21/2020  End Date: 9/2/2020  Provider: Desire Bledsoe MD  Chemotherapy: gemcitabine (GEMZAR) 865 mg in sodium chloride 0.9% 250 mL chemo infusion, 675 mg/m2 = 865 mg (84.4 % of original dose 800 mg/m2), Intravenous, Clinic/HOD 1 time, 2 of 6 cycles  Dose modification: 800 mg/m2 (original dose 800 mg/m2, Cycle 1), 675 mg/m2 (original dose 800 mg/m2, Cycle 1), 800 mg/m2 (original dose 800 mg/m2, Cycle 2)  Administration: 865 mg (7/21/2020), 1,000 mg (7/28/2020), 975 mg (8/19/2020), 1,000 mg (8/26/2020), 1,000 mg (9/2/2020)     9/16/2020 -  Chemotherapy    Treatment Summary   Plan Name: OP PANC NAB-PACLITAXEL + GEMCITABINE Q3W  Treatment Goal: Supportive  Status: Active  Start Date: 9/16/2020  End Date: 5/13/2021 (Planned)  Provider: Desire Bledsoe MD  Chemotherapy: PACLitaxel-protein bound (ABRAXANE) 75 mg/m2 = 100 mg in 20 mL infusion, 75 mg/m2 = 100 mg (100 % of original dose 75 mg/m2), Intravenous, Clinic/HOD 1 time, 2 of 12 cycles  Dose modification: 75 mg/m2 (original dose 75 mg/m2, Cycle 1)  Administration: 100 mg (9/16/2020), 100 mg (9/23/2020), 100 mg (10/8/2020)  gemcitabine (GEMZAR) 1,000 mg in sodium chloride 0.9% 250 mL chemo infusion, 1,030 mg (100 % of original dose 800 mg/m2), Intravenous, Clinic/HOD 1 time, 2 of 12 cycles  Dose modification: 800 mg/m2 (original dose 800 mg/m2, Cycle 1)  Administration: 1,000 mg (9/16/2020), 1,000 mg (9/23/2020), 1,050 mg (10/8/2020)         I have reviewed my initial consult note with detailed PMH/ROS from initial encounter and all following progress notes.   Past medical, surgical, family, and social history were reviewed today and there are no changes of note unless mentioned in HPI.     MEDS and ALLERGIES were  "reviewed with patient and meds reconciled.     Fatigue  Notable    Weight/appetite  Poor    Constitutional  no F/C or SOI   Pain  Related to edema and abdomen    Cardiovascular  no change in CP or palpitations   Respiratory  no change in SOB or cough   GI  No new abdominal pain or major change in BM   Bleeding  No new bleeding   Extremities  Severe edema           Objective:      BP (!) 93/55 (BP Location: Left arm, Patient Position: Sitting)   Pulse 80   Temp 98 °F (36.7 °C) (Temporal)   Resp 18   Ht 5' 4" (1.626 m)   Wt 43 kg (94 lb 12.8 oz)   SpO2 99%   BMI 16.27 kg/m²   Wt Readings from Last 30 Encounters:   10/30/20 43 kg (94 lb 12.8 oz)   10/19/20 37.6 kg (83 lb)   10/14/20 40.2 kg (88 lb 10 oz)   10/08/20 38.3 kg (84 lb 7 oz)   10/07/20 38 kg (83 lb 12.4 oz)   09/23/20 37.1 kg (81 lb 12.7 oz)   09/23/20 37.1 kg (81 lb 12.7 oz)   09/16/20 36.9 kg (81 lb 5.6 oz)   09/14/20 36.9 kg (81 lb 5.6 oz)   09/09/20 37.5 kg (82 lb 10.8 oz)   09/02/20 37.1 kg (81 lb 12.7 oz)   09/02/20 37.1 kg (81 lb 12.7 oz)   08/28/20 36.7 kg (81 lb)   08/27/20 36.3 kg (80 lb)   08/26/20 36.3 kg (80 lb 0.4 oz)   08/26/20 36.3 kg (80 lb 0.4 oz)   08/19/20 35.4 kg (78 lb 0.7 oz)   08/19/20 35.4 kg (78 lb 0.7 oz)   08/05/20 34.3 kg (75 lb 9.9 oz)   08/05/20 34.3 kg (75 lb 9.9 oz)   07/28/20 35.8 kg (78 lb 14.8 oz)   07/21/20 36.1 kg (79 lb 9.4 oz)   07/21/20 36.1 kg (79 lb 9.4 oz)   07/20/20 36.7 kg (81 lb)   07/16/20 37.6 kg (83 lb)   07/14/20 38 kg (83 lb 12.4 oz)   07/13/20 38.3 kg (84 lb 6.4 oz)   07/09/20 40.6 kg (89 lb 8.1 oz)   06/30/20 39.6 kg (87 lb 4.8 oz)   06/30/20 39.6 kg (87 lb 4.8 oz)       Constitutional: Patient is oriented to person, place, and time. No physical distress.  Frail in appearance, thin  HENT:  Eyes: Conjunctivae and EOM are normal.   Neck: Normal range of motion. Neck supple.   Cardiovascular: Normal rate, regular rhythm, normal heart sounds and intact distal pulses.    Pulmonary/Chest: Effort normal " and breath sounds normal. no wheezes. no rales.    Abdominal: Soft. Bowel sounds are normal. Patient exhibits taught distension. There is no major tenderness.   Musculoskeletal: patient exhibits no edema or tenderness.   Lymphadenopathy:  patient  has no cervical adenopathy. no supraclavicular adenopathy. No axillary LAD.   Neurological: Patient is alert and oriented to person, place, and time. normal strength. No cranial nerve deficit or sensory deficit.   Skin: Skin is warm and dry. No rash noted. No erythema.   Psychiatric: Patient has a angry/anxious mood and affect. speech is normal.   Nursing note and vitals reviewed.    LABS none today    Assessment:       1. Pancreatic cancer metastasized to intra-abdominal lymph node    2. Edema, unspecified type     3. Metastasis to liver    4. Underweight    5. Anxiety about health    6. Anemia associated with chemotherapy    7. 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 Poor appetite    10. Tachycardia    11. Gastrointestinal hemorrhage, unspecified gastrointestinal hemorrhage type          Stage IV     ECOG 2-3        09/09/2020 today presents with progression of disease on recent scans.  Reviewed in detail.  Since her performance status is good and organ function, labs are adequate, she is still a candidate to receive chemotherapy.  Obstacles/risk include her advanced age and underweight.     Discussed add Abraxane to Gemzar, benefit risk and rationale.  She is in agreement for this plan  Discussed very important to have close reassessment in the event she has toxicities, side effects, would need to adjust plan quickly and possibly discontinue.      Discussed importance of considering when she is no longer a candidate for treatment defined by side effects/ toxicity or when we may not have a good treatment option for her cancer and need to change goals of care to supportive only, treatment of symptoms.  Her  directed he  understood this and mentioned plans to visit  home to make arrangements - they showed understanding of this but reluctance to discuss it which is understandable.  They agreed to continue discussion with palliative care doctor Deangelo who they have met with previously.  I have also relayed all of the above information to her brother who is a physician in [place] and whom she relies upon to direct her care.     22 she did tolerate chemo last week and blood counts adequate but depression/poor coping continues. She agrees to schedule with pall care next week after we have evaluated and addressed her feet/ankles. She otherwise will proceed with chemo today D8 of C1 with close re-eval. She is currently still a candidate for chemotherapy however high risk for clinical decline and would disconitnue. Nutrition reviewed at length.    Today 10/30/20 she presents for follow-up after further complications see HPI, and clinical decline. Reviewed chemotherapy would cause more harm then good, she is high risk for serious adverse events including hospitalization and death.     She does not want further chemo at this time but remains hopeful she can improve. She reports taking it a day at a time and we reviewed that she may not improve and inquired if she understood what hospice was, she became tearful and said she wanted to leave and raised her voice. Emotional support given, her  expressed good understanding and we will discuss with her brother in [place] also. She is a DNR and remains agreeable to this on discussion today.  She has been very resistant to all end of life care discussions since diagnosis, emotional support given and will discuss with her brother, her  shows good understanding but they still decline discussion on hospice.    We discussed she will continue home health and we will discuss directly with her home health nurse her prognosis so that if / when she clinically declines there is  hopefully an opportunity to transition her to hospice at home.     She has a GI follow-up appointment next week she will keep in the event they can lend advice, optimize treatment of her ascites, edema, since this is a quality of life issue currently    At the end of our visit she was tearful and stating she wanted to leave the clinic over and over.  We recognized before some depression anxiety and severe grief reaction, anger, she is open to considering a medication as needed for this at home and to discuss this with her brother so we will do that.      Plan:       Laly was seen today for follow-up.    Diagnoses and all orders for this visit:    Pancreatic cancer metastasized to intra-abdominal lymph node    Edema, unspecified type     Metastasis to liver    Underweight    Anxiety about health    Anemia associated with chemotherapy    Leg swelling    Diabetes mellitus associated with pancreatic disease    Poor appetite    Tachycardia    Gastrointestinal hemorrhage, unspecified gastrointestinal hemorrhage type            MD visit here in 3 weeks    Continue home health- - they report using [facility] - [facility] - twice a week visits currently, will communicate with them.     Keep GI appointment    Will start Aldactone, discontinue Lasix for fluid retention.    Exercise/nutrition reviewed, she remains as active as possible which currently meet her goals of care.    Continue current medications, reviewed.      Important to keep follow-up with PCP.  She declines further follow-up with palliative care.       Discussed plan above in great detail with patient and all questions answered to their satisfaction. Proceed with plan above.          Desire Bledsoe M.D.  Hematology Oncology  [facility]

## 2020-11-06 NOTE — PROGRESS NOTES
"Subjective:       Patient ID: Laly Baker is a 85 y.o. female, Body mass index is 15.17 kg/m².    Chief Complaint: Other (swollow abd)      Established patient of Dr. Barton and myself.    Reviewed hospital discharge summary from 10/15/2020: "Hospital Course: Transfused 2 PRBC 10/13.  EGD 10/14.  Normal esophagus: Gastritis; Non-bleeding duodenal ulcer with mass effect likely secondary to pancreatic neoplasm; no bleeding; Non-bleeding duodenal diverticulum; No specimens collected.  Good response to transfusion with appropriate increase in hgb and felt better and stated she wants to go home.  Discussed with oncology and GI. Patient was seen by Palliative care and will follow up outpatient with them. She is feeling better and ready to go home."     Here with , whom assisted with interview.    GI Problem  The primary symptoms include weight loss (stable). Primary symptoms do not include fever, fatigue, abdominal pain, nausea, vomiting, diarrhea, melena (Resolved since hospital discharge), hematemesis, jaundice, hematochezia, dysuria or rash.   The illness does not include chills, anorexia (appetite better), dysphagia, odynophagia, bloating or constipation (bowel movements are once per day). Significant associated medical issues include GERD (denies heartburn), liver disease and PUD (Taking Protonix 40 mg BID and Carafate 1 gm QID). Associated medical issues do not include inflammatory bowel disease, gallstones, alcohol abuse, gastric bypass, bowel resection, irritable bowel syndrome, hemorrhoids or diverticulitis. Associated medical issues comments: Hx of pancreatic cancer with mets to liver- taking Creon TID and followed by oncology; Hx of GENEVA- taking Fergon daily.     Review of Systems   Constitutional: Positive for weight loss (stable). Negative for appetite change, chills, fatigue, fever and unexpected weight change.   HENT: Negative for trouble swallowing.    Respiratory: Negative for cough and " shortness of breath.    Cardiovascular: Negative for chest pain.   Gastrointestinal: Positive for abdominal distention (denies pain; reports uncomfortable at times; has declined paracentesis). Negative for abdominal pain, anal bleeding, anorexia (appetite better), bloating, blood in stool, constipation (bowel movements are once per day), diarrhea, dysphagia, hematemesis, hematochezia, jaundice, melena (Resolved since hospital discharge), nausea, rectal pain and vomiting.   Genitourinary: Negative for difficulty urinating and dysuria.   Musculoskeletal: Positive for gait problem.   Skin: Negative for rash.   Neurological: Positive for weakness. Negative for speech difficulty.   Psychiatric/Behavioral: Negative for confusion.       Past Medical History:   Diagnosis Date    Arthritis     Cancer     pancreatic    Colon polyp     Hyperlipidemia     Hypertension     Nonexudative age-related macular degeneration, bilateral, intermediate dry stage 12/30/2019    Pancreatic pseudocyst     Pancreatitis 05/2017      Past Surgical History:   Procedure Laterality Date    CATARACT EXTRACTION W/  INTRAOCULAR LENS IMPLANT Right 08/08/2017    [place]    CATARACT EXTRACTION W/  INTRAOCULAR LENS IMPLANT Left 10/10/2017    [place]man    CERVICAL CONIZATION   W/ LASER      CHOLANGIOGRAM N/A 7/7/2020    Procedure: CHOLANGIOGRAM;  Surgeon: Peri Surgeon;  Location[facility];  Service: Anesthesiology;  Laterality: N/A;    COLONOSCOPY  01/12/2015    Dr. Barton; diverticulosis; ext int hemorrhoid; repeat in 5 years    COLONOSCOPY N/A 2/24/2020    Procedure: COLONOSCOPY;  Surgeon: Ankur Barton MD;  Location: [facility];  Service: Endoscopy;  Laterality: N/A;    COLONOSCOPY N/A 7/5/2020    Procedure: COLONOSCOPY;  Surgeon: Dami Martin MD;  Location: [facility] ([address] [place] [address]);  Service: Endoscopy;  Laterality: N/A;    COLONOSCOPY W/ POLYPECTOMY      cone      ENDOSCOPIC ULTRASOUND OF UPPER GASTROINTESTINAL TRACT Left  6/17/2020    Procedure: ULTRASOUND, UPPER GI TRACT, ENDOSCOPIC;  Surgeon: Marcello Campbell MD;  Location: [facility] ENDO;  Service: Endoscopy;  Laterality: Left;  Linear scope    epid ster  2012. 2014    ERCP N/A 6/30/2020    Procedure: ERCP (ENDOSCOPIC RETROGRADE CHOLANGIOPANCREATOGRAPHY);  Surgeon: Ankur Barton MD;  Location: [facility] ENDO;  Service: Endoscopy;  Laterality: N/A;    ERCP N/A 7/1/2020    Procedure: ERCP (ENDOSCOPIC RETROGRADE CHOLANGIOPANCREATOGRAPHY);  Surgeon: Jamal Martin MD;  Location: [facility] (2ND FLR);  Service: Endoscopy;  Laterality: N/A;    ESOPHAGOGASTRODUODENOSCOPY N/A 6/17/2020    Procedure: EGD (ESOPHAGOGASTRODUODENOSCOPY);  Surgeon: Marcello Campbell MD;  Location: [facility] END[facility];  Service: Endoscopy;  Laterality: N/A;    ESOPHAGOGASTRODUODENOSCOPY N/A 10/14/2020    Procedure: EGD (ESOPHAGOGASTRODUODENOSCOPY);  Surgeon: Ankur Barton MD;  Location: [facility];  Service: Endoscopy;  Laterality: N/A;    INSERTION OF BILIARY DRAINAGE CATHETER N/A 7/2/2020    Procedure: INSERTION, DRAINAGE CATHETER, BILE DUCT;  Surgeon: Peri Surgeon;  Location: [facility] PERI;  Service: Anesthesiology;  Laterality: N/A;    INSERTION OF TUNNELED CENTRAL VENOUS CATHETER (CVC) WITH SUBCUTANEOUS PORT N/A 7/17/2020    Procedure: [id number];  Surgeon: Frandy Shankar MD;  [facility];  Service: General;  Laterality: N/A;    TONSILLECTOMY        Family History   Problem Relation Age of Onset    Diabetes Mother 92    Cataracts Mother     Hypertension Mother     Stroke Father     Cataracts Father     Hypertension Father     Cancer Brother 82        lung cancer    Amblyopia Neg Hx     Blindness Neg Hx     Glaucoma Neg Hx     Macular degeneration Neg Hx     Retinal detachment Neg Hx     Strabismus Neg Hx     Thyroid disease Neg Hx       Wt Readings from Last 10 Encounters:   11/06/20 40.1 kg (88 lb 6.5 oz)   10/30/20 43 kg (94 lb 12.8 oz)   10/19/20 37.6 kg (83 lb)   10/14/20  40.2 kg (88 lb 10 oz)   10/08/20 38.3 kg (84 lb 7 oz)   10/07/20 38 kg (83 lb 12.4 oz)   09/23/20 37.1 kg (81 lb 12.7 oz)   09/23/20 37.1 kg (81 lb 12.7 oz)   09/16/20 36.9 kg (81 lb 5.6 oz)   09/14/20 36.9 kg (81 lb 5.6 oz)     Lab Results   Component Value Date    WBC 8.09 11/06/2020    HGB 9.1 (L) 11/06/2020    HCT 29.6 (L) 11/06/2020    MCV 98 11/06/2020     11/06/2020     CMP  Sodium   Date Value Ref Range Status   10/19/2020 133 (L) 136 - 145 mmol/L Final     Potassium   Date Value Ref Range Status   10/19/2020 3.6 3.5 - 5.1 mmol/L Final     Chloride   Date Value Ref Range Status   10/19/2020 103 95 - 110 mmol/L Final     CO2   Date Value Ref Range Status   10/19/2020 26 22 - 31 mmol/L Final     Glucose   Date Value Ref Range Status   10/19/2020 74 70 - 110 mg/dL Final     Comment:     The ADA recommends the following guidelines for fasting glucose:  Normal:       less than 100 mg/dL  Prediabetes:  100 mg/dL to 125 mg/dL  Diabetes:     126 mg/dL or higher       BUN   Date Value Ref Range Status   10/19/2020 22 (H) 7 - 18 mg/dL Final     Creatinine   Date Value Ref Range Status   10/19/2020 0.58 0.50 - 1.40 mg/dL Final     Calcium   Date Value Ref Range Status   10/19/2020 8.3 (L) 8.4 - 10.2 mg/dL Final     Total Protein   Date Value Ref Range Status   10/19/2020 4.9 (L) 6.0 - 8.4 g/dL Final     Albumin   Date Value Ref Range Status   10/19/2020 2.5 (L) 3.5 - 5.2 g/dL Final     Total Bilirubin   Date Value Ref Range Status   10/19/2020 0.4 0.2 - 1.3 mg/dL Final     Alkaline Phosphatase   Date Value Ref Range Status   10/19/2020 95 38 - 145 U/L Final     AST   Date Value Ref Range Status   10/19/2020 55 (H) 14 - 36 U/L Final     ALT   Date Value Ref Range Status   10/19/2020 46 (H) 0 - 35 U/L Final     Anion Gap   Date Value Ref Range Status   10/19/2020 4 (L) 8 - 16 mmol/L Final     eGFR if    Date Value Ref Range Status   10/19/2020 >60 >60 mL/min/1.73 m^2 Final     eGFR if non African  American   Date Value Ref Range Status   10/19/2020 >60 >60 mL/min/1.73 m^2 Final     Comment:     Calculation used to obtain the estimated glomerular filtration  rate (eGFR) is the CKD-EPI equation.        Lab Results   Component Value Date    AMYLASE 9,277 (H) 05/30/2017     Lab Results   Component Value Date    LIPASE 40 05/21/2018     Lab Results   Component Value Date    LIPASERES <10 (L) 10/19/2020      Lab Results   Component Value Date    IRON 27 (L) 09/16/2020    TIBC 197 (L) 09/16/2020    FERRITIN 1,000 (H) 09/16/2020          Reviewed prior medical records including radiology report of CT of abdomen and pelvis 9/8/2020 & endoscopy history (see surgical history).     Objective:      Physical Exam  Constitutional:       General: She is not in acute distress.     Appearance: She is well-developed.   HENT:      Head: Normocephalic.      Right Ear: Decreased hearing noted.      Left Ear: Decreased hearing noted.      Nose: Nose normal.      Mouth/Throat:      Comments: Pt wearing mask due to COVID concerns  Eyes:      General: Lids are normal.      Conjunctiva/sclera: Conjunctivae normal.      Pupils: Pupils are equal, round, and reactive to light.   Neck:      Musculoskeletal: Normal range of motion.      Trachea: Trachea normal.   Cardiovascular:      Rate and Rhythm: Normal rate and regular rhythm.      Heart sounds: Normal heart sounds. No murmur.   Pulmonary:      Effort: Pulmonary effort is normal. No respiratory distress.      Breath sounds: Normal breath sounds. No stridor. No wheezing.   Abdominal:      General: Bowel sounds are normal. There is distension.      Palpations: Abdomen is soft. There is no mass.      Tenderness: There is no abdominal tenderness. There is no guarding or rebound.   Musculoskeletal: Normal range of motion.      Comments: Presents in wheelchair   Skin:     General: Skin is warm and dry.      Findings: No rash.      Comments: Non jaundiced   Neurological:      Mental Status:  She is alert and oriented to person, place, and time.   Psychiatric:         Speech: Speech normal.         Behavior: Behavior normal. Behavior is cooperative.           Assessment:       1. Hospital discharge follow-up    2. Duodenal ulcer    3. History of melena    4. Iron deficiency anemia, unspecified iron deficiency anemia type    5. Pancreatic adenocarcinoma with metastasis to liver           Plan:   Discussed case with Dr. Barton.    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

## 2020-11-06 NOTE — TELEPHONE ENCOUNTER
----- Message from Rowena Vogel sent at 11/6/2020  2:58 PM CST -----  Regarding: Appointment  Contact: , Ildefonso Fregoso want to know if office can push appointment time back please call back at [phone]

## 2020-11-09 NOTE — TELEPHONE ENCOUNTER
Talked to son and gave BW results, he verbally understood and asked if we would results to my chart, done.

## 2020-11-23 NOTE — PROGRESS NOTES
FOLLOW-UP VISIT    Patient name: Laly Baker  Date: 11/23/20      Subjective:       Patient ID: Laly Baker is a 85 y.o. female.    Chief Complaint:  Follow-up    HPI  Here today for pancreatic ca followup  Has not been on chemo - recent PS down and GIB with concern high risk to recur.  She has had very poor coping, refused further palliative care follow-up and deferred end of life discussions.  She has her  who lives with her for support as well as her brother in [place] who is a primary care physician and 2 sons live out of town.    Recent they described improvement overall-  Very active around home  Ambulatory quite a bit which is a change  Eating improved  Fluid retention is remarkably improved, using aldactone - her ascites is better and BLE edema is much better, this has facilitated increase in her activity.   Aldactone has been helping, urinating frequent including overnight wears diaper.     Unfortunately today very red swollen finger which she recently had an injection.  No fever.  It is difficult to use due to limited range of motion and pain and she is very frustrated about this.  She does not feel the pain is severe.  There was discharge.  We have uploaded images of her finger in media for review.  She has an orthopedic visit on Wednesday for follow-up    See detailed oncology history below, updated with most recent scans, labs, pertinent medical history.   Oncology History   Pancreatic adenocarcinoma   7/1/2020 Initial Diagnosis    Pancreatic adenocarcinoma    05/26/2020-GI visit Dr. Barton  Presents for evaluation with change in bowel habits, increased frequency past 5 months.  History of pancreatitis, weight loss, change in bowel habits.  CT abdomen pelvis ordered.   06/03/2020-CT abdomen pelvis-previously noted cystic mass involving the body and tail of the pancreas is further decrease in size measuring 2.9 x 1 cm today.  There is however change in the appearance of the head proximal  body of the pancreas which is heterogeneous masslike low-density cystic area measuring 2.5 cm and encasing vessels.  There was also interval development of dilatation of the pancreatic duct in the head of the pancreas measuring 7 mm.  The common duct remains non dilated measuring 6-7 mm.  There is also a new 12 mm hypodense lesion in the liver.  06/17/2020-EUS - Dr. Campbell               Pathology: FNA-positive for adenocarcinoma.  Unable to biopsy liver lesion, highly suspicious.  Additional studies: TESSIE and PDL1 0%  06/29/2020-CT abdomen pelvis - degree of intrahepatic biliary ductal dilatation has increased since 06/03/2020.  Low density pancreatic head mass measures 3.1 x 3.1 cm.  This is similar in size when compared to 06/03/2020.  Cystic lesion centered in the pancreatic tail measures 3.6 x 1.5 cm.  There is a low-density lesion in the medial aspect of the right hepatic lobe measuring 1.8 x 1.7 cm, unchanged from prior scan 06/03/2020.  0.5 cm low-density nodule in the posterior segment right hepatic lobe.  This is unchanged.  Thrombosis of the splenic vein unchanged.  Scheduled for initial medical oncology consult on 06/30/2020, admitted to [facility]  06/29/2020-emergency department visit-admitted to [facility] hospital, increased T bili  06/30/2020-transferred to [facility] for biliary obstruction and ERCP unable to cannulate biliary sphincter at [facility]  07/01/2020-underwent PTC, percutaneous transhepatic cholangiography  07/02/2020-CT chest shows several right pulmonary nodules measuring up to 6 mm, 2 mm left upper lobe, 5 mm right upper lobe, 4 mm right middle lobe, 6 mm right lower lobe..  07/03/2020-CT a abdomen and pelvis - percutaneous transhepatic biliary drain in place with distal tip terminating in the 2nd part of the duodenum.  Multiple stable hypoattenuating foci in the liver compatible with metastatic disease.  Stable appearance of heterogeneous pancreatic head mass abutting the duodenum and  "common bile duct.  Impression includes mild colonic distention with multiple air-fluid levels suspicious for non obstructive ileus The largest liver lesion is 1.3 cm hypoenhancing mass in the right hepatic lobe inferiorly.  07/03/2020-CA 19.9 - 6263  07/04/2020-medical oncology inpatient consult Dr. Cagle - consult reflects discussion about stage IV, non curative treatment options available.  GI bleeding this day, discharge held  07/05/2020-EGD-  07/08/2020-CMP shows bilirubin improved to 1.0, albumin is low at 2.4.  Calcium 8.3.  CBC shows a hemoglobin of 9.3, white count normal and platelets normal.  ANC is 6.8 normal  07/08/2020-discharge from [facility]  07/09/2020-initial consult [facility]     Patient and her son present and confirm history above  They are both quite anxious today     She reports Pain brand new two weeks ago. Chills overnight. Spoke to her brother who is a general practitioner in [place]. She went to ED [facility] as above. Then sent to [facility]. Home health is coming to her home and OT.      She reports "doing well" except the pain from incision at the point of tube.      Swelling new and we reviewed her low albumin likely related they will obtain compression stockings and focus on her nutrition and recovery from hospitalization.     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 Feels this is waxing and waning.   7/16/20 - hyperglycemia, hospital admission, mediport placed on 7/17/20 7/21/20 - C1D1 gemzar - 675mg/m2 and will increase  7/28/20 - C1D8 gemzar -      7/21/2020 - 9/15/2020 Chemotherapy    Treatment Summary   Plan Name: OP PANC GEMCITABINE QW  Treatment Goal: Supportive  Status: Inactive  Start Date: 7/21/2020  End Date: 9/2/2020  Provider: Desire Bledsoe MD  Chemotherapy: gemcitabine (GEMZAR) 865 mg " in sodium chloride 0.9% 250 mL chemo infusion, 675 mg/m2 = 865 mg (84.4 % of original dose 800 mg/m2), Intravenous, Clinic/HOD 1 time, 2 of 6 cycles  Dose modification: 800 mg/m2 (original dose 800 mg/m2, Cycle 1), 675 mg/m2 (original dose 800 mg/m2, Cycle 1), 800 mg/m2 (original dose 800 mg/m2, Cycle 2)  Administration: 865 mg (7/21/2020), 1,000 mg (7/28/2020), 975 mg (8/19/2020), 1,000 mg (8/26/2020), 1,000 mg (9/2/2020)     9/16/2020 -  Chemotherapy    Treatment Summary   Plan Name: OP PANC NAB-PACLITAXEL + GEMCITABINE Q3W  Treatment Goal: Supportive  Status: Active  Start Date: 9/16/2020  End Date: 5/13/2021 (Planned)  Provider: Desire Bledsoe MD  Chemotherapy: PACLitaxel-protein bound (ABRAXANE) 75 mg/m2 = 100 mg in 20 mL infusion, 75 mg/m2 = 100 mg (100 % of original dose 75 mg/m2), Intravenous, Clinic/HOD 1 time, 2 of 12 cycles  Dose modification: 75 mg/m2 (original dose 75 mg/m2, Cycle 1)  Administration: 100 mg (9/16/2020), 100 mg (9/23/2020), 100 mg (10/8/2020)  gemcitabine (GEMZAR) 1,000 mg in sodium chloride 0.9% 250 mL chemo infusion, 1,030 mg (100 % of original dose 800 mg/m2), Intravenous, Clinic/HOD 1 time, 2 of 12 cycles  Dose modification: 800 mg/m2 (original dose 800 mg/m2, Cycle 1)  Administration: 1,000 mg (9/16/2020), 1,000 mg (9/23/2020), 1,050 mg (10/8/2020)         I have reviewed my initial consult note with detailed PMH/ROS from initial encounter and all following progress notes.   Past medical, surgical, family, and social history were reviewed today and there are no changes of note unless mentioned in HPI.     MEDS and ALLERGIES were reviewed with patient and meds reconciled.     Fatigue  Better    Weight/appetite  Better, weight loss related to decrease in fluid retention    Constitutional  no F/C or SOI   Pain  Finger see HPI    Cardiovascular  no change in CP or palpitations   Respiratory  no change in SOB or cough   GI  No new abdominal pain or major change in BM   Bleeding   "No new bleeding   Extremities  No new edema           Objective:      BP (!) 140/66 (BP Location: Right arm, Patient Position: Sitting, BP Method: Medium (Automatic))   Pulse 78   Temp 97.8 °F (36.6 °C) (Temporal)   Resp 18   Ht 5' 4" (1.626 m)   Wt 36.5 kg (80 lb 7.5 oz)   SpO2 99%   BMI 13.81 kg/m²   Wt Readings from Last 30 Encounters:   11/23/20 36.5 kg (80 lb 7.5 oz)   11/06/20 40.1 kg (88 lb 6.5 oz)   10/30/20 43 kg (94 lb 12.8 oz)   10/19/20 37.6 kg (83 lb)   10/14/20 40.2 kg (88 lb 10 oz)   10/08/20 38.3 kg (84 lb 7 oz)   10/07/20 38 kg (83 lb 12.4 oz)   09/23/20 37.1 kg (81 lb 12.7 oz)   09/23/20 37.1 kg (81 lb 12.7 oz)   09/16/20 36.9 kg (81 lb 5.6 oz)   09/14/20 36.9 kg (81 lb 5.6 oz)   09/09/20 37.5 kg (82 lb 10.8 oz)   09/02/20 37.1 kg (81 lb 12.7 oz)   09/02/20 37.1 kg (81 lb 12.7 oz)   08/28/20 36.7 kg (81 lb)   08/27/20 36.3 kg (80 lb)   08/26/20 36.3 kg (80 lb 0.4 oz)   08/26/20 36.3 kg (80 lb 0.4 oz)   08/19/20 35.4 kg (78 lb 0.7 oz)   08/19/20 35.4 kg (78 lb 0.7 oz)   08/05/20 34.3 kg (75 lb 9.9 oz)   08/05/20 34.3 kg (75 lb 9.9 oz)   07/28/20 35.8 kg (78 lb 14.8 oz)   07/21/20 36.1 kg (79 lb 9.4 oz)   07/21/20 36.1 kg (79 lb 9.4 oz)   07/20/20 36.7 kg (81 lb)   07/16/20 37.6 kg (83 lb)   07/14/20 38 kg (83 lb 12.4 oz)   07/13/20 38.3 kg (84 lb 6.4 oz)   07/09/20 40.6 kg (89 lb 8.1 oz)       Constitutional: Patient is oriented to person, place, and time. No distress.  Very thin her baseline  HENT:    Eyes: Conjunctivae and EOM are normal.   Neck: Normal range of motion. Neck supple.   Cardiovascular: Normal rate, regular rhythm, normal heart sounds and intact distal pulses.    Pulmonary/Chest: Effort normal and breath sounds normal. no wheezes. no rales.    Mediport clean dry intact  Abdominal: Soft. Bowel sounds are normal. Patient exhibits markedly improved distension. There is no tenderness.   Musculoskeletal: patient exhibits no edema or tenderness with exception of left 4th digit " extremely red and inflamed see images and media.   Lymphadenopathy:  patient  has no cervical adenopathy. no supraclavicular adenopathy. No axillary LAD.   Neurological: Patient is alert and oriented to person, place, and time. normal strength. No cranial nerve deficit or sensory deficit.   Skin: Skin is warm and dry. No rash noted. No erythema.   Left buttock shows small palpable mass that was noted few months ago, this may be consistent with a metastasis.  There is some breakdown.  Psychiatric: Patient has a normal mood and affect. speech is normal.   Nursing note and vitals reviewed.      Assessment:       1. Cellulitis, unspecified cellulitis site    2. Pancreatic cancer metastasized to intra-abdominal lymph node    3. Metastasis to liver    4. Underweight    5. Anxiety about health    6. Poor appetite    7. 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  Reviewed in detail.  Since her performance status is good and organ function, labs are adequate, she is still a candidate to receive chemotherapy.  Obstacles/risk include her advanced age and underweight.     Discussed add Abraxane to Gemzar, benefit risk and rationale.  She is in agreement for this plan  Discussed very important to have close reassessment in the event she has toxicities, side effects, would need to adjust plan quickly and possibly discontinue.      Discussed importance of considering when she is no longer a candidate for treatment defined by side effects/ toxicity or when we may not have a good treatment option for her cancer and need to change goals of care to supportive only, treatment of symptoms.  Her  directed he understood this and mentioned plans to visit  home to make arrangements - they showed understanding of this but reluctance to discuss it which is understandable.  They agreed to continue discussion with  palliative care doctor Deangelo who they have met with previously.  I have also relayed all of the above information to her brother who is a physician in [place] and whom she relies upon to direct her care.     9/23/20 she did tolerate chemo last week and blood counts adequate but depression/poor coping continues. She agrees to schedule with pall care next week after we have evaluated and addressed her feet/ankles. She otherwise will proceed with chemo today D8 of C1 with close re-eval. She is currently still a candidate for chemotherapy however high risk for clinical decline and would disconitnue. Nutrition reviewed at length.     10/30/20 she presents for follow-up after further complications see HPI, and clinical decline. Reviewed chemotherapy would cause more harm then good, she is high risk for serious adverse events including hospitalization and death.      She does not want further chemo at this time but remains hopeful she can improve. She reports taking it a day at a time and we reviewed that she may not improve and inquired if she understood what hospice was, she became tearful and said she wanted to leave and raised her voice. Emotional support given, her  expressed good understanding and we will discuss with her brother in [place] also. She is a DNR and remains agreeable to this on discussion today.  She has been very resistant to all end of life care discussions since diagnosis, emotional support given and will discuss with her brother, her  shows good understanding but they still decline discussion on hospice.     We discussed she will continue home health and we will discuss directly with her home health nurse her prognosis so that if / when she clinically declines there is hopefully an opportunity to transition her to hospice at home.      She has a GI follow-up appointment next week she will keep in the event they can lend advice, optimize treatment of her ascites, edema, since this is a  quality of life issue currently     At the end of our visit she was tearful and stating she wanted to leave the clinic over and over.  We recognized before some depression anxiety and severe grief reaction, anger, she is open to considering a medication as needed for this at home and to discuss this with her brother so we will do that.    11/23/2020 she presents today for metastatic pancreatic cancer on no cancer directed therapy with poor coping of her disease, performance status decline last visit mostly related to extreme bilateral lower extremity edema and ascites which is markedly improved today which is great, her activity and p.o. intake has improved.  Unfortunately appears to have infection of left 4th digit very concerning, discussed with orthopedics may be flexor tenosynovitis and directed to go to emergency room for IV antibiotics possible debridement.  Patient absolutely refuses as well as her  and they understand that this leads to a risk of need for amputation or sepsis, septic shock, ICU admission death.  They expressed good understanding of this.  We have sent back to the pharmacy to start now they will otherwise keep their orthopedic visit on Wednesday which is already scheduled.      Plan:       Diagnoses and all orders for this visit:    Cellulitis, unspecified cellulitis site  -     sulfamethoxazole-trimethoprim 800-160mg (BACTRIM DS) 800-160 mg Tab; Take 1 tablet by mouth 2 (two) times daily.    Pancreatic cancer metastasized to intra-abdominal lymph node    Metastasis to liver    Underweight    Anxiety about health    Poor appetite    Diabetes mellitus associated with pancreatic disease            Start Bactrim  If does not improve report to emergency department as soon as possible  Otherwise keep orthopedic visit on Wednesday    MD visit in 2-3 weeks with lab CBC CMP CA 19 9    Exercise/nutrition important, reviewed    Continue current medications, reviewed.      Important to keep  follow-up with PCP and orthopedics.     Discussed plan above in great detail with patient and all questions answered to their satisfaction. Greater than 50% of visit today was spent in discussing condition and continued treatment plan, counseling, education, for >40 minutes.  Proceed with plan above.          Desire Bledsoe M.D.  Hematology Oncology  [facility]  [place]

## 2020-11-24 PROBLEM — M65.9 FLEXOR TENOSYNOVITIS OF FINGER: Status: ACTIVE | Noted: 2020-01-01

## 2020-11-24 PROBLEM — E11.9 DM (DIABETES MELLITUS): Status: ACTIVE | Noted: 2020-01-01

## 2020-11-24 NOTE — TELEPHONE ENCOUNTER
----- Message from Chandra Cochran sent at 11/24/2020  9:31 AM CST -----  Regarding: pt on the way to [facility], for figer  Contact: Morgan schuster   call

## 2020-11-24 NOTE — TELEPHONE ENCOUNTER
Provider spoke to ED  ----- Message from Ann Marie Frank MA sent at 11/24/2020 12:19 PM CST -----  Contact: [facility],  sreekanth Frost to get in touch with DR COHEN   Call back

## 2020-11-24 NOTE — TELEPHONE ENCOUNTER
FYI patient is at [facility] for her finger today. The son wanted me to let you know. I have cancelled her appointment for tomorrow. I also advised him that you do not take call at [facility] but they hospital will be able to consult an Orthopedic MD for her.

## 2020-12-03 PROBLEM — E03.9 HYPOTHYROID: Status: ACTIVE | Noted: 2020-01-01

## 2020-12-07 NOTE — TELEPHONE ENCOUNTER
----- Message from Kristina Richey MA sent at 12/7/2020 12:35 PM CST -----  Call back # [phone]-Cell  Phone   Requesting to schedule hospital follow up for Finger with in 10 days

## 2020-12-09 NOTE — TELEPHONE ENCOUNTER
Returned pt's  call about rescheduling her f/u appt with Dr. Bledsoe. Gave him the time a dates and he verbalized understanding and expressed his appreciation.

## 2020-12-14 NOTE — TELEPHONE ENCOUNTER
appt scheduled   No pertinent past medical history <<----- Click to add NO pertinent Past Medical History

## 2020-12-15 NOTE — PROGRESS NOTES
Ms Baker returns to clinic today.  She has a history of left ring finger mycobacterial abscess.  She has been getting local wound care.  She states that the antibiotics have started to give her diarrhea so she has stopped taking the antibiotics.  She feels as if her finger is improving.    Physical exam:  Examination of the left ring finger reveals that the wounds are healing.  There is significant decrease in edema and erythema.  There are sutures remaining in place.  There is no area of fluctuance or significant drainage.  She has capillary refill less than 2 sec at the tip of the finger    Assessment:  Left ring finger mycobacterial abscess    Plan:    1.  I did contact Dr. Narayanan of Infectious Disease.  She suggested continuing to take the Zyvox and to start on Imodium for the diarrhea.  I did relay this message to the patient and she did agree    2.  Will continue dressing changes to the left ring finger    3.  Sutures were removed today    4.  She will follow up with me in 1 week for a wound check

## 2020-12-16 NOTE — TELEPHONE ENCOUNTER
"S/w pt's spouse Ildefonso re: cancelling apt today.  Per Dr. Reveles, ok to r/s apt to next avail at pt's convenience.   Discussed pt to continue imodium.  Pt's spouse states she is on an "anti-fluid" pill and wants to know if she should continue in light of her feeling weak and light-headed and c/o diarrhea.  Will discuss with MD and get back to them.  Verb agreement with plan  "

## 2020-12-16 NOTE — TELEPHONE ENCOUNTER
----- Message from Princess HAY Godwin sent at 12/16/2020  2:32 PM CST -----  Contact: brock  Type: Needs Medical Advice  Who Called:  Brock london/ [facility] [place]  Best Call Back Number:   Additional Information: requesting a call to speak with the Nurse.  Clarify lab orders.

## 2020-12-16 NOTE — TELEPHONE ENCOUNTER
----- Message from Francesco Zarate sent at 12/16/2020 12:52 PM CST -----  Type: Needs Medical Advice  Who Called:  patient  Ildefonso  Symptoms (please be specific):    How long has patient had these symptoms:    Pharmacy name and phone #:    Best Call Back Number:   Additional Information[facility]

## 2020-12-22 NOTE — PROGRESS NOTES
[facility]  Infectious Disease  TELEPHONE VISIT      Patient ID: Laly Baker  85 y.o. female.    Chief Complaint: No chief complaint on file.    Interval HPI:   Chief Complaint: No chief complaint on file.    HPI   Patient is a 85 yr old female admitted on 11/24 for complaint of left hand redness and swelling for one week. Patient is s/p recent trigger finger  Injection 2 months prior,  now with tenosynovitis.  Patient has known metastatic pancreatic cancer, anxiety and DM. Her last chemotherapy was < 4 weeks prior via port.     Interval HPI:   Office   12/10 - Patient is a no show x 3 times. I spoke with  via telephone, as patient is hard of hearing. No fever, no diarrhea. Patient is taking abx as prescribed. I informed them that I cannot continue to renew abx without being seen. 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 . Labs from 12/08 reviewed, , WBC 3, CRP 7.8, creatinine 0.66, glucose not well controlled.  12/22 - afebrile. M. Abscessus is susceptible to linezolid and clarithromycin. Patient was instructed to continue both of these when seen by Dr Oliver 2 weeks prior. She complained of diarrhea, and I instructed her to take imodium as needed as symptoms not consistentt with C.diff. CRP I s< 0.5, exam shows healing finger with small portion or  Ulceration at finger base (see photo). Patient wishes to continue abx and see how she does over next month. Has follow up next week with Dr ROMERO Continue clarithro/linezolid, labs in [id number] and follow up on 1/22/21.    Assessment and Plan:   1) Tenosynovitis, flexor   - currently s/p I  & D on 11/25 Ortho-Dr Bell, intraoperative findings of gross purulence  - has port in place   - intraop cx in process [was on tmp/smx prior]--> isolated M. abscessus- - patient was switched to doxycycline + levofloxacin prior to discharge  - prelim susceptibility shows  S-linezolid and amikacin, clarithromycin is pending. R- minocycline, moxifloxacin  - will switch patient to linezolid + clarithromycin for possible 2 to 3 month course. Note the quantification of NMTB was few and debridement has likely resulted in surgical cure. Patient may not be able to tolerate a full course.  She wishes to continue at this time   - Patient has follow up on 12/28 with Dr Bell, please take photos in clinic   - follow up in ID clinic Dr GEORGIANA Mireles  In one month on 1/22/21 in office  - bi monthly y crp, cbc, chem 20     2) Immunosuppressed host  - pancreatic cancer with intra-abdominal LN mets  - poor prognosis  - hold any chemotherapy in setting of acute infection [last 9/2020]       Discussed with patient and spouse during visit on 12/22    Shelia Mireles MD, MPH  Infectious Disease  [facility]      Past Medical History:   Diagnosis Date    Arthritis     Cancer     pancreatic    Colon polyp     Hyperlipidemia     Hypertension     Nonexudative age-related macular degeneration, bilateral, intermediate dry stage 12/30/2019    Pancreatic pseudocyst     Pancreatitis 05/2017       Past Surgical History:   Procedure Laterality Date    CATARACT EXTRACTION W/  INTRAOCULAR LENS IMPLANT Right 08/08/2017    [place]    CATARACT EXTRACTION W/  INTRAOCULAR LENS IMPLANT Left 10/10/2017    k[place]man    CERVICAL CONIZATION   W/ LASER      CHOLANGIOGRAM N/A 7/7/2020    Procedure: CHOLANGIOGRAM;  Surgeon: Peri Surgeon;  Location: [facility];  Service: Anesthesiology;  Laterality: N/A;    COLONOSCOPY  01/12/2015    Dr. Barton; diverticulosis; ext int hemorrhoid; repeat in 5 years    COLONOSCOPY N/A 2/24/2020    Procedure: COLONOSCOPY;  Surgeon: Ankur Barton MD;  Location: [facility];  Service: Endoscopy;  Laterality: N/A;    COLONOSCOPY N/A 7/5/2020    Procedure: COLONOSCOPY;  Surgeon: Dami Martin MD;  Location: [facility] ([address] [place] [address]);  Service:  Endoscopy;  Laterality: N/A;    COLONOSCOPY W/ POLYPECTOMY      cone      ENDOSCOPIC ULTRASOUND OF UPPER GASTROINTESTINAL TRACT Left 6/17/2020    Procedure: ULTRASOUND, UPPER GI TRACT, ENDOSCOPIC;  Surgeon: Marcello Campbell MD;  Location: [facility] ENDO;  Service: Endoscopy;  Laterality: Left;  Linear scope    epid ster  2012. 2014    ERCP N/A 6/30/2020    Procedure: ERCP (ENDOSCOPIC RETROGRADE CHOLANGIOPANCREATOGRAPHY);  Surgeon: Ankur Barton MD;  Location: [facility] ENDO;  Service: Endoscopy;  Laterality: N/A;    ERCP N/A 7/1/2020    Procedure: ERCP (ENDOSCOPIC RETROGRADE CHOLANGIOPANCREATOGRAPHY);  Surgeon: Jamal Martin MD;  Location: [facility] ENDO ([facility]);  Service: Endoscopy;  Laterality: N/A;    ESOPHAGOGASTRODUODENOSCOPY N/A 6/17/2020    Procedure: EGD (ESOPHAGOGASTRODUODENOSCOPY);  Surgeon: Marcello Campbell MD;  Location: [facility] ENDO;  Service: Endoscopy;  Laterality: N/A;    ESOPHAGOGASTRODUODENOSCOPY N/A 10/14/2020    Procedure: EGD (ESOPHAGOGASTRODUODENOSCOPY);  Surgeon: Ankur Barton MD;  Location: [facility] ENDO;  Service: Endoscopy;  Laterality: N/A;    INCISION AND DRAINAGE OF HAND Left 11/24/2020    Procedure: INCISION AND DRAINAGE, HAND;  Surgeon: Stefan Bell MD;  Location: [facility] OR;  Service: Orthopedics;  Laterality: Left;    INCISION AND DRAINAGE OF HAND Left 11/28/2020    Procedure: INCISION AND DRAINAGE, HAND;  Surgeon: Stefan Bell MD;  Location: [facility] OR;  Service: Orthopedics;  Laterality: Left;    INSERTION OF BILIARY DRAINAGE CATHETER N/A 7/2/2020    Procedure: INSERTION, DRAINAGE CATHETER, BILE DUCT;  Surgeon: Peri Surgeon;  Location: [facility] PERI;  Service: Anesthesiology;  Laterality: N/A;    INSERTION OF TUNNELED CENTRAL VENOUS CATHETER (CVC) WITH SUBCUTANEOUS PORT N/A 7/17/2020    Procedure: [id number];  Surgeon: Frandy Shankar MD;  Location: [facility] OR;  Service: General;  Laterality: N/A;    TONSILLECTOMY         Review of patient's allergies  "indicates:   Allergen Reactions    Poison ivy extract        Current Outpatient Medications   Medication Instructions    acetaminophen (TYLENOL) 500 mg, Oral, As needed (PRN)    blood sugar diagnostic Strp 1 strip, Misc.(Non-Drug; Combo Route), 3 times daily with meals    blood-glucose meter (FREESTYLE SYSTEM KIT) kit Use as instructed    clarithromycin (BIAXIN) 500 mg, Oral, 2 times daily    dexAMETHasone (DECADRON) 1 mg, Oral, With breakfast    FERGON 27 mg, Oral, Daily    L.acidophil,parac-S.therm-Bif. (RISAQUAD) Cap capsule 1 capsule, Oral, Daily    lancets (ACCU-CHEK MULTICLIX LANCET) Misc 1 lancet, Misc.(Non-Drug; Combo Route), 3 times daily with meals    levothyroxine (SYNTHROID) 50 mcg, Oral, Before breakfast    linezolid (ZYVOX) 600 mg, Oral, Every 12 hours    lipase-protease-amylase 24,000-76,000-120,000 units (CREON) 24,000-76,000 -120,000 unit capsule 2 capsules, Oral, 3 times daily with meals    lisinopriL (PRINIVIL,ZESTRIL) 5 mg, Oral, Daily    mv-mn/folic acid/calcium/vit K (WOMEN'S 50 PLUS MULTIVITAMIN ORAL) 1 tablet, Oral, Daily    pantoprazole (PROTONIX) 40 mg, Oral, 2 times daily before meals    pen needle, diabetic (BD GASPER 2ND GEN PEN NEEDLE) 32 gauge x 5/32" Ndle Use to administer insulin three times daily.    pen needle, diabetic (PEN NEEDLE) 31 gauge x 1/4" Ndle 1 Device, Misc.(Non-Drug; Combo Route), Daily    simethicone (GAS-X EXTRA STRENGTH) 125 mg, Oral, As needed (PRN)    sodium chloride 1 g, Oral, 2 times daily    sucralfate (CARAFATE) 1 g, Oral, 2 times daily    traMADoL (ULTRAM) 50 mg, Oral, Every 6 hours PRN    TRESIBA FLEXTOUCH U-100 5 Units, Subcutaneous, Every morning       CrCl cannot be calculated (Unknown ideal weight.).    @[id number][wbc:2,plt:2,creat:2,hem:2,procal:2@    Microbiology Results (last 7 days)     ** No results found for the last 168 hours. **               Significant Labs: All pertinent labs within the past 24 hours have been reviewed.   "   Significant Imaging: I have reviewed all relevant and available imaging results/findings within the past 24 hours.      Plan -- see top of note

## 2020-12-23 NOTE — TELEPHONE ENCOUNTER
"Contacted [facility] and let her know that Dr. Holm is out of the office and when he returns we will ask him if we can reduce the dressing changes to 3x a week [facility] verbalized understanding  ----- Message from Ann Marie Frank MA sent at 12/23/2020  1:02 PM CST -----  Contact: [facility], [facility]  [facility] wound care orders   "daily"   Call back        "

## 2020-12-28 NOTE — TELEPHONE ENCOUNTER
Called [facility] and let them know that the provider states that the dressing changes can be done 3x a week instead of daily dressing changes. Understanding verbalized

## 2020-12-29 PROBLEM — Z66 DNR (DO NOT RESUSCITATE): Status: ACTIVE | Noted: 2020-01-01

## 2020-12-29 PROBLEM — R19.7 DIARRHEA: Status: ACTIVE | Noted: 2020-01-01

## 2020-12-29 PROBLEM — E86.0 DEHYDRATION: Status: ACTIVE | Noted: 2020-01-01

## 2020-12-29 NOTE — PROGRESS NOTES
Ms Maguire returns to clinic today.  She has a history left ring finger abscess with tenosynovitis.  She has been taking her oral antibiotic intermittently due to stomach upset.  She states that her finger is feeling better.  She has no new complaints.    Physical exam:  Examination left hand and ring finger reveals that the wounds continue to improve.  There is decreasing edema and minimal erythema.  There is no significant drainage remaining.  She is neurovascularly intact at the tip of the finger.    Assessment:  Left ring finger tenosynovitis with abscess    Plan:    1.  Will continue daily dressing changes    2.  Will attempt to continue her antibiotic if she can tolerated    3.  She will follow up with me in 1-2 weeks for repeat evaluation

## 2020-12-29 NOTE — TELEPHONE ENCOUNTER
Called patient son to let him know that I will let the provider know that the patient is not coming to the appointment today because she is going to the hospital son verbalized understanding  ----- Message from Talon Cardoso sent at 12/29/2020  9:57 AM CST -----  Contact: pt's son Morgan [phone]  Type: Needs Medical Advice  Who Called:  pt's son Morgan  Best Call Back Number: [phone]  Additional Information: pt's son Morgan is calling the office to let them know she's cancelling her appointment today due to she xavi be admitted into the hospital. Please call back and advise

## 2020-12-30 PROBLEM — D69.6 THROMBOCYTOPENIA: Status: ACTIVE | Noted: 2020-01-01

## 2021-01-01 ENCOUNTER — DOCUMENT SCAN (OUTPATIENT)
Dept: HOME HEALTH SERVICES | Facility: HOSPITAL | Age: 86
End: 2021-01-01
Payer: MEDICARE

## 2021-01-03 PROBLEM — Z51.5 COMFORT MEASURES ONLY STATUS: Status: ACTIVE | Noted: 2021-01-01

## 2021-01-25 ENCOUNTER — DOCUMENT SCAN (OUTPATIENT)
Dept: HOME HEALTH SERVICES | Facility: HOSPITAL | Age: 86
End: 2021-01-25
Payer: MEDICARE

## 2021-10-14 NOTE — TELEPHONE ENCOUNTER
----- Message from Nick Taylor sent at 8/26/2020  2:44 PM CDT -----  Type: Needs Medical Advice  Who Called:  Robin/ROCÍO Ham    Best Call Back Number: [phone]  Additional Information: Caller states that he has a Critical Lab for the patient.  Please call to advise      
Spoke to lab patients glucose 41 patient has appointment with Dr. Cordero.  
Yes

## 2022-01-05 NOTE — TELEPHONE ENCOUNTER
----- Message from Maggie Samson sent at 7/16/2020  8:23 AM CDT -----  Type: Needs Medical Advice  Who Called: Morgan/  son  Best Call Back Number: [phone]  Additional Information: states that patient is experiencing a lot of pain and shakiness this morning. States that the home health nurse will be coming to the home this morning. Requesting labs CBC , CHEM panel, and liver panel. Would like for orders to be sent to Deisi DO nurse at [phone]. Please give call back       
How Severe Is Your Cyst?: mild
Spoke to Deisi from [facility] to inform her I faxed over lab order.  
Is This A New Presentation, Or A Follow-Up?: Cyst

## 2022-06-17 NOTE — DISCHARGE SUMMARY
BRIEF DISCHARGE NOTE:    Reason for hospitalization -  Cataract surgery     Final Diagnosis - Visually significant Cataract    Procedures and treatment provided - Status post phacoemulsification with placement of intraocular lens     Diet - Advance to regular as tolerated    Activity - as tolerated    Disposition at the end of the case - Good.    Discharge: to home    The patient tolerated the procedure well and knows to follow up with me tomorrow morning in the eye clinic, sooner if needed.    Patient and family instructions (as appropriate) - Given to patient on discharge    Isabela Rosales MD    
Breath sounds clear and equal bilaterally.

## 2022-11-13 NOTE — ANESTHESIA POSTPROCEDURE EVALUATION
Anesthesia Post Evaluation    Patient: Laly Baker    Procedure(s) Performed: Procedure(s) (LRB):  CHOLANGIOGRAM (N/A)    Final Anesthesia Type: MAC    Patient location during evaluation: PACU  Patient participation: Yes- Able to Participate  Level of consciousness: awake and alert and oriented  Post-procedure vital signs: reviewed and stable  Pain management: adequate  Airway patency: patent    PONV status at discharge: No PONV  Anesthetic complications: no      Cardiovascular status: blood pressure returned to baseline  Respiratory status: unassisted, spontaneous ventilation and room air  Hydration status: euvolemic  Follow-up not needed.          Vitals Value Taken Time   /79 07/07/20 1850   Temp 37.1 °C (98.8 °F) 07/07/20 1850   Pulse 74 07/07/20 1852   Resp 15 07/07/20 1852   SpO2 98 % 07/07/20 1852   Vitals shown include unvalidated device data.      No case tracking events are documented in the log.      Pain/Puma Score: Pain Rating Prior to Med Admin: 7 (7/8/2020  7:45 AM)  Puma Score: 10 (7/7/2020  6:50 PM)         moderate assist (50% patients effort)

## 2024-09-12 NOTE — LETTER
September 21, 2020      Jone Hunter MD  [address] [address]  [address]  [address]  [facility] [address]           [facility]  [address] [place] [address]  Phone: [phone]          Patient: Laly Baker   MR Number: [id number]   YOB: 1935   Date of Visit: 9/21/2020       Dear Dr. Jone Hunter:    Thank you for referring Laly Baker to me for evaluation. Attached you will find relevant portions of my assessment and plan of care.    If you have questions, please do not hesitate to call me. I look forward to following Laly Baker along with you.    Sincerely,    Stefan Bell MD    Enclosure  CC:  No Recipients    If you would like to receive this communication electronically, please contact [email or web address] or [phone] to request more information on [facility] Link access.    For providers and/or their staff who would like to refer a patient to [facility], please contact us through our one-stop-shop provider referral line, [facility], at [phone].    If you feel you have received this communication in error or would no longer like to receive these types of communications, please e-mail [email or web address]          Libtayo Pregnancy And Lactation Text: This medication is contraindicated in pregnancy and when breast feeding. Opioid Counseling: I discussed with the patient the potential side effects of opioids including but not limited to addiction, altered mental status, and depression. I stressed avoiding alcohol, benzodiazepines, muscle relaxants and sleep aids unless specifically okayed by a physician. The patient verbalized understanding of the proper use and possible adverse effects of opioids. All of the patient's questions and concerns were addressed. They were instructed to flush the remaining pills down the toilet if they did not need them for pain. Ivermectin Counseling:  Patient instructed to take medication on an empty stomach with a full glass of water.  Patient informed of potential adverse effects including but not limited to nausea, diarrhea, dizziness, itching, and swelling of the extremities or lymph nodes.  The patient verbalized understanding of the proper use and possible adverse effects of ivermectin.  All of the patient's questions and concerns were addressed. Oral Minoxidil Pregnancy And Lactation Text: This medication should only be used when clearly needed if you are pregnant, attempting to become pregnant or breast feeding. Topical Ketoconazole Counseling: Patient counseled that this medication may cause skin irritation or allergic reactions.  In the event of skin irritation, the patient was advised to reduce the amount of the drug applied or use it less frequently.   The patient verbalized understanding of the proper use and possible adverse effects of ketoconazole.  All of the patient's questions and concerns were addressed. Ilumya Counseling: I discussed with the patient the risks of tildrakizumab including but not limited to immunosuppression, malignancy, posterior leukoencephalopathy syndrome, and serious infections.  The patient understands that monitoring is required including a PPD at baseline and must alert us or the primary physician if symptoms of infection or other concerning signs are noted. Colchicine Counseling:  Patient counseled regarding adverse effects including but not limited to stomach upset (nausea, vomiting, stomach pain, or diarrhea).  Patient instructed to limit alcohol consumption while taking this medication.  Colchicine may reduce blood counts especially with prolonged use.  The patient understands that monitoring of kidney function and blood counts may be required, especially at baseline. The patient verbalized understanding of the proper use and possible adverse effects of colchicine.  All of the patient's questions and concerns were addressed. Taltz Counseling: I discussed with the patient the risks of ixekizumab including but not limited to immunosuppression, serious infections, worsening of inflammatory bowel disease and drug reactions.  The patient understands that monitoring is required including a PPD at baseline and must alert us or the primary physician if symptoms of infection or other concerning signs are noted. Wartpeel Pregnancy And Lactation Text: This medication is Pregnancy Category X and contraindicated in pregnancy and in women who may become pregnant. It is unknown if this medication is excreted in breast milk. Imiquimod Counseling:  I discussed with the patient the risks of imiquimod including but not limited to erythema, scaling, itching, weeping, crusting, and pain.  Patient understands that the inflammatory response to imiquimod is variable from person to person and was educated regarded proper titration schedule.  If flu-like symptoms develop, patient knows to discontinue the medication and contact us. Simponi Pregnancy And Lactation Text: The risk during pregnancy and breastfeeding is uncertain with this medication. Xelcalebz Pregnancy And Lactation Text: This medication is Pregnancy Category D and is not considered safe during pregnancy.  The risk during breast feeding is also uncertain. Cephalexin Pregnancy And Lactation Text: This medication is Pregnancy Category B and considered safe during pregnancy.  It is also excreted in breast milk but can be used safely for shorter doses. Cyclophosphamide Pregnancy And Lactation Text: This medication is Pregnancy Category D and it isn't considered safe during pregnancy. This medication is excreted in breast milk. Opzelura Pregnancy And Lactation Text: There is insufficient data to evaluate drug-associated risk for major birth defects, miscarriage, or other adverse maternal or fetal outcomes.  There is a pregnancy registry that monitors pregnancy outcomes in pregnant persons exposed to the medication during pregnancy.  It is unknown if this medication is excreted in breast milk.  Do not breastfeed during treatment and for about 4 weeks after the last dose. Litfulo Counseling: I discussed with the patient the risks of Litfulo therapy including but not limited to upper respiratory tract infections, shingles, cold sores, and nausea. Live vaccines should be avoided.  This medication has been linked to serious infections; higher rate of mortality; malignancy and lymphoproliferative disorders; major adverse cardiovascular events; thrombosis; gastrointestinal perforations; neutropenia; lymphopenia; anemia; liver enzyme elevations; and lipid elevations. Fluconazole Counseling:  Patient counseled regarding adverse effects of fluconazole including but not limited to headache, diarrhea, nausea, upset stomach, liver function test abnormalities, taste disturbance, and stomach pain.  There is a rare possibility of liver failure that can occur when taking fluconazole.  The patient understands that monitoring of LFTs and kidney function test may be required, especially at baseline. The patient verbalized understanding of the proper use and possible adverse effects of fluconazole.  All of the patient's questions and concerns were addressed. Hydroxychloroquine Pregnancy And Lactation Text: This medication has been shown to cause fetal harm but it isn't assigned a Pregnancy Risk Category. There are small amounts excreted in breast milk. Detail Level: Simple Finasteride Pregnancy And Lactation Text: This medication is absolutely contraindicated during pregnancy. It is unknown if it is excreted in breast milk. Calcipotriene Pregnancy And Lactation Text: The use of this medication during pregnancy or lactation is not recommended as there is insufficient data. Thalidomide Pregnancy And Lactation Text: This medication is Pregnancy Category X and is absolutely contraindicated during pregnancy. It is unknown if it is excreted in breast milk. Terbinafine Pregnancy And Lactation Text: This medication is Pregnancy Category B and is considered safe during pregnancy. It is also excreted in breast milk and breast feeding isn't recommended. Minocycline Pregnancy And Lactation Text: This medication is Pregnancy Category D and not consider safe during pregnancy. It is also excreted in breast milk. Solaraze Counseling:  I discussed with the patient the risks of Solaraze including but not limited to erythema, scaling, itching, weeping, crusting, and pain. Cosentyx Counseling:  I discussed with the patient the risks of Cosentyx including but not limited to worsening of Crohn's disease, immunosuppression, allergic reactions and infections.  The patient understands that monitoring is required including a PPD at baseline and must alert us or the primary physician if symptoms of infection or other concerning signs are noted. Libtayo Counseling- I discussed with the patient the risks of Libtayo including but not limited to nausea, vomiting, diarrhea, and bone or muscle pain.  The patient verbalized understanding of the proper use and possible adverse effects of Libtayo.  All of the patient's questions and concerns were addressed. Albendazole Pregnancy And Lactation Text: This medication is Pregnancy Category C and it isn't known if it is safe during pregnancy. It is also excreted in breast milk. Otezla Counseling: The side effects of Otezla were discussed with the patient, including but not limited to worsening or new depression, weight loss, diarrhea, nausea, upper respiratory tract infection, and headache. Patient instructed to call the office should any adverse effect occur.  The patient verbalized understanding of the proper use and possible adverse effects of Otezla.  All the patient's questions and concerns were addressed. Hyrimoz Pregnancy And Lactation Text: This medication is Pregnancy Category B and is considered safe during pregnancy. It is unknown if this medication is excreted in breast milk. Topical Clindamycin Pregnancy And Lactation Text: This medication is Pregnancy Category B and is considered safe during pregnancy. It is unknown if it is excreted in breast milk. Hydroquinone Pregnancy And Lactation Text: This medication has not been assigned a Pregnancy Risk Category but animal studies failed to show danger with the topical medication. It is unknown if the medication is excreted in breast milk. Simponi Counseling:  I discussed with the patient the risks of golimumab including but not limited to myelosuppression, immunosuppression, autoimmune hepatitis, demyelinating diseases, lymphoma, and serious infections.  The patient understands that monitoring is required including a PPD at baseline and must alert us or the primary physician if symptoms of infection or other concerning signs are noted. Wartpeel Counseling:  I discussed with the patient the risks of Wartpeel including but not limited to erythema, scaling, itching, weeping, crusting, and pain. Clofazimine Pregnancy And Lactation Text: This medication is Pregnancy Category C and isn't considered safe during pregnancy. It is excreted in breast milk. Clindamycin Counseling: I counseled the patient regarding use of clindamycin as an antibiotic for prophylactic and/or therapeutic purposes. Clindamycin is active against numerous classes of bacteria, including skin bacteria. Side effects may include nausea, diarrhea, gastrointestinal upset, rash, hives, yeast infections, and in rare cases, colitis. Cyclosporine Counseling:  I discussed with the patient the risks of cyclosporine including but not limited to hypertension, gingival hyperplasia,myelosuppression, immunosuppression, liver damage, kidney damage, neurotoxicity, lymphoma, and serious infections. The patient understands that monitoring is required including baseline blood pressure, CBC, CMP, lipid panel and uric acid, and then 1-2 times monthly CMP and blood pressure. Zyclara Pregnancy And Lactation Text: This medication is Pregnancy Category C. It is unknown if this medication is excreted in breast milk. Hydroxychloroquine Counseling:  I discussed with the patient that a baseline ophthalmologic exam is needed at the start of therapy and every year thereafter while on therapy. A CBC may also be warranted for monitoring.  The side effects of this medication were discussed with the patient, including but not limited to agranulocytosis, aplastic anemia, seizures, rashes, retinopathy, and liver toxicity. Patient instructed to call the office should any adverse effect occur.  The patient verbalized understanding of the proper use and possible adverse effects of Plaquenil.  All the patient's questions and concerns were addressed. Include Pregnancy/Lactation Warning?: No Aklief counseling:  Patient advised to apply a pea-sized amount only at bedtime and wait 30 minutes after washing their face before applying.  If too drying, patient may add a non-comedogenic moisturizer.  The most commonly reported side effects including irritation, redness, scaling, dryness, stinging, burning, itching, and increased risk of sunburn.  The patient verbalized understanding of the proper use and possible adverse effects of retinoids.  All of the patient's questions and concerns were addressed. Opzelura Counseling:  I discussed with the patient the risks of Opzelura including but not limited to nasopharngitis, bronchitis, ear infection, eosinophila, hives, diarrhea, folliculitis, tonsillitis, and rhinorrhea.  Taken orally, this medication has been linked to serious infections; higher rate of mortality; malignancy and lymphoproliferative disorders; major adverse cardiovascular events; thrombosis; thrombocytopenia, anemia, and neutropenia; and lipid elevations. Litfulo Pregnancy And Lactation Text: Based on animal studies, Lifulo may cause embryo-fetal harm when administered to pregnant women.  The medication should not be used in pregnancy.  Breastfeeding is not recommended during treatment. Calcipotriene Counseling:  I discussed with the patient the risks of calcipotriene including but not limited to erythema, scaling, itching, and irritation. Terbinafine Counseling: Patient counseling regarding adverse effects of terbinafine including but not limited to headache, diarrhea, rash, upset stomach, liver function test abnormalities, itching, taste/smell disturbance, nausea, abdominal pain, and flatulence.  There is a rare possibility of liver failure that can occur when taking terbinafine.  The patient understands that a baseline LFT and kidney function test may be required. The patient verbalized understanding of the proper use and possible adverse effects of terbinafine.  All of the patient's questions and concerns were addressed. Tranexamic Acid Counseling:  Patient advised of the small risk of bleeding problems with tranexamic acid. They were also instructed to call if they developed any nausea, vomiting or diarrhea. All of the patient's questions and concerns were addressed. Finasteride Female Counseling: Finasteride Counseling:  I discussed with the patient the risks of use of finasteride including but not limited to decreased libido and sexual dysfunction. Explained the teratogenic nature of the medication and stressed the importance of not getting pregnant during treatment. All of the patient's questions and concerns were addressed. Minocycline Counseling: Patient advised regarding possible photosensitivity and discoloration of the teeth, skin, lips, tongue and gums.  Patient instructed to avoid sunlight, if possible.  When exposed to sunlight, patients should wear protective clothing, sunglasses, and sunscreen.  The patient was instructed to call the office immediately if the following severe adverse effects occur:  hearing changes, easy bruising/bleeding, severe headache, or vision changes.  The patient verbalized understanding of the proper use and possible adverse effects of minocycline.  All of the patient's questions and concerns were addressed. Rhofade Pregnancy And Lactation Text: This medication has not been assigned a Pregnancy Risk Category. It is unknown if the medication is excreted in breast milk. Acitretin Counseling:  I discussed with the patient the risks of acitretin including but not limited to hair loss, dry lips/skin/eyes, liver damage, hyperlipidemia, depression/suicidal ideation, photosensitivity.  Serious rare side effects can include but are not limited to pancreatitis, pseudotumor cerebri, bony changes, clot formation/stroke/heart attack.  Patient understands that alcohol is contraindicated since it can result in liver toxicity and significantly prolong the elimination of the drug by many years. Cimzia Pregnancy And Lactation Text: This medication crosses the placenta but can be considered safe in certain situations. Cimzia may be excreted in breast milk. Hyrimoz Counseling:  I discussed with the patient the risks of adalimumab including but not limited to myelosuppression, immunosuppression, autoimmune hepatitis, demyelinating diseases, lymphoma, and serious infections.  The patient understands that monitoring is required including a PPD at baseline and must alert us or the primary physician if symptoms of infection or other concerning signs are noted. Topical Clindamycin Counseling: Patient counseled that this medication may cause skin irritation or allergic reactions.  In the event of skin irritation, the patient was advised to reduce the amount of the drug applied or use it less frequently.   The patient verbalized understanding of the proper use and possible adverse effects of clindamycin.  All of the patient's questions and concerns were addressed. Otezla Pregnancy And Lactation Text: This medication is Pregnancy Category C and it isn't known if it is safe during pregnancy. It is unknown if it is excreted in breast milk. Stelara Counseling:  I discussed with the patient the risks of ustekinumab including but not limited to immunosuppression, malignancy, posterior leukoencephalopathy syndrome, and serious infections.  The patient understands that monitoring is required including a PPD at baseline and must alert us or the primary physician if symptoms of infection or other concerning signs are noted. Albendazole Counseling:  I discussed with the patient the risks of albendazole including but not limited to cytopenia, kidney damage, nausea/vomiting and severe allergy.  The patient understands that this medication is being used in an off-label manner. Topical Sulfur Applications Pregnancy And Lactation Text: This medication is Pregnancy Category C and has an unknown safety profile during pregnancy. It is unknown if this topical medication is excreted in breast milk. Hydroquinone Counseling:  Patient advised that medication may result in skin irritation, lightening (hypopigmentation), dryness, and burning.  In the event of skin irritation, the patient was advised to reduce the amount of the drug applied or use it less frequently.  Rarely, spots that are treated with hydroquinone can become darker (pseudoochronosis).  Should this occur, patient instructed to stop medication and call the office. The patient verbalized understanding of the proper use and possible adverse effects of hydroquinone.  All of the patient's questions and concerns were addressed. Clofazimine Counseling:  I discussed with the patient the risks of clofazimine including but not limited to skin and eye pigmentation, liver damage, nausea/vomiting, gastrointestinal bleeding and allergy. Cyclosporine Pregnancy And Lactation Text: This medication is Pregnancy Category C and it isn't know if it is safe during pregnancy. This medication is excreted in breast milk. Glycopyrrolate Pregnancy And Lactation Text: This medication is Pregnancy Category B and is considered safe during pregnancy. It is unknown if it is excreted breast milk. Clindamycin Pregnancy And Lactation Text: This medication can be used in pregnancy if certain situations. Clindamycin is also present in breast milk. Aklief Pregnancy And Lactation Text: It is unknown if this medication is safe to use during pregnancy.  It is unknown if this medication is excreted in breast milk.  Breastfeeding women should use the topical cream on the smallest area of the skin for the shortest time needed while breastfeeding.  Do not apply to nipple and areola. Zyclara Counseling:  I discussed with the patient the risks of imiquimod including but not limited to erythema, scaling, itching, weeping, crusting, and pain.  Patient understands that the inflammatory response to imiquimod is variable from person to person and was educated regarded proper titration schedule.  If flu-like symptoms develop, patient knows to discontinue the medication and contact us. Ketoconazole Pregnancy And Lactation Text: This medication is Pregnancy Category C and it isn't know if it is safe during pregnancy. It is also excreted in breast milk and breast feeding isn't recommended. Olumiant Counseling: I discussed with the patient the risks of Olumiant therapy including but not limited to upper respiratory tract infections, shingles, cold sores, and nausea. Live vaccines should be avoided.  This medication has been linked to serious infections; higher rate of mortality; malignancy and lymphoproliferative disorders; major adverse cardiovascular events; thrombosis; gastrointestinal perforations; neutropenia; lymphopenia; anemia; liver enzyme elevations; and lipid elevations. Olanzapine Counseling- I discussed with the patient the common side effects of olanzapine including but are not limited to: lack of energy, dry mouth, increased appetite, sleepiness, tremor, constipation, dizziness, changes in behavior, or restlessness.  Explained that teenagers are more likely to experience headaches, abdominal pain, pain in the arms or legs, tiredness, and sleepiness.  Serious side effects include but are not limited: increased risk of death in elderly patients who are confused, have memory loss, or dementia-related psychosis; hyperglycemia; increased cholesterol and triglycerides; and weight gain. Finasteride Male Counseling: Finasteride Counseling:  I discussed with the patient the risks of use of finasteride including but not limited to decreased libido, decreased ejaculate volume, gynecomastia, and depression. Women should not handle medication.  All of the patient's questions and concerns were addressed. Rhofade Counseling: Rhofade is a topical medication which can decrease superficial blood flow where applied. Side effects are uncommon and include stinging, redness and allergic reactions. Cimzia Counseling:  I discussed with the patient the risks of Cimzia including but not limited to immunosuppression, allergic reactions and infections.  The patient understands that monitoring is required including a PPD at baseline and must alert us or the primary physician if symptoms of infection or other concerning signs are noted. Metronidazole Pregnancy And Lactation Text: This medication is Pregnancy Category B and considered safe during pregnancy.  It is also excreted in breast milk. Tranexamic Acid Pregnancy And Lactation Text: It is unknown if this medication is safe during pregnancy or breast feeding. Tetracycline Counseling: Patient counseled regarding possible photosensitivity and increased risk for sunburn.  Patient instructed to avoid sunlight, if possible.  When exposed to sunlight, patients should wear protective clothing, sunglasses, and sunscreen.  The patient was instructed to call the office immediately if the following severe adverse effects occur:  hearing changes, easy bruising/bleeding, severe headache, or vision changes.  The patient verbalized understanding of the proper use and possible adverse effects of tetracycline.  All of the patient's questions and concerns were addressed. Patient understands to avoid pregnancy while on therapy due to potential birth defects. Tazorac Pregnancy And Lactation Text: This medication is not safe during pregnancy. It is unknown if this medication is excreted in breast milk. Acitretin Pregnancy And Lactation Text: This medication is Pregnancy Category X and should not be given to women who are pregnant or may become pregnant in the future. This medication is excreted in breast milk. Erivedge Counseling- I discussed with the patient the risks of Erivedge including but not limited to nausea, vomiting, diarrhea, constipation, weight loss, changes in the sense of taste, decreased appetite, muscle spasms, and hair loss.  The patient verbalized understanding of the proper use and possible adverse effects of Erivedge.  All of the patient's questions and concerns were addressed. Spironolactone Pregnancy And Lactation Text: This medication can cause feminization of the male fetus and should be avoided during pregnancy. The active metabolite is also found in breast milk. Oxybutynin Counseling:  I discussed with the patient the risks of oxybutynin including but not limited to skin rash, drowsiness, dry mouth, difficulty urinating, and blurred vision. Methotrexate Counseling:  Patient counseled regarding adverse effects of methotrexate including but not limited to nausea, vomiting, abnormalities in liver function tests. Patients may develop mouth sores, rash, diarrhea, and abnormalities in blood counts. The patient understands that monitoring is required including LFT's and blood counts.  There is a rare possibility of scarring of the liver and lung problems that can occur when taking methotrexate. Persistent nausea, loss of appetite, pale stools, dark urine, cough, and shortness of breath should be reported immediately. Patient advised to discontinue methotrexate treatment at least three months before attempting to become pregnant.  I discussed the need for folate supplements while taking methotrexate.  These supplements can decrease side effects during methotrexate treatment. The patient verbalized understanding of the proper use and possible adverse effects of methotrexate.  All of the patient's questions and concerns were addressed. Siliq Counseling:  I discussed with the patient the risks of Siliq including but not limited to new or worsening depression, suicidal thoughts and behavior, immunosuppression, malignancy, posterior leukoencephalopathy syndrome, and serious infections.  The patient understands that monitoring is required including a PPD at baseline and must alert us or the primary physician if symptoms of infection or other concerning signs are noted. There is also a special program designed to monitor depression which is required with Siliq. Topical Sulfur Applications Counseling: Topical Sulfur Counseling: Patient counseled that this medication may cause skin irritation or allergic reactions.  In the event of skin irritation, the patient was advised to reduce the amount of the drug applied or use it less frequently.   The patient verbalized understanding of the proper use and possible adverse effects of topical sulfur application.  All of the patient's questions and concerns were addressed. Glycopyrrolate Counseling:  I discussed with the patient the risks of glycopyrrolate including but not limited to skin rash, drowsiness, dry mouth, difficulty urinating, and blurred vision. Mirvaso Counseling: Mirvaso is a topical medication which can decrease superficial blood flow where applied. Side effects are uncommon and include stinging, redness and allergic reactions. Doxycycline Counseling:  Patient counseled regarding possible photosensitivity and increased risk for sunburn.  Patient instructed to avoid sunlight, if possible.  When exposed to sunlight, patients should wear protective clothing, sunglasses, and sunscreen.  The patient was instructed to call the office immediately if the following severe adverse effects occur:  hearing changes, easy bruising/bleeding, severe headache, or vision changes.  The patient verbalized understanding of the proper use and possible adverse effects of doxycycline.  All of the patient's questions and concerns were addressed. Zoryve Pregnancy And Lactation Text: It is unknown if this medication can cause problems during pregnancy and breastfeeding. Olumiant Pregnancy And Lactation Text: Based on animal studies, Olumiant may cause embryo-fetal harm when administered to pregnant women.  The medication should not be used in pregnancy.  Breastfeeding is not recommended during treatment. Hydroxyzine Pregnancy And Lactation Text: This medication is not safe during pregnancy and should not be taken. It is also excreted in breast milk and breast feeding isn't recommended. Dutasteride Pregnancy And Lactation Text: This medication is absolutely contraindicated in women, especially during pregnancy and breast feeding. Feminization of male fetuses is possible if taking while pregnant. Metronidazole Counseling:  I discussed with the patient the risks of metronidazole including but not limited to seizures, nausea/vomiting, a metallic taste in the mouth, nausea/vomiting and severe allergy. Carac Counseling:  I discussed with the patient the risks of Carac including but not limited to erythema, scaling, itching, weeping, crusting, and pain. Bimzelx Pregnancy And Lactation Text: This medication crosses the placenta and the safety is uncertain during pregnancy. It is unknown if this medication is present in breast milk. Azelaic Acid Counseling: Patient counseled that medicine may cause skin irritation and to avoid applying near the eyes.  In the event of skin irritation, the patient was advised to reduce the amount of the drug applied or use it less frequently.   The patient verbalized understanding of the proper use and possible adverse effects of azelaic acid.  All of the patient's questions and concerns were addressed. Qbrexza Pregnancy And Lactation Text: There is no available data on Qbrexza use in pregnant women.  There is no available data on Qbrexza use in lactation. Nsaids Pregnancy And Lactation Text: These medications are considered safe up to 30 weeks gestation. It is excreted in breast milk. Valtrex Counseling: I discussed with the patient the risks of valacyclovir including but not limited to kidney damage, nausea, vomiting and severe allergy.  The patient understands that if the infection seems to be worsening or is not improving, they are to call. Tazorac Counseling:  Patient advised that medication is irritating and drying.  Patient may need to apply sparingly and wash off after an hour before eventually leaving it on overnight.  The patient verbalized understanding of the proper use and possible adverse effects of tazorac.  All of the patient's questions and concerns were addressed. Bexarotene Counseling:  I discussed with the patient the risks of bexarotene including but not limited to hair loss, dry lips/skin/eyes, liver abnormalities, hyperlipidemia, pancreatitis, depression/suicidal ideation, photosensitivity, drug rash/allergic reactions, hypothyroidism, anemia, leukopenia, infection, cataracts, and teratogenicity.  Patient understands that they will need regular blood tests to check lipid profile, liver function tests, white blood cell count, thyroid function tests and pregnancy test if applicable. Ketoconazole Counseling:   Patient counseled regarding improving absorption with orange juice.  Adverse effects include but are not limited to breast enlargement, headache, diarrhea, nausea, upset stomach, liver function test abnormalities, taste disturbance, and stomach pain.  There is a rare possibility of liver failure that can occur when taking ketoconazole. The patient understands that monitoring of LFTs may be required, especially at baseline. The patient verbalized understanding of the proper use and possible adverse effects of ketoconazole.  All of the patient's questions and concerns were addressed. Humira Counseling:  I discussed with the patient the risks of adalimumab including but not limited to myelosuppression, immunosuppression, autoimmune hepatitis, demyelinating diseases, lymphoma, and serious infections.  The patient understands that monitoring is required including a PPD at baseline and must alert us or the primary physician if symptoms of infection or other concerning signs are noted. Arava Counseling:  Patient counseled regarding adverse effects of Arava including but not limited to nausea, vomiting, abnormalities in liver function tests. Patients may develop mouth sores, rash, diarrhea, and abnormalities in blood counts. The patient understands that monitoring is required including LFTs and blood counts.  There is a rare possibility of scarring of the liver and lung problems that can occur when taking methotrexate. Persistent nausea, loss of appetite, pale stools, dark urine, cough, and shortness of breath should be reported immediately. Patient advised to discontinue Arava treatment and consult with a physician prior to attempting conception. The patient will have to undergo a treatment to eliminate Arava from the body prior to conception. Methotrexate Pregnancy And Lactation Text: This medication is Pregnancy Category X and is known to cause fetal harm. This medication is excreted in breast milk. Eucrisa Counseling: Patient may experience a mild burning sensation during topical application. Eucrisa is not approved in children less than 2 years of age. Rituxan Pregnancy And Lactation Text: This medication is Pregnancy Category C and it isn't know if it is safe during pregnancy. It is unknown if this medication is excreted in breast milk but similar antibodies are known to be excreted. Topical Steroids Applications Pregnancy And Lactation Text: Most topical steroids are considered safe to use during pregnancy and lactation.  Any topical steroid applied to the breast or nipple should be washed off before breastfeeding. Doxycycline Pregnancy And Lactation Text: This medication is Pregnancy Category D and not consider safe during pregnancy. It is also excreted in breast milk but is considered safe for shorter treatment courses. Xolair Pregnancy And Lactation Text: This medication is Pregnancy Category B and is considered safe during pregnancy. This medication is excreted in breast milk. Zoryve Counseling:  I discussed with the patient that Zoryve is not for use in the eyes, mouth or vagina. The most commonly reported side effects include diarrhea, headache, insomnia, application site pain, upper respiratory tract infections, and urinary tract infections.  All of the patient's questions and concerns were addressed. Hydroxyzine Counseling: Patient advised that the medication is sedating and not to drive a car after taking this medication.  Patient informed of potential adverse effects including but not limited to dry mouth, urinary retention, and blurry vision.  The patient verbalized understanding of the proper use and possible adverse effects of hydroxyzine.  All of the patient's questions and concerns were addressed. Rinvoq Counseling: I discussed with the patient the risks of Rinvoq therapy including but not limited to upper respiratory tract infections, shingles, cold sores, bronchitis, nausea, cough, fever, acne, and headache. Live vaccines should be avoided.  This medication has been linked to serious infections; higher rate of mortality; malignancy and lymphoproliferative disorders; major adverse cardiovascular events; thrombosis; thrombocytopenia, anemia, and neutropenia; lipid elevations; liver enzyme elevations; and gastrointestinal perforations. Azithromycin Counseling:  I discussed with the patient the risks of azithromycin including but not limited to GI upset, allergic reaction, drug rash, diarrhea, and yeast infections. Dutasteride Female Counseling: Dutasteride Counseling:  I discussed with the patient the risks of use of dutasteride including but not limited to decreased libido and sexual dysfunction. Explained the teratogenic nature of the medication and stressed the importance of not getting pregnant during treatment. All of the patient's questions and concerns were addressed. Qbrexza Counseling:  I discussed with the patient the risks of Qbrexza including but not limited to headache, mydriasis, blurred vision, dry eyes, nasal dryness, dry mouth, dry throat, dry skin, urinary hesitation, and constipation.  Local skin reactions including erythema, burning, stinging, and itching can also occur. Bimzelx Counseling:  I discussed with the patient the risks of Bimzelx including but not limited to depression, immunosuppression, allergic reactions and infections.  The patient understands that monitoring is required including a PPD at baseline and must alert us or the primary physician if symptoms of infection or other concerning signs are noted. Azathioprine Counseling:  I discussed with the patient the risks of azathioprine including but not limited to myelosuppression, immunosuppression, hepatotoxicity, lymphoma, and infections.  The patient understands that monitoring is required including baseline LFTs, Creatinine, possible TPMP genotyping and weekly CBCs for the first month and then every 2 weeks thereafter.  The patient verbalized understanding of the proper use and possible adverse effects of azathioprine.  All of the patient's questions and concerns were addressed. Azelaic Acid Pregnancy And Lactation Text: This medication is considered safe during pregnancy and breast feeding. Benzoyl Peroxide Pregnancy And Lactation Text: This medication is Pregnancy Category C. It is unknown if benzoyl peroxide is excreted in breast milk. Nsaids Counseling: NSAID Counseling: I discussed with the patient that NSAIDs should be taken with food. Prolonged use of NSAIDs can result in the development of stomach ulcers.  Patient advised to stop taking NSAIDs if abdominal pain occurs.  The patient verbalized understanding of the proper use and possible adverse effects of NSAIDs.  All of the patient's questions and concerns were addressed. Valtrex Pregnancy And Lactation Text: this medication is Pregnancy Category B and is considered safe during pregnancy. This medication is not directly found in breast milk but it's metabolite acyclovir is present. Itraconazole Pregnancy And Lactation Text: This medication is Pregnancy Category C and it isn't know if it is safe during pregnancy. It is also excreted in breast milk. Bexarotene Pregnancy And Lactation Text: This medication is Pregnancy Category X and should not be given to women who are pregnant or may become pregnant. This medication should not be used if you are breast feeding. Propranolol Counseling:  I discussed with the patient the risks of propranolol including but not limited to low heart rate, low blood pressure, low blood sugar, restlessness and increased cold sensitivity. They should call the office if they experience any of these side effects. Sarecycline Counseling: Patient advised regarding possible photosensitivity and discoloration of the teeth, skin, lips, tongue and gums.  Patient instructed to avoid sunlight, if possible.  When exposed to sunlight, patients should wear protective clothing, sunglasses, and sunscreen.  The patient was instructed to call the office immediately if the following severe adverse effects occur:  hearing changes, easy bruising/bleeding, severe headache, or vision changes.  The patient verbalized understanding of the proper use and possible adverse effects of sarecycline.  All of the patient's questions and concerns were addressed. Rituxan Counseling:  I discussed with the patient the risks of Rituxan infusions. Side effects can include infusion reactions, severe drug rashes including mucocutaneous reactions, reactivation of latent hepatitis and other infections and rarely progressive multifocal leukoencephalopathy.  All of the patient's questions and concerns were addressed. Topical Steroids Counseling: I discussed with the patient that prolonged use of topical steroids can result in the increased appearance of superficial blood vessels (telangiectasias), lightening (hypopigmentation) and thinning of the skin (atrophy).  Patient understands to avoid using high potency steroids in skin folds, the groin or the face.  The patient verbalized understanding of the proper use and possible adverse effects of topical steroids.  All of the patient's questions and concerns were addressed. Xolair Counseling:  Patient informed of potential adverse effects including but not limited to fever, muscle aches, rash and allergic reactions.  The patient verbalized understanding of the proper use and possible adverse effects of Xolair.  All of the patient's questions and concerns were addressed. Erythromycin Counseling:  I discussed with the patient the risks of erythromycin including but not limited to GI upset, allergic reaction, drug rash, diarrhea, increase in liver enzymes, and yeast infections. Prednisone Counseling:  I discussed with the patient the risks of prolonged use of prednisone including but not limited to weight gain, insomnia, osteoporosis, mood changes, diabetes, susceptibility to infection, glaucoma and high blood pressure.  In cases where prednisone use is prolonged, patients should be monitored with blood pressure checks, serum glucose levels and an eye exam.  Additionally, the patient may need to be placed on GI prophylaxis, PCP prophylaxis, and calcium and vitamin D supplementation and/or a bisphosphonate.  The patient verbalized understanding of the proper use and the possible adverse effects of prednisone.  All of the patient's questions and concerns were addressed. Spevigo Pregnancy And Lactation Text: The risk during pregnancy and breastfeeding is uncertain with this medication. This medication does cross the placenta. It is unknown if this medication is found in breast milk. Doxepin Pregnancy And Lactation Text: This medication is Pregnancy Category C and it isn't known if it is safe during pregnancy. It is also excreted in breast milk and breast feeding isn't recommended. Minoxidil Counseling: Minoxidil is a topical medication which can increase blood flow where it is applied. It is uncertain how this medication increases hair growth. Side effects are uncommon and include stinging and allergic reactions. Rinvoq Pregnancy And Lactation Text: Based on animal studies, Rinvoq may cause embryo-fetal harm when administered to pregnant women.  The medication should not be used in pregnancy.  Breastfeeding is not recommended during treatment and for 6 days after the last dose. Dutasteride Male Counseling: Dustasteride Counseling:  I discussed with the patient the risks of use of dutasteride including but not limited to decreased libido, decreased ejaculate volume, and gynecomastia. Women who can become pregnant should not handle medication.  All of the patient's questions and concerns were addressed. Gabapentin Counseling: I discussed with the patient the risks of gabapentin including but not limited to dizziness, somnolence, fatigue and ataxia. Azithromycin Pregnancy And Lactation Text: This medication is considered safe during pregnancy and is also secreted in breast milk. Azathioprine Pregnancy And Lactation Text: This medication is Pregnancy Category D and isn't considered safe during pregnancy. It is unknown if this medication is excreted in breast milk. Benzoyl Peroxide Counseling: Patient counseled that medicine may cause skin irritation and bleach clothing.  In the event of skin irritation, the patient was advised to reduce the amount of the drug applied or use it less frequently.   The patient verbalized understanding of the proper use and possible adverse effects of benzoyl peroxide.  All of the patient's questions and concerns were addressed. Itraconazole Counseling:  I discussed with the patient the risks of itraconazole including but not limited to liver damage, nausea/vomiting, neuropathy, and severe allergy.  The patient understands that this medication is best absorbed when taken with acidic beverages such as non-diet cola or ginger ale.  The patient understands that monitoring is required including baseline LFTs and repeat LFTs at intervals.  The patient understands that they are to contact us or the primary physician if concerning signs are noted. Niacinamide Pregnancy And Lactation Text: These medications are considered safe during pregnancy. Isotretinoin Counseling: Patient should get monthly blood tests, not donate blood, not drive at night if vision affected, not share medication, and not undergo elective surgery for 6 months after tx completed. Side effects reviewed, pt to contact office should one occur. Opioid Pregnancy And Lactation Text: These medications can lead to premature delivery and should be avoided during pregnancy. These medications are also present in breast milk in small amounts. Adbry Pregnancy And Lactation Text: It is unknown if this medication will adversely affect pregnancy or breast feeding. Protopic Pregnancy And Lactation Text: This medication is Pregnancy Category C. It is unknown if this medication is excreted in breast milk when applied topically. Propranolol Pregnancy And Lactation Text: This medication is Pregnancy Category C and it isn't known if it is safe during pregnancy. It is excreted in breast milk. Topical Retinoid counseling:  Patient advised to apply a pea-sized amount only at bedtime and wait 30 minutes after washing their face before applying.  If too drying, patient may add a non-comedogenic moisturizer. The patient verbalized understanding of the proper use and possible adverse effects of retinoids.  All of the patient's questions and concerns were addressed. Rifampin Pregnancy And Lactation Text: This medication is Pregnancy Category C and it isn't know if it is safe during pregnancy. It is also excreted in breast milk and should not be used if you are breast feeding. Enbrel Counseling:  I discussed with the patient the risks of etanercept including but not limited to myelosuppression, immunosuppression, autoimmune hepatitis, demyelinating diseases, lymphoma, and infections.  The patient understands that monitoring is required including a PPD at baseline and must alert us or the primary physician if symptoms of infection or other concerning signs are noted. Topical Metronidazole Pregnancy And Lactation Text: This medication is Pregnancy Category B and considered safe during pregnancy.  It is also considered safe to use while breastfeeding. Elidel Counseling: Patient may experience a mild burning sensation during topical application. Elidel is not approved in children less than 2 years of age. There have been case reports of hematologic and skin malignancies in patients using topical calcineurin inhibitors although causality is questionable. Doxepin Counseling:  Patient advised that the medication is sedating and not to drive a car after taking this medication. Patient informed of potential adverse effects including but not limited to dry mouth, urinary retention, and blurry vision.  The patient verbalized understanding of the proper use and possible adverse effects of doxepin.  All of the patient's questions and concerns were addressed. Klisyri Pregnancy And Lactation Text: It is unknown if this medication can harm a developing fetus or if it is excreted in breast milk. VTAMA Counseling: I discussed with the patient that VTAMA is not for use in the eyes, mouth or mouth. They should call the office if they develop any signs of allergic reactions to VTAMA. The patient verbalized understanding of the proper use and possible adverse effects of VTAMA.  All of the patient's questions and concerns were addressed. Sotyktu Counseling:  I discussed the most common side effects of Sotyktu including: common cold, sore throat, sinus infections, cold sores, canker sores, folliculitis, and acne.? I also discussed more serious side effects of Sotyktu including but not limited to: serious allergic reactions; increased risk for infections such as TB; cancers such as lymphomas; rhabdomyolysis and elevated CPK; and elevated triglycerides and liver enzymes.? Dapsone Pregnancy And Lactation Text: This medication is Pregnancy Category C and is not considered safe during pregnancy or breast feeding. Cellcept Counseling:  I discussed with the patient the risks of mycophenolate mofetil including but not limited to infection/immunosuppression, GI upset, hypokalemia, hypercholesterolemia, bone marrow suppression, lymphoproliferative disorders, malignancy, GI ulceration/bleed/perforation, colitis, interstitial lung disease, kidney failure, progressive multifocal leukoencephalopathy, and birth defects.  The patient understands that monitoring is required including a baseline creatinine and regular CBC testing. In addition, patient must alert us immediately if symptoms of infection or other concerning signs are noted. Erythromycin Pregnancy And Lactation Text: This medication is Pregnancy Category B and is considered safe during pregnancy. It is also excreted in breast milk. Spevigo Counseling: I discussed with the patient the risks of Spevigo including but not limited to fatigue, nasuea, vomiting, headache, pruritus, urinary tract infection, an infusion related reactions.  The patient understands that monitoring is required including screening for tuberculosis at baseline and yearly screening thereafter while continuing Spevigo therapy. They should contact us if symptoms of infection or other concerning signs are noted. Griseofulvin Pregnancy And Lactation Text: This medication is Pregnancy Category X and is known to cause serious birth defects. It is unknown if this medication is excreted in breast milk but breast feeding should be avoided. Niacinamide Counseling: I recommended taking niacin or niacinamide, also know as vitamin B3, twice daily. Recent evidence suggests that taking vitamin B3 (500 mg twice daily) can reduce the risk of actinic keratoses and non-melanoma skin cancers. Side effects of vitamin B3 include flushing and headache. Bactrim Counseling:  I discussed with the patient the risks of sulfa antibiotics including but not limited to GI upset, allergic reaction, drug rash, diarrhea, dizziness, photosensitivity, and yeast infections.  Rarely, more serious reactions can occur including but not limited to aplastic anemia, agranulocytosis, methemoglobinemia, blood dyscrasias, liver or kidney failure, lung infiltrates or desquamative/blistering drug rashes. Isotretinoin Pregnancy And Lactation Text: This medication is Pregnancy Category X and is considered extremely dangerous during pregnancy. It is unknown if it is excreted in breast milk. Adbry Counseling: I discussed with the patient the risks of tralokinumab including but not limited to eye infection and irritation, cold sores, injection site reactions, worsening of asthma, allergic reactions and increased risk of parasitic infection.  Live vaccines should be avoided while taking tralokinumab. The patient understands that monitoring is required and they must alert us or the primary physician if symptoms of infection or other concerning signs are noted. Protopic Counseling: Patient may experience a mild burning sensation during topical application. Protopic is not approved in children less than 2 years of age. There have been case reports of hematologic and skin malignancies in patients using topical calcineurin inhibitors although causality is questionable. SSKI Counseling:  I discussed with the patient the risks of SSKI including but not limited to thyroid abnormalities, metallic taste, GI upset, fever, headache, acne, arthralgias, paraesthesias, lymphadenopathy, easy bleeding, arrhythmias, and allergic reaction. Spironolactone Counseling: Patient advised regarding risks of diarrhea, abdominal pain, hyperkalemia, birth defects (for female patients), liver toxicity and renal toxicity. The patient may need blood work to monitor liver and kidney function and potassium levels while on therapy. The patient verbalized understanding of the proper use and possible adverse effects of spironolactone.  All of the patient's questions and concerns were addressed. Rifampin Counseling: I discussed with the patient the risks of rifampin including but not limited to liver damage, kidney damage, red-orange body fluids, nausea/vomiting and severe allergy. Soolantra Pregnancy And Lactation Text: This medication is Pregnancy Category C. This medication is considered safe during breast feeding. Dupixent Pregnancy And Lactation Text: This medication likely crosses the placenta but the risk for the fetus is uncertain. This medication is excreted in breast milk. Infliximab Counseling:  I discussed with the patient the risks of infliximab including but not limited to myelosuppression, immunosuppression, autoimmune hepatitis, demyelinating diseases, lymphoma, and serious infections.  The patient understands that monitoring is required including a PPD at baseline and must alert us or the primary physician if symptoms of infection or other concerning signs are noted. Topical Metronidazole Counseling: Metronidazole is a topical antibiotic medication. You may experience burning, stinging, redness, or allergic reactions.  Please call our office if you develop any problems from using this medication. Olanzapine Pregnancy And Lactation Text: This medication is pregnancy category C.   There are no adequate and well controlled trials with olanzapine in pregnant females.  Olanzapine should be used during pregnancy only if the potential benefit justifies the potential risk to the fetus.   In a study in lactating healthy women, olanzapine was excreted in breast milk.  It is recommended that women taking olanzapine should not breast feed. Dapsone Counseling: I discussed with the patient the risks of dapsone including but not limited to hemolytic anemia, agranulocytosis, rashes, methemoglobinemia, kidney failure, peripheral neuropathy, headaches, GI upset, and liver toxicity.  Patients who start dapsone require monitoring including baseline LFTs and weekly CBCs for the first month, then every month thereafter.  The patient verbalized understanding of the proper use and possible adverse effects of dapsone.  All of the patient's questions and concerns were addressed. Tremfya Counseling: I discussed with the patient the risks of guselkumab including but not limited to immunosuppression, serious infections, and drug reactions.  The patient understands that monitoring is required including a PPD at baseline and must alert us or the primary physician if symptoms of infection or other concerning signs are noted. Winlevi Pregnancy And Lactation Text: This medication is considered safe during pregnancy and breastfeeding. Sotyktu Pregnancy And Lactation Text: There is insufficient data to evaluate whether or not Sotyktu is safe to use during pregnancy.? ?It is not known if Sotyktu passes into breast milk and whether or not it is safe to use when breastfeeding.?? Klisyri Counseling:  I discussed with the patient the risks of Klisyri including but not limited to erythema, scaling, itching, weeping, crusting, and pain. Low Dose Naltrexone Pregnancy And Lactation Text: Naltrexone is pregnancy category C.  There have been no adequate and well-controlled studies in pregnant women.  It should be used in pregnancy only if the potential benefit justifies the potential risk to the fetus.   Limited data indicates that naltrexone is minimally excreted into breastmilk. Bactrim Pregnancy And Lactation Text: This medication is Pregnancy Category D and is known to cause fetal risk.  It is also excreted in breast milk. High Dose Vitamin A Counseling: Side effects reviewed, pt to contact office should one occur. Griseofulvin Counseling:  I discussed with the patient the risks of griseofulvin including but not limited to photosensitivity, cytopenia, liver damage, nausea/vomiting and severe allergy.  The patient understands that this medication is best absorbed when taken with a fatty meal (e.g., ice cream or french fries). Cibinqo Counseling: I discussed with the patient the risks of Cibinqo therapy including but not limited to common cold, nausea, headache, cold sores, increased blood CPK levels, dizziness, UTIs, fatigue, acne, and vomitting. Live vaccines should be avoided.  This medication has been linked to serious infections; higher rate of mortality; malignancy and lymphoproliferative disorders; major adverse cardiovascular events; thrombosis; thrombocytopenia and lymphopenia; lipid elevations; and retinal detachment. Birth Control Pills Pregnancy And Lactation Text: This medication should be avoided if pregnant and for the first 30 days post-partum. Soolantra Counseling: I discussed with the patients the risks of topial Soolantra. This is a medicine which decreases the number of mites and inflammation in the skin. You experience burning, stinging, eye irritation or allergic reactions.  Please call our office if you develop any problems from using this medication. Dupixent Counseling: I discussed with the patient the risks of dupilumab including but not limited to eye infection and irritation, cold sores, injection site reactions, worsening of asthma, allergic reactions and increased risk of parasitic infection.  Live vaccines should be avoided while taking dupilumab. Dupilumab will also interact with certain medications such as warfarin and cyclosporine. The patient understands that monitoring is required and they must alert us or the primary physician if symptoms of infection or other concerning signs are noted. 5-Fu Counseling: 5-Fluorouracil Counseling:  I discussed with the patient the risks of 5-fluorouracil including but not limited to erythema, scaling, itching, weeping, crusting, and pain. Sski Pregnancy And Lactation Text: This medication is Pregnancy Category D and isn't considered safe during pregnancy. It is excreted in breast milk. Drysol Counseling:  I discussed with the patient the risks of drysol/aluminum chloride including but not limited to skin rash, itching, irritation, burning. Odomzo Counseling- I discussed with the patient the risks of Odomzo including but not limited to nausea, vomiting, diarrhea, constipation, weight loss, changes in the sense of taste, decreased appetite, muscle spasms, and hair loss.  The patient verbalized understanding of the proper use and possible adverse effects of Odomzo.  All of the patient's questions and concerns were addressed. Oral Minoxidil Counseling- I discussed with the patient the risks of oral minoxidil including but not limited to shortness of breath, swelling of the feet or ankles, dizziness, lightheadedness, unwanted hair growth and allergic reaction.  The patient verbalized understanding of the proper use and possible adverse effects of oral minoxidil.  All of the patient's questions and concerns were addressed. Cantharidin Pregnancy And Lactation Text: This medication has not been proven safe during pregnancy. It is unknown if this medication is excreted in breast milk. Cyclophosphamide Counseling:  I discussed with the patient the risks of cyclophosphamide including but not limited to hair loss, hormonal abnormalities, decreased fertility, abdominal pain, diarrhea, nausea and vomiting, bone marrow suppression and infection. The patient understands that monitoring is required while taking this medication. Skyrizi Counseling: I discussed with the patient the risks of risankizumab-rzaa including but not limited to immunosuppression, and serious infections.  The patient understands that monitoring is required including a PPD at baseline and must alert us or the primary physician if symptoms of infection or other concerning signs are noted. Winlevi Counseling:  I discussed with the patient the risks of topical clascoterone including but not limited to erythema, scaling, itching, and stinging. Patient voiced their understanding. Cimetidine Counseling:  I discussed with the patient the risks of Cimetidine including but not limited to gynecomastia, headache, diarrhea, nausea, drowsiness, arrhythmias, pancreatitis, skin rashes, psychosis, bone marrow suppression and kidney toxicity. Low Dose Naltrexone Counseling- I discussed with the patient the potential risks and side effects of low dose naltrexone including but not limited to: more vivid dreams, headaches, nausea, vomiting, abdominal pain, fatigue, dizziness, and anxiety. Xeljanz Counseling: I discussed with the patient the risks of Xeljanz therapy including increased risk of infection, liver issues, headache, diarrhea, or cold symptoms. Live vaccines should be avoided. They were instructed to call if they have any problems. Cephalexin Counseling: I counseled the patient regarding use of cephalexin as an antibiotic for prophylactic and/or therapeutic purposes. Cephalexin (commonly prescribed under brand name Keflex) is a cephalosporin antibiotic which is active against numerous classes of bacteria, including most skin bacteria. Side effects may include nausea, diarrhea, gastrointestinal upset, rash, hives, yeast infections, and in rare cases, hepatitis, kidney disease, seizures, fever, confusion, neurologic symptoms, and others. Patients with severe allergies to penicillin medications are cautioned that there is about a 10% incidence of cross-reactivity with cephalosporins. When possible, patients with penicillin allergies should use alternatives to cephalosporins for antibiotic therapy. Picato Counseling:  I discussed with the patient the risks of Picato including but not limited to erythema, scaling, itching, weeping, crusting, and pain. Cibinqo Pregnancy And Lactation Text: It is unknown if this medication will adversely affect pregnancy or breast feeding.  You should not take this medication if you are currently pregnant or planning a pregnancy or while breastfeeding. Birth Control Pills Counseling: Birth Control Pill Counseling: I discussed with the patient the potential side effects of OCPs including but not limited to increased risk of stroke, heart attack, thrombophlebitis, deep venous thrombosis, hepatic adenomas, breast changes, GI upset, headaches, and depression.  The patient verbalized understanding of the proper use and possible adverse effects of OCPs. All of the patient's questions and concerns were addressed. Quinolones Counseling:  I discussed with the patient the risks of fluoroquinolones including but not limited to GI upset, allergic reaction, drug rash, diarrhea, dizziness, photosensitivity, yeast infections, liver function test abnormalities, tendonitis/tendon rupture. High Dose Vitamin A Pregnancy And Lactation Text: High dose vitamin A therapy is contraindicated during pregnancy and breast feeding. Cantharidin Counseling:  I discussed with the patient the risks of Cantharidin including but not limited to pain, redness, burning, itching, and blistering. Solaraze Pregnancy And Lactation Text: This medication is Pregnancy Category B and is considered safe. There is some data to suggest avoiding during the third trimester. It is unknown if this medication is excreted in breast milk. Thalidomide Counseling: I discussed with the patient the risks of thalidomide including but not limited to birth defects, anxiety, weakness, chest pain, dizziness, cough and severe allergy.

## (undated) DEVICE — PACK OPHTHALMIC

## (undated) DEVICE — SOL IRR BSS OPHTH 500ML STRL

## (undated) DEVICE — SOL IRR STRL WATER 500ML

## (undated) DEVICE — SOL BSS BALANCED SALT

## (undated) DEVICE — SYR LUER LOCK 1CC

## (undated) DEVICE — SEE L#120831

## (undated) DEVICE — SPONGE WEC CEL SPEARS

## (undated) DEVICE — GLOVE BIOGEL ECLIPSE SZ 6.5

## (undated) DEVICE — PACK EYE CUSTOM COVINGTON.

## (undated) DEVICE — SEE MEDLINE ITEM 157128

## (undated) DEVICE — CANNULA ANTERIOR CHAMBER 30G

## (undated) DEVICE — SOL BETADINE 5%

## (undated) DEVICE — SYR 3CC LUER LOC

## (undated) DEVICE — GARTER EYE ADULT